# Patient Record
Sex: MALE | Race: WHITE | NOT HISPANIC OR LATINO | Employment: OTHER | URBAN - METROPOLITAN AREA
[De-identification: names, ages, dates, MRNs, and addresses within clinical notes are randomized per-mention and may not be internally consistent; named-entity substitution may affect disease eponyms.]

---

## 2017-03-24 ENCOUNTER — DOCUMENTATION ONLY (OUTPATIENT)
Dept: FAMILY MEDICINE | Facility: CLINIC | Age: 82
End: 2017-03-24

## 2017-03-24 ENCOUNTER — OFFICE VISIT (OUTPATIENT)
Dept: FAMILY MEDICINE | Facility: CLINIC | Age: 82
End: 2017-03-24
Payer: MEDICARE

## 2017-03-24 VITALS
BODY MASS INDEX: 27.54 KG/M2 | WEIGHT: 181.69 LBS | HEIGHT: 68 IN | SYSTOLIC BLOOD PRESSURE: 125 MMHG | TEMPERATURE: 98 F | OXYGEN SATURATION: 97 % | DIASTOLIC BLOOD PRESSURE: 68 MMHG | HEART RATE: 63 BPM | RESPIRATION RATE: 16 BRPM

## 2017-03-24 DIAGNOSIS — H61.21 IMPACTED CERUMEN OF RIGHT EAR: ICD-10-CM

## 2017-03-24 DIAGNOSIS — N40.0 ENLARGED PROSTATE ON RECTAL EXAMINATION: ICD-10-CM

## 2017-03-24 DIAGNOSIS — E03.9 HYPOTHYROIDISM, UNSPECIFIED TYPE: ICD-10-CM

## 2017-03-24 DIAGNOSIS — I10 ESSENTIAL HYPERTENSION: Primary | ICD-10-CM

## 2017-03-24 DIAGNOSIS — E78.5 HYPERLIPIDEMIA, UNSPECIFIED HYPERLIPIDEMIA TYPE: ICD-10-CM

## 2017-03-24 DIAGNOSIS — R09.82 POSTNASAL DRIP: ICD-10-CM

## 2017-03-24 PROCEDURE — 1126F AMNT PAIN NOTED NONE PRSNT: CPT | Mod: S$GLB,,, | Performed by: INTERNAL MEDICINE

## 2017-03-24 PROCEDURE — 3078F DIAST BP <80 MM HG: CPT | Mod: S$GLB,,, | Performed by: INTERNAL MEDICINE

## 2017-03-24 PROCEDURE — 1159F MED LIST DOCD IN RCRD: CPT | Mod: S$GLB,,, | Performed by: INTERNAL MEDICINE

## 2017-03-24 PROCEDURE — 3074F SYST BP LT 130 MM HG: CPT | Mod: S$GLB,,, | Performed by: INTERNAL MEDICINE

## 2017-03-24 PROCEDURE — 1157F ADVNC CARE PLAN IN RCRD: CPT | Mod: S$GLB,,, | Performed by: INTERNAL MEDICINE

## 2017-03-24 PROCEDURE — 1160F RVW MEDS BY RX/DR IN RCRD: CPT | Mod: S$GLB,,, | Performed by: INTERNAL MEDICINE

## 2017-03-24 PROCEDURE — 69210 REMOVE IMPACTED EAR WAX UNI: CPT | Mod: S$GLB,,, | Performed by: INTERNAL MEDICINE

## 2017-03-24 PROCEDURE — 99203 OFFICE O/P NEW LOW 30 MIN: CPT | Mod: 25,S$GLB,, | Performed by: INTERNAL MEDICINE

## 2017-03-24 PROCEDURE — 99499 UNLISTED E&M SERVICE: CPT | Mod: S$GLB,,, | Performed by: INTERNAL MEDICINE

## 2017-03-24 RX ORDER — VITAMIN E 268 MG
400 CAPSULE ORAL DAILY
COMMUNITY
End: 2018-10-01 | Stop reason: ALTCHOICE

## 2017-03-24 RX ORDER — FLUTICASONE PROPIONATE 50 MCG
2 SPRAY, SUSPENSION (ML) NASAL DAILY
Qty: 16 G | Refills: 2 | Status: SHIPPED | OUTPATIENT
Start: 2017-03-24 | End: 2017-08-23 | Stop reason: SDUPTHER

## 2017-03-24 RX ORDER — ATORVASTATIN CALCIUM 10 MG/1
1 TABLET, FILM COATED ORAL NIGHTLY
COMMUNITY
Start: 2017-03-12 | End: 2017-10-04

## 2017-03-24 NOTE — MR AVS SNAPSHOT
[facility]  [address]  [place] [address]  Phone: [phone]  Fax: [phone]                  Adriano Borjas   3/24/2017 1:40 PM   Office Visit    Description:  Male : 1935   Provider:  Bud Kamara MD   Department:  [facility]           Reason for Visit     Establish Care           Diagnoses this Visit        Comments    Essential hypertension    -  Primary     Hyperlipidemia, unspecified hyperlipidemia type         Hypothyroidism, unspecified type         Enlarged prostate on rectal examination         Impacted cerumen of right ear         Postnasal drip                To Do List           Future Appointments        Provider Department Dept Phone    2017 1:40 PM Bud Kamara MD [facility] [phone]      Goals (5 Years of Data)     None      Follow-Up and Disposition     Return in about 3 months (around 2017).    Follow-up and Disposition History       These Medications        Disp Refills Start End    fluticasone (FLONASE) 50 mcg/actuation nasal spray 16 g 2 3/24/2017     2 sprays by Each Nare route once daily. - Each Nare    Pharmacy: [facility] [address] [place] [address] Ph #: [phone]         [facility]     [facility] Care [facility] -  Assistance  Registered nurses in the [facility] On Call Center provide clinical advisement, health education, appointment booking, and other advisory services.  Call for this free service at [phone].             Medications           Message regarding Medications     Verify the changes and/or additions to your medication regime listed below are the same as discussed with your clinician today.  If any of these changes or additions are incorrect, please notify your healthcare provider.        START taking these NEW medications        Refills    fluticasone (FLONASE) 50 mcg/actuation nasal spray 2    Si sprays by Each Nare  "route once daily.    Class: Normal    Route: Each Nare      STOP taking these medications     escitalopram oxalate (LEXAPRO) 20 MG tablet     hydrochlorothiazide (HYDRODIURIL) 12.5 MG Tab     metronidazole 1 % GlwP     pravastatin (PRAVACHOL) 20 MG tablet     PROCTOSOL HC 2.5 % rectal cream     tamsulosin (FLOMAX) 0.4 mg Cp24 Take 1 capsule (0.4 mg total) by mouth once daily.    dronedarone (MULTAQ) 400 mg Tab Take 200 mg by mouth 2 (two) times daily with meals.           Verify that the below list of medications is an accurate representation of the medications you are currently taking.  If none reported, the list may be blank. If incorrect, please contact your healthcare provider. Carry this list with you in case of emergency.           Current Medications     aspirin (ECOTRIN) 81 MG EC tablet Take 1 tablet (81 mg total) by mouth once daily.    atorvastatin (LIPITOR) 10 MG tablet Take 1 tablet by mouth nightly.    CALCIUM CARB/MAGNESIUM OXID/D3 (CALCIUM CARB-MAG OXIDE-VIT D3 ORAL) Take by mouth once daily.    levothyroxine (SYNTHROID) 75 MCG tablet Take 75 mcg by mouth once daily.      losartan (COZAAR) 25 MG tablet     vitamin E 400 UNIT capsule Take 400 Units by mouth once daily.    fluticasone (FLONASE) 50 mcg/actuation nasal spray 2 sprays by Each Nare route once daily.           Clinical Reference Information           Your Vitals Were     BP Pulse Temp Resp Height Weight    125/68 (BP Location: Left arm, Patient Position: Sitting, BP Method: Automatic) 63 98.2 °F (36.8 °C) (Oral) 16 5' 8" (1.727 m) 82.4 kg (181 lb 10.5 oz)    SpO2 BMI             97% 27.62 kg/m2         Blood Pressure          Most Recent Value    BP  125/68      Allergies as of 3/24/2017     Ciprofloxacin    Codeine    Sulfa (Sulfonamide Antibiotics)      Immunizations Administered on Date of Encounter - 3/24/2017     None      Orders Placed During Today's Visit     Future Labs/Procedures Expected by Expires    CBC auto differential  " 3/24/2017 3/25/2018    Comprehensive metabolic panel  3/24/2017 3/25/2018    Lipid panel  3/24/2017 3/25/2018    PSA, Screening  3/24/2017 3/25/2018    TSH  3/24/2017 3/24/2018      Instructions    Do not use a Q-tip.  You need to clear it take a mixture of peroxide, rubbing alcohol and mineral oil and put about 5 drops into your ear and leave it in for 10 minutes similar drain out.    Low-Salt Diet  This diet removes foods that are high in salt. It also limits the amount of salt you use when cooking. It is most often used for people with high blood pressure, edema (fluid retention), and kidney, liver, or heart disease.  Table salt contains the mineral sodium. Your body needs sodium to work normally. But too much sodium can make your health problems worse. Your healthcare provider is recommending a low-salt (also called low-sodium) diet for you. Your total daily allowance of salt is 1,500 to 2,300 milligrams (mg). It is less than 1 teaspoon of table salt. This means you can have only about 500 to 700 mg of sodium at each meal. People with certain health problems should limit salt intake to the lower end of the recommended range.    When you cook, dont add much salt. If you can cook without using salt, even better. Dont add salt to your food at the table.  When shopping, read food labels. Salt is often called sodium on the label. Choose foods that are salt-free, low salt, or very low salt. Note that foods with reduced salt may not lower your salt intake enough.    Beans, potatoes, and pasta  Ok: Dry beans, split peas, lentils, potatoes, rice, macaroni, pasta, spaghetti without added salt  Avoid: Potato chips, tortilla chips, and similar products  Breads and cereals  Ok: Low-sodium breads, rolls, cereals, and cakes; low-salt crackers, matzo crackers  Avoid: Salted crackers, pretzels, popcorn, Armenian toast, pancakes, muffins  Dairy  Ok: Milk, chocolate milk, hot chocolate mix, low-salt cheeses, and yogurt  Avoid:  Processed cheese and cheese spreads; Roquefort, Camembert, and cottage cheese; buttermilk, instant breakfast drink  Desserts  Ok: Ice cream, frozen yogurt, juice bars, gelatin, cookies and pies, sugar, honey, jelly, hard candy  Avoid: Most pies, cakes and cookies prepared or processed with salt; instant pudding  Drinks  Ok: Tea, coffee, fizzy (carbonated) drinks, juices  Avoid: Flavored coffees, electrolyte replacement drinks, sports drinks  Meats  Ok: All fresh meat, fish, poultry, low-salt tuna, eggs, egg substitute  Avoid: Smoked, pickled, brine-cured, or salted meats and fish. This includes valverde, chipped beef, corned beef, hot dogs, deli meats, ham, kosher meats, salt pork, sausage, canned tuna, salted codfish, smoked salmon, herring, sardines, or anchovies.  Seasonings and spices  Ok: Most seasonings are okay. Good substitutes for salt include: fresh herb blends, hot sauce, lemon, garlic, moralez, vinegar, dry mustard, parsley, cilantro, horseradish, tomato paste, regular margarine, mayonnaise, unsalted butter, cream cheese, vegetable oil, cream, low-salt salad dressing and gravy.  Avoid: Regular ketchup, relishes, pickles, soy sauce, teriyaki sauce, Worcestershire sauce, BBQ sauce, tartar sauce, meat tenderizer, chili sauce, regular gravy, regular salad dressing, salted butter  Soups  Ok: Low-salt soups and broths made with allowed foods  Avoid: Bouillon cubes, soups with smoked or salted meats, regular soup and broth  Vegetables  Ok: Most vegetables are okay; also low-salt tomato and vegetable juices  Avoid: Sauerkraut and other brine-soaked vegetables; pickles and other pickled vegetables; tomato juice, olives  © 8662-3073 [employer]. [address] [place] [address], [place] [address]. All rights reserved. This information is not intended as a substitute for professional medical care. Always follow your healthcare professional's instructions.  .         Language Assistance Services     ATTENTION:  Language assistance services are available, free of charge. Please call [phone].      ATENCIÓN: Si habla brenda, tiene a damon disposición servicios gratuitos de asistencia lingüística. Llame al [phone].     CHÚ Ý: N?u b?n nói Ti?ng Vi?t, có các d?ch v? h? tr? ngôn ng? mi?n phí dành cho b?n. G?i s? [phone].         [facility] complies with applicable Federal civil rights laws and does not discriminate on the basis of race, color, national origin, age, disability, or sex.

## 2017-03-24 NOTE — PATIENT INSTRUCTIONS
Do not use a Q-tip.  You need to clear it take a mixture of peroxide, rubbing alcohol and mineral oil and put about 5 drops into your ear and leave it in for 10 minutes similar drain out.    Low-Salt Diet  This diet removes foods that are high in salt. It also limits the amount of salt you use when cooking. It is most often used for people with high blood pressure, edema (fluid retention), and kidney, liver, or heart disease.  Table salt contains the mineral sodium. Your body needs sodium to work normally. But too much sodium can make your health problems worse. Your healthcare provider is recommending a low-salt (also called low-sodium) diet for you. Your total daily allowance of salt is 1,500 to 2,300 milligrams (mg). It is less than 1 teaspoon of table salt. This means you can have only about 500 to 700 mg of sodium at each meal. People with certain health problems should limit salt intake to the lower end of the recommended range.    When you cook, dont add much salt. If you can cook without using salt, even better. Dont add salt to your food at the table.  When shopping, read food labels. Salt is often called sodium on the label. Choose foods that are salt-free, low salt, or very low salt. Note that foods with reduced salt may not lower your salt intake enough.    Beans, potatoes, and pasta  Ok: Dry beans, split peas, lentils, potatoes, rice, macaroni, pasta, spaghetti without added salt  Avoid: Potato chips, tortilla chips, and similar products  Breads and cereals  Ok: Low-sodium breads, rolls, cereals, and cakes; low-salt crackers, matzo crackers  Avoid: Salted crackers, pretzels, popcorn, [place]serratian toast, pancakes, muffins  Dairy  Ok: Milk, chocolate milk, hot chocolate mix, low-salt cheeses, and yogurt  Avoid: Processed cheese and cheese spreads; Roquefort, Camembert, and cottage cheese; buttermilk, instant breakfast drink  Desserts  Ok: Ice cream, frozen yogurt, juice bars, gelatin, cookies and pies, sugar,  honey, jelly, hard candy  Avoid: Most pies, cakes and cookies prepared or processed with salt; instant pudding  Drinks  Ok: Tea, coffee, fizzy (carbonated) drinks, juices  Avoid: Flavored coffees, electrolyte replacement drinks, sports drinks  Meats  Ok: All fresh meat, fish, poultry, low-salt tuna, eggs, egg substitute  Avoid: Smoked, pickled, brine-cured, or salted meats and fish. This includes valverde, chipped beef, corned beef, hot dogs, deli meats, ham, kosher meats, salt pork, sausage, canned tuna, salted codfish, smoked salmon, herring, sardines, or anchovies.  Seasonings and spices  Ok: Most seasonings are okay. Good substitutes for salt include: fresh herb blends, hot sauce, lemon, garlic, moralez, vinegar, dry mustard, parsley, cilantro, horseradish, tomato paste, regular margarine, mayonnaise, unsalted butter, cream cheese, vegetable oil, cream, low-salt salad dressing and gravy.  Avoid: Regular ketchup, relishes, pickles, soy sauce, teriyaki sauce, Worcestershire sauce, BBQ sauce, tartar sauce, meat tenderizer, chili sauce, regular gravy, regular salad dressing, salted butter  Soups  Ok: Low-salt soups and broths made with allowed foods  Avoid: Bouillon cubes, soups with smoked or salted meats, regular soup and broth  Vegetables  Ok: Most vegetables are okay; also low-salt tomato and vegetable juices  Avoid: Sauerkraut and other brine-soaked vegetables; pickles and other pickled vegetables; tomato juice, olives  © 0557-7265 [employer]. [address] [place] [address], [place] [address]. All rights reserved. This information is not intended as a substitute for professional medical care. Always follow your healthcare professional's instructions.  .

## 2017-03-24 NOTE — PROGRESS NOTES
Health Maintenance Due   Topic Date Due    TETANUS VACCINE  05/02/1955    Zoster Vaccine  05/02/1997    Pneumococcal (65+) (1 of 2 - PCV13) 05/02/2002    Lipid Panel  09/02/2015    Influenza Vaccine  08/01/2016

## 2017-03-24 NOTE — PROGRESS NOTES
Subjective:       Patient ID: Adriano Borjas is a 81 y.o. male.    Chief Complaint: Establish Care    HPI   The patient has a history of elevated PSA about 5.  He seen urologist they did not biopsy it.  He's asymptomatic without nocturia or urinary frequency.    The patient is a history of a TIA 2 years ago.  He has no further symptoms.  He at that time had atrial fibrillation but is been taken off medications because it has not recurred.  He is however on aspirin    Patient has decreased hearing from the right ear for a month.  No pain.  He tries to clean his ears out with Q-tips.    Patient reports that he feels like there is something in his throat most the time; he has to clear his throat frequently.      CHIEF COMPLAINT: Hyperlipidemia. cholesterol screening: no.   HPI:     ONSET:    MODIFIERS/TREATMENTS: . Taking medications: yes.  Lipitor . Non-compliance with following diet: no. .     SYMPTOMS/RELATED:Possible medication side effects include:   Myalgia: no.  .     REVIEW OF SYMPTOMS: past weights:   Wt Readings from Last 1 Encounters:   03/24/17 1353 82.4 kg (181 lb 10.5 oz)                                                     Last lipids: total   Lab Results   Component Value Date    CHOL 221 (H) 09/02/2014                                                                     HDL   Lab Results   Component Value Date    HDL 33 (L) 09/02/2014    HDL 38 (L) 11/30/2012    HDL 38 (L) 11/29/2012                                                                     LDL   Lab Results   Component Value Date    LDLCALC 151.2 09/02/2014    LDLCALC 152 11/30/2012    LDLCALC 142 11/29/2012                                                                     TRIG   Lab Results   Component Value Date    TRIG 184 (H) 09/02/2014                            CHIEF COMPLAINT: Hypertension  HPI:     ONSET:      QUALITY/COURSE:   Controlled:  yes     INTENSITY/SEVERITY:  Average blood pressure is 125/75.     MODIFIERS/TREATMENTS:   "Taking medications: yes. .High sodium intake: no. alcohol: no      The following symptoms are positive only if BOLDED, otherwise are negative.      SYMPTOMS/RELATED: Possible medication side effects include:   Depression..  . Cough. . Constipation.    REVIEW OF SYMPTOMS: . Weight_loss . Weight_gain . Leg_cramps .Potency_problems .    TARGET ORGAN DAMAGE:: angina/ prior myocardial infarction, chronic kidney disease, heart failure, left ventricular hypertrophy, peripheral artery disease, prior coronary revascularization, retinopathy, stroke. transient ischemic attack.                                                       Review of Systems   Constitutional: Negative for fatigue, fever and unexpected weight change.   HENT: Negative for dental problem, hearing loss, nosebleeds, rhinorrhea, tinnitus, trouble swallowing and voice change.    Eyes: Negative for itching and visual disturbance.   Respiratory: Negative for cough, shortness of breath and wheezing.    Cardiovascular: Negative for chest pain and palpitations.   Gastrointestinal: Negative for abdominal pain, blood in stool, constipation, diarrhea, nausea and vomiting.   Endocrine: Negative for cold intolerance, heat intolerance, polydipsia and polyphagia.   Genitourinary: Negative for difficulty urinating and dysuria.   Musculoskeletal: Negative for arthralgias.   Allergic/Immunologic: Negative for environmental allergies and immunocompromised state.   Neurological: Negative for dizziness, seizures, weakness, numbness and headaches.   Hematological: Does not bruise/bleed easily.   Psychiatric/Behavioral: Negative for agitation, dysphoric mood, sleep disturbance and suicidal ideas. The patient is not nervous/anxious.        Objective:      Vitals:    03/24/17 1353   BP: 125/68   Pulse: 63   Resp: 16   Temp: 98.2 °F (36.8 °C)   TempSrc: Oral   SpO2: 97%   Weight: 82.4 kg (181 lb 10.5 oz)   Height: 5' 8" (1.727 m)   PainSc: 0-No pain     Physical Exam "   Constitutional: He is oriented to person, place, and time. He appears well-developed and well-nourished.   HENT:   Head: Normocephalic and atraumatic.   Left Ear: External ear normal.   Nose: Nose normal.   Mouth/Throat: Oropharynx is clear and moist.   Right ear wax impaction   Eyes: Conjunctivae and EOM are normal. Pupils are equal, round, and reactive to light. No scleral icterus.   Neck: Normal range of motion. Neck supple. No thyromegaly present.   Cardiovascular: Normal rate, regular rhythm, normal heart sounds and intact distal pulses.  Exam reveals no friction rub.    No murmur heard.  Pulmonary/Chest: Effort normal and breath sounds normal. No respiratory distress. He has no wheezes. He has no rales. He exhibits no tenderness.   Abdominal: Soft. Bowel sounds are normal. He exhibits no distension. There is no tenderness.   Genitourinary: Rectum normal and penis normal.   Genitourinary Comments: Prostate is enlarged but no nodules.   Musculoskeletal: Normal range of motion. He exhibits no edema or deformity.   Lymphadenopathy:     He has no cervical adenopathy.   Neurological: He is oriented to person, place, and time. He has normal reflexes. He displays normal reflexes. No cranial nerve deficit. He exhibits normal muscle tone. Coordination normal.   Skin: Skin is warm and dry. No rash noted.   Psychiatric: He has a normal mood and affect. His behavior is normal. Judgment and thought content normal.   Nursing note and vitals reviewed.      procedure: Verbal consent given.  Wax removed from the right ear with an ear curette.  Tympanic membrane well visualized and was normal.  Hearing equal bilaterally afterwards..  Assessment:       1. Essential hypertension    2. Hyperlipidemia, unspecified hyperlipidemia type    3. Hypothyroidism, unspecified type    4. Enlarged prostate on rectal examination    5. Impacted cerumen of right ear    6. Postnasal drip          Plan:     Essential hypertension  -     CBC auto  differential; Future; Expected date: 3/24/17  -     Comprehensive metabolic panel; Future; Expected date: 3/24/17    Hyperlipidemia, unspecified hyperlipidemia type  -     Lipid panel; Future; Expected date: 3/24/17    Hypothyroidism, unspecified type  -     TSH; Future; Expected date: 3/24/17    Enlarged prostate on rectal examination  -     PSA, Screening; Future; Expected date: 3/24/17    Impacted cerumen of right ear    Postnasal drip  -     fluticasone (FLONASE) 50 mcg/actuation nasal spray; 2 sprays by Each Nare route once daily.  Dispense: 16 g; Refill: 2      No Follow-up on file.

## 2017-04-06 ENCOUNTER — HOSPITAL ENCOUNTER (EMERGENCY)
Facility: HOSPITAL | Age: 82
Discharge: HOME | End: 2017-04-06
Attending: EMERGENCY MEDICINE
Payer: MEDICARE

## 2017-04-06 VITALS
BODY MASS INDEX: 27.13 KG/M2 | WEIGHT: 179 LBS | RESPIRATION RATE: 16 BRPM | TEMPERATURE: 97 F | SYSTOLIC BLOOD PRESSURE: 112 MMHG | OXYGEN SATURATION: 96 % | DIASTOLIC BLOOD PRESSURE: 62 MMHG | HEIGHT: 68 IN | HEART RATE: 52 BPM

## 2017-04-06 DIAGNOSIS — R00.2 PALPITATIONS: Primary | ICD-10-CM

## 2017-04-06 LAB
ALBUMIN SERPL BCP-MCNC: 3.2 G/DL
ALP SERPL-CCNC: 57 U/L
ALT SERPL W/O P-5'-P-CCNC: 25 U/L
ANION GAP SERPL CALC-SCNC: 9 MMOL/L
AST SERPL-CCNC: 30 U/L
BASOPHILS # BLD AUTO: 0.1 K/UL
BASOPHILS NFR BLD: 0.6 %
BILIRUB SERPL-MCNC: 0.5 MG/DL
BUN SERPL-MCNC: 15 MG/DL
CALCIUM SERPL-MCNC: 8.8 MG/DL
CHLORIDE SERPL-SCNC: 104 MMOL/L
CO2 SERPL-SCNC: 27 MMOL/L
CREAT SERPL-MCNC: 1.1 MG/DL
DIFFERENTIAL METHOD: ABNORMAL
EOSINOPHIL # BLD AUTO: 0.3 K/UL
EOSINOPHIL NFR BLD: 3.6 %
ERYTHROCYTE [DISTWIDTH] IN BLOOD BY AUTOMATED COUNT: 13 %
EST. GFR  (AFRICAN AMERICAN): >60 ML/MIN/1.73 M^2
EST. GFR  (NON AFRICAN AMERICAN): >60 ML/MIN/1.73 M^2
GLUCOSE SERPL-MCNC: 101 MG/DL
HCT VFR BLD AUTO: 42 %
HGB BLD-MCNC: 13.5 G/DL
LYMPHOCYTES # BLD AUTO: 2.8 K/UL
LYMPHOCYTES NFR BLD: 34 %
MAGNESIUM SERPL-MCNC: 2.2 MG/DL
MCH RBC QN AUTO: 29.8 PG
MCHC RBC AUTO-ENTMCNC: 32.1 %
MCV RBC AUTO: 93 FL
MONOCYTES # BLD AUTO: 0.9 K/UL
MONOCYTES NFR BLD: 10.6 %
NEUTROPHILS # BLD AUTO: 4.2 K/UL
NEUTROPHILS NFR BLD: 51.2 %
PLATELET # BLD AUTO: 121 K/UL
PMV BLD AUTO: 12.7 FL
POTASSIUM SERPL-SCNC: 3.8 MMOL/L
PROT SERPL-MCNC: 6.5 G/DL
RBC # BLD AUTO: 4.51 M/UL
SODIUM SERPL-SCNC: 140 MMOL/L
WBC # BLD AUTO: 8.3 K/UL

## 2017-04-06 PROCEDURE — 80053 COMPREHEN METABOLIC PANEL: CPT

## 2017-04-06 PROCEDURE — 83735 ASSAY OF MAGNESIUM: CPT

## 2017-04-06 PROCEDURE — 99284 EMERGENCY DEPT VISIT MOD MDM: CPT

## 2017-04-06 PROCEDURE — 85025 COMPLETE CBC W/AUTO DIFF WBC: CPT

## 2017-04-06 PROCEDURE — 36415 COLL VENOUS BLD VENIPUNCTURE: CPT

## 2017-04-06 PROCEDURE — 93005 ELECTROCARDIOGRAM TRACING: CPT

## 2017-04-06 NOTE — ED PROVIDER NOTES
Encounter Date: 4/6/2017       History     Chief Complaint   Patient presents with    Irregular Heart Beat     pt reports feeling like heart is skipping a beat, denies cp, mild sob; stopped taking Multaq approx 1 week ago     Review of patient's allergies indicates:   Allergen Reactions    Ciprofloxacin Other (See Comments)     Shoulder and leg pain/cramping     Codeine Other (See Comments)     Pt cannot remember been a long time ago    Sulfa (sulfonamide antibiotics)      Allergic reaction to bactrim     HPI Comments: 81-year-old male with a past medical history of atrial fibrillation presents with a chief complaint of palpitations.  He reports that it started approximately 2 hours prior to arrival and it felt like his heart was skipping a beat.  He reports that it is associated with slight shortness of breath.  He denies any chest pain, nausea/vomiting, diarrhea, or abdominal pain.  The patient took a Multaq when his symptoms started.  This medication was recently stopped by his cardiologist, Dr. Piper approximately 10 days ago.  He reports his symptoms have now resolved since being in the Emergency Department.    The history is provided by the patient.     Past Medical History:   Diagnosis Date    A-fib     Elevated cholesterol     Essential hypertension     Kidney stone     passed on his own    Osteoarthritis     Thyroid disease      Past Surgical History:   Procedure Laterality Date    CYSTOSCOPY      EYE SURGERY       Family History   Problem Relation Age of Onset    Cancer Mother     Diabetes Mother     Hyperlipidemia Mother     Heart disease Father     Hyperlipidemia Father     Dementia Sister     Dementia Brother     Heart disease Brother     Migraines Daughter     Tremor Daughter     Tremor Son     Urolithiasis Neg Hx     Prostate cancer Neg Hx     Kidney cancer Neg Hx      Social History   Substance Use Topics    Smoking status: Former Smoker     Years: 15.00    Smokeless  tobacco: Never Used    Alcohol use No     Review of Systems   Constitutional: Negative for chills, diaphoresis, fatigue and fever.   HENT: Negative for congestion and rhinorrhea.    Respiratory: Positive for shortness of breath. Negative for cough.    Cardiovascular: Positive for palpitations. Negative for chest pain.   Gastrointestinal: Negative for abdominal pain, diarrhea, nausea and vomiting.   Genitourinary: Negative for dysuria, frequency and testicular pain.   Musculoskeletal: Negative for gait problem.   Skin: Negative for color change.   Neurological: Negative for dizziness and numbness.   Psychiatric/Behavioral: Negative for agitation and confusion.       Physical Exam   Initial Vitals   BP Pulse Resp Temp SpO2   04/06/17 0410 04/06/17 0410 04/06/17 0410 04/06/17 0410 04/06/17 0410   141/90 119 16 96.6 °F (35.9 °C) 99 %     Physical Exam    Nursing note and vitals reviewed.  Constitutional: He appears well-developed and well-nourished.   HENT:   Head: Normocephalic and atraumatic.   Eyes: EOM are normal. Pupils are equal, round, and reactive to light.   Neck: Neck supple.   Cardiovascular: Normal rate and regular rhythm.   Pulmonary/Chest: Breath sounds normal.   Abdominal: Soft. Bowel sounds are normal.   Musculoskeletal: Normal range of motion.   Neurological: He is alert and oriented to person, place, and time.   Skin: Skin is warm and dry.   Psychiatric: He has a normal mood and affect.         ED Course   Procedures  Labs Reviewed   COMPREHENSIVE METABOLIC PANEL   CBC W/ AUTO DIFFERENTIAL   MAGNESIUM             Medical Decision Making:   Initial Assessment:   81-year-old male presented with a chief complaint of palpitations.  Differential Diagnosis:   Initial differential diagnosis included but not limited to atrial fibrillation, atrial flutter, electrolyte abnormality, and SVT.  Clinical Tests:   Lab Tests: Ordered and Reviewed  Medical Tests: Ordered and Reviewed  ED Management:  The patient was  emergently evaluated in the ED, his evaluation was significant for an elderly male who has a normal heart rate at present.  The patient reports his symptoms are resolved at present.  The patient's EKG showed a sinus rhythm per my independent interpretation.  The patient's labs showed no acute processes.  I suspect the patient had an episode of atrial flutter, which resolved with his Multaq, taken prior to arrival.  The patient was observed in the ED without any further recurrence in his arrhythmia.  He is stable for discharge to home.  He has been instructed that he should restart his antiarrhythmic medication and he is to follow-up with his cardiologist for further care.                   ED Course     Clinical Impression:   The encounter diagnosis was Palpitations.          Randall Charles MD  04/06/17 0605

## 2017-04-06 NOTE — ED NOTES
"Patient identifiers for Adriano Borjas checked and correct.  LOC:  Patient is awake, alert, and aware of environment with an appropriate affect. Patient is oriented x 3 and speaking appropriately.  APPEARANCE:  Patient resting comfortably and in no acute distress. Patient is clean and well groomed, patient's clothing is properly fastened.  SKIN:  The skin is warm and dry. Patient has normal skin turgor and moist mucus membranes. Skin is intact; no bruising or breakdown noted.  MUSCULOSKELETAL:  Patient is moving all extremities well, no obvious deformities noted. Pulses intact.   RESPIRATORY:  Airway is open and patent. Respirations are spontaneous and non-labored with normal effort and rate.  CARDIAC:  Patient has a normal rate and rhythm. Pt states his heart "felt like it was skipping a beat". Denies feeling like this now. No peripheral edema noted. Capillary refill < 3 seconds.  ABDOMEN:  No distention noted.  Soft and non-tender upon palpation.  NEUROLOGICAL:  PERRL. Facial expression is symmetrical. Hand grasps are equal bilaterally. Normal sensation in all extremities when touched with finger.  Allergies reported:   Review of patient's allergies indicates:   Allergen Reactions    Ciprofloxacin Other (See Comments)     Shoulder and leg pain/cramping     Codeine Other (See Comments)     Pt cannot remember been a long time ago    Sulfa (sulfonamide antibiotics)      Allergic reaction to bactrim     OTHER NOTES:  Pt c/o heart skipping a beat, some SOB was reported. Pt states he denies the feeling now and is not SOB at the moment. VSS, Will continue to monitor.     "

## 2017-04-06 NOTE — ED AVS SNAPSHOT
[facility] [address]  [place] [address]               Adriano Borjas   2017  4:13 AM   ED    Description:  Male : 1935   Department:  [facility]           Your Care was Coordinated By:     Provider Role From To    Randall Charles MD Attending Provider 17 0415 --      Reason for Visit     Irregular Heart Beat           Diagnoses this Visit        Comments    Palpitations    -  Primary       ED Disposition     None           To Do List           Follow-up Information     Follow up with David Piper MD. Call today.    Specialty:  Cardiology    Contact information:    [address]  [address]  [place] [address]  [phone]        [facility] On [facility]     [facility] Nurse Care Line -  Assistance  Unless otherwise directed by your provider, please contact [facility], our nurse care line that is available for  assistance.     Registered nurses in the [facility] provide: appointment scheduling, clinical advisement, health education, and other advisory services.  Call: [phone] (toll free)               Medications           Message regarding Medications     Verify the changes and/or additions to your medication regime listed below are the same as discussed with your clinician today.  If any of these changes or additions are incorrect, please notify your healthcare provider.             Verify that the below list of medications is an accurate representation of the medications you are currently taking.  If none reported, the list may be blank. If incorrect, please contact your healthcare provider. Carry this list with you in case of emergency.           Current Medications     aspirin (ECOTRIN) 81 MG EC tablet Take 1 tablet (81 mg total) by mouth once daily.    atorvastatin (LIPITOR) 10 MG tablet Take 1 tablet by mouth nightly.    CALCIUM CARB/MAGNESIUM OXID/D3 (CALCIUM CARB-MAG OXIDE-VIT  "D3 ORAL) Take by mouth once daily.    fluticasone (FLONASE) 50 mcg/actuation nasal spray 2 sprays by Each Nare route once daily.    levothyroxine (SYNTHROID) 75 MCG tablet Take 75 mcg by mouth once daily.      losartan (COZAAR) 25 MG tablet     vitamin E 400 UNIT capsule Take 400 Units by mouth once daily.           Clinical Reference Information           Your Vitals Were     BP Pulse Temp Resp Height Weight    141/90 119 96.6 °F (35.9 °C) (Oral) 16 5' 8" (1.727 m) 81.2 kg (179 lb)    SpO2 BMI             99% 27.22 kg/m2         Allergies as of 4/6/2017        Reactions    Ciprofloxacin Other (See Comments)    Shoulder and leg pain/cramping     Codeine Other (See Comments)    Pt cannot remember been a long time ago    Sulfa (Sulfonamide Antibiotics)     Allergic reaction to bactrim      Immunizations Administered on Date of Encounter - 4/6/2017     None      ED Micro, Lab, POCT     Start Ordered       Status Ordering Provider    04/06/17 0435 04/06/17 0434  Comprehensive Metabolic Panel (CMP)  STAT      Final result     04/06/17 0435 04/06/17 0434  Complete Blood Count (CBC)  STAT      Final result     04/06/17 0435 04/06/17 0434  Magnesium  STAT      Final result       ED Imaging Orders     None        Discharge Instructions         Understanding Heart Palpitations    Heart palpitations are a symptom. Its the feeling you have when your heartbeat seems to be racing, pounding, skipping, or fluttering. Heart palpitations are most often felt in the chest. Sometimes, they may also be felt in the neck.  What causes heart palpitations?  In most cases, heart palpitations are caused by:  · Stress or anxiety  · Exercise  · Pregnancy  · Some medicines  · Caffeine  · Nicotine  · Alcohol  · Illegal drugs, such as cocaine  · Health problems, such as anemia or overactive thyroid  In some cases, heart palpitations may be caused by a problem with the heart. Abnormal heart rhythms (arrhythmias) are the main concern. They may need " to be managed by you and your healthcare provider or treated right away.  How are heart palpitations treated?  Treatments for heart palpitations depend on the cause. Options may include:  · Managing the things that trigger your heart palpitations. This could mean:  ¨ Learning ways to reduce stress and anxiety  ¨ Avoiding caffeine, nicotine, alcohol, or illegal drugs  ¨ Stopping the use of certain medicines, under your doctors guidance  · Medicines, procedures, or surgery to treat an arrhythmia or other health problem that is causing your symptoms  What are the complications of heart palpitations?  Complications of heart palpitations are rare unless they are caused by a problem such as an arrhythmia. In such cases, complications can include:  · Fainting  · Heart failure. This problem occurs when the heart is so weak it no longer pumps blood well.  · Blood clots and stroke  · Sudden cardiac arrest. This problem occurs when the heart suddenly stops beating.  When should I call my healthcare provider?  Call your healthcare provider right away if you have any of these:  · Fever of 100.4°F (38°C) or higher, or as directed  · Symptoms that dont get better with treatment, or symptoms that get worse  · New symptoms, such as chest pain, shortness of breath, dizziness, or fainting   Date Last Reviewed: 5/1/2016  © 2404-4432 [employer]. [address] [place] [address], [place] [address]. All rights reserved. This information is not intended as a substitute for professional medical care. Always follow your healthcare professional's instructions.          Your Scheduled Appointments     Jun 23, 2017  1:40 PM CDT   Established Patient Visit with Bud Kamara MD   [facility] ([facility] [place] [facility]) [address] [place] [id number] [phone]              Smoking Cessation     If you would like to quit smoking:   You may be eligible for free services if you are a [place] resident and  started smoking cigarettes before September 1, 1988.  Call the Smoking Cessation Trust (SCT) toll free at (730) 524-9182 or (569) 109-2622.   Call 1-800-QUIT-NOW if you do not meet the above criteria.   Contact us via email: [email or web address]   View our website for more information: [email or web address]         [facility] complies with applicable Federal civil rights laws and does not discriminate on the basis of race, color, national origin, age, disability, or sex.        Language Assistance Services     ATTENTION: Language assistance services are available, free of charge. Please call [phone].      ATENCIÓN: Si habla español, tiene a damon disposición servicios gratuitos de asistencia lingüística. Llame al [phone].     CHÚ Ý: N?u b?n nói Ti?ng Vi?t, có các d?ch v? h? tr? ngôn ng? mi?n phí dành cho b?n. G?i s? [phone].

## 2017-04-06 NOTE — DISCHARGE INSTRUCTIONS
Understanding Heart Palpitations    Heart palpitations are a symptom. Its the feeling you have when your heartbeat seems to be racing, pounding, skipping, or fluttering. Heart palpitations are most often felt in the chest. Sometimes, they may also be felt in the neck.  What causes heart palpitations?  In most cases, heart palpitations are caused by:  · Stress or anxiety  · Exercise  · Pregnancy  · Some medicines  · Caffeine  · Nicotine  · Alcohol  · Illegal drugs, such as cocaine  · Health problems, such as anemia or overactive thyroid  In some cases, heart palpitations may be caused by a problem with the heart. Abnormal heart rhythms (arrhythmias) are the main concern. They may need to be managed by you and your healthcare provider or treated right away.  How are heart palpitations treated?  Treatments for heart palpitations depend on the cause. Options may include:  · Managing the things that trigger your heart palpitations. This could mean:  ¨ Learning ways to reduce stress and anxiety  ¨ Avoiding caffeine, nicotine, alcohol, or illegal drugs  ¨ Stopping the use of certain medicines, under your doctors guidance  · Medicines, procedures, or surgery to treat an arrhythmia or other health problem that is causing your symptoms  What are the complications of heart palpitations?  Complications of heart palpitations are rare unless they are caused by a problem such as an arrhythmia. In such cases, complications can include:  · Fainting  · Heart failure. This problem occurs when the heart is so weak it no longer pumps blood well.  · Blood clots and stroke  · Sudden cardiac arrest. This problem occurs when the heart suddenly stops beating.  When should I call my healthcare provider?  Call your healthcare provider right away if you have any of these:  · Fever of 100.4°F (38°C) or higher, or as directed  · Symptoms that dont get better with treatment, or symptoms that get worse  · New symptoms, such as chest pain,  shortness of breath, dizziness, or fainting   Date Last Reviewed: 5/1/2016  © 5526-5170 [employer]. [address] [place] [address], [place] [address]. All rights reserved. This information is not intended as a substitute for professional medical care. Always follow your healthcare professional's instructions.

## 2017-05-02 ENCOUNTER — OFFICE VISIT (OUTPATIENT)
Dept: FAMILY MEDICINE | Facility: CLINIC | Age: 82
End: 2017-05-02
Payer: MEDICARE

## 2017-05-02 ENCOUNTER — DOCUMENTATION ONLY (OUTPATIENT)
Dept: FAMILY MEDICINE | Facility: CLINIC | Age: 82
End: 2017-05-02

## 2017-05-02 VITALS
RESPIRATION RATE: 16 BRPM | WEIGHT: 184.5 LBS | OXYGEN SATURATION: 97 % | SYSTOLIC BLOOD PRESSURE: 146 MMHG | DIASTOLIC BLOOD PRESSURE: 72 MMHG | BODY MASS INDEX: 27.96 KG/M2 | HEIGHT: 68 IN | HEART RATE: 70 BPM

## 2017-05-02 DIAGNOSIS — R09.89 LABILE HYPERTENSION: ICD-10-CM

## 2017-05-02 DIAGNOSIS — G62.9 POLYNEUROPATHY: Primary | ICD-10-CM

## 2017-05-02 DIAGNOSIS — L57.0 ACTINIC KERATOSES: ICD-10-CM

## 2017-05-02 PROCEDURE — 3078F DIAST BP <80 MM HG: CPT | Mod: S$GLB,,, | Performed by: INTERNAL MEDICINE

## 2017-05-02 PROCEDURE — 1126F AMNT PAIN NOTED NONE PRSNT: CPT | Mod: S$GLB,,, | Performed by: INTERNAL MEDICINE

## 2017-05-02 PROCEDURE — 3077F SYST BP >= 140 MM HG: CPT | Mod: S$GLB,,, | Performed by: INTERNAL MEDICINE

## 2017-05-02 PROCEDURE — 99499 UNLISTED E&M SERVICE: CPT | Mod: S$GLB,,, | Performed by: INTERNAL MEDICINE

## 2017-05-02 PROCEDURE — 1160F RVW MEDS BY RX/DR IN RCRD: CPT | Mod: S$GLB,,, | Performed by: INTERNAL MEDICINE

## 2017-05-02 PROCEDURE — 99214 OFFICE O/P EST MOD 30 MIN: CPT | Mod: S$GLB,,, | Performed by: INTERNAL MEDICINE

## 2017-05-02 PROCEDURE — 1159F MED LIST DOCD IN RCRD: CPT | Mod: S$GLB,,, | Performed by: INTERNAL MEDICINE

## 2017-05-02 RX ORDER — DRONEDARONE 400 MG/1
1 TABLET, FILM COATED ORAL DAILY
COMMUNITY
Start: 2017-04-07 | End: 2017-09-15

## 2017-05-02 NOTE — PROGRESS NOTES
Health Maintenance Due   Topic Date Due    TETANUS VACCINE  05/02/1955    Zoster Vaccine  05/02/1997    Pneumococcal (65+) (1 of 2 - PCV13) 05/02/2002    Lipid Panel  09/02/2015

## 2017-05-02 NOTE — MR AVS SNAPSHOT
[facility]  [address]  [place] [address]  Phone: [phone]  Fax: [phone]                  Adriano Borjas   2017 3:00 PM   Office Visit    Description:  Male : 1935   Provider:  Bud Kamara MD   Department:  [facility]           Reason for Visit     Knee Pain     spot on head     spots on neck           Diagnoses this Visit        Comments    Polyneuropathy    -  Primary     Actinic keratoses         Labile hypertension                To Do List           Future Appointments        Provider Department Dept Phone    2017 9:40 AM LAB, [facility] [phone]    2017 1:40 PM Bud Kamara MD [facility] [phone]    2017 2:15 PM Heavenly Lopez MD [place] [facility] [phone]      Goals (5 Years of Data)     None      Follow-Up and Disposition     Return in about 6 weeks (around 2017).    Follow-up and Disposition History      [facility]     [facility] On Call Nurse Care Line -  Assistance  Unless otherwise directed by your provider, please contact [facility], our nurse care line that is available for  assistance.     Registered nurses in the [facility] provide: appointment scheduling, clinical advisement, health education, and other advisory services.  Call: [phone] (toll free)               Medications           Message regarding Medications     Verify the changes and/or additions to your medication regime listed below are the same as discussed with your clinician today.  If any of these changes or additions are incorrect, please notify your healthcare provider.             Verify that the below list of medications is an accurate representation of the medications you are currently taking.  If none reported, the list may be blank. If incorrect, please contact your healthcare provider. Carry this list with you in case of  "emergency.           Current Medications     aspirin (ECOTRIN) 81 MG EC tablet Take 1 tablet (81 mg total) by mouth once daily.    atorvastatin (LIPITOR) 10 MG tablet Take 1 tablet by mouth nightly.    CALCIUM CARB/MAGNESIUM OXID/D3 (CALCIUM CARB-MAG OXIDE-VIT D3 ORAL) Take by mouth once daily.    fluticasone (FLONASE) 50 mcg/actuation nasal spray 2 sprays by Each Nare route once daily.    levothyroxine (SYNTHROID) 75 MCG tablet Take 75 mcg by mouth once daily.      losartan (COZAAR) 25 MG tablet     MULTAQ 400 mg Tab Take 1 tablet by mouth once daily.    vitamin E 400 UNIT capsule Take 400 Units by mouth once daily.           Clinical Reference Information           Your Vitals Were     BP Pulse Resp Height Weight SpO2    146/72 (BP Location: Right arm, Patient Position: Sitting, BP Method: Manual) 70 16 5' 8" (1.727 m) 83.7 kg (184 lb 8.4 oz) 97%    BMI                28.06 kg/m2          Blood Pressure          Most Recent Value    BP  (!)  146/72      Allergies as of 5/2/2017     Ciprofloxacin    Codeine    Sulfa (Sulfonamide Antibiotics)      Immunizations Administered on Date of Encounter - 5/2/2017     None      Orders Placed During Today's Visit      Normal Orders This Visit    Ambulatory Referral to Dermatology     Ambulatory Referral to Neurology     Future Labs/Procedures Expected by Expires    AMANDA  5/2/2017 7/1/2018    Protein electrophoresis, serum  5/2/2017 7/1/2018    RPR  5/2/2017 5/2/2018    Sedimentation rate, manual  5/2/2017 5/3/2018    TSH  5/2/2017 5/2/2018    Vitamin B12  5/2/2017 5/2/2018      Instructions    Low-Salt Diet  This diet removes foods that are high in salt. It also limits the amount of salt you use when cooking. It is most often used for people with high blood pressure, edema (fluid retention), and kidney, liver, or heart disease.  Table salt contains the mineral sodium. Your body needs sodium to work normally. But too much sodium can make your health problems worse. Your " healthcare provider is recommending a low-salt (also called low-sodium) diet for you. Your total daily allowance of salt is 1,500 to 2,300 milligrams (mg). It is less than 1 teaspoon of table salt. This means you can have only about 500 to 700 mg of sodium at each meal. People with certain health problems should limit salt intake to the lower end of the recommended range.    When you cook, dont add much salt. If you can cook without using salt, even better. Dont add salt to your food at the table.  When shopping, read food labels. Salt is often called sodium on the label. Choose foods that are salt-free, low salt, or very low salt. Note that foods with reduced salt may not lower your salt intake enough.    Beans, potatoes, and pasta  Ok: Dry beans, split peas, lentils, potatoes, rice, macaroni, pasta, spaghetti without added salt  Avoid: Potato chips, tortilla chips, and similar products  Breads and cereals  Ok: Low-sodium breads, rolls, cereals, and cakes; low-salt crackers, matzo crackers  Avoid: Salted crackers, pretzels, popcorn, Kosovan toast, pancakes, muffins  Dairy  Ok: Milk, chocolate milk, hot chocolate mix, low-salt cheeses, and yogurt  Avoid: Processed cheese and cheese spreads; Roquefort, Camembert, and cottage cheese; buttermilk, instant breakfast drink  Desserts  Ok: Ice cream, frozen yogurt, juice bars, gelatin, cookies and pies, sugar, honey, jelly, hard candy  Avoid: Most pies, cakes and cookies prepared or processed with salt; instant pudding  Drinks  Ok: Tea, coffee, fizzy (carbonated) drinks, juices  Avoid: Flavored coffees, electrolyte replacement drinks, sports drinks  Meats  Ok: All fresh meat, fish, poultry, low-salt tuna, eggs, egg substitute  Avoid: Smoked, pickled, brine-cured, or salted meats and fish. This includes valverde, chipped beef, corned beef, hot dogs, deli meats, ham, kosher meats, salt pork, sausage, canned tuna, salted codfish, smoked salmon, herring, sardines, or  anchovies.  Seasonings and spices  Ok: Most seasonings are okay. Good substitutes for salt include: fresh herb blends, hot sauce, lemon, garlic, moralez, vinegar, dry mustard, parsley, cilantro, horseradish, tomato paste, regular margarine, mayonnaise, unsalted butter, cream cheese, vegetable oil, cream, low-salt salad dressing and gravy.  Avoid: Regular ketchup, relishes, pickles, soy sauce, teriyaki sauce, Worcestershire sauce, BBQ sauce, tartar sauce, meat tenderizer, chili sauce, regular gravy, regular salad dressing, salted butter  Soups  Ok: Low-salt soups and broths made with allowed foods  Avoid: Bouillon cubes, soups with smoked or salted meats, regular soup and broth  Vegetables  Ok: Most vegetables are okay; also low-salt tomato and vegetable juices  Avoid: Sauerkraut and other brine-soaked vegetables; pickles and other pickled vegetables; tomato juice, olives  © 8057-1400 [employer]. [address] [place] [address]. All rights reserved. This information is not intended as a substitute for professional medical care. Always follow your healthcare professional's instructions.       Language Assistance Services     ATTENTION: Language assistance services are available, free of charge. Please call [phone].      ATENCIÓN: Si habla español, tiene a damon disposición servicios gratuitos de asistencia lingüística. Llame al [phone].     CHÚ Ý: N?u b?n nói Ti?ng Vi?t, có các d?ch v? h? tr? ngôn ng? mi?n phí dành cho b?n. G?i s? [phone].         [facility] complies with applicable Federal civil rights laws and does not discriminate on the basis of race, color, national origin, age, disability, or sex.

## 2017-05-02 NOTE — PROGRESS NOTES
"Subjective:       Patient ID: Adriano Borjas is a 82 y.o. male.    Chief Complaint: Knee Pain; spot on head (behind right ear); and spots on neck    HPI       Lower_Extremity_Problems(+). Pain: yes. . Injury: no.   HPI: Numbness.. Also has a funny sensation in the left knee which is only there with walking but not running.  It's not a pain.    ONSET/TIMING: . Onset    6 years  ago.     DURATION:   Intermittent         QUALITY/COURSE:    Worsening,. .     LOCATION:  Both feet     . Radiation: no.      INTENSITY/SEVERITY:. Severity is #  4   (10 point scale).        MODIFIERS/TREATMENTS: Current_Limitations_are:  none..   Taking medications: no    Physical_Therapy: no.  Chiropractor: no.     SYMPTOMS/RELATED: . --Possible medication side effects include:.     The following symptoms/statements  are positive if BOLD, negative otherwise.      CONTEXT/WHEN: . Activity . weight bearing. Inactivity.  Sudden  Work related.  Similar_problems_in past.   . Litigation_pending . X-rays. CT . MRI      REVIEW OF SYMPTOMS:   Numbness. Weakness. Muscle_Problems. Fever. Joint_Problems. Swelling. Erythema. Weight_loss. Instability.  . Weakness.     .              Review of Systems    Objective:      Vitals:    05/02/17 1526 05/02/17 1529   BP: (!) 155/80 (!) 146/72   Pulse: 70    Resp: 16    SpO2: 97%    Weight: 83.7 kg (184 lb 8.4 oz)    Height: 5' 8" (1.727 m)    PainSc: 0-No pain      Physical Exam   Constitutional: He appears well-developed and well-nourished.   Cardiovascular: Normal rate, regular rhythm and normal heart sounds.    Pulses:       Dorsalis pedis pulses are 2+ on the right side, and 2+ on the left side.   Pulmonary/Chest: Effort normal and breath sounds normal. No respiratory distress. He has no wheezes.   Abdominal: Soft. Bowel sounds are normal. There is no tenderness.   Musculoskeletal:        Right foot: There is normal range of motion and no deformity.        Left foot: There is normal range of motion and no " deformity.   Feet:   Right Foot:   Protective Sensation: 5 sites tested. 2 sites sensed.   Skin Integrity: Negative for ulcer, blister, skin breakdown, erythema, warmth, callus or dry skin.   Left Foot:   Protective Sensation: 5 sites tested. 3 sites sensed.   Skin Integrity: Negative for ulcer, blister, skin breakdown, erythema, warmth, callus or dry skin.   Skin:   Bruising of right hand at the palmar side of the MP joint.    Actinic keratoses behind the right ear and on the right forearm         Assessment:       1. Polyneuropathy    2. Actinic keratoses          Plan:     Polyneuropathy  -     Sedimentation rate, manual; Future; Expected date: 5/2/17  -     RPR; Future; Expected date: 5/2/17  -     Vitamin B12; Future; Expected date: 5/2/17  -     TSH; Future; Expected date: 5/2/17  -     AMANDA; Future; Expected date: 5/2/17  -     Protein electrophoresis, serum; Future; Expected date: 5/2/17  -     Ambulatory Referral to Neurology    Actinic keratoses  -     Ambulatory Referral to Dermatology    No Follow-up on file.

## 2017-05-02 NOTE — PATIENT INSTRUCTIONS

## 2017-06-02 ENCOUNTER — CLINICAL SUPPORT (OUTPATIENT)
Dept: LAB | Facility: HOSPITAL | Age: 82
End: 2017-06-02
Attending: INTERNAL MEDICINE
Payer: MEDICARE

## 2017-06-02 ENCOUNTER — TELEPHONE (OUTPATIENT)
Dept: FAMILY MEDICINE | Facility: CLINIC | Age: 82
End: 2017-06-02

## 2017-06-02 DIAGNOSIS — G62.9 POLYNEUROPATHY: ICD-10-CM

## 2017-06-02 LAB
ERYTHROCYTE [SEDIMENTATION RATE] IN BLOOD BY WESTERGREN METHOD: 15 MM/HR
TSH SERPL DL<=0.005 MIU/L-ACNC: 3.02 UIU/ML
VIT B12 SERPL-MCNC: 825 PG/ML

## 2017-06-02 PROCEDURE — 86038 ANTINUCLEAR ANTIBODIES: CPT

## 2017-06-02 PROCEDURE — 85651 RBC SED RATE NONAUTOMATED: CPT

## 2017-06-02 PROCEDURE — 84165 PROTEIN E-PHORESIS SERUM: CPT | Mod: 26,,, | Performed by: PATHOLOGY

## 2017-06-02 PROCEDURE — 86592 SYPHILIS TEST NON-TREP QUAL: CPT

## 2017-06-02 PROCEDURE — 82607 VITAMIN B-12: CPT

## 2017-06-02 PROCEDURE — 84165 PROTEIN E-PHORESIS SERUM: CPT

## 2017-06-02 PROCEDURE — 84443 ASSAY THYROID STIM HORMONE: CPT

## 2017-06-02 PROCEDURE — 36415 COLL VENOUS BLD VENIPUNCTURE: CPT

## 2017-06-02 NOTE — TELEPHONE ENCOUNTER
----- Message from Marie Millan sent at 6/2/2017 10:18 AM CDT -----  Contact: self [phone]  Patient is requesting lab orders to be sent to [facility] so he can get his blood drawn.

## 2017-06-05 LAB
ALBUMIN SERPL ELPH-MCNC: 3.57 G/DL
ALPHA1 GLOB SERPL ELPH-MCNC: 0.27 G/DL
ALPHA2 GLOB SERPL ELPH-MCNC: 0.59 G/DL
ANA SER QL IF: NORMAL
B-GLOBULIN SERPL ELPH-MCNC: 0.74 G/DL
GAMMA GLOB SERPL ELPH-MCNC: 1.53 G/DL
PATHOLOGIST INTERPRETATION SPE: NORMAL
PROT SERPL-MCNC: 6.7 G/DL
RPR SER QL: NORMAL

## 2017-06-12 NOTE — PROGRESS NOTES
Subjective:       Patient ID: Adriano Borjas is a 82 y.o. male.    Chief Complaint: No chief complaint on file.  Patient requested lab orders, did not specify where he wanted them drawn. Labs sent to [facility].   HPI  Review of Systems    Objective:      Physical Exam    Assessment:       1. Polyneuropathy        Plan:

## 2017-06-15 ENCOUNTER — DOCUMENTATION ONLY (OUTPATIENT)
Dept: FAMILY MEDICINE | Facility: CLINIC | Age: 82
End: 2017-06-15

## 2017-06-16 ENCOUNTER — OFFICE VISIT (OUTPATIENT)
Dept: FAMILY MEDICINE | Facility: CLINIC | Age: 82
End: 2017-06-16
Payer: MEDICARE

## 2017-06-16 ENCOUNTER — TELEPHONE (OUTPATIENT)
Dept: NEUROLOGY | Facility: CLINIC | Age: 82
End: 2017-06-16

## 2017-06-16 VITALS
WEIGHT: 181.88 LBS | HEART RATE: 70 BPM | SYSTOLIC BLOOD PRESSURE: 130 MMHG | DIASTOLIC BLOOD PRESSURE: 72 MMHG | BODY MASS INDEX: 27.57 KG/M2 | OXYGEN SATURATION: 97 % | TEMPERATURE: 98 F | RESPIRATION RATE: 16 BRPM | HEIGHT: 68 IN

## 2017-06-16 DIAGNOSIS — E78.5 HYPERLIPIDEMIA, UNSPECIFIED HYPERLIPIDEMIA TYPE: ICD-10-CM

## 2017-06-16 DIAGNOSIS — M21.371 RIGHT FOOT DROP: Primary | ICD-10-CM

## 2017-06-16 DIAGNOSIS — I10 ESSENTIAL HYPERTENSION: ICD-10-CM

## 2017-06-16 PROCEDURE — 99214 OFFICE O/P EST MOD 30 MIN: CPT | Mod: S$GLB,,, | Performed by: INTERNAL MEDICINE

## 2017-06-16 PROCEDURE — 99499 UNLISTED E&M SERVICE: CPT | Mod: S$GLB,,, | Performed by: INTERNAL MEDICINE

## 2017-06-16 PROCEDURE — 1126F AMNT PAIN NOTED NONE PRSNT: CPT | Mod: S$GLB,,, | Performed by: INTERNAL MEDICINE

## 2017-06-16 PROCEDURE — 1159F MED LIST DOCD IN RCRD: CPT | Mod: S$GLB,,, | Performed by: INTERNAL MEDICINE

## 2017-06-16 NOTE — TELEPHONE ENCOUNTER
----- Message from Sharri Gardner sent at 6/16/2017  2:39 PM CDT -----  Pt needs appt access.  Pt does not want to go to [facility].  Please call at home #

## 2017-06-16 NOTE — TELEPHONE ENCOUNTER
"Patient's ex-wife said, "she would talk to her kids to see if the patient can get a ride."  She will call back to schedule.  "

## 2017-06-16 NOTE — PROGRESS NOTES
"Subjective:       Patient ID: Adriano Borjas is a 82 y.o. male.    Chief Complaint: Follow-up (labs) and Extremity Weakness (lower body)    HPI       Lower_Extremity_Problems(+). Pain: no. . Injury: no.   HPI: The patient has difficulty getting up when he is on his hands and knees but otherwise is unaffected    ONSET/TIMING: . Onset 6 months    ago.     DURATION:   Intermittent         QUALITY/COURSE:    Slowly worsening,. .     LOCATION:    Both legs    . Radiation: no.      INTENSITY/SEVERITY:. Severity is #   5   (10 point scale).        MODIFIERS/TREATMENTS: Current_Limitations_are:  none..   Taking medications: no    Physical_Therapy: no.  Chiropractor: no.     SYMPTOMS/RELATED: . --Possible medication side effects include:.     The following symptoms/statements  are positive if BOLD, negative otherwise.      CONTEXT/WHEN: . Activity . weight bearing. Inactivity.  Sudden  Work related.  Similar_problems_in past.   . Litigation_pending . X-rays. CT . MRI      REVIEW OF SYMPTOMS:   Numbness. Weakness. Muscle_Problems. Fever. Joint_Problems. Swelling. Erythema. Weight_loss. Instability.      .             Review of Systems    Objective:      Vitals:    06/16/17 1348 06/16/17 1428   BP: (!) 140/78 130/72   Pulse: 70    Resp: 16    Temp: 98.2 °F (36.8 °C)    TempSrc: Oral    SpO2: 97%    Weight: 82.5 kg (181 lb 14.1 oz)    Height: 5' 8" (1.727 m)    PainSc: 0-No pain      Physical Exam   Constitutional: He appears well-developed and well-nourished.   Eyes: Pupils are equal, round, and reactive to light.   Cardiovascular: Normal rate, regular rhythm and normal heart sounds.    Pulmonary/Chest: Effort normal and breath sounds normal.   Abdominal: Soft. There is no tenderness.   Musculoskeletal:   Weakness of dorsiflexion of the right great toe and the right foot.  He can stand on his tiptoes but is difficult form.  No trouble getting out of a chair and the quadricep muscles are strong   Neurological: He is alert. He " displays abnormal reflex (risk reflexes).   Psychiatric: He has a normal mood and affect. His behavior is normal. Thought content normal.   Nursing note and vitals reviewed.        Assessment:       1. Right foot drop    2. Essential hypertension    3. Hyperlipidemia, unspecified hyperlipidemia type          Plan:     Right foot drop  -     Ambulatory Referral to Neurology  -     Ambulatory Referral to Physical/Occupational Therapy  -     GLUCOSE TOLERANCE 2 HOUR; Future; Expected date: 06/16/2017    Essential hypertension  -     Comprehensive metabolic panel; Future; Expected date: 06/16/2017    Hyperlipidemia, unspecified hyperlipidemia type  -     Lipid panel; Future; Expected date: 06/16/2017      Return in about 3 months (around 9/16/2017).

## 2017-06-26 ENCOUNTER — CLINICAL SUPPORT (OUTPATIENT)
Dept: REHABILITATION | Facility: HOSPITAL | Age: 82
End: 2017-06-26
Attending: INTERNAL MEDICINE
Payer: MEDICARE

## 2017-06-26 DIAGNOSIS — R26.9 GAIT ABNORMALITY: ICD-10-CM

## 2017-06-26 DIAGNOSIS — M21.371 FOOT DROP, RIGHT: Primary | ICD-10-CM

## 2017-06-26 PROCEDURE — G8978 MOBILITY CURRENT STATUS: HCPCS | Mod: CJ,PN

## 2017-06-26 PROCEDURE — 97161 PT EVAL LOW COMPLEX 20 MIN: CPT | Mod: PN

## 2017-06-26 PROCEDURE — G8979 MOBILITY GOAL STATUS: HCPCS | Mod: CJ,PN

## 2017-06-26 NOTE — PLAN OF CARE
TIME RECORD    Date: 06/26/2017    Start Time:  1500  Stop Time:  1555    PROCEDURES:    TIMED  Procedure Time Min.    Start:  Stop:     Start:  Stop:     Start:  Stop:     Start:  Stop:          UNTIMED  Procedure Time Min.   CHARLESAL Start:  Stop:     Start:  Stop:      Total Timed Minutes:    Total Timed Units:    Total Untimed Units:  1  Charges Billed/# of units:  1    OUTPATIENT PHYSICAL THERAPY   PATIENT EVALUATION  Onset Date: Insidious.  Primary Diagnosis:   1. Foot drop, right     2. Gait abnormality       Treatment Diagnosis: Gait abnormality.  Past Medical History:   Diagnosis Date    A-fib     Elevated cholesterol     Essential hypertension     Kidney stone     passed on his own    Osteoarthritis     Thyroid disease      Precautions: STD  Prior Therapy: None  Medications: Adriano Borjas has a current medication list which includes the following prescription(s): aspirin, atorvastatin, calcium carb/magnesium oxid/d3, fluticasone, levothyroxine, losartan, multaq, and vitamin e.  Nutrition:  Overweight  History of Present Illness: Pain in rt foot started ~ 6-7 months ago without trauma.  Prior Level of Function: Independent  Social History: In house with wife.  Place of Residence (Steps/Adaptations): No steps.  Functional Deficits Leading to Referral/Nature of Injury: Difficulties with ADLs,functional activities, homemaking.  Patient Therapy Goals: Decrease pain and numbness in both feet.    Subjective     Adriano Borjas states reports numbness in both feet ~ 3 yrs..    Pain:  Location: rt foot.  Description: Aching, Throbbing, Tingling, Deep, Numb and Variable  Activities Which Increase Pain: Standing, Bending, Walking, Extension, Flexing and Lifting  Activities Which Decrease Pain: relaxation, lying down, rest and elevation  Pain Scale: 0/10 at best 0/10 now  6/10 at worst    Objective     Posture: Round shoulders,flexed hips, ER RLE.  Palpation: No tenderness.  Sensation: Intact.  DTRs:  Range of  Motion/Strength: ROM of right foot is WNL/WFL in all planes.  Strength is grossly 3+/5 in all planes.     Flexibility: Decreased in BLE.  Gait: Without AD  Analysis: SBA - guarded, ER RLE.  Bed Mobility:Independent  Transfers: Independent  Special Tests: Anterior drawer test neg,FIG 8 LT;49.8, RT;49.8 cm.  Other: LEFS 55/80 20-40% IMP.  Treatment: EVAL.    Assessment       Initial Assessment (Pertinent finding, problem list and factors affecting outcome): Pt presents gait abnormality, strength deficit, decreased functional status due to  Rehab Potiential: good    Short Term Goals (2 Weeks): 1.Decrease pain x 1 grade. 2.Start HEP.   Long Term Goals (4 Weeks): 1.Pain 0-4/10. 2.Independent HEP. 3. Strength 5/5. 4.Gait WNL. 5.LEFS 49/80. 6.Restore previous LING.    Plan     Certification Period: 6/26/17 to 7/26/17  Recommended Treatment Plan: 2 times per week for 4 weeks: Gait Training, Group Therapy, Manual Therapy, Moist Heat/ Ice, Patient Education, Therapeutic Activites, Therapeutic Exercise and Whirlpool/Fluidotherapy  Other Recommendations: HEP.      Therapist: Rambo Davis, PT    I CERTIFY THE NEED FOR THESE SERVICES FURNISHED UNDER THIS PLAN OF TREATMENT AND WHILE UNDER MY CARE    Physician's comments: ________________________________________________________________________________________________________________________________________________      Physician's Name: ___________________________________

## 2017-06-29 ENCOUNTER — CLINICAL SUPPORT (OUTPATIENT)
Dept: REHABILITATION | Facility: HOSPITAL | Age: 82
End: 2017-06-29
Attending: INTERNAL MEDICINE
Payer: MEDICARE

## 2017-06-29 DIAGNOSIS — M79.671 RIGHT FOOT PAIN: Primary | ICD-10-CM

## 2017-06-29 PROCEDURE — 97022 WHIRLPOOL THERAPY: CPT | Mod: PN

## 2017-06-29 PROCEDURE — 97110 THERAPEUTIC EXERCISES: CPT | Mod: PN

## 2017-06-29 NOTE — PROGRESS NOTES
TIME RECORD    Date:  06/29/2017    Start Time:  1500  Stop Time:  1555    PROCEDURES:    TIMED  Procedure Time Min.   TE Start:1500  Stop:1540 40    Start:  Stop:     Start:  Stop:     Start:  Stop:          UNTIMED  Procedure Time Min.   FLUIDO  Start:1541  Stop:1553 12    Start:  Stop:      Total Timed Minutes:  40  Total Timed Units:  3  Total Untimed Units:  1  Charges Billed/# of units:  4      Progress/Current Status    Subjective:     Patient ID: Adriano Borjas is a 82 y.o. male.  Diagnosis:   1. Right foot pain       Pain: 2 /10      Objective:     TE for ROM,strength, gait f/b fluido x 12'.    Assessment:     Requires constant v.c. To follow ex programme.    Patient Education/Response:     Gait pattern ed.    Plans and Goals:     Cont per POC.

## 2017-06-29 NOTE — PROGRESS NOTES
06/29/17 1600   Ankle Exercises   Double Leg Calf Raises Reps/Sets/Weight 30   Standing Dorsiflexion Stretch(Gastroc) Reps/Sets/Hold Time 3X30   Knee Exercises   Frontal Squats Reps/Sets/Weight 30   Tg/Shuttle/Reformer Squats Reps/Sets/Weight/Level 50# 6'   Additional Exercises   Additional Exercise BIKE 10', NUSTEP 5' L5   Additional Exercise AROM ALL PLANES 5'

## 2017-07-13 ENCOUNTER — TELEPHONE (OUTPATIENT)
Dept: NEUROLOGY | Facility: CLINIC | Age: 82
End: 2017-07-13

## 2017-07-13 NOTE — TELEPHONE ENCOUNTER
Spoke with the pt's wife, informed her we can schedule the pt with Dr. Martin in the [place] location. She plans to speak with the pt and call back to schedule.

## 2017-08-23 DIAGNOSIS — R09.82 POSTNASAL DRIP: ICD-10-CM

## 2017-08-23 RX ORDER — LEVOTHYROXINE SODIUM 75 UG/1
75 TABLET ORAL DAILY
Qty: 90 TABLET | Refills: 1 | Status: SHIPPED | OUTPATIENT
Start: 2017-08-23 | End: 2018-03-07 | Stop reason: SDUPTHER

## 2017-08-23 RX ORDER — FLUTICASONE PROPIONATE 50 MCG
2 SPRAY, SUSPENSION (ML) NASAL DAILY
Qty: 16 G | Refills: 2 | Status: SHIPPED | OUTPATIENT
Start: 2017-08-23 | End: 2017-12-05

## 2017-08-23 NOTE — TELEPHONE ENCOUNTER
----- Message from Dara Carvalho sent at 8/23/2017 10:52 AM CDT -----  Contact: wife  Needs a new prescription for thyroid meds.  Please call back at [phone] (home)     [facility] [address] [place] [address] [facility]  [address] [facility] [address]  Phone: [phone] Fax: [phone]

## 2017-09-08 ENCOUNTER — TELEPHONE (OUTPATIENT)
Dept: FAMILY MEDICINE | Facility: CLINIC | Age: 82
End: 2017-09-08

## 2017-09-08 NOTE — TELEPHONE ENCOUNTER
----- Message from RT Rosalia sent at 9/8/2017  4:03 PM CDT -----  Contact: Enma (wife) [phone]   Enma (wife) [phone], requesting a call back soon concerning the pt glucose tolerance test, thanks.

## 2017-09-11 ENCOUNTER — TELEPHONE (OUTPATIENT)
Dept: FAMILY MEDICINE | Facility: CLINIC | Age: 82
End: 2017-09-11

## 2017-09-11 NOTE — TELEPHONE ENCOUNTER
----- Message from Arminda Woods sent at 9/11/2017  1:45 PM CDT -----  Contact: Chris Monae (Daughter)  Chris Monae (Daughter) calling to speak with the Nurse about the glucose tolerance test. It says it was cancelled. Please advise.  Call back   Thanks!

## 2017-09-11 NOTE — TELEPHONE ENCOUNTER
Spoke with Chris.  Wanted to have done at [facility].   Instructed to call them and see if it can be changed.

## 2017-09-12 ENCOUNTER — TELEPHONE (OUTPATIENT)
Dept: FAMILY MEDICINE | Facility: CLINIC | Age: 82
End: 2017-09-12

## 2017-09-12 DIAGNOSIS — E16.2 HYPOGLYCEMIA: Primary | ICD-10-CM

## 2017-09-13 ENCOUNTER — PATIENT MESSAGE (OUTPATIENT)
Dept: FAMILY MEDICINE | Facility: CLINIC | Age: 82
End: 2017-09-13

## 2017-09-13 ENCOUNTER — LAB VISIT (OUTPATIENT)
Dept: LAB | Facility: HOSPITAL | Age: 82
End: 2017-09-13
Attending: INTERNAL MEDICINE
Payer: MEDICARE

## 2017-09-13 DIAGNOSIS — E78.5 HYPERLIPIDEMIA, UNSPECIFIED HYPERLIPIDEMIA TYPE: ICD-10-CM

## 2017-09-13 DIAGNOSIS — N40.0 ENLARGED PROSTATE ON RECTAL EXAMINATION: ICD-10-CM

## 2017-09-13 DIAGNOSIS — I10 ESSENTIAL HYPERTENSION: ICD-10-CM

## 2017-09-13 DIAGNOSIS — R73.9 ELEVATED BLOOD SUGAR LEVEL: ICD-10-CM

## 2017-09-13 DIAGNOSIS — R73.9 ELEVATED BLOOD SUGAR LEVEL: Primary | ICD-10-CM

## 2017-09-13 DIAGNOSIS — E03.9 HYPOTHYROIDISM, UNSPECIFIED TYPE: ICD-10-CM

## 2017-09-13 LAB
ALBUMIN SERPL BCP-MCNC: 3.5 G/DL
ALP SERPL-CCNC: 71 U/L
ALT SERPL W/O P-5'-P-CCNC: 23 U/L
ANION GAP SERPL CALC-SCNC: 7 MMOL/L
AST SERPL-CCNC: 34 U/L
BASOPHILS # BLD AUTO: 0.1 K/UL
BASOPHILS NFR BLD: 0.7 %
BILIRUB SERPL-MCNC: 1.3 MG/DL
BUN SERPL-MCNC: 14 MG/DL
CALCIUM SERPL-MCNC: 8.8 MG/DL
CHLORIDE SERPL-SCNC: 102 MMOL/L
CHOLEST SERPL-MCNC: 203 MG/DL
CHOLEST/HDLC SERPL: 5.6 {RATIO}
CO2 SERPL-SCNC: 29 MMOL/L
COMPLEXED PSA SERPL-MCNC: 10.8 NG/ML
CREAT SERPL-MCNC: 1.2 MG/DL
DIFFERENTIAL METHOD: ABNORMAL
EOSINOPHIL # BLD AUTO: 0.1 K/UL
EOSINOPHIL NFR BLD: 1.8 %
ERYTHROCYTE [DISTWIDTH] IN BLOOD BY AUTOMATED COUNT: 13.2 %
EST. GFR  (AFRICAN AMERICAN): >60 ML/MIN/1.73 M^2
EST. GFR  (NON AFRICAN AMERICAN): 57 ML/MIN/1.73 M^2
GLUCOSE SERPL-MCNC: 121 MG/DL
GLUCOSE SERPL-MCNC: 195 MG/DL
GLUCOSE SERPL-MCNC: 96 MG/DL
GLUCOSE SERPL-MCNC: 96 MG/DL
HCT VFR BLD AUTO: 42.8 %
HDLC SERPL-MCNC: 36 MG/DL
HDLC SERPL: 17.7 %
HGB BLD-MCNC: 14.4 G/DL
LDLC SERPL CALC-MCNC: 143.8 MG/DL
LYMPHOCYTES # BLD AUTO: 1.8 K/UL
LYMPHOCYTES NFR BLD: 23.4 %
MCH RBC QN AUTO: 31.5 PG
MCHC RBC AUTO-ENTMCNC: 33.7 G/DL
MCV RBC AUTO: 94 FL
MONOCYTES # BLD AUTO: 0.7 K/UL
MONOCYTES NFR BLD: 9 %
NEUTROPHILS # BLD AUTO: 5.1 K/UL
NEUTROPHILS NFR BLD: 65.1 %
NONHDLC SERPL-MCNC: 167 MG/DL
PLATELET # BLD AUTO: 127 K/UL
PMV BLD AUTO: 12 FL
POTASSIUM SERPL-SCNC: 4.2 MMOL/L
PROT SERPL-MCNC: 7.3 G/DL
RBC # BLD AUTO: 4.57 M/UL
SODIUM SERPL-SCNC: 138 MMOL/L
TRIGL SERPL-MCNC: 116 MG/DL
TSH SERPL DL<=0.005 MIU/L-ACNC: 1.9 UIU/ML
WBC # BLD AUTO: 7.8 K/UL

## 2017-09-13 PROCEDURE — 83036 HEMOGLOBIN GLYCOSYLATED A1C: CPT

## 2017-09-13 PROCEDURE — 85025 COMPLETE CBC W/AUTO DIFF WBC: CPT

## 2017-09-13 PROCEDURE — 82951 GLUCOSE TOLERANCE TEST (GTT): CPT

## 2017-09-13 PROCEDURE — 36415 COLL VENOUS BLD VENIPUNCTURE: CPT

## 2017-09-13 PROCEDURE — 84153 ASSAY OF PSA TOTAL: CPT

## 2017-09-13 PROCEDURE — 84443 ASSAY THYROID STIM HORMONE: CPT

## 2017-09-13 PROCEDURE — 80061 LIPID PANEL: CPT

## 2017-09-13 PROCEDURE — 80053 COMPREHEN METABOLIC PANEL: CPT

## 2017-09-15 ENCOUNTER — OFFICE VISIT (OUTPATIENT)
Dept: FAMILY MEDICINE | Facility: CLINIC | Age: 82
End: 2017-09-15
Payer: MEDICARE

## 2017-09-15 ENCOUNTER — DOCUMENTATION ONLY (OUTPATIENT)
Dept: FAMILY MEDICINE | Facility: CLINIC | Age: 82
End: 2017-09-15

## 2017-09-15 VITALS
SYSTOLIC BLOOD PRESSURE: 125 MMHG | TEMPERATURE: 99 F | HEART RATE: 72 BPM | HEIGHT: 68 IN | WEIGHT: 177 LBS | DIASTOLIC BLOOD PRESSURE: 71 MMHG | OXYGEN SATURATION: 97 % | BODY MASS INDEX: 26.83 KG/M2

## 2017-09-15 DIAGNOSIS — Z23 NEED FOR VACCINATION WITH 13-POLYVALENT PNEUMOCOCCAL CONJUGATE VACCINE: ICD-10-CM

## 2017-09-15 DIAGNOSIS — Z23 NEEDS FLU SHOT: ICD-10-CM

## 2017-09-15 DIAGNOSIS — R29.898 WEAKNESS OF BOTH LOWER EXTREMITIES: Primary | ICD-10-CM

## 2017-09-15 DIAGNOSIS — R97.20 ELEVATED PSA: ICD-10-CM

## 2017-09-15 PROCEDURE — G0009 ADMIN PNEUMOCOCCAL VACCINE: HCPCS | Mod: S$GLB,,, | Performed by: INTERNAL MEDICINE

## 2017-09-15 PROCEDURE — 99212 OFFICE O/P EST SF 10 MIN: CPT | Mod: S$GLB,,, | Performed by: INTERNAL MEDICINE

## 2017-09-15 PROCEDURE — G0008 ADMIN INFLUENZA VIRUS VAC: HCPCS | Mod: S$GLB,,, | Performed by: INTERNAL MEDICINE

## 2017-09-15 PROCEDURE — 3008F BODY MASS INDEX DOCD: CPT | Mod: S$GLB,,, | Performed by: INTERNAL MEDICINE

## 2017-09-15 PROCEDURE — 90662 IIV NO PRSV INCREASED AG IM: CPT | Mod: S$GLB,,, | Performed by: INTERNAL MEDICINE

## 2017-09-15 PROCEDURE — 3074F SYST BP LT 130 MM HG: CPT | Mod: S$GLB,,, | Performed by: INTERNAL MEDICINE

## 2017-09-15 PROCEDURE — 90670 PCV13 VACCINE IM: CPT | Mod: S$GLB,,, | Performed by: INTERNAL MEDICINE

## 2017-09-15 PROCEDURE — 1126F AMNT PAIN NOTED NONE PRSNT: CPT | Mod: S$GLB,,, | Performed by: INTERNAL MEDICINE

## 2017-09-15 PROCEDURE — 3078F DIAST BP <80 MM HG: CPT | Mod: S$GLB,,, | Performed by: INTERNAL MEDICINE

## 2017-09-15 PROCEDURE — 1159F MED LIST DOCD IN RCRD: CPT | Mod: S$GLB,,, | Performed by: INTERNAL MEDICINE

## 2017-09-15 NOTE — PROGRESS NOTES
Health Maintenance Due   Topic Date Due    TETANUS VACCINE  05[birth year]02/1953    Zoster Vaccine  05/02/1997    Pneumococcal (65+) (1 of 2 - PCV13) 05/02/2002    Influenza Vaccine  08/01/2017

## 2017-09-15 NOTE — PROGRESS NOTES
"Subjective:       Patient ID: Adriano Borjas is a 82 y.o. male.    Chief Complaint: Follow-up    HPI         Lower_Extremity_Problems(+). Pain: no . . Injury: no.   HPI: The patient says that he started to feel weaker when he developed atrial fibrillation 8 years ago.  6 months ago he was taken off multaq After an ablation cured him of atrial fibrillation.  He feels like he's weak in the lower extremities.  This 82-year-old doesn't great deal of weight lifting and can press on a good day 400 pounds.    ONSET/TIMING: . Onset   8 y ago    ago.     DURATION:   Intermittent         QUALITY/COURSE:    unchanged ,. .     LOCATION:        . Radiation: no.      INTENSITY/SEVERITY:. Severity is #   3  (10 point scale).        MODIFIERS/TREATMENTS: Current_Limitations_are:  none..   Taking medications: no    Physical_Therapy: no.  Chiropractor: no.     SYMPTOMS/RELATED: . --Possible medication side effects include:.     The following symptoms/statements  are positive if BOLD, negative otherwise.      CONTEXT/WHEN: . Activity . weight bearing. Inactivity.  Sudden  Work related.  Similar_problems_in past.   . Litigation_pending . X-rays. CT . MRI      REVIEW OF SYMPTOMS:   Numbness. Weakness. Muscle_Problems. Fever. Joint_Problems. Swelling. Erythema. Weight_loss. Instability.      .             Review of Systems    Objective:      Vitals:    09/15/17 1309   BP: 125/71   Pulse: 72   Temp: 98.8 °F (37.1 °C)   TempSrc: Oral   SpO2: 97%   Weight: 80.3 kg (177 lb 0.5 oz)   Height: 5' 8" (1.727 m)   PainSc: 0-No pain     Physical Exam   Constitutional: He appears well-developed and well-nourished.   Eyes: Pupils are equal, round, and reactive to light.   Cardiovascular: Normal rate, regular rhythm and normal heart sounds.    Pulmonary/Chest: Effort normal and breath sounds normal.   Abdominal: Soft. There is no tenderness.   Musculoskeletal:   He can get up from a chair without using his arms.  He can walk on his tiptoes easily. "   Neurological: He is alert.   Can walk a straight line easily.   Psychiatric: He has a normal mood and affect. His behavior is normal. Thought content normal.   Nursing note and vitals reviewed.   PSA 10.8.  Other labs normal including TSH CMP and CBC      Assessment:       1. Weakness of both lower extremities    2. Elevated PSA    3. Needs flu shot    4. Need for vaccination with 13-polyvalent pneumococcal conjugate vaccine          Plan:     Weakness of both lower extremities    Elevated PSA  -     Ambulatory Referral to Urology    Needs flu shot  -     Influenza - High Dose (65+) (PF) (IM)    Need for vaccination with 13-polyvalent pneumococcal conjugate vaccine  -     (In Office Administered) Pneumococcal Conjugate Vaccine (13 Valent) (IM)      Return in about 3 months (around 12/15/2017).

## 2017-10-04 ENCOUNTER — OFFICE VISIT (OUTPATIENT)
Dept: UROLOGY | Facility: CLINIC | Age: 82
End: 2017-10-04
Payer: MEDICARE

## 2017-10-04 VITALS
WEIGHT: 175.69 LBS | DIASTOLIC BLOOD PRESSURE: 85 MMHG | HEIGHT: 68 IN | SYSTOLIC BLOOD PRESSURE: 160 MMHG | BODY MASS INDEX: 26.63 KG/M2 | HEART RATE: 58 BPM

## 2017-10-04 DIAGNOSIS — R97.20 ELEVATED PSA: ICD-10-CM

## 2017-10-04 DIAGNOSIS — R35.1 NOCTURIA: ICD-10-CM

## 2017-10-04 DIAGNOSIS — R35.0 FREQUENCY OF MICTURITION: Primary | ICD-10-CM

## 2017-10-04 DIAGNOSIS — N40.1 BENIGN PROSTATIC HYPERPLASIA WITH WEAK URINARY STREAM: ICD-10-CM

## 2017-10-04 DIAGNOSIS — R39.12 BENIGN PROSTATIC HYPERPLASIA WITH WEAK URINARY STREAM: ICD-10-CM

## 2017-10-04 LAB
BILIRUB SERPL-MCNC: NORMAL MG/DL
BLOOD URINE, POC: NORMAL
COLOR, POC UA: YELLOW
GLUCOSE UR QL STRIP: NORMAL
KETONES UR QL STRIP: NORMAL
LEUKOCYTE ESTERASE URINE, POC: NORMAL
NITRITE, POC UA: NORMAL
PH, POC UA: 6
PROTEIN, POC: NORMAL
SPECIFIC GRAVITY, POC UA: 1.01
UROBILINOGEN, POC UA: NORMAL

## 2017-10-04 PROCEDURE — 99214 OFFICE O/P EST MOD 30 MIN: CPT | Mod: 25,S$GLB,, | Performed by: UROLOGY

## 2017-10-04 PROCEDURE — 99999 PR PBB SHADOW E&M-EST. PATIENT-LVL III: CPT | Mod: PBBFAC,,, | Performed by: UROLOGY

## 2017-10-04 PROCEDURE — 81002 URINALYSIS NONAUTO W/O SCOPE: CPT | Mod: S$GLB,,, | Performed by: UROLOGY

## 2017-10-04 NOTE — PROGRESS NOTES
[facility] [place]  10 4 17    Urinalysis: col yellow, sg 10, pH 6, leuco trace, nitrites -, prot -, glucose -, bili -, blood -    c-c elevated psa    Age 82, accompanied by son. We are following this pt because of slightly elevated psa. The psa was 5.6 in 2014, 6.5 in 2015, 5.5 last year and 10.8 at the current time.     He is known to have bph, for which he is taking no medication. His stream is slightly diminished, with occasional intermittency, but pt denies needing to strain to void. He says he does have some urgency with rare urinary incontinence if he 'gets distracted doing something and ignores his bladder for a while'. This happens sometimes when he plays chess, he says.     No pains or burning when voiding. Nocturia x 2          PMH     Surgical: Eye surgery.     Medical:  has a past medical history of Thyroid disease; Kidney stone; A-fib; Essential hypertension; Elevated cholesterol; and Osteoarthritis.     Familial: no fh prostate ca. Mother had diabetes     Social: pt , lives in [facility]. Eats well, goes to the gym and lifts weights. He is very proud of the progress he is making with his gymnasium routine.        ROS:  Denies malaise, headaches, eye symptoms, difficulty swallowing or breathing problems.   No chest pains or palpitations.   No change in bowel habits, no tarry stools or hematochezia. No acid reflux.  No genital lesions.  No bleeding disorders, no seizures.  Psych: normal mentation, normal affect     Current Outpatient Prescriptions on File Prior to Visit   Medication Sig Dispense Refill    aspirin (ECOTRIN) 81 MG EC tablet Take 1 tablet (81 mg total) by mouth once daily. 81 tablet mg    CALCIUM CARB/MAGNESIUM OXID/D3 (CALCIUM CARB-MAG OX Take by mouth once daily.      fluticasone (FLONASE) 50 mcg/actuation nasal spray 2 sprays by Each Nare route once daily. (Patient t 16 g 2    levothyroxine (SYNTHROID) 75 MCG tablet Take 1 tablet (75 mcg total) by mouth once  daily. 90 tablet 1    vitamin E 400 UNIT capsule Take 400 Units by mouth once daily.       PE:    Pt alert, oriented, cooperative, no distress  HEENT: normocephalic, pupils round, equal, reactive to light and acommodation, extrinsic ocular movements full, conjunctiva pink. Oral and nasal cavities wnl.  Neck: supple, no JVD, no lymphadenopathy  Chest: CV NSR  Lungs: normal auscultation  Abdomen athletic, flat, nontender, no organomegaly, no masses.  No hernias  Penis noncircumcised, meatus nl  Testes nl, epi nl, scrotum nl  GREG: anus nl, sphincter nl tone, mucosa without lesions, prostate 30 gm, symmetric, no nodules or indurations  Extremities: no edema, peripheral pulses nl  Neuro: preserved       IMP    Elevated psa, has not had a prostate biopsy  bph on observation  We discussed the psa situation with pt and son. We talked about the pros and cons of a possible prostate biopsy. We decided to have him come back in six months and then we can repeat the GREG and PSA

## 2017-10-04 NOTE — LETTER
October 4, 2017      Bud Kamara MD  [address] [facility] [address]           [facility]  [address] [facility] [address] [place] [address]  Phone: [phone]          Patient: Adriano Borjas   MR Number: [id number]   YOB: 1935   Date of Visit: 10/4/2017       Dear Dr. Bud Kamara:    Thank you for referring Adriano Borjas to me for evaluation. Attached you will find relevant portions of my assessment and plan of care.    If you have questions, please do not hesitate to call me. I look forward to following Adriano Borjas along with you.    Sincerely,    Abran Tapia MD    Enclosure  CC:  No Recipients    If you would like to receive this communication electronically, please contact [email or web address] or [phone] to request more information on [facility] access.    For providers and/or their staff who would like to refer a patient to [facility], please contact us through our one-stop-shop provider referral line, [facility], at [phone].    If you feel you have received this communication in error or would no longer like to receive these types of communications, please e-mail [email or web address]

## 2017-12-05 ENCOUNTER — OFFICE VISIT (OUTPATIENT)
Dept: ORTHOPEDICS | Facility: CLINIC | Age: 82
End: 2017-12-05
Payer: MEDICARE

## 2017-12-05 VITALS
HEIGHT: 68 IN | SYSTOLIC BLOOD PRESSURE: 140 MMHG | BODY MASS INDEX: 25.46 KG/M2 | HEART RATE: 66 BPM | WEIGHT: 168 LBS | DIASTOLIC BLOOD PRESSURE: 76 MMHG

## 2017-12-05 DIAGNOSIS — M75.51 BURSITIS OF RIGHT SHOULDER: Primary | ICD-10-CM

## 2017-12-05 PROCEDURE — 99204 OFFICE O/P NEW MOD 45 MIN: CPT | Mod: 25,,, | Performed by: ORTHOPAEDIC SURGERY

## 2017-12-05 PROCEDURE — 20610 DRAIN/INJ JOINT/BURSA W/O US: CPT | Mod: RT,,, | Performed by: ORTHOPAEDIC SURGERY

## 2017-12-05 PROCEDURE — 73030 X-RAY EXAM OF SHOULDER: CPT | Mod: RT,,, | Performed by: ORTHOPAEDIC SURGERY

## 2017-12-05 RX ORDER — TRIAMCINOLONE ACETONIDE 40 MG/ML
80 INJECTION, SUSPENSION INTRA-ARTICULAR; INTRAMUSCULAR
Status: DISCONTINUED | OUTPATIENT
Start: 2017-12-05 | End: 2017-12-05 | Stop reason: HOSPADM

## 2017-12-05 RX ADMIN — TRIAMCINOLONE ACETONIDE 80 MG: 40 INJECTION, SUSPENSION INTRA-ARTICULAR; INTRAMUSCULAR at 04:12

## 2017-12-05 NOTE — PROCEDURES
Large Joint Aspiration/Injection  Date/Time: 12/5/2017 4:07 PM  Performed by: SIMRAN ROBERTS JR  Authorized by: SIMRAN ROBERTS JR     Consent Done?:  Yes (Verbal)  Indications:  Pain  Procedure site marked: Yes    Timeout: Prior to procedure the correct patient, procedure, and site was verified      Location:  Shoulder  Site:  R subacromial bursa  Prep: Patient was prepped and draped in usual sterile fashion    Ultrasonic Guidance for needle placement: No  Needle size:  25 G  Approach:  Lateral  Medications:  80 mg triamcinolone acetonide 40 mg/mL  Patient tolerance:  Patient tolerated the procedure well with no immediate complications

## 2017-12-05 NOTE — PROGRESS NOTES
Subjective:       Chief Complaint    Chief Complaint   Patient presents with    Right Shoulder - Pain     Patient has had right shoulder pain for many years. He states he does some weight lifting and it is starting to bother him more frequently now. He is also having troble with his shoulder while trying to sleep as well.sergio Borjas is a 82 y.o.  male who presents With right shoulder discomfort about 3 years. Interferes with his sleep at times. Exercises at least 3 times a week.      Past History  Past Medical History:   Diagnosis Date    A-fib     Elevated cholesterol     Essential hypertension     Kidney stone     passed on his own    Osteoarthritis     Thyroid disease      Past Surgical History:   Procedure Laterality Date    CYSTOSCOPY      EYE SURGERY       Social History     Social History    Marital status:      Spouse name: N/A    Number of children: N/A    Years of education: N/A     Occupational History    Not on file.     Social History Main Topics    Smoking status: Former Smoker     Years: 15.00    Smokeless tobacco: Never Used    Alcohol use No    Drug use: No    Sexual activity: Not on file     Other Topics Concern    Not on file     Social History Narrative    No narrative on file         Medications  Current Outpatient Prescriptions   Medication Sig    aspirin (ECOTRIN) 81 MG EC tablet Take 1 tablet (81 mg total) by mouth once daily.    CALCIUM CARB/MAGNESIUM OXID/D3 (CALCIUM CARB-MAG OXIDE-VIT D3 ORAL) Take by mouth once daily.    levothyroxine (SYNTHROID) 75 MCG tablet Take 1 tablet (75 mcg total) by mouth once daily.    vitamin E 400 UNIT capsule Take 400 Units by mouth once daily.     No current facility-administered medications for this visit.        Allergies  Review of patient's allergies indicates:   Allergen Reactions    Ciprofloxacin Other (See Comments)     Shoulder and leg pain/cramping     Codeine Other (See Comments)     Pt cannot  remember been a long time ago    Sulfa (sulfonamide antibiotics)      Allergic reaction to bactrim         Review of Systems     Constitutional: Negative    HENT: Negative  Eyes: Negative  Respiratory: Negative  Cardiovascular: Negative  Musculoskeletal: HPI  Skin: Negative  Neurological: Negative  Hematological: Negative  Endocrine: Negative      Physical Exam    Vitals:    12/05/17 1443   BP: (!) 140/76   Pulse: 66     Physical Examination:Nontender. Acromioclavicular joint. Elevation, abduction 180°, external rotation 45°, internal rotation T10. Scaption 90°. Stable shoulder. Negative impingement signs.     Skin-clear  General appearance -  well appearing, and in no distress  Mental status - awake  Neck - supple  Chest -  symmetric air entry  Heart - normal rate   Abdomen - soft      Assessment/Plan   Bursitis of right shoulder  -     X-ray Shoulder 2 or More Views Right  -     Large Joint Aspiration/Injection          Advised to continue working out with weights. Stop bench pressing with 380 pounds.  This note was dictated using voice recognition software and may contain grammatical errors.

## 2017-12-15 ENCOUNTER — TELEPHONE (OUTPATIENT)
Dept: FAMILY MEDICINE | Facility: CLINIC | Age: 82
End: 2017-12-15

## 2017-12-15 DIAGNOSIS — R97.20 ELEVATED PSA: Primary | ICD-10-CM

## 2017-12-15 NOTE — TELEPHONE ENCOUNTER
----- Message from Yandy Mcpherson sent at 12/15/2017  8:08 AM CST -----  Contact: Enma Borjas  Wife called regarding scheduling lab orders before appt on 1/8/18, no orders. Please contact [phone]

## 2017-12-21 ENCOUNTER — TELEPHONE (OUTPATIENT)
Dept: FAMILY MEDICINE | Facility: CLINIC | Age: 82
End: 2017-12-21

## 2018-01-04 ENCOUNTER — LAB VISIT (OUTPATIENT)
Dept: LAB | Facility: HOSPITAL | Age: 83
End: 2018-01-04
Attending: INTERNAL MEDICINE
Payer: MEDICARE

## 2018-01-04 DIAGNOSIS — R97.20 ELEVATED PSA: ICD-10-CM

## 2018-01-04 LAB
ALBUMIN SERPL BCP-MCNC: 3.3 G/DL
ALP SERPL-CCNC: 66 U/L
ALT SERPL W/O P-5'-P-CCNC: 24 U/L
ANION GAP SERPL CALC-SCNC: 11 MMOL/L
AST SERPL-CCNC: 32 U/L
BILIRUB SERPL-MCNC: 0.8 MG/DL
BUN SERPL-MCNC: 19 MG/DL
CALCIUM SERPL-MCNC: 8.8 MG/DL
CHLORIDE SERPL-SCNC: 103 MMOL/L
CO2 SERPL-SCNC: 27 MMOL/L
COMPLEXED PSA SERPL-MCNC: 8.6 NG/ML
CREAT SERPL-MCNC: 1.1 MG/DL
EST. GFR  (AFRICAN AMERICAN): >60 ML/MIN/1.73 M^2
EST. GFR  (NON AFRICAN AMERICAN): >60 ML/MIN/1.73 M^2
GLUCOSE SERPL-MCNC: 101 MG/DL
POTASSIUM SERPL-SCNC: 4.3 MMOL/L
PROT SERPL-MCNC: 7 G/DL
SODIUM SERPL-SCNC: 141 MMOL/L

## 2018-01-04 PROCEDURE — 36415 COLL VENOUS BLD VENIPUNCTURE: CPT | Mod: PO

## 2018-01-04 PROCEDURE — 84153 ASSAY OF PSA TOTAL: CPT

## 2018-01-04 PROCEDURE — 80053 COMPREHEN METABOLIC PANEL: CPT

## 2018-01-08 ENCOUNTER — DOCUMENTATION ONLY (OUTPATIENT)
Dept: FAMILY MEDICINE | Facility: CLINIC | Age: 83
End: 2018-01-08

## 2018-01-08 ENCOUNTER — OFFICE VISIT (OUTPATIENT)
Dept: FAMILY MEDICINE | Facility: CLINIC | Age: 83
End: 2018-01-08
Payer: MEDICARE

## 2018-01-08 VITALS
BODY MASS INDEX: 27.36 KG/M2 | HEIGHT: 68 IN | RESPIRATION RATE: 16 BRPM | HEART RATE: 79 BPM | OXYGEN SATURATION: 96 % | DIASTOLIC BLOOD PRESSURE: 90 MMHG | SYSTOLIC BLOOD PRESSURE: 114 MMHG | TEMPERATURE: 98 F | WEIGHT: 180.56 LBS

## 2018-01-08 DIAGNOSIS — R41.3 MEMORY LOSS: ICD-10-CM

## 2018-01-08 DIAGNOSIS — R25.2 LEG CRAMPS: Primary | ICD-10-CM

## 2018-01-08 LAB
MAGNESIUM SERPL-MCNC: 2.2 MG/DL
TSH SERPL DL<=0.005 MIU/L-ACNC: 1.52 UIU/ML

## 2018-01-08 PROCEDURE — 84443 ASSAY THYROID STIM HORMONE: CPT

## 2018-01-08 PROCEDURE — 83735 ASSAY OF MAGNESIUM: CPT

## 2018-01-08 PROCEDURE — 99214 OFFICE O/P EST MOD 30 MIN: CPT | Mod: S$GLB,,, | Performed by: INTERNAL MEDICINE

## 2018-01-08 RX ORDER — ASCORBIC ACID 500 MG
500 TABLET ORAL DAILY
COMMUNITY

## 2018-01-08 RX ORDER — DONEPEZIL HYDROCHLORIDE 5 MG/1
5 TABLET, FILM COATED ORAL NIGHTLY
Qty: 30 TABLET | Refills: 11 | Status: SHIPPED | OUTPATIENT
Start: 2018-01-08 | End: 2018-03-17

## 2018-01-08 NOTE — PROGRESS NOTES
Health Maintenance Due   Topic Date Due    Zoster Vaccine  05/02/1997    TETANUS VACCINE  10/05/2015

## 2018-01-08 NOTE — PROGRESS NOTES
"Subjective:       Patient ID: Adriano Borjas is a 82 y.o. male.    Chief Complaint: muscle cramps and Memory Loss (discuss meds)    HPI     CHIEF COMPLAINT: leg cramps.  HPI:     ONSET/TIMING: Onset   1 month  ago.     DURATION:  5 minutes.  Some days it doesn't happen at all and some days up to 6 times    QUALITY/COURSE: unchanged .     LOCATION: . calves          Radiation: none    INTENSITY/SEVERITY:  severity   5  (on a 1-10 scale).    MODIFIERS/TREATMENTS:  Taking_medications:.     SYMPTOMS/RELATED: Possible medication side effects include:     The following symptoms/statements are positive if BOLD, negative otherwise.        CONTEXT/WHEN: Similar_problems.  Day or night. .   Activity.  Sudden.. Trauma .           Medication_not_effective_now.  Prior_ultrasound.. . Prior _abs.  .  Heat_exposure.       REVIEW OF SYSTEMS : Sharp_pain. Dull_pain. Burning_pain.       On_diuretics . Muscle_strain . Increase_in_caffeine_or_energy_drinks.  vomiting . spider bite . Pregnancy .      \  Review of Systems    Objective:      Vitals:    01/08/18 1514   BP: (!) 114/90   Pulse: 79   Resp: 16   Temp: 97.9 °F (36.6 °C)   TempSrc: Oral   SpO2: 96%   Weight: 81.9 kg (180 lb 8.9 oz)   Height: 5' 8" (1.727 m)   PainSc: 0-No pain     Physical Exam   Constitutional: He appears well-developed and well-nourished.   Eyes: Pupils are equal, round, and reactive to light.   Cardiovascular: Normal rate, regular rhythm and normal heart sounds.    Pulmonary/Chest: Effort normal and breath sounds normal.   Abdominal: Soft. There is no tenderness.   Neurological: He is alert.   Psychiatric: He has a normal mood and affect. His behavior is normal. Thought content normal.   Draws a.  LOC face correctly but make several mistakes but then recognizes him later.  Very slow to remember the date but finally did get it right.  The patient remembered 0 of 3 objects at 5 minutes   Nursing note and vitals reviewed.        Assessment:       1. Leg cramps  "   2. Memory loss          Plan:     Leg cramps  -     TSH; Future; Expected date: 01/08/2018  -     Magnesium; Future; Expected date: 01/08/2018    Memory loss  -     RPR; Future; Expected date: 01/08/2018  -     Vitamin B12; Future; Expected date: 01/08/2018  -     CT Head Without Contrast; Future; Expected date: 01/08/2018  -     donepezil (ARICEPT) 5 MG tablet; Take 1 tablet (5 mg total) by mouth every evening.  Dispense: 30 tablet; Refill: 11      Return in about 6 weeks (around 2/19/2018).

## 2018-02-19 ENCOUNTER — DOCUMENTATION ONLY (OUTPATIENT)
Dept: FAMILY MEDICINE | Facility: CLINIC | Age: 83
End: 2018-02-19

## 2018-03-07 RX ORDER — LEVOTHYROXINE SODIUM 75 UG/1
TABLET ORAL
Qty: 90 TABLET | Refills: 1 | Status: SHIPPED | OUTPATIENT
Start: 2018-03-07 | End: 2018-10-04 | Stop reason: SDUPTHER

## 2018-03-17 ENCOUNTER — HOSPITAL ENCOUNTER (EMERGENCY)
Facility: HOSPITAL | Age: 83
Discharge: HOME | End: 2018-03-17
Attending: EMERGENCY MEDICINE
Payer: MEDICARE

## 2018-03-17 VITALS
RESPIRATION RATE: 16 BRPM | DIASTOLIC BLOOD PRESSURE: 74 MMHG | SYSTOLIC BLOOD PRESSURE: 148 MMHG | BODY MASS INDEX: 26.22 KG/M2 | HEIGHT: 68 IN | WEIGHT: 173 LBS | HEART RATE: 61 BPM | TEMPERATURE: 98 F | OXYGEN SATURATION: 97 %

## 2018-03-17 DIAGNOSIS — R07.0 THROAT PAIN: ICD-10-CM

## 2018-03-17 DIAGNOSIS — R05.9 COUGH: Primary | ICD-10-CM

## 2018-03-17 DIAGNOSIS — J02.9 PHARYNGITIS, UNSPECIFIED ETIOLOGY: ICD-10-CM

## 2018-03-17 PROCEDURE — 99283 EMERGENCY DEPT VISIT LOW MDM: CPT

## 2018-03-17 RX ORDER — FLUTICASONE PROPIONATE 50 MCG
1 SPRAY, SUSPENSION (ML) NASAL 2 TIMES DAILY PRN
Qty: 15 G | Refills: 0 | Status: SHIPPED | OUTPATIENT
Start: 2018-03-17 | End: 2018-07-17

## 2018-03-17 NOTE — ED NOTES
Pt presents with c/o difficulty clearing throat. Throat is sore and voice is becoming hoarse. He is also concerned about abrasion on the front of his right shin-hit on frame of a chair 2 weeks ago at dental office. Abrasion is scabbed over now but tender around site.

## 2018-03-17 NOTE — ED PROVIDER NOTES
Encounter Date: 3/17/2018       History     Chief Complaint   Patient presents with    Sore Throat     Pt feels like something is caught in his throat.  Has been trying for several hours to clear it but in now makes his throat hurt worse      HPI   Patient is a very pleasant 82-year-old man who presents emergency department for evaluation of foreign body sensation in his upper throat with coughing tonight.  He endorses moderate pain in his throat and foreign body sensation.  Denies fever.  Cough is dry and nonproductive.  Symptoms are worsened by coughing.  He has an associated hoarse voice.  Review of patient's allergies indicates:   Allergen Reactions    Ciprofloxacin Other (See Comments)     Shoulder and leg pain/cramping     Codeine Other (See Comments)     Pt cannot remember been a long time ago    Sulfa (sulfonamide antibiotics)      Allergic reaction to bactrim     Past Medical History:   Diagnosis Date    A-fib     Elevated cholesterol     Essential hypertension     Kidney stone     passed on his own    Osteoarthritis     Thyroid disease      Past Surgical History:   Procedure Laterality Date    CYSTOSCOPY      EYE SURGERY       Family History   Problem Relation Age of Onset    Cancer Mother     Diabetes Mother     Hyperlipidemia Mother     Heart disease Father     Hyperlipidemia Father     Dementia Sister     Dementia Brother     Heart disease Brother     Migraines Daughter     Tremor Daughter     Tremor Son     Urolithiasis Neg Hx     Prostate cancer Neg Hx     Kidney cancer Neg Hx      Social History   Substance Use Topics    Smoking status: Former Smoker     Years: 15.00    Smokeless tobacco: Never Used    Alcohol use No     Review of Systems  REVIEW OF SYSTEMS  CONSTITUTIONAL: Negative for fever.  HEENT: Positive for sore throat  HEART:   Negative for chest pain..  LUNG:  Negative for shortness of breath.  Positive for cough  ABDOMEN:  Negative for nausea.   :  No  discharge, dysuria  EXTREMITIES:  No swelling  NEURO:  Negative for weakness.   SKIN:  Negative for rash.  Psych: No depression  HEME: Does not bruise/bleed easily.           Physical Exam     Initial Vitals [03/17/18 0317]   BP Pulse Resp Temp SpO2   (!) 172/92 62 16 97.8 °F (36.6 °C) 97 %      MAP       118.67         Physical Exam    Physical Exam   Nursing note and vitals reviewed.   Constitutional: Oriented to person, place, and time. Appears well-developed and well-nourished.   HENT:   Head: Normocephalic and atraumatic.   Eyes: EOM are normal.   Mouth: Mildly erythematous pharynx.  No tonsillar hypertrophy or exudate.  Uvula midline.  No oropharyngeal swelling or edema.  No foreign body appreciated.  Airway intact.  Voice is slightly hoarse.  Neck: Normal range of motion. Neck supple.   Cardiovascular: Normal rate.   Pulmonary/Chest: Effort normal. Clear BS b/l   Abdominal: Soft, Non tender, no distension.   Musculoskeletal: Normal range of motion.   Neurological:Alert and oriented to person, place, and time.   Psychiatric: Normal mood and affect. Behavior is normal.         ED Course   Procedures  Labs Reviewed - No data to display          Medical Decision Making:   History:   Old Medical Records: I decided to obtain old medical records.  Initial Assessment:   82-year-old man presents for evaluation of foreign body sensation in his throat that occurred after forceful coughing.  No fever.  Well-appearing and in no respiratory distress.  No stridor.  No airway occlusion, pharyngeal edema or foreign body appreciated.  Low suspicion for angioedema, epiglottitis, peritonsillar abscess, streptococcal pharyngitis.  He has no hemoptysis or hematemesis.  He is well-appearing and in no acute distress.  X-ray soft tissue neck obtained and is unremarkable.  I believe he is appropriate for outpatient follow-up by his primary care physician.  I'll prescribe him Flonase since he has been having rhinorrhea and likely  postnasal drip leading to his cough.  His discharge improved in no acute distress.                      Clinical Impression:   The primary encounter diagnosis was Cough. Diagnoses of Throat pain and Pharyngitis, unspecified etiology were also pertinent to this visit.                           Jason Forde MD  03/17/18 0544

## 2018-03-19 ENCOUNTER — HOSPITAL ENCOUNTER (OUTPATIENT)
Dept: RADIOLOGY | Facility: HOSPITAL | Age: 83
Discharge: HOME | End: 2018-03-19
Attending: NURSE PRACTITIONER
Payer: MEDICARE

## 2018-03-19 ENCOUNTER — OFFICE VISIT (OUTPATIENT)
Dept: FAMILY MEDICINE | Facility: CLINIC | Age: 83
End: 2018-03-19
Payer: MEDICARE

## 2018-03-19 ENCOUNTER — DOCUMENTATION ONLY (OUTPATIENT)
Dept: FAMILY MEDICINE | Facility: CLINIC | Age: 83
End: 2018-03-19

## 2018-03-19 VITALS
TEMPERATURE: 99 F | HEART RATE: 81 BPM | OXYGEN SATURATION: 96 % | DIASTOLIC BLOOD PRESSURE: 72 MMHG | BODY MASS INDEX: 27.26 KG/M2 | HEIGHT: 68 IN | WEIGHT: 179.88 LBS | SYSTOLIC BLOOD PRESSURE: 138 MMHG

## 2018-03-19 DIAGNOSIS — M79.10 MYALGIA: ICD-10-CM

## 2018-03-19 DIAGNOSIS — R94.31 ABNORMAL EKG: ICD-10-CM

## 2018-03-19 DIAGNOSIS — R05.9 COUGH: ICD-10-CM

## 2018-03-19 DIAGNOSIS — R07.89 CHEST PRESSURE: Primary | ICD-10-CM

## 2018-03-19 PROCEDURE — 93010 ELECTROCARDIOGRAM REPORT: CPT | Mod: ,,, | Performed by: INTERNAL MEDICINE

## 2018-03-19 PROCEDURE — 99213 OFFICE O/P EST LOW 20 MIN: CPT | Mod: 25,S$GLB,, | Performed by: NURSE PRACTITIONER

## 2018-03-19 PROCEDURE — 71046 X-RAY EXAM CHEST 2 VIEWS: CPT | Mod: TC,FY

## 2018-03-19 PROCEDURE — 96372 THER/PROPH/DIAG INJ SC/IM: CPT | Mod: S$GLB,,, | Performed by: INTERNAL MEDICINE

## 2018-03-19 PROCEDURE — 3078F DIAST BP <80 MM HG: CPT | Mod: CPTII,S$GLB,, | Performed by: NURSE PRACTITIONER

## 2018-03-19 PROCEDURE — 93005 ELECTROCARDIOGRAM TRACING: CPT | Mod: S$GLB,,, | Performed by: NURSE PRACTITIONER

## 2018-03-19 PROCEDURE — 3075F SYST BP GE 130 - 139MM HG: CPT | Mod: CPTII,S$GLB,, | Performed by: NURSE PRACTITIONER

## 2018-03-19 PROCEDURE — 71046 X-RAY EXAM CHEST 2 VIEWS: CPT | Mod: 26,,, | Performed by: RADIOLOGY

## 2018-03-19 RX ORDER — PREDNISONE 5 MG/1
TABLET ORAL
Qty: 21 TABLET | Refills: 0 | Status: SHIPPED | OUTPATIENT
Start: 2018-03-19 | End: 2018-07-17

## 2018-03-19 RX ORDER — DEXAMETHASONE SODIUM PHOSPHATE 4 MG/ML
4 INJECTION, SOLUTION INTRA-ARTICULAR; INTRALESIONAL; INTRAMUSCULAR; INTRAVENOUS; SOFT TISSUE
Status: COMPLETED | OUTPATIENT
Start: 2018-03-19 | End: 2018-03-19

## 2018-03-19 RX ORDER — BENZONATATE 200 MG/1
200 CAPSULE ORAL 3 TIMES DAILY PRN
Qty: 30 CAPSULE | Refills: 0 | Status: SHIPPED | OUTPATIENT
Start: 2018-03-19 | End: 2018-07-17

## 2018-03-19 RX ORDER — PROMETHAZINE HYDROCHLORIDE AND DEXTROMETHORPHAN HYDROBROMIDE 6.25; 15 MG/5ML; MG/5ML
5 SYRUP ORAL NIGHTLY
Qty: 120 ML | Refills: 0 | Status: SHIPPED | OUTPATIENT
Start: 2018-03-19 | End: 2018-03-29

## 2018-03-19 RX ADMIN — DEXAMETHASONE SODIUM PHOSPHATE 4 MG: 4 INJECTION, SOLUTION INTRA-ARTICULAR; INTRALESIONAL; INTRAMUSCULAR; INTRAVENOUS; SOFT TISSUE at 04:03

## 2018-03-19 NOTE — PROGRESS NOTES
"Subjective:       Patient ID: Adriano Borjas is a 82 y.o. male.    Chief Complaint: Cough and Chest Pain    Cough   This is a new (Went to the ER on Saturday night, he had a sore throat and felt like the throat was closing on him, they gave him flonase and sent him home. ) problem. The current episode started in the past 7 days (Has difficulty giving detailed history). The problem has been rapidly worsening (used flonase as ordered today and began coughing violently. ). The cough is productive of sputum. Associated symptoms include chest pain. Pertinent negatives include no fever, myalgias or shortness of breath. Treatments tried: flonase. The treatment provided no relief. There is no history of COPD or pneumonia.   Chest Pain    This is a new problem. The current episode started in the past 7 days. Progression since onset: resolved. The pain is severe. The quality of the pain is described as pressure ("felt like my ribs were cracking"). The pain does not radiate. Associated symptoms include a cough. Pertinent negatives include no fever or shortness of breath.     Review of Systems   Constitutional: Negative for fever.   Respiratory: Positive for cough. Negative for shortness of breath.    Cardiovascular: Positive for chest pain.   Musculoskeletal: Negative for myalgias.       Objective:     X-Ray Neck Soft Tissue     Narrative     EXAMINATION:  XR NECK SOFT TISSUE    CLINICAL HISTORY:  Pain in throat    TECHNIQUE:  AP and lateral soft tissue views the neck were performed.    COMPARISON:  None.    FINDINGS:  There is not abnormal prevertebral soft tissue swelling or abnormal prevertebral gas collection.  Epiglottis is normal in appearance.  Degenerative changes, disc space narrowing, reversal of the usual cervical lordosis noted in the cervical spine.   Impression       Unremarkable appearance of the soft tissues of the neck.      Electronically signed by: Mayra Steel MD  Date: 03/17/2018  Time: 08:33    " Encounter     View Encounter          Signed by     Signed Credentials Date/Time  Phone Pager   STACY DAVILA MD 3/17/2018 08:33 [phone]      EKG NSR with pacs and left anterior fascicular block  Physical Exam   Constitutional: He is oriented to person, place, and time. He appears well-developed and well-nourished. No distress.   HENT:   Head: Normocephalic and atraumatic.   Right Ear: External ear normal.   Left Ear: External ear normal.   Nose: Nose normal.   Mouth/Throat: Oropharynx is clear and moist. No oropharyngeal exudate.   Eyes: Conjunctivae are normal. Right eye exhibits no discharge. Left eye exhibits no discharge. No scleral icterus.   Neck: Normal range of motion. Neck supple.   Cardiovascular: Normal rate, regular rhythm and normal heart sounds.  Exam reveals no gallop and no friction rub.    No murmur heard.  Pulmonary/Chest: Effort normal. No respiratory distress. He has no wheezes. He has rales.   Right lower lobe rales noted.    Musculoskeletal: He exhibits no edema.   Lymphadenopathy:     He has no cervical adenopathy.   Neurological: He is alert and oriented to person, place, and time.   Skin: Skin is warm and dry. He is not diaphoretic.   Psychiatric: He has a normal mood and affect. His behavior is normal.   Nursing note and vitals reviewed.      Assessment:       1. Chest pressure    2. Cough    3. Myalgia    4. Abnormal EKG        Plan:       Chest pressure  -     IN OFFICE EKG 12-LEAD (to [facility])    Cough  -     X-Ray Chest PA And Lateral; Future; Expected date: 03/19/2018  -     benzonatate (TESSALON) 200 MG capsule; Take 1 capsule (200 mg total) by mouth 3 (three) times daily as needed for Cough.  Dispense: 30 capsule; Refill: 0  -     predniSONE (DELTASONE) 5 MG tablet; 6 tabs the first day, then 5 the next, then 4, 3, 2, 1  Dispense: 21 tablet; Refill: 0  -     promethazine-dextromethorphan (PROMETHAZINE-DM) 6.25-15 mg/5 mL Syrp; Take 5 mLs by mouth every evening.  Dispense: 120  mL; Refill: 0    Myalgia  -     dexamethasone injection 4 mg; Inject 1 mL (4 mg total) into the muscle one time.    Abnormal EKG  -     Ambulatory Referral to Cardiology      1 week if not better

## 2018-04-18 ENCOUNTER — DOCUMENTATION ONLY (OUTPATIENT)
Dept: FAMILY MEDICINE | Facility: CLINIC | Age: 83
End: 2018-04-18

## 2018-07-17 ENCOUNTER — HOSPITAL ENCOUNTER (EMERGENCY)
Facility: HOSPITAL | Age: 83
Discharge: LEFT BEFORE DISCHARGE | End: 2018-07-17
Attending: EMERGENCY MEDICINE | Admitting: EMERGENCY MEDICINE
Payer: MEDICARE

## 2018-07-17 VITALS
OXYGEN SATURATION: 99 % | SYSTOLIC BLOOD PRESSURE: 116 MMHG | RESPIRATION RATE: 14 BRPM | TEMPERATURE: 97 F | HEART RATE: 66 BPM | HEIGHT: 68 IN | DIASTOLIC BLOOD PRESSURE: 56 MMHG | BODY MASS INDEX: 27.26 KG/M2 | WEIGHT: 179.88 LBS

## 2018-07-17 DIAGNOSIS — R00.2 PALPITATIONS: ICD-10-CM

## 2018-07-17 DIAGNOSIS — I48.91 ATRIAL FIBRILLATION WITH RVR: Primary | ICD-10-CM

## 2018-07-17 LAB
ALBUMIN SERPL BCP-MCNC: 3.3 G/DL
ALP SERPL-CCNC: 53 U/L
ALT SERPL W/O P-5'-P-CCNC: 22 U/L
ANION GAP SERPL CALC-SCNC: 10 MMOL/L
AST SERPL-CCNC: 35 U/L
BASOPHILS # BLD AUTO: 0 K/UL
BASOPHILS NFR BLD: 0.2 %
BILIRUB SERPL-MCNC: 1.1 MG/DL
BUN SERPL-MCNC: 15 MG/DL
CALCIUM SERPL-MCNC: 8.6 MG/DL
CHLORIDE SERPL-SCNC: 105 MMOL/L
CO2 SERPL-SCNC: 24 MMOL/L
CREAT SERPL-MCNC: 1.5 MG/DL
DIFFERENTIAL METHOD: ABNORMAL
EOSINOPHIL # BLD AUTO: 0 K/UL
EOSINOPHIL NFR BLD: 0.3 %
ERYTHROCYTE [DISTWIDTH] IN BLOOD BY AUTOMATED COUNT: 13 %
EST. GFR  (AFRICAN AMERICAN): 50 ML/MIN/1.73 M^2
EST. GFR  (NON AFRICAN AMERICAN): 43 ML/MIN/1.73 M^2
GLUCOSE SERPL-MCNC: 99 MG/DL
HCT VFR BLD AUTO: 40.2 %
HGB BLD-MCNC: 13.6 G/DL
LYMPHOCYTES # BLD AUTO: 1.1 K/UL
LYMPHOCYTES NFR BLD: 15.5 %
MAGNESIUM SERPL-MCNC: 2.2 MG/DL
MCH RBC QN AUTO: 31.1 PG
MCHC RBC AUTO-ENTMCNC: 33.7 G/DL
MCV RBC AUTO: 92 FL
MONOCYTES # BLD AUTO: 0.6 K/UL
MONOCYTES NFR BLD: 8.3 %
NEUTROPHILS # BLD AUTO: 5.4 K/UL
NEUTROPHILS NFR BLD: 75.7 %
PLATELET # BLD AUTO: 113 K/UL
PMV BLD AUTO: 13.5 FL
POTASSIUM SERPL-SCNC: 4.3 MMOL/L
PROT SERPL-MCNC: 6.1 G/DL
RBC # BLD AUTO: 4.36 M/UL
SODIUM SERPL-SCNC: 139 MMOL/L
TROPONIN I SERPL DL<=0.01 NG/ML-MCNC: 0.04 NG/ML
TSH SERPL DL<=0.005 MIU/L-ACNC: 2.55 UIU/ML
WBC # BLD AUTO: 7.2 K/UL

## 2018-07-17 PROCEDURE — 84484 ASSAY OF TROPONIN QUANT: CPT

## 2018-07-17 PROCEDURE — 36415 COLL VENOUS BLD VENIPUNCTURE: CPT

## 2018-07-17 PROCEDURE — 12000002 HC ACUTE/MED SURGE SEMI-PRIVATE ROOM

## 2018-07-17 PROCEDURE — 83735 ASSAY OF MAGNESIUM: CPT

## 2018-07-17 PROCEDURE — 99285 EMERGENCY DEPT VISIT HI MDM: CPT | Mod: 25 | Performed by: EMERGENCY MEDICINE

## 2018-07-17 PROCEDURE — 80053 COMPREHEN METABOLIC PANEL: CPT

## 2018-07-17 PROCEDURE — 93005 ELECTROCARDIOGRAM TRACING: CPT

## 2018-07-17 PROCEDURE — 85025 COMPLETE CBC W/AUTO DIFF WBC: CPT

## 2018-07-17 PROCEDURE — 25000003 PHARM REV CODE 250: Performed by: EMERGENCY MEDICINE

## 2018-07-17 PROCEDURE — 96361 HYDRATE IV INFUSION ADD-ON: CPT | Performed by: EMERGENCY MEDICINE

## 2018-07-17 PROCEDURE — 96374 THER/PROPH/DIAG INJ IV PUSH: CPT | Performed by: EMERGENCY MEDICINE

## 2018-07-17 PROCEDURE — 84443 ASSAY THYROID STIM HORMONE: CPT

## 2018-07-17 RX ORDER — AMOXICILLIN 500 MG/1
500 CAPSULE ORAL EVERY 8 HOURS
COMMUNITY
Start: 2018-07-12 | End: 2018-07-19

## 2018-07-17 RX ORDER — DILTIAZEM HCL 1 MG/ML
5 INJECTION, SOLUTION INTRAVENOUS
Status: DISCONTINUED | OUTPATIENT
Start: 2018-07-17 | End: 2018-07-17 | Stop reason: HOSPADM

## 2018-07-17 RX ORDER — DILTIAZEM HYDROCHLORIDE 5 MG/ML
10 INJECTION INTRAVENOUS
Status: COMPLETED | OUTPATIENT
Start: 2018-07-17 | End: 2018-07-17

## 2018-07-17 RX ORDER — SODIUM CHLORIDE 0.9 % (FLUSH) 0.9 %
3 SYRINGE (ML) INJECTION EVERY 8 HOURS
Status: CANCELLED | OUTPATIENT
Start: 2018-07-17

## 2018-07-17 RX ADMIN — DILTIAZEM HYDROCHLORIDE 10 MG: 5 INJECTION INTRAVENOUS at 04:07

## 2018-07-17 RX ADMIN — SODIUM CHLORIDE 1000 ML: 0.9 INJECTION, SOLUTION INTRAVENOUS at 05:07

## 2018-07-17 RX ADMIN — DRONEDARONE 400 MG: 400 TABLET, FILM COATED ORAL at 04:07

## 2018-07-17 NOTE — ED PROVIDER NOTES
"Encounter Date: 7/17/2018    SCRIBE #1 NOTE: I, Oksana Page, am scribing for, and in the presence of, Dr. Charles .       History     Chief Complaint   Patient presents with    Neck Pain    Depression     Denies SI, HI       Time seen by provider: 4:00 PM on 07/17/2018    Adriano Borjas is a 83 y.o. male with a hx of atrial fibrillation and atrial flutter who presents to the ED with an onset of weakness and vision change. He states he was looking at something and "it started looking grey" followed by generalized weakness. He states he feels palpitations with previous episodes of atrial fibrillation but currently feels "perfectly fine." Pt was on Multaq for A-fib but was taken of 6-12 months ago. Allergens include Ciprofloxacin, Codeine, and Sulfa. Pt denies trouble breathing and vomiting. Per EMS, pt reported depression but pt states he is "happy."       The history is provided by the patient.     Review of patient's allergies indicates:   Allergen Reactions    Ciprofloxacin Other (See Comments)     Shoulder and leg pain/cramping     Codeine Other (See Comments)     Pt cannot remember been a long time ago    Sulfa (sulfonamide antibiotics)      Allergic reaction to bactrim     Past Medical History:   Diagnosis Date    A-fib     Elevated cholesterol     Essential hypertension     Kidney stone     passed on his own    Osteoarthritis     Thyroid disease      Past Surgical History:   Procedure Laterality Date    CYSTOSCOPY      EYE SURGERY       Family History   Problem Relation Age of Onset    Cancer Mother     Diabetes Mother     Hyperlipidemia Mother     Heart disease Father     Hyperlipidemia Father     Dementia Sister     Dementia Brother     Heart disease Brother     Migraines Daughter     Tremor Daughter     Tremor Son     Urolithiasis Neg Hx     Prostate cancer Neg Hx     Kidney cancer Neg Hx      Social History   Substance Use Topics    Smoking status: Former Smoker     Years: " 15.00    Smokeless tobacco: Never Used    Alcohol use No     Review of Systems   Constitutional: Negative for fever.   HENT: Negative for sore throat.    Eyes: Positive for visual disturbance. Negative for redness.   Respiratory: Negative for shortness of breath.    Cardiovascular: Negative for chest pain.   Gastrointestinal: Negative for nausea and vomiting.   Genitourinary: Negative for dysuria.   Musculoskeletal: Negative for back pain.   Skin: Negative for rash.   Neurological: Positive for weakness.   Hematological: Does not bruise/bleed easily.   Psychiatric/Behavioral: Positive for dysphoric mood (Depression (resolved) ).       Physical Exam     Initial Vitals [07/17/18 1538]   BP Pulse Resp Temp SpO2   104/63 (!) 155 14 96.9 °F (36.1 °C) 95 %      MAP       --         Physical Exam    Nursing note and vitals reviewed.  Constitutional: He appears well-developed and well-nourished. He is not diaphoretic.  Non-toxic appearance. He does not have a sickly appearance. He does not appear ill. No distress.   HENT:   Head: Normocephalic and atraumatic.   Eyes: EOM are normal.   Neck: Normal range of motion. Neck supple. Normal range of motion present. No neck rigidity.   Cardiovascular: Normal heart sounds. An irregular rhythm present. Tachycardia present.  Exam reveals no gallop and no friction rub.    No murmur heard.  Pulmonary/Chest: Breath sounds normal. No respiratory distress. He has no wheezes. He has no rhonchi. He has no rales.   Abdominal: Soft. He exhibits no distension.   Musculoskeletal: Normal range of motion.   Neurological: He is alert and oriented to person, place, and time.   Skin: Skin is warm and dry. No rash noted.   Psychiatric: He has a normal mood and affect. His behavior is normal. Judgment and thought content normal.         ED Course   Critical Care  Performed by: ESTHER LU  Authorized by: ESTHER LU   Direct patient critical care time: 14 minutes  Additional history  critical care time: 11 minutes  Ordering / reviewing critical care time: 12 minutes  Documentation critical care time: 10 minutes  Consulting other physicians critical care time: 6 minutes  Total critical care time (exclusive of procedural time) : 53 minutes  Critical care was time spent personally by me on the following activities: evaluation of patient's response to treatment, obtaining history from patient or surrogate, ordering and review of laboratory studies, ordering and performing treatments and interventions and development of treatment plan with patient or surrogate.        Labs Reviewed   COMPREHENSIVE METABOLIC PANEL - Abnormal; Notable for the following:        Result Value    Creatinine 1.5 (*)     Calcium 8.6 (*)     Albumin 3.3 (*)     Total Bilirubin 1.1 (*)     Alkaline Phosphatase 53 (*)     eGFR if  50 (*)     eGFR if non  43 (*)     All other components within normal limits   CBC W/ AUTO DIFFERENTIAL - Abnormal; Notable for the following:     RBC 4.36 (*)     Hemoglobin 13.6 (*)     MCH 31.1 (*)     Platelets 113 (*)     MPV 13.5 (*)     Gran% 75.7 (*)     Lymph% 15.5 (*)     All other components within normal limits   TROPONIN I - Abnormal; Notable for the following:     Troponin I 0.035 (*)     All other components within normal limits   MAGNESIUM   TSH     EKG Readings: (Independently Interpreted)   A-fib with RVR at a rate of 156. Normal axis. Normal axis. No acute ST segment changes.        Imaging Results    None          Medical Decision Making:   History:   Old Medical Records: I decided to obtain old medical records.  Initial Assessment:   83-year-old male presented with a chief complaint of weakness.  Differential Diagnosis:   Initial differential diagnosis included but not limited to cardiac arrhythmia, dehydration, electrolyte abnormality, and acute renal failure.    Clinical Tests:   Lab Tests: Ordered and Reviewed  Medical Tests: Ordered and  Reviewed  ED Management:  The patient was emergently evaluated in the emergency department, his evaluation was significant for an elderly male with an irregular tachycardic rhythm noted. The patient's EKG was significant for atrial fibrillation with RVR per my independent interpretation.  The patient's labs were significant for a mildly elevated troponin.  The patient's atrial fibrillation was aggressively treated with IV Cardizem, with some improvement in it.  I will admit the patient to the hospitalist service for further care and treatment.  I have discussed the case with the hospitalist on call, Dr. Dalton.  He has accepted the patient for admission.            Scribe Attestation:   Scribe #1: I performed the above scribed service and the documentation accurately describes the services I performed. I attest to the accuracy of the note.    Attending Attestation:             Attending ED Notes:   After admission, the patient's atrial fibrillation spontaneously converted to a normal sinus rhythm. A repeat EKG shows him to be in normal sinus rhythm per my independent interpretation.  The patient also decided to leave AMA after this.  The patient was awake, alert, and oriented x4.  Patient also had capacity make debilitated make his own decisions and showed no signs of clinical intoxication.  The patient signed out AMA and was encouraged to return at any time to seek further care.  He will otherwise follow up with Cardiology.             Clinical Impression:     1. Atrial fibrillation with RVR    2. Palpitations                                 Randall Charles MD  07/17/18 2043

## 2018-07-17 NOTE — ED NOTES
Provider notified of BP and instructed to hang 500 ml of NS. Pt asymptomatic of hypotension. Denies any CP

## 2018-07-17 NOTE — ED NOTES
Pt's HR remaining in 70's provider aware and diltiazem drip held. Pt states he doesn't want to stay the night because he can't sleep in hospitals. Informed pt of reasoning for staying and pt verbalized understanding but states he needs sleep. Pt requesting to speak to provider. Will notify provider.

## 2018-07-18 NOTE — ED NOTES
Patient leaving AMA.  Forms signed and patient educated on complications of leaving AMA.  Patient verbalized understanding.

## 2018-07-18 NOTE — ED NOTES
Assumed care from Dorita:  Patient awake, alert and oriented x 3, skin warm and dry, in NAD with family at bedside.

## 2018-07-19 ENCOUNTER — TELEPHONE (OUTPATIENT)
Dept: CARDIOLOGY | Facility: CLINIC | Age: 83
End: 2018-07-19

## 2018-07-19 NOTE — TELEPHONE ENCOUNTER
----- Message from Jillian Gomez sent at 7/19/2018  9:50 AM CDT -----  Type:   Appointment Request       Name of Caller:  Daughter Chris Monae   When is the first available appointment?  08/20/2018  Symptoms:  Follow up [facility] / 07/18  Best Call Back Number:  [phone]  Additional Information:

## 2018-07-19 NOTE — TELEPHONE ENCOUNTER
Called and spoke with Ms. Perez, I offered her an appt on 7/26/18 for a hosp f/u with Dr. Vaughn. She accepted. No further issues noted.

## 2018-07-26 ENCOUNTER — OFFICE VISIT (OUTPATIENT)
Dept: CARDIOLOGY | Facility: CLINIC | Age: 83
End: 2018-07-26
Payer: MEDICARE

## 2018-07-26 ENCOUNTER — HOSPITAL ENCOUNTER (OUTPATIENT)
Dept: CARDIOLOGY | Facility: HOSPITAL | Age: 83
Discharge: HOME | End: 2018-07-26
Attending: INTERNAL MEDICINE
Payer: MEDICARE

## 2018-07-26 VITALS
WEIGHT: 174.63 LBS | SYSTOLIC BLOOD PRESSURE: 159 MMHG | HEART RATE: 61 BPM | DIASTOLIC BLOOD PRESSURE: 79 MMHG | BODY MASS INDEX: 26.47 KG/M2 | HEIGHT: 68 IN | OXYGEN SATURATION: 95 %

## 2018-07-26 DIAGNOSIS — I48.91 ATRIAL FIBRILLATION, UNSPECIFIED TYPE: ICD-10-CM

## 2018-07-26 DIAGNOSIS — G45.8 OTHER SPECIFIED TRANSIENT CEREBRAL ISCHEMIAS: Primary | ICD-10-CM

## 2018-07-26 DIAGNOSIS — I48.91 ATRIAL FIBRILLATION, UNSPECIFIED TYPE: Primary | ICD-10-CM

## 2018-07-26 DIAGNOSIS — I10 ESSENTIAL HYPERTENSION: ICD-10-CM

## 2018-07-26 PROCEDURE — 99999 PR PBB SHADOW E&M-EST. PATIENT-LVL III: CPT | Mod: PBBFAC,,, | Performed by: INTERNAL MEDICINE

## 2018-07-26 PROCEDURE — 93272 ECG/REVIEW INTERPRET ONLY: CPT | Mod: ,,, | Performed by: INTERNAL MEDICINE

## 2018-07-26 PROCEDURE — 99204 OFFICE O/P NEW MOD 45 MIN: CPT | Mod: S$GLB,,, | Performed by: INTERNAL MEDICINE

## 2018-07-26 PROCEDURE — 99499 UNLISTED E&M SERVICE: CPT | Mod: S$GLB,,, | Performed by: INTERNAL MEDICINE

## 2018-07-26 PROCEDURE — 93271 ECG/MONITORING AND ANALYSIS: CPT

## 2018-07-26 PROCEDURE — 93000 ELECTROCARDIOGRAM COMPLETE: CPT | Mod: 59,S$GLB,, | Performed by: INTERNAL MEDICINE

## 2018-07-26 RX ORDER — AMOXICILLIN AND CLAVULANATE POTASSIUM 500; 125 MG/1; MG/1
TABLET, FILM COATED ORAL
COMMUNITY
Start: 2018-07-24 | End: 2018-10-01

## 2018-07-26 RX ORDER — ASPIRIN 81 MG/1
81 TABLET ORAL DAILY
Refills: 0 | COMMUNITY
Start: 2018-07-26 | End: 2018-10-01 | Stop reason: ALTCHOICE

## 2018-07-27 PROBLEM — I10 ESSENTIAL HYPERTENSION: Status: ACTIVE | Noted: 2018-07-27

## 2018-07-27 NOTE — PROGRESS NOTES
[facility]    CC:  Atrial fibrillation.  Chief Complaint   Patient presents with    Hospital Follow Up       Patient ID: Adriano Borjas is a 83 y.o. male with a past medical history of paroxysmal atrial fibrillation, essential hypertension, hypercholesterolemia, carotid artery disease, prior TIAs, thrombocytopenia    HPI  Patient was recently admitted to the hospital with AFib with RVR.  He was medically managed.  He spontaneously cardioverted into sinus rhythm.  This gentleman has a history of paroxysmal atrial fibrillation with previous treatment with Multaq.  He has been off this medication for a while.  When he had atrial fibrillation on this occasion he did not have palpitation but felt like he was dizzy.  He has had transient ischemic episodes a few years back.  Never was diagnosed with a stroke.  He has had dizzy spells since then however not certain whether these were atrial fibrillation episodes.    Past Medical History:   Diagnosis Date    A-fib     Elevated cholesterol     Essential hypertension     Kidney stone     passed on his own    Osteoarthritis     Thyroid disease      Past Surgical History:   Procedure Laterality Date    CYSTOSCOPY      EYE SURGERY       Social History     Social History    Marital status:      Spouse name: N/A    Number of children: N/A    Years of education: N/A     Occupational History    Not on file.     Social History Main Topics    Smoking status: Former Smoker     Years: 15.00    Smokeless tobacco: Never Used    Alcohol use No    Drug use: No    Sexual activity: Not on file     Other Topics Concern    Not on file     Social History Narrative    No narrative on file     Family History   Problem Relation Age of Onset    Cancer Mother     Diabetes Mother     Hyperlipidemia Mother     Heart disease Father     Hyperlipidemia Father     Dementia Sister     Dementia Brother     Heart disease Brother     Migraines Daughter      "Tremor Daughter     Tremor Son     Urolithiasis Neg Hx     Prostate cancer Neg Hx     Kidney cancer Neg Hx        Review of patient's allergies indicates:   Allergen Reactions    Ciprofloxacin Other (See Comments)     Shoulder and leg pain/cramping     Codeine Other (See Comments)     Pt cannot remember been a long time ago    Sulfa (sulfonamide antibiotics)      Allergic reaction to bactrim       Medication List with Changes/Refills   New Medications    ASPIRIN (ECOTRIN) 81 MG EC TABLET    Take 1 tablet (81 mg total) by mouth once daily.   Current Medications    AMOXICILLIN-CLAVULANATE 500-125MG (AUGMENTIN) 500-125 MG TAB        ASCORBIC ACID, VITAMIN C, (VITAMIN C) 500 MG TABLET    Take 500 mg by mouth once daily.    CALCIUM CARB/MAGNESIUM OXID/D3 (CALCIUM CARB-MAG OXIDE-VIT D3 ORAL)    Take by mouth once daily.    LEVOTHYROXINE (SYNTHROID) 75 MCG TABLET    TAKE ONE TABLET BY MOUTH ONCE DAILY    MULTIVITAMIN (THERAGRAN) TABLET    Take 1 tablet by mouth once daily.    VITAMIN E 400 UNIT CAPSULE    Take 400 Units by mouth once daily.       Review of Systems  Constitution: Denies chills, fever, and sweats.  HENT: Denies headaches or blurry vision.  Cardiovascular: Denies chest pain or irregular heart beat.  Respiratory: Denies cough or shortness of breath.  Gastrointestinal: Denies abdominal pain, nausea, or vomiting.  Musculoskeletal: Denies muscle cramps.  Neurological:  dizziness .  Psychiatric/Behavioral: Normal mental status.  Hematologic/Lymphatic: Denies bleeding problem or easy bruising/bleeding.  Skin: Denies rash or suspicious lesions    Physical Examination  BP (!) 159/79 (BP Location: Right arm, Patient Position: Sitting)   Pulse 61   Ht 5' 8" (1.727 m)   Wt 79.2 kg (174 lb 9.7 oz)   SpO2 95%   BMI 26.55 kg/m²     Constitutional: No acute distress, conversant  HEENT: Sclera anicteric, Pupils equal, round and reactive to light, extraocular motions intact, Oropharynx clear  Neck: No JVD, no " carotid bruits  Cardiovascular: regular rate and rhythm, no murmur, rubs or gallops, normal S1/S2  Pulmonary: Clear to auscultation bilaterally  Abdominal: Abdomen soft, nontender, nondistended, positive bowel sounds  Extremities: No lower extremity edema,   Pulses:  Carotid pulses are 2+ on the right side, and 2+ on the left side.  Radial pulses are 2+ on the right side, and 2+ on the left side.      Skin: No ecchymosis, erythema, or ulcers  Psych: Alert and oriented x 3, appropriate affect  Neuro: CNII-XII intact, no focal deficits    Labs:  Most Recent Data  CBC:   Lab Results   Component Value Date    WBC 7.20 2018    HGB 13.6 (L) 2018    HCT 40.2 2018     (L) 2018    MCV 92 2018    RDW 13.0 2018     BMP:   Lab Results   Component Value Date     2018    K 4.3 2018     2018    CO2 24 2018    BUN 15 2018    CREATININE 1.5 (H) 2018    GLU 99 2018    CALCIUM 8.6 (L) 2018    MG 2.2 2018    PHOS 3.9 2012     LFTS;   Lab Results   Component Value Date    PROT 6.1 2018    ALBUMIN 3.3 (L) 2018    BILITOT 1.1 (H) 2018    AST 35 2018    ALKPHOS 53 (L) 2018    ALT 22 2018     COAGS:   Lab Results   Component Value Date    INR 1.0 2014     FLP:   Lab Results   Component Value Date    CHOL 203 (H) 2017    HDL 36 (L) 2017    LDLCALC 143.8 2017    TRIG 116 2017    CHOLHDL 17.7 (L) 2017     CARDIAC:   Lab Results   Component Value Date    TROPONINI 0.035 (H) 2018    BNP 89 2014       Imaging:    EKG:  Normal sinus rhythm.  Single PAC.        Stress Testin.  Scintigraphically negative for ischemia and infarct.  2.  The global left ventricular systolic function is normal with an LV ejection fraction of 63 % and no evidence of LV dilatation. Wall motion is normal.  I have personally reviewed these images and echo  data    Assessment/Plan:  Adriano was seen today for hospital follow up.    Diagnoses and all orders for this visit:    Other specified transient cerebral ischemias    Atrial fibrillation, unspecified type  -     EKG 12-lead  -     Transthoracic echo (TTE) complete; Future  -     Cancel: Cardiac event monitor (30 day); Future  -     Cardiac event monitor; Future    Essential hypertension    Other orders  -     aspirin (ECOTRIN) 81 MG EC tablet; Take 1 tablet (81 mg total) by mouth once daily.      CHADSVAS- 5. HASBLED-2    He has thrombocytopenia.  Given his age he is also at high risk for bleeding.  I will put 30 day event monitor.  Based on the atrial fibrillation burden I would decide whether to use full dose Eliquis versus half dose Eliquis.  He was on blood pressure medication but currently he is on none.  His blood pressure that he measures it at home has been within normal range as he states.  His systolics run around 130s.  Once he has the 30 day event monitor I would put him on small dose of beta-blocker.    Follow-up in about 1 month (around 8/26/2018).          Total duration of face to face visit time 45 minutes.  Total time spent counseling greater than fifty percent of total visit time.  Counseling included discussion regarding imaging findings, diagnosis, possibilities, treatment options, risks and benefits.  The patient had many questions regarding the options and long-term effect which were all answered to my best ability.      Chele Vaughn MD,MRCP,RPVI,FACC,FSCAI.  Interventional Cardiology   Phone [phone]

## 2018-08-07 ENCOUNTER — HOSPITAL ENCOUNTER (OUTPATIENT)
Dept: CARDIOLOGY | Facility: HOSPITAL | Age: 83
Discharge: HOME | End: 2018-08-07
Attending: INTERNAL MEDICINE
Payer: MEDICARE

## 2018-08-07 VITALS
HEIGHT: 68 IN | WEIGHT: 174 LBS | HEART RATE: 65 BPM | DIASTOLIC BLOOD PRESSURE: 84 MMHG | SYSTOLIC BLOOD PRESSURE: 139 MMHG | BODY MASS INDEX: 26.37 KG/M2

## 2018-08-07 DIAGNOSIS — I48.91 ATRIAL FIBRILLATION, UNSPECIFIED TYPE: ICD-10-CM

## 2018-08-07 PROCEDURE — 93306 TTE W/DOPPLER COMPLETE: CPT | Mod: 26,,, | Performed by: INTERNAL MEDICINE

## 2018-08-07 PROCEDURE — 93306 TTE W/DOPPLER COMPLETE: CPT

## 2018-08-08 ENCOUNTER — PATIENT MESSAGE (OUTPATIENT)
Dept: CARDIOLOGY | Facility: CLINIC | Age: 83
End: 2018-08-08

## 2018-08-08 LAB
AORTIC ROOT ANNULUS: 2.73 CM
AORTIC VALVE CUSP SEPERATION: 1.7 CM
AV MEAN GRADIENT: 2.16 MMHG
AV PEAK GRADIENT: 3.7 MMHG
AV VALVE AREA: 2.38 CM2
BSA FOR ECHO PROCEDURE: 1.95 M2
CV ECHO LV RWT: 0.56 CM
DOP CALC AO PEAK VEL: 0.96 M/S
DOP CALC AO VTI: 22.12 CM
DOP CALC LVOT AREA: 2.77 CM2
DOP CALC LVOT DIAMETER: 1.88 CM
DOP CALC LVOT STROKE VOLUME: 52.72 CM3
DOP CALCLVOT PEAK VEL VTI: 19 CM
E WAVE DECELERATION TIME: 191.2 MSEC
E/A RATIO: 0.8
E/E' RATIO: 6.67
ECHO LV POSTERIOR WALL: 1.18 CM (ref 0.6–1.1)
FRACTIONAL SHORTENING: 38 % (ref 28–44)
INTERVENTRICULAR SEPTUM: 1.22 CM (ref 0.6–1.1)
IVRT: 0.08 MSEC
LEFT ATRIUM SIZE: 3.28 CM
LEFT INTERNAL DIMENSION IN SYSTOLE: 2.66 CM (ref 2.1–4)
LEFT VENTRICLE MASS INDEX: 95 G/M2
LEFT VENTRICULAR INTERNAL DIMENSION IN DIASTOLE: 4.31 CM (ref 3.5–6)
LEFT VENTRICULAR MASS: 185.34 G
LV LATERAL E/E' RATIO: 6
LV SEPTAL E/E' RATIO: 7.5
MV PEAK A VEL: 0.75 M/S
MV PEAK E VEL: 0.6 M/S
MV STENOSIS PRESSURE HALF TIME: 55.45 MS
MV VALVE AREA P 1/2 METHOD: 3.97 CM2
PISA TR MAX VEL: 1.76 M/S
PV PEAK GRADIENT: 4.13 MMHG
PV PEAK VELOCITY: 1.02 CM/S
RA PRESSURE: 3 MMHG
RIGHT VENTRICULAR END-DIASTOLIC DIMENSION: 3.13 CM
TDI LATERAL: 0.1
TDI SEPTAL: 0.08
TDI: 0.09
TR MAX PG: 12.39 MMHG
TRICUSPID ANNULAR PLANE SYSTOLIC EXCURSION: 0.02 CM
TV REST PULMONARY ARTERY PRESSURE: 15.39 MMHG

## 2018-08-27 ENCOUNTER — TELEPHONE (OUTPATIENT)
Dept: CARDIOLOGY | Facility: CLINIC | Age: 83
End: 2018-08-27

## 2018-08-27 NOTE — TELEPHONE ENCOUNTER
Called and spoke with Shawna, pts daughter, she stated that her dad is still wearing his heart monitor and she was wondering if they disconnect it to come to the appt. I advised her I will reschedule his appt because we like to have all the test results prior to the pt coming to see the doctor. She verbalized understanding. I rescheudle appt for 9/27/18 @ 2:45p. She accpeted. I asked her to call me when they mail it back, and I may be able to move the appt up. She verbalized understanding. No further issues noted.

## 2018-08-27 NOTE — TELEPHONE ENCOUNTER
----- Message from Gurwinder Reid sent at 8/27/2018  3:57 PM CDT -----  Contact: chris  Type: Needs Medical Advice    Who Called:  Patient  Symptoms (please be specific):    How long has patient had these symptoms:    Pharmacy name and phone #:    Best Call Back Number: [phone]  Additional Information: called to verify if patient needs to disconnect the monitor to come for visit on tomorrow.call back to advise

## 2018-09-10 ENCOUNTER — TELEPHONE (OUTPATIENT)
Dept: CARDIOLOGY | Facility: CLINIC | Age: 83
End: 2018-09-10

## 2018-09-10 NOTE — TELEPHONE ENCOUNTER
----- Message from Teresa Burnett sent at 9/10/2018  1:05 PM CDT -----  Contact: Chris Letha (Daughter)  Patient's daughter states they mailed the heart monitored on Thursday 08/06/18. Daughter states if Brittaney makes the appointment she is not available to bring patient on Tuesdays or Wednesday. Please call daughter at [phone]. Thanks!

## 2018-09-25 ENCOUNTER — TELEPHONE (OUTPATIENT)
Dept: CARDIOLOGY | Facility: CLINIC | Age: 83
End: 2018-09-25

## 2018-09-25 NOTE — TELEPHONE ENCOUNTER
Called pt to reschedule his appointment with Dr. Vaughn on 9/27 as  wont be in clinic after 2: 30 for a meeting. Spoke to pt's wife and scheduled pt's appointment for 10/1 at 3:45 pm. Pt's wife was asked that he comes 15 minutes early and to bring a current medication list. No further issues noted.

## 2018-10-01 ENCOUNTER — OFFICE VISIT (OUTPATIENT)
Dept: CARDIOLOGY | Facility: CLINIC | Age: 83
End: 2018-10-01
Payer: MEDICARE

## 2018-10-01 VITALS
SYSTOLIC BLOOD PRESSURE: 116 MMHG | BODY MASS INDEX: 26.27 KG/M2 | HEART RATE: 66 BPM | OXYGEN SATURATION: 95 % | HEIGHT: 68 IN | WEIGHT: 173.31 LBS | DIASTOLIC BLOOD PRESSURE: 68 MMHG

## 2018-10-01 DIAGNOSIS — I73.9 CLAUDICATION: Primary | ICD-10-CM

## 2018-10-01 DIAGNOSIS — I48.91 ATRIAL FIBRILLATION, UNSPECIFIED TYPE: ICD-10-CM

## 2018-10-01 PROCEDURE — 99999 PR PBB SHADOW E&M-EST. PATIENT-LVL III: CPT | Mod: PBBFAC,,, | Performed by: INTERNAL MEDICINE

## 2018-10-01 PROCEDURE — 99213 OFFICE O/P EST LOW 20 MIN: CPT | Mod: PBBFAC,PO | Performed by: INTERNAL MEDICINE

## 2018-10-01 PROCEDURE — 99499 UNLISTED E&M SERVICE: CPT | Mod: S$GLB,,, | Performed by: INTERNAL MEDICINE

## 2018-10-01 PROCEDURE — 1101F PT FALLS ASSESS-DOCD LE1/YR: CPT | Mod: CPTII,,, | Performed by: INTERNAL MEDICINE

## 2018-10-01 PROCEDURE — 99214 OFFICE O/P EST MOD 30 MIN: CPT | Mod: S$PBB,,, | Performed by: INTERNAL MEDICINE

## 2018-10-03 ENCOUNTER — LAB VISIT (OUTPATIENT)
Dept: LAB | Facility: HOSPITAL | Age: 83
End: 2018-10-03
Attending: INTERNAL MEDICINE
Payer: MEDICARE

## 2018-10-03 DIAGNOSIS — R41.3 MEMORY LOSS: ICD-10-CM

## 2018-10-03 LAB — VIT B12 SERPL-MCNC: 844 PG/ML

## 2018-10-03 PROCEDURE — 36415 COLL VENOUS BLD VENIPUNCTURE: CPT | Mod: PO

## 2018-10-03 PROCEDURE — 82607 VITAMIN B-12: CPT

## 2018-10-03 PROCEDURE — 86592 SYPHILIS TEST NON-TREP QUAL: CPT

## 2018-10-04 LAB — RPR SER QL: NORMAL

## 2018-10-04 RX ORDER — LEVOTHYROXINE SODIUM 75 UG/1
75 TABLET ORAL DAILY
Qty: 90 TABLET | Refills: 1 | Status: SHIPPED | OUTPATIENT
Start: 2018-10-04 | End: 2019-04-04 | Stop reason: SDUPTHER

## 2018-10-04 NOTE — TELEPHONE ENCOUNTER
----- Message from Pedro Perales sent at 10/4/2018  9:49 AM CDT -----  Contact: Daughter, Shawna  Patient need a refill on levothyroxine please send to [facility], any questions please call back at [phone] (home)     [facility] [id number] - [address]  [place] [id number]  Phone: [phone] Fax: [phone]

## 2018-10-09 LAB
OHS CV HOLTER LENGTH DECIMAL HOURS: 693.27
OHS CV HOLTER LENGTH HOURS: 693
OHS CV HOLTER LENGTH MINUTES: 16

## 2018-10-09 NOTE — PROGRESS NOTES
[facility]    CC:  Follow-up appointment.  Chief Complaint   Patient presents with    Follow-up     4 week       Patient ID: Adriano Borjas is a 83 y.o. male with a past medical history of atrial fibrillation, essential hypertension  HPI  This gentleman underwent extended event monitor.  The reports are dictated below.  He is feeling well.  Denies any history of chest pain, shortness of breath, orthopnea, PND, palpitations, syncope or presyncope.  He is still exercising and does really heavy lifting.  Occasionally he complains of lower extremity pain.    Past Medical History:   Diagnosis Date    A-fib     Elevated cholesterol     Essential hypertension     Kidney stone     passed on his own    Osteoarthritis     Thyroid disease      Past Surgical History:   Procedure Laterality Date    CYSTOSCOPY      EYE SURGERY       Social History     Socioeconomic History    Marital status:      Spouse name: Not on file    Number of children: Not on file    Years of education: Not on file    Highest education level: Not on file   Social Needs    Financial resource strain: Not on file    Food insecurity - worry: Not on file    Food insecurity - inability: Not on file    Transportation needs - medical: Not on file    Transportation needs - non-medical: Not on file   Occupational History    Not on file   Tobacco Use    Smoking status: Former Smoker     Years: 15.00    Smokeless tobacco: Never Used   Substance and Sexual Activity    Alcohol use: No    Drug use: No    Sexual activity: Not on file   Other Topics Concern    Not on file   Social History Narrative    Not on file     Family History   Problem Relation Age of Onset    Cancer Mother     Diabetes Mother     Hyperlipidemia Mother     Heart disease Father     Hyperlipidemia Father     Dementia Sister     Dementia Brother     Heart disease Brother     Migraines Daughter     Tremor Daughter     Tremor Son      Urolithiasis Neg Hx     Prostate cancer Neg Hx     Kidney cancer Neg Hx        Review of patient's allergies indicates:   Allergen Reactions    Ciprofloxacin Other (See Comments)     Shoulder and leg pain/cramping     Codeine Other (See Comments)     Pt cannot remember been a long time ago    Sulfa (sulfonamide antibiotics)      Allergic reaction to bactrim          Medication List           Accurate as of 10/1/18 11:59 PM. If you have any questions, ask your nurse or doctor.               START taking these medications    apixaban 5 mg Tab  Commonly known as:  ELIQUIS  Take 1 tablet (5 mg total) by mouth 2 (two) times daily.  Started by:  Chele Vaughn MD        CONTINUE taking these medications    CALCIUM CARB-MAG OXIDE-VIT D3 ORAL     levothyroxine 75 MCG tablet  Commonly known as:  SYNTHROID  TAKE ONE TABLET BY MOUTH ONCE DAILY     multivitamin tablet  Commonly known as:  THERAGRAN     VITAMIN C 500 MG tablet  Generic drug:  ascorbic acid (vitamin C)        STOP taking these medications    amoxicillin-clavulanate 500-125mg 500-125 mg Tab  Commonly known as:  AUGMENTIN  Stopped by:  Chele Vaughn MD     aspirin 81 MG EC tablet  Commonly known as:  ECOTRIN  Stopped by:  Chele Vaughn MD     vitamin E 400 UNIT capsule  Stopped by:  Chele Vaughn MD           Where to Get Your Medications      These medications were sent to [facility] [address] [place] [id number]    Phone:  [phone]   · apixaban 5 mg Tab         Review of Systems  Constitution: Denies chills, fever, and sweats.  HENT: Denies headaches or blurry vision.  Cardiovascular: Denies chest pain or irregular heart beat.  Respiratory: Denies cough or shortness of breath.  Gastrointestinal: Denies abdominal pain, nausea, or vomiting.  Musculoskeletal:  Leg pain.  Neurological: Denies dizziness or focal weakness.  Psychiatric/Behavioral: Normal mental status.  Hematologic/Lymphatic:  "Denies bleeding problem or easy bruising/bleeding.  Skin: Denies rash or suspicious lesions    Physical Examination  /68 (BP Location: Right arm, Patient Position: Sitting)   Pulse 66   Ht 5' 8" (1.727 m)   Wt 78.6 kg (173 lb 4.5 oz)   SpO2 95%   BMI 26.35 kg/m²     Constitutional: No acute distress, conversant  HEENT: Sclera anicteric, Pupils equal, round and reactive to light, extraocular motions intact, Oropharynx clear  Neck: No JVD, no carotid bruits  Cardiovascular: regular rate and rhythm, no murmur, rubs or gallops, normal S1/S2  Pulmonary: Clear to auscultation bilaterally  Abdominal: Abdomen soft, nontender, nondistended, positive bowel sounds  Extremities: No lower extremity edema,   Pulses:  Carotid pulses are 2+ on the right side, and 2+ on the left side.  Radial pulses are 2+ on the right side, and 2+ on the left side.      Skin: No ecchymosis, erythema, or ulcers  Psych: Alert and oriented x 3, appropriate affect  Neuro: CNII-XII intact, no focal deficits    Labs:  Most Recent Data  CBC:   Lab Results   Component Value Date    WBC 7.20 07/17/2018    HGB 13.6 (L) 07/17/2018    HCT 40.2 07/17/2018     (L) 07/17/2018    MCV 92 07/17/2018    RDW 13.0 07/17/2018     BMP:   Lab Results   Component Value Date     07/17/2018    K 4.3 07/17/2018     07/17/2018    CO2 24 07/17/2018    BUN 15 07/17/2018    CREATININE 1.5 (H) 07/17/2018    GLU 99 07/17/2018    CALCIUM 8.6 (L) 07/17/2018    MG 2.2 07/17/2018    PHOS 3.9 11/30/2012     LFTS;   Lab Results   Component Value Date    PROT 6.1 07/17/2018    ALBUMIN 3.3 (L) 07/17/2018    BILITOT 1.1 (H) 07/17/2018    AST 35 07/17/2018    ALKPHOS 53 (L) 07/17/2018    ALT 22 07/17/2018     COAGS:   Lab Results   Component Value Date    INR 1.0 09/01/2014     FLP:   Lab Results   Component Value Date    CHOL 203 (H) 09/13/2017    HDL 36 (L) 09/13/2017    LDLCALC 143.8 09/13/2017    TRIG 116 09/13/2017    CHOLHDL 17.7 (L) 09/13/2017 "     CARDIAC:   Lab Results   Component Value Date    TROPONINI 0.035 (H) 07/17/2018    BNP 89 09/01/2014       Imaging:    EKG:  Normal sinus rhythm.  PACs.    Echo:Conclusion     · Left ventricle ejection fraction is normal at 55%  · Grade I (mild) left ventricular diastolic dysfunction consistent with impaired relaxation.  · RV systolic function is normal.  · Left atrium is mildly dilated.  · Right atrium is mildly dilated.  · Normal central venous pressure (3 mm Hg).  · Mitral valve shows mild regurgitation.  · Tricuspid valve shows mild regurgitation.  · Mild regurgitation is present in the aortic valve..  · LA pressure is normal.  · Left ventricle shows mild concentric hypertrophy.       Holter monitor  Multiple episodes of atrial fibrillation.  Up to 10% burden.  Few conversion pauses.  Longest 2.2 sec.    I have personally reviewed these images and echo data    Assessment/Plan:  Adriano was seen today for follow-up.    Diagnoses and all orders for this visit:    Claudication  -     Cardiology Lab CONSTANCE Resting, Lower Extremities; Future    Atrial fibrillation, unspecified type  -     EKG 12-lead  -     CBC auto differential; Future    Other orders  -     apixaban (ELIQUIS) 5 mg Tab; Take 1 tablet (5 mg total) by mouth 2 (two) times daily.     Based on his atrial fibrillation burden I have increased his Eliquis due full dose for stroke prophylaxis    CHADSVAS- 5, HAS BLED-2      Follow-up in about 2 months (around 12/1/2018).        Chele Vaughn MD,MRCP,RPVI,FACC,FSCAI.  Interventional Cardiology   Phone [phone]

## 2018-12-12 ENCOUNTER — OFFICE VISIT (OUTPATIENT)
Dept: CARDIOLOGY | Facility: CLINIC | Age: 83
End: 2018-12-12
Payer: MEDICARE

## 2018-12-12 VITALS
OXYGEN SATURATION: 92 % | SYSTOLIC BLOOD PRESSURE: 144 MMHG | DIASTOLIC BLOOD PRESSURE: 70 MMHG | BODY MASS INDEX: 26.43 KG/M2 | WEIGHT: 174.38 LBS | HEART RATE: 75 BPM | HEIGHT: 68 IN

## 2018-12-12 DIAGNOSIS — I10 ESSENTIAL HYPERTENSION: ICD-10-CM

## 2018-12-12 DIAGNOSIS — G45.9 TIA (TRANSIENT ISCHEMIC ATTACK): Primary | ICD-10-CM

## 2018-12-12 DIAGNOSIS — I48.91 ATRIAL FIBRILLATION, UNSPECIFIED TYPE: ICD-10-CM

## 2018-12-12 DIAGNOSIS — I48.0 PAROXYSMAL ATRIAL FIBRILLATION: ICD-10-CM

## 2018-12-12 PROCEDURE — 99213 OFFICE O/P EST LOW 20 MIN: CPT | Mod: S$GLB,,, | Performed by: INTERNAL MEDICINE

## 2018-12-12 PROCEDURE — 99999 PR PBB SHADOW E&M-EST. PATIENT-LVL III: CPT | Mod: PBBFAC,,, | Performed by: INTERNAL MEDICINE

## 2018-12-12 PROCEDURE — 1101F PT FALLS ASSESS-DOCD LE1/YR: CPT | Mod: CPTII,S$GLB,, | Performed by: INTERNAL MEDICINE

## 2018-12-12 PROCEDURE — 93000 ELECTROCARDIOGRAM COMPLETE: CPT | Mod: S$GLB,,, | Performed by: INTERNAL MEDICINE

## 2018-12-12 NOTE — PROGRESS NOTES
[facility]    CC:  Follow-up appointment  Chief Complaint   Patient presents with    2 month follow-up       Patient ID: Adriano Borjas is a 83 y.o. male with a past medical history of paroxysmal atrial fibrillation, essential hypertension  HPI  Patient is doing well.  He continues to exercise regularly.  He is lifting about 350 lb of weight in the gym.  He denies any chest pain, palpitations, syncope or presyncope.    Past Medical History:   Diagnosis Date    A-fib     Elevated cholesterol     Essential hypertension     Kidney stone     passed on his own    Osteoarthritis     Thyroid disease      Past Surgical History:   Procedure Laterality Date    CYSTOSCOPY      EYE SURGERY       Social History     Socioeconomic History    Marital status:      Spouse name: Not on file    Number of children: Not on file    Years of education: Not on file    Highest education level: Not on file   Social Needs    Financial resource strain: Not on file    Food insecurity - worry: Not on file    Food insecurity - inability: Not on file    Transportation needs - medical: Not on file    Transportation needs - non-medical: Not on file   Occupational History    Not on file   Tobacco Use    Smoking status: Former Smoker     Years: 15.00    Smokeless tobacco: Never Used   Substance and Sexual Activity    Alcohol use: No    Drug use: No    Sexual activity: Not on file   Other Topics Concern    Not on file   Social History Narrative    Not on file     Family History   Problem Relation Age of Onset    Cancer Mother     Diabetes Mother     Hyperlipidemia Mother     Heart disease Father     Hyperlipidemia Father     Dementia Sister     Dementia Brother     Heart disease Brother     Migraines Daughter     Tremor Daughter     Tremor Son     Urolithiasis Neg Hx     Prostate cancer Neg Hx     Kidney cancer Neg Hx        Review of patient's allergies indicates:   Allergen Reactions     "Ciprofloxacin Other (See Comments)     Shoulder and leg pain/cramping     Codeine Other (See Comments)     Pt cannot remember been a long time ago    Sulfa (sulfonamide antibiotics)      Allergic reaction to bactrim          Medication List           Accurate as of 12/12/18  3:11 PM. If you have any questions, ask your nurse or doctor.               CONTINUE taking these medications    apixaban 5 mg Tab  Commonly known as:  ELIQUIS  Take 1 tablet (5 mg total) by mouth 2 (two) times daily.     levothyroxine 75 MCG tablet  Commonly known as:  SYNTHROID  Take 1 tablet (75 mcg total) by mouth once daily.     multivitamin tablet  Commonly known as:  THERAGRAN     VITAMIN C 500 MG tablet  Generic drug:  ascorbic acid (vitamin C)        STOP taking these medications    CALCIUM CARB-MAG OXIDE-VIT D3 ORAL  Stopped by:  Chele Vaughn MD            Review of Systems  Constitution: Denies chills, fever, and sweats.  HENT: Denies headaches or blurry vision.  Cardiovascular: Denies chest pain or irregular heart beat.  Respiratory: Denies cough or shortness of breath.  Gastrointestinal: Denies abdominal pain, nausea, or vomiting.  Musculoskeletal: Denies muscle cramps.  Neurological: Denies dizziness or focal weakness.  Psychiatric/Behavioral: Normal mental status.  Hematologic/Lymphatic: Denies bleeding problem or easy bruising/bleeding.  Skin: Denies rash or suspicious lesions    Physical Examination  BP (!) 144/70 (BP Location: Right arm, Patient Position: Sitting, BP Method: Medium (Automatic))   Pulse 75   Ht 5' 8" (1.727 m)   Wt 79.1 kg (174 lb 6.1 oz)   SpO2 (!) 92%   BMI 26.51 kg/m²     Constitutional: No acute distress, conversant  HEENT: Sclera anicteric, Pupils equal, round and reactive to light, extraocular motions intact, Oropharynx clear  Neck: No JVD, no carotid bruits  Cardiovascular: regular rate and rhythm, no murmur, rubs or gallops, normal S1/S2  Pulmonary: Clear to auscultation bilaterally  Abdominal: " Abdomen soft, nontender, nondistended, positive bowel sounds  Extremities: No lower extremity edema,   Pulses:  Carotid pulses are 2+ on the right side, and 2+ on the left side.  Radial pulses are 2+ on the right side, and 2+ on the left side.     Skin: No ecchymosis, erythema, or ulcers  Psych: Alert and oriented x 3, appropriate affect  Neuro: CNII-XII intact, no focal deficits    Labs:  Most Recent Data  CBC:   Lab Results   Component Value Date    WBC 7.20 07/17/2018    HGB 13.6 (L) 07/17/2018    HCT 40.2 07/17/2018     (L) 07/17/2018    MCV 92 07/17/2018    RDW 13.0 07/17/2018     BMP:   Lab Results   Component Value Date     07/17/2018    K 4.3 07/17/2018     07/17/2018    CO2 24 07/17/2018    BUN 15 07/17/2018    CREATININE 1.5 (H) 07/17/2018    GLU 99 07/17/2018    CALCIUM 8.6 (L) 07/17/2018    MG 2.2 07/17/2018    PHOS 3.9 11/30/2012     LFTS;   Lab Results   Component Value Date    PROT 6.1 07/17/2018    ALBUMIN 3.3 (L) 07/17/2018    BILITOT 1.1 (H) 07/17/2018    AST 35 07/17/2018    ALKPHOS 53 (L) 07/17/2018    ALT 22 07/17/2018     COAGS:   Lab Results   Component Value Date    INR 1.0 09/01/2014     FLP:   Lab Results   Component Value Date    CHOL 203 (H) 09/13/2017    HDL 36 (L) 09/13/2017    LDLCALC 143.8 09/13/2017    TRIG 116 09/13/2017    CHOLHDL 17.7 (L) 09/13/2017     CARDIAC:   Lab Results   Component Value Date    TROPONINI 0.035 (H) 07/17/2018    BNP 89 09/01/2014       Imaging:    EKG:  Normal sinus rhythm.    Echo:Conclusion     · Left ventricle ejection fraction is normal at 55%  · Grade I (mild) left ventricular diastolic dysfunction consistent with impaired relaxation.  · RV systolic function is normal.  · Left atrium is mildly dilated.  · Right atrium is mildly dilated.  · Normal central venous pressure (3 mm Hg).  · Mitral valve shows mild regurgitation.  · Tricuspid valve shows mild regurgitation.  · Mild regurgitation is present in the aortic valve..  · LA pressure  is normal.  · Left ventricle shows mild concentric hypertrophy.         I have personally reviewed these images and echo data    Assessment/Plan:  Adriano was seen today for 2 month follow-up.    Diagnoses and all orders for this visit:    TIA (transient ischemic attack)    Atrial fibrillation, unspecified type  -     EKG 12-lead  -     CBC Without Differential; Future    Essential hypertension    Paroxysmal atrial fibrillation       continue anticoagulation for the time being.   he would need blood work done every 6 months .  Recommendations regarding healthy lifestyle, diet and exercise provided to the patient.    Patient needs an extraction of his tooth.  It is okay to hold anticoagulation for about 3 days.  He should resume anticoagulation back after the extraction.        Follow-up in about 1 year (around 12/12/2019).        Total duration of face to face visit time 15 minutes.  Total time spent counseling greater than fifty percent of total visit time.  Counseling included discussion regarding imaging findings, diagnosis, possibilities, treatment options, risks and benefits.  The patient had many questions regarding the options and long-term effect which were all answered to my best ability.      Chele Vaughn MD,MRCP,RPVI,FACC,FSCAI.  Interventional Cardiology   Phone [phone]

## 2018-12-20 ENCOUNTER — TELEPHONE (OUTPATIENT)
Dept: CARDIOLOGY | Facility: CLINIC | Age: 83
End: 2018-12-20

## 2018-12-20 NOTE — TELEPHONE ENCOUNTER
----- Message from Yandy Mcpherson sent at 12/20/2018  3:52 PM CST -----  Type: Needs Medical Advice    Who Called: DaughterCira Marquez Call Back Number: [phone]  Additional Information: Need medical clearance for dental, stating the patient is on blood thinners. Mentioned it last appt and advised would have it taken care of. Not received a call nor dental office

## 2018-12-27 ENCOUNTER — TELEPHONE (OUTPATIENT)
Dept: CARDIOLOGY | Facility: CLINIC | Age: 83
End: 2018-12-27

## 2018-12-27 NOTE — TELEPHONE ENCOUNTER
Called and spoke with Ms. Chris, I advised her that Ms. Oh's clearance form was faxed over to the dentist office on 12/21/18, and they he needs to be off of his eliquid 3-4 days prior to procedure per Dr. Vaughn. She verbalized understanding. No further issues noted.

## 2018-12-27 NOTE — TELEPHONE ENCOUNTER
----- Message from Tomas Bermeo sent at 12/27/2018 10:02 AM CST -----  Contact: Chris Monae (Daughter)  Type: Needs Medical Advice    Who Called:  Chris Monae (Daughter)  Best Call Back Number: [phone]  Additional Information: Needs medical clearance to have tooth pulled while on blood thinners. Tooth is abscessed  and patient is in pain. Please call daughter back.

## 2019-02-06 ENCOUNTER — DOCUMENTATION ONLY (OUTPATIENT)
Dept: FAMILY MEDICINE | Facility: CLINIC | Age: 84
End: 2019-02-06

## 2019-02-06 NOTE — PROGRESS NOTES
Health Maintenance Due   Topic Date Due    Zoster Vaccine  05/02/1997    Influenza Vaccine  08/01/2018    Pneumococcal Vaccine (65+ Low/Medium Risk) (2 of 2 - PPSV23) 09/15/2018

## 2019-04-01 ENCOUNTER — TELEPHONE (OUTPATIENT)
Dept: FAMILY MEDICINE | Facility: CLINIC | Age: 84
End: 2019-04-01

## 2019-04-01 DIAGNOSIS — D64.9 ANEMIA, UNSPECIFIED TYPE: ICD-10-CM

## 2019-04-01 DIAGNOSIS — F03.90 DEMENTIA WITHOUT BEHAVIORAL DISTURBANCE, UNSPECIFIED DEMENTIA TYPE: Primary | ICD-10-CM

## 2019-04-01 NOTE — TELEPHONE ENCOUNTER
----- Message from Charles Faria sent at 4/1/2019 11:48 AM CDT -----  Contact: pt's wife Enma  Type: Needs Medical Advice    Who Called:  Pt's wife        Best Call Back Number: [phone]- daughter Shawna  Additional Information: pt's wife is curious if pt needs lab work done at his next appointment 4/20/19.

## 2019-04-04 RX ORDER — LEVOTHYROXINE SODIUM 75 UG/1
TABLET ORAL
Qty: 90 TABLET | Refills: 1 | Status: SHIPPED | OUTPATIENT
Start: 2019-04-04 | End: 2019-08-02 | Stop reason: SDUPTHER

## 2019-04-05 ENCOUNTER — OFFICE VISIT (OUTPATIENT)
Dept: FAMILY MEDICINE | Facility: CLINIC | Age: 84
End: 2019-04-05
Payer: MEDICARE

## 2019-04-05 VITALS
OXYGEN SATURATION: 97 % | BODY MASS INDEX: 26.83 KG/M2 | DIASTOLIC BLOOD PRESSURE: 84 MMHG | SYSTOLIC BLOOD PRESSURE: 124 MMHG | HEART RATE: 65 BPM | HEIGHT: 68 IN | RESPIRATION RATE: 16 BRPM | WEIGHT: 177 LBS

## 2019-04-05 DIAGNOSIS — I48.0 PAROXYSMAL ATRIAL FIBRILLATION: ICD-10-CM

## 2019-04-05 DIAGNOSIS — Z23 NEED FOR 23-POLYVALENT PNEUMOCOCCAL POLYSACCHARIDE VACCINE: Primary | ICD-10-CM

## 2019-04-05 DIAGNOSIS — E03.9 HYPOTHYROIDISM, UNSPECIFIED TYPE: ICD-10-CM

## 2019-04-05 PROCEDURE — 90732 PPSV23 VACC 2 YRS+ SUBQ/IM: CPT | Mod: S$GLB,,, | Performed by: INTERNAL MEDICINE

## 2019-04-05 PROCEDURE — 99499 UNLISTED E&M SERVICE: CPT | Mod: S$GLB,,, | Performed by: INTERNAL MEDICINE

## 2019-04-05 PROCEDURE — 99499 RISK ADDL DX/OHS AUDIT: ICD-10-PCS | Mod: S$GLB,,, | Performed by: INTERNAL MEDICINE

## 2019-04-05 PROCEDURE — 99213 PR OFFICE/OUTPT VISIT, EST, LEVL III, 20-29 MIN: ICD-10-PCS | Mod: 25,S$GLB,, | Performed by: INTERNAL MEDICINE

## 2019-04-05 PROCEDURE — G0009 ADMIN PNEUMOCOCCAL VACCINE: HCPCS | Mod: S$GLB,,, | Performed by: INTERNAL MEDICINE

## 2019-04-05 PROCEDURE — 90732 PNEUMOCOCCAL POLYSACCHARIDE VACCINE 23-VALENT =>2YO SQ IM: ICD-10-PCS | Mod: S$GLB,,, | Performed by: INTERNAL MEDICINE

## 2019-04-05 PROCEDURE — G0009 PNEUMOCOCCAL POLYSACCHARIDE VACCINE 23-VALENT =>2YO SQ IM: ICD-10-PCS | Mod: S$GLB,,, | Performed by: INTERNAL MEDICINE

## 2019-04-05 PROCEDURE — 99213 OFFICE O/P EST LOW 20 MIN: CPT | Mod: 25,S$GLB,, | Performed by: INTERNAL MEDICINE

## 2019-04-05 NOTE — PROGRESS NOTES
"Subjective:       Patient ID: Adriano Borjas is a 83 y.o. male.    Chief Complaint: Annual Exam (needs labs ordered)    HPI   Biggest medical problem according to the patient: a-fib. No bleeding or weakness or numbness.      does not get erections but thinks he might have peyronie's disease. Had bend to left when he had a partial erection.       Smoking: long ago  Alcohol: no   Street drugs:no    Diet issues:no    Exercise: yes, qod.     Mood:  Good    Sadness: no    Anhedonia: no    Sleep: poor  Snoring: no    Brush teeth:  yes  Floss teeth: yes    Wears seatbelts: yes    Review of Systems      Objective:      Vitals:    04/05/19 1607   BP: 124/84   Pulse: 65   Resp: 16   SpO2: 97%   Weight: 80.3 kg (177 lb 0.5 oz)   Height: 5' 8" (1.727 m)   PainSc: 0-No pain     Physical Exam   Constitutional: He appears well-developed and well-nourished.   Cardiovascular: Normal rate, regular rhythm and normal heart sounds.   Pulmonary/Chest: Effort normal and breath sounds normal.   Abdominal: Soft. There is no tenderness.   Neurological: He is alert.   Dry skin   Psychiatric: He has a normal mood and affect. His behavior is normal. Thought content normal.   Nursing note and vitals reviewed.        Assessment:       1. Need for 23-polyvalent pneumococcal polysaccharide vaccine    2. Hypothyroidism, unspecified type    3. Paroxysmal atrial fibrillation          Plan:       Need for 23-polyvalent pneumococcal polysaccharide vaccine  -     Pneumococcal Polysaccharide Vaccine (23 Valent) (SQ/IM)    Hypothyroidism, unspecified type  -     TSH; Future; Expected date: 04/05/2019    Paroxysmal atrial fibrillation  -     CBC auto differential; Future; Expected date: 04/05/2019  -     Comprehensive metabolic panel; Future; Expected date: 04/05/2019      Follow up in about 3 months (around 7/5/2019).      "

## 2019-04-30 ENCOUNTER — TELEPHONE (OUTPATIENT)
Dept: FAMILY MEDICINE | Facility: CLINIC | Age: 84
End: 2019-04-30

## 2019-04-30 DIAGNOSIS — F03.90 DEMENTIA WITHOUT BEHAVIORAL DISTURBANCE, UNSPECIFIED DEMENTIA TYPE: Primary | ICD-10-CM

## 2019-04-30 NOTE — TELEPHONE ENCOUNTER
----- Message from Joseph Donis sent at 4/30/2019  2:53 PM CDT -----  Type:  Patient Requesting Referral    Who Called: Chris Monae (Daughter)   Does the patient already have the specialty appointment scheduled?:    If yes, what is the date of that appointment?:  Referral to What Specialty:  Neurology  Reason for Referral:  TIA-Dementia  Does the patient want the referral with a specific physician?:  Dr. Destinee Gomez  [facility] the [facility]  [facility]  Patient Requesting a Call Back?:  Yes  Best Call Back Number:  [phone]  Additional Information:

## 2019-05-01 ENCOUNTER — TELEPHONE (OUTPATIENT)
Dept: NEUROLOGY | Facility: CLINIC | Age: 84
End: 2019-05-01

## 2019-05-01 NOTE — TELEPHONE ENCOUNTER
Spoke with daughter.  Referral has been made.  I was unable to assist with scheduling.  Staff message was sent to see if Dr Gomez addresses his issues.  If not a new referral would need to be made.

## 2019-05-01 NOTE — TELEPHONE ENCOUNTER
----- Message from Jaz Barrera LPN sent at 5/1/2019 11:05 AM CDT -----  Regarding: referral  Dr Kamara made a referral to Dr Gomez at the request of his daughter.  I was unable to schedule the patient.  Can you look at referral and let me know if this is something she addresses and schedule patient if appropriate.  If not let me know and I'll have Dr Kamara change referral.  Thanks for your help.

## 2019-05-07 ENCOUNTER — OFFICE VISIT (OUTPATIENT)
Dept: FAMILY MEDICINE | Facility: CLINIC | Age: 84
End: 2019-05-07
Payer: MEDICARE

## 2019-05-07 VITALS
BODY MASS INDEX: 26.33 KG/M2 | OXYGEN SATURATION: 97 % | HEART RATE: 74 BPM | DIASTOLIC BLOOD PRESSURE: 78 MMHG | WEIGHT: 173.75 LBS | HEIGHT: 68 IN | RESPIRATION RATE: 16 BRPM | SYSTOLIC BLOOD PRESSURE: 142 MMHG | TEMPERATURE: 98 F

## 2019-05-07 DIAGNOSIS — R10.13 EPIGASTRIC PAIN: Primary | ICD-10-CM

## 2019-05-07 PROCEDURE — 93010 EKG 12-LEAD: ICD-10-PCS | Mod: S$GLB,,, | Performed by: INTERNAL MEDICINE

## 2019-05-07 PROCEDURE — 93005 EKG 12-LEAD: ICD-10-PCS | Mod: S$GLB,,, | Performed by: INTERNAL MEDICINE

## 2019-05-07 PROCEDURE — 99499 UNLISTED E&M SERVICE: CPT | Mod: S$GLB,,, | Performed by: INTERNAL MEDICINE

## 2019-05-07 PROCEDURE — 1101F PT FALLS ASSESS-DOCD LE1/YR: CPT | Mod: CPTII,S$GLB,, | Performed by: INTERNAL MEDICINE

## 2019-05-07 PROCEDURE — 93010 ELECTROCARDIOGRAM REPORT: CPT | Mod: S$GLB,,, | Performed by: INTERNAL MEDICINE

## 2019-05-07 PROCEDURE — 99214 OFFICE O/P EST MOD 30 MIN: CPT | Mod: S$GLB,,, | Performed by: INTERNAL MEDICINE

## 2019-05-07 PROCEDURE — 1101F PR PT FALLS ASSESS DOC 0-1 FALLS W/OUT INJ PAST YR: ICD-10-PCS | Mod: CPTII,S$GLB,, | Performed by: INTERNAL MEDICINE

## 2019-05-07 PROCEDURE — 99214 PR OFFICE/OUTPT VISIT, EST, LEVL IV, 30-39 MIN: ICD-10-PCS | Mod: S$GLB,,, | Performed by: INTERNAL MEDICINE

## 2019-05-07 PROCEDURE — 99499 RISK ADDL DX/OHS AUDIT: ICD-10-PCS | Mod: S$GLB,,, | Performed by: INTERNAL MEDICINE

## 2019-05-07 PROCEDURE — 93005 ELECTROCARDIOGRAM TRACING: CPT | Mod: S$GLB,,, | Performed by: INTERNAL MEDICINE

## 2019-05-07 RX ORDER — PANTOPRAZOLE SODIUM 40 MG/1
40 TABLET, DELAYED RELEASE ORAL DAILY
Qty: 30 TABLET | Refills: 11 | Status: SHIPPED | OUTPATIENT
Start: 2019-05-07 | End: 2019-08-21 | Stop reason: CLARIF

## 2019-05-07 NOTE — PROGRESS NOTES
"Subjective:       Patient ID: Adriano Borjas is a 84 y.o. male.    Chief Complaint: Stomach Pain (No refills needed. )    HPI       Abdominal Pain.    ONSET:     2 wks ago.    DURATION:      QUALITY/COURSE: . unchanged    LOCATION:   epig  .--Radiation:      INTENSITY/SEVERITY: .Severity is #   5   (10 point scale).  Character:  burning  CONTEXT/WHEN: .--Similar problems: no. Trauma: no.    The following symptoms/statements  are positive if BOLD, negative otherwise.    AGGRAVATING FACTORS: food . medications. alcohol. movement. position . bowel movements . emotional stress .  RELIEF WITH: food or milk, antacids . medications .  position . bowel movements . Eructation.  passing gas .  PAST TREATMENT OR EVALUATION: barium+_enema . Upper_gastrointestinal_series . CT_scans . sonograms . Endoscopic_procedures .  ASSOCIATED SYMPTOMS:      Weight_loss dieting . jaundice .     HISTORY OF: Diabetes. CAD. prior abdomina surgery. Kidney_stones.  Gallbladder_disease. Hiatal_hernia. Peptic_ulcer. colitis. Liver_disease.  FH  Colitis . . enteritis .     Last labs:  Lab Results   Component Value Date    WBC 7.20 07/17/2018    HGB 13.6 (L) 07/17/2018    HCT 40.2 07/17/2018     (L) 07/17/2018    CHOL 203 (H) 09/13/2017    TRIG 116 09/13/2017    HDL 36 (L) 09/13/2017    ALT 22 07/17/2018    AST 35 07/17/2018     07/17/2018    K 4.3 07/17/2018     07/17/2018    CREATININE 1.5 (H) 07/17/2018    BUN 15 07/17/2018    CO2 24 07/17/2018    TSH 2.554 07/17/2018    PSA 8.6 (H) 01/04/2018    INR 1.0 09/01/2014    HGBA1C 5.2 09/13/2017                     Review of Systems      Objective:      Vitals:    05/07/19 1539   BP: (!) 142/78   Pulse: 74   Resp: 16   Temp: 97.7 °F (36.5 °C)   TempSrc: Oral   SpO2: 97%   Weight: 78.8 kg (173 lb 11.6 oz)   Height: 5' 8" (1.727 m)   PainSc: 0-No pain    Down 5 lb  Physical Exam   Constitutional: He appears well-developed and well-nourished.   Cardiovascular: Normal rate, regular " rhythm and normal heart sounds.   Pulmonary/Chest: Effort normal and breath sounds normal.   Abdominal: Soft. There is no tenderness.   Neurological: He is alert.   Psychiatric: He has a normal mood and affect. His behavior is normal. Thought content normal.   Nursing note and vitals reviewed.      EKG normal      Assessment:       1. Epigastric pain          Plan:       Epigastric pain  -     pantoprazole (PROTONIX) 40 MG tablet; Take 1 tablet (40 mg total) by mouth once daily.  Dispense: 30 tablet; Refill: 11  -     H.Pylori Antibody IgG; Future; Expected date: 05/07/2019  -     CBC auto differential; Future; Expected date: 05/07/2019      Follow up in about 3 months (around 8/7/2019) for if you are not better return in one week.

## 2019-05-08 ENCOUNTER — LAB VISIT (OUTPATIENT)
Dept: LAB | Facility: HOSPITAL | Age: 84
End: 2019-05-08
Attending: INTERNAL MEDICINE
Payer: MEDICARE

## 2019-05-08 DIAGNOSIS — R10.13 EPIGASTRIC PAIN: ICD-10-CM

## 2019-05-08 LAB
BASOPHILS # BLD AUTO: 0.07 K/UL (ref 0–0.2)
BASOPHILS NFR BLD: 1.1 % (ref 0–1.9)
DIFFERENTIAL METHOD: ABNORMAL
EOSINOPHIL # BLD AUTO: 0.1 K/UL (ref 0–0.5)
EOSINOPHIL NFR BLD: 1.3 % (ref 0–8)
ERYTHROCYTE [DISTWIDTH] IN BLOOD BY AUTOMATED COUNT: 13.1 % (ref 11.5–14.5)
HCT VFR BLD AUTO: 38.8 % (ref 40–54)
HGB BLD-MCNC: 13.1 G/DL (ref 14–18)
IMM GRANULOCYTES # BLD AUTO: 0.01 K/UL (ref 0–0.04)
IMM GRANULOCYTES NFR BLD AUTO: 0.2 % (ref 0–0.5)
LYMPHOCYTES # BLD AUTO: 2 K/UL (ref 1–4.8)
LYMPHOCYTES NFR BLD: 32.8 % (ref 18–48)
MCH RBC QN AUTO: 31.6 PG (ref 27–31)
MCHC RBC AUTO-ENTMCNC: 33.8 G/DL (ref 32–36)
MCV RBC AUTO: 94 FL (ref 82–98)
MONOCYTES # BLD AUTO: 0.7 K/UL (ref 0.3–1)
MONOCYTES NFR BLD: 10.9 % (ref 4–15)
NEUTROPHILS # BLD AUTO: 3.3 K/UL (ref 1.8–7.7)
NEUTROPHILS NFR BLD: 53.7 % (ref 38–73)
NRBC BLD-RTO: 0 /100 WBC
PLATELET # BLD AUTO: 111 K/UL (ref 150–350)
PMV BLD AUTO: 14.3 FL (ref 9.2–12.9)
RBC # BLD AUTO: 4.15 M/UL (ref 4.6–6.2)
WBC # BLD AUTO: 6.15 K/UL (ref 3.9–12.7)

## 2019-05-08 PROCEDURE — 36415 COLL VENOUS BLD VENIPUNCTURE: CPT | Mod: PO

## 2019-05-08 PROCEDURE — 85025 COMPLETE CBC W/AUTO DIFF WBC: CPT

## 2019-05-08 PROCEDURE — 86677 HELICOBACTER PYLORI ANTIBODY: CPT

## 2019-05-08 NOTE — PROGRESS NOTES
Patient, Adriano Borjas (MRN #[id number]), presented with a recent Estimated Glumerular Filtration Rate (EGFR) between 30 and 45 consistent with the definition of chronic kidney disease stage 3 - moderate (ICD10 - N18.3).    eGFR if non    Date Value Ref Range Status   07/17/2018 43 (A) >60 mL/min/1.73 m^2 Final     Comment:     Calculation used to obtain the estimated glomerular filtration  rate (eGFR) is the CKD-EPI equation.          The patient's chronic kidney disease stage 3 was monitored, evaluated, addressed and/or treated. This addendum to the medical record is made on 05/08/2019.

## 2019-05-09 DIAGNOSIS — D64.9 ANEMIA, UNSPECIFIED TYPE: Primary | ICD-10-CM

## 2019-05-09 LAB — H PYLORI IGG SERPL QL IA: POSITIVE

## 2019-05-10 DIAGNOSIS — A04.8 H. PYLORI INFECTION: Primary | ICD-10-CM

## 2019-05-10 RX ORDER — CLARITHROMYCIN 500 MG/1
500 TABLET, FILM COATED ORAL 2 TIMES DAILY
Qty: 28 TABLET | Refills: 0 | Status: SHIPPED | OUTPATIENT
Start: 2019-05-10 | End: 2019-05-24

## 2019-05-10 RX ORDER — AMOXICILLIN 500 MG/1
1000 CAPSULE ORAL EVERY 12 HOURS
Qty: 56 CAPSULE | Refills: 0 | Status: SHIPPED | OUTPATIENT
Start: 2019-05-10 | End: 2019-05-24

## 2019-05-10 RX ORDER — PANTOPRAZOLE SODIUM 40 MG/1
40 TABLET, DELAYED RELEASE ORAL 2 TIMES DAILY
Qty: 24 TABLET | Refills: 0 | Status: SHIPPED | OUTPATIENT
Start: 2019-05-10 | End: 2019-07-03 | Stop reason: SDUPTHER

## 2019-05-13 ENCOUNTER — PATIENT MESSAGE (OUTPATIENT)
Dept: FAMILY MEDICINE | Facility: CLINIC | Age: 84
End: 2019-05-13

## 2019-07-03 ENCOUNTER — TELEPHONE (OUTPATIENT)
Dept: NEUROLOGY | Facility: CLINIC | Age: 84
End: 2019-07-03

## 2019-07-03 ENCOUNTER — OFFICE VISIT (OUTPATIENT)
Dept: NEUROLOGY | Facility: CLINIC | Age: 84
End: 2019-07-03
Payer: MEDICARE

## 2019-07-03 VITALS
DIASTOLIC BLOOD PRESSURE: 70 MMHG | HEART RATE: 67 BPM | HEIGHT: 68 IN | BODY MASS INDEX: 25.83 KG/M2 | RESPIRATION RATE: 20 BRPM | WEIGHT: 170.44 LBS | SYSTOLIC BLOOD PRESSURE: 127 MMHG

## 2019-07-03 DIAGNOSIS — G45.9 TIA (TRANSIENT ISCHEMIC ATTACK): ICD-10-CM

## 2019-07-03 DIAGNOSIS — M21.371 FOOT DROP, RIGHT: ICD-10-CM

## 2019-07-03 DIAGNOSIS — F02.80 FRONTOTEMPORAL DEMENTIA: Primary | ICD-10-CM

## 2019-07-03 DIAGNOSIS — G31.09 FRONTOTEMPORAL DEMENTIA: Primary | ICD-10-CM

## 2019-07-03 DIAGNOSIS — I48.0 PAROXYSMAL ATRIAL FIBRILLATION: ICD-10-CM

## 2019-07-03 PROCEDURE — 99499 RISK ADDL DX/OHS AUDIT: ICD-10-PCS | Mod: S$GLB,,, | Performed by: PSYCHIATRY & NEUROLOGY

## 2019-07-03 PROCEDURE — 99999 PR PBB SHADOW E&M-EST. PATIENT-LVL IV: CPT | Mod: PBBFAC,,, | Performed by: PSYCHIATRY & NEUROLOGY

## 2019-07-03 PROCEDURE — 1101F PT FALLS ASSESS-DOCD LE1/YR: CPT | Mod: CPTII,S$GLB,, | Performed by: PSYCHIATRY & NEUROLOGY

## 2019-07-03 PROCEDURE — 99499 UNLISTED E&M SERVICE: CPT | Mod: S$GLB,,, | Performed by: PSYCHIATRY & NEUROLOGY

## 2019-07-03 PROCEDURE — 99999 PR PBB SHADOW E&M-EST. PATIENT-LVL IV: ICD-10-PCS | Mod: PBBFAC,,, | Performed by: PSYCHIATRY & NEUROLOGY

## 2019-07-03 PROCEDURE — 1101F PR PT FALLS ASSESS DOC 0-1 FALLS W/OUT INJ PAST YR: ICD-10-PCS | Mod: CPTII,S$GLB,, | Performed by: PSYCHIATRY & NEUROLOGY

## 2019-07-03 PROCEDURE — 99205 PR OFFICE/OUTPT VISIT, NEW, LEVL V, 60-74 MIN: ICD-10-PCS | Mod: S$GLB,,, | Performed by: PSYCHIATRY & NEUROLOGY

## 2019-07-03 PROCEDURE — 99205 OFFICE O/P NEW HI 60 MIN: CPT | Mod: S$GLB,,, | Performed by: PSYCHIATRY & NEUROLOGY

## 2019-07-03 RX ORDER — DIPHENHYDRAMINE HCL 25 MG
25 CAPSULE ORAL NIGHTLY PRN
COMMUNITY
End: 2020-09-25

## 2019-07-03 RX ORDER — DONEPEZIL HYDROCHLORIDE 10 MG/1
10 TABLET, FILM COATED ORAL NIGHTLY
Qty: 30 TABLET | Refills: 11 | Status: SHIPPED | OUTPATIENT
Start: 2019-07-03 | End: 2019-07-09
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
## 2019-07-03 NOTE — LETTER
July 3, 2019      Bud Kamara MD  [address] [facility] [address]           [facility]  [address] [facility] [address] [place] [address]  Phone: [phone]  Fax: [phone]          Patient: Adriano Borjas   MR Number: [id number]   YOB: 1935   Date of Visit: 7/3/2019       Dear Dr. Bud Kamara:    Thank you for referring Adriano Borjas to me for evaluation. Attached you will find relevant portions of my assessment and plan of care.    If you have questions, please do not hesitate to call me. I look forward to following Adriano Borjas along with you.    Sincerely,    Destinee Gomez MD    Enclosure  CC:  No Recipients    If you would like to receive this communication electronically, please contact [email or web address] or [phone] to request more information on [employer] Link access.    For providers and/or their staff who would like to refer a patient to [facility], please contact us through our one-stop-shop provider referral line, [facility], at [phone].    If you feel you have received this communication in error or would no longer like to receive these types of communications, please e-mail [email or web address]

## 2019-07-03 NOTE — PATIENT INSTRUCTIONS
Start aricept 5 mg nightly for 1 week. If no side effects, we can increase to 10 mg nightly. If you have GI side effects or nightmares, let me know.        To prevent further memory loss, some of the best preventative measures are following a healthy diet, getting regular exercise, and ensuring good sleep habits.  Approximately 30 to 45 minutes of brisk physical activity (brisk walking, swimming, stationary bicycle, etc.) 5 days a week has been shown to improve function in vascular dementias, and can lower the risk of stroke and slow progression of memory loss.     A Mediterranean style diet, or the DASH diet with lots of fresh fruits and vegetables, whole grains, more fish and chicken, less red meat, less dairy, less processed foods is also beneficial. For more information see:    https://www.rush.[facility]/news/diet-may-help-prevent-alzheimers      Minimize or eliminate the use of alcohol, and discuss any prescription medications you might be taking with your doctor to avoid medications which can cause sedation or worsen cognitive function.     Establish a regular, consistent sleep pattern and practice good sleep hygiene.  Avoid screen time (computer, TV, smartphones or tablets) or heavy meals for at least an hour before bedtime, and avoid caffeine or stimulants after 2 PM. Exercise earlier in the day or mornings and keep your sleeping environment comfortable.      Socializing with friends and family and staying both mentally and physically active is also very important. Continue with hobbies or activities that are engaging and practice activities that are mentally stimulating (word puzzles, Sudoku, etc) and stay active in the community.    Please look into the following websites, to help you find resources including day programs, caregivers and caregiver support, legal questions, support groups, etc.    [facility] ([facility])  [facility] -  [address] [facility]    Group meetings facilitated by Chucky Walker MA, KRISTEN [phone]    ADDRESS  Mailing Address:  [address]  [place] [address] Street Address:  [address] [place] [address]   Web Address:  [email or web address]       Services offered at this location:  Chore, Congregate Meals, Home Delivered Meals, Homemaker, Information and Assistance, Legal, Material Aid, Medical Alert, Medication Management, NFCSP Information and Assistance, NFCSP In-Home Respite, NFCSP Material Aid, NGCSP Personal Care , NFCSP Public Education, NFCSP Sitter Service, NFCSP Support Groups, Nutrition Counseling, Nutrition Education, Outreach, Recreation, Transportation, Utility Assistance, Wellness, Area Agency on Aging, [place] on Aging        Website for the Alzheimer's Association:  http://www.alz.org/    Excellent resources for finding community resources   Action plan navigator and online tools   Call [phone] for 24/7 helpline    http://www.communityresourcefinder.org    [facility] Office of Aging and Adult Services:  Http://www.ldh.[place].[facility]/index.cfm/subhome/12/n/7    [facility] With Dementia: http://carePayTouch.YOHO/    Website for [facility] Agency on Ageing: http://goea.[place].[facility]/index.cfm?md=ehsan&tmp=category&catID=38&nid=24&ssid=0&startIndex=1      PATIENT/FAMILY RESOURCES:  1. Alzheimer's Association                                                                 http://www.alz.org  2. Alzheimer's Foundation of [place]                                               http://www.alzfdn.org  3. The Alzheimer's Disease Education and Referral Center             https://www.lion.nih.gov/alzheimers  4. Lewy Body Dementia Association                                                  http://www.lbda.org  5. National [place] of Mental Health                                                 http://www.nimh.nih.gov  6. National [place] on Mental Illness                                                 http://www.puja.org  7. Mental Health [place]                                                                   http://www.mentalhealthamerica.net  8. Mental Health.gov                                                                           http://www.mentalhealth.gov  9. National [place] for Behavioral Health                                          http://www.thenationalcouncil.org  10. Substance Abuse and Mental Health Services Administration   http://www.samhsa.gov  11. Licensed local counselors, social workers, psychiatrists, psychologists - one starting point is the Psychology Today website, therapist finder

## 2019-07-03 NOTE — TELEPHONE ENCOUNTER
----- Message from Bonita Duarte sent at 7/3/2019  3:45 PM CDT -----  Contact: Chris Monae (Daughter)  Chris Monae (Daughter)calling would like to change where to send prescription from [facility] [place] for Aricept,please...[phone]      .

## 2019-07-09 RX ORDER — GALANTAMINE 4 MG/1
4 TABLET, FILM COATED ORAL 2 TIMES DAILY WITH MEALS
Qty: 60 TABLET | Refills: 11 | Status: SHIPPED | OUTPATIENT
Start: 2019-07-09 | End: 2019-07-09 | Stop reason: SDUPTHER

## 2019-07-09 RX ORDER — GALANTAMINE 4 MG/1
4 TABLET, FILM COATED ORAL 2 TIMES DAILY WITH MEALS
Qty: 60 TABLET | Refills: 11 | Status: SHIPPED | OUTPATIENT
Start: 2019-07-09 | End: 2019-08-21 | Stop reason: CLARIF

## 2019-07-09 NOTE — TELEPHONE ENCOUNTER
----- Message from Zofia Shea sent at 7/9/2019 12:43 PM CDT -----  Contact: patient daughter chris harp [phone]  patient daughter chris harp [phone]  Requesting a call from the nurse stated patient had a reaction to Aricept and was taking to ER at [facility].  Please call

## 2019-07-09 NOTE — TELEPHONE ENCOUNTER
Patient with adverse reaction to aricept. Will try galantamine low dose. They can start with one 4 mg tablet nightly for 1 week. If tolerating, can increase to 4 mg twice daily. If he has side effects again, they should call us and stop the medication.

## 2019-07-09 NOTE — TELEPHONE ENCOUNTER
Patient's daughter informed of medication change. She needs refill sent to [facility] because it's closer for her to drive. She stated that they were given an Rx for Restoril in the ER but never filled it; wanted to know if they should wait for this new medication before trying that prescription or not.

## 2019-07-09 NOTE — TELEPHONE ENCOUNTER
Spoke with patient's daughter. First night he took Aricept, he began to 'panic'. The second night, he took it at 11pm, called her very depressed and agitated and began to vomit; brought to the ER at [facility]; left yesterday morning. Stated he was shaking and hyperventilating on the way which hadn't happened before. They were told to stop the Aricept and to call to get a different prescription from Dr. Gomez. Please advise.

## 2019-07-10 ENCOUNTER — TELEPHONE (OUTPATIENT)
Dept: NEUROLOGY | Facility: CLINIC | Age: 84
End: 2019-07-10

## 2019-07-10 NOTE — TELEPHONE ENCOUNTER
Spoke with patient's son. He stated that the patient refused to get the MRI completed that Dr. Gomez ordered. He stated that the patient has been aggressive; yelling and throwing things, refusing to do anything he doesn't want to do. They are at a loss and are looking for some kind of help; they weren't sure what to do. Please advise.

## 2019-07-10 NOTE — TELEPHONE ENCOUNTER
----- Message from Dominique Eric sent at 7/10/2019  4:01 PM CDT -----     Pt    Daughter bailey  Is calling to  Get a sooner essence  For   The pt // please call [phone]

## 2019-07-10 NOTE — TELEPHONE ENCOUNTER
Has he started the galantamine? If he has, then let's stop that to see if it is a paradoxical reaction. If that doesn't work, let us know. If he hasn't yet started that, then let me know that too.     Also tell the family:    The alzheimer's association has a number that is good in emergencies: 1-671.977.8155    Advise the patient's family of tips for caregivers:     - Dont Ask, Do You Remember? Of course they cant remember. If they could remember, they wouldnt be diagnosed with dementia. Asking if they remember some person or event could make them frustrated.  -  Do Interact With the Person at His or Her Level: You may want to interact with the person the way you always have, but that isnt going to be possible. Instead, figure out at what age they appear to be behaving, then connect with them at that level.  -  Dont Argue, Correct or Disagree: You cant win an argument with a person who has dementia, so dont even try. Neither should you contradict them. It will make them dig in their heels even more strongly.  - Dont Bring up Topics That May Upset the Person: If you know your loved one will get upset if you talk about politics, for example, dont start the conversation in the first place. It will probably lead to a quinn you dont want to have.  -  Do Quickly Change the Subject If the Person Does Get Upset: If the person does get upset one of the best things you can do is redirect their attention to something else, preferable something pleasant.

## 2019-07-10 NOTE — TELEPHONE ENCOUNTER
----- Message from Prasanna Glaser sent at 7/10/2019 12:21 PM CDT -----  Contact: Son/Prieto Moreau called in and stated patient was seen on 7/3/19 by Dr. Gomez.  Prieto stated patient has dementia and is refusing going to have his test done today & is behaving badly.      Prieto' call back number is [phone] cell

## 2019-07-12 ENCOUNTER — PATIENT MESSAGE (OUTPATIENT)
Dept: NEUROLOGY | Facility: CLINIC | Age: 84
End: 2019-07-12

## 2019-07-15 ENCOUNTER — PATIENT MESSAGE (OUTPATIENT)
Dept: NEUROLOGY | Facility: CLINIC | Age: 84
End: 2019-07-15

## 2019-07-24 ENCOUNTER — PATIENT OUTREACH (OUTPATIENT)
Dept: ADMINISTRATIVE | Facility: HOSPITAL | Age: 84
End: 2019-07-24

## 2019-08-02 RX ORDER — LEVOTHYROXINE SODIUM 75 UG/1
TABLET ORAL
Qty: 90 TABLET | Refills: 1 | Status: SHIPPED | OUTPATIENT
Start: 2019-08-02 | End: 2020-04-23

## 2019-08-05 ENCOUNTER — PATIENT MESSAGE (OUTPATIENT)
Dept: NEUROLOGY | Facility: CLINIC | Age: 84
End: 2019-08-05

## 2019-08-05 DIAGNOSIS — F02.80 FRONTOTEMPORAL DEMENTIA: Primary | ICD-10-CM

## 2019-08-05 DIAGNOSIS — G31.09 FRONTOTEMPORAL DEMENTIA: Primary | ICD-10-CM

## 2019-08-20 ENCOUNTER — PATIENT OUTREACH (OUTPATIENT)
Dept: ADMINISTRATIVE | Facility: HOSPITAL | Age: 84
End: 2019-08-20

## 2019-08-23 ENCOUNTER — TELEPHONE (OUTPATIENT)
Dept: NEUROLOGY | Facility: CLINIC | Age: 84
End: 2019-08-23

## 2019-08-23 DIAGNOSIS — Z86.79 HISTORY OF INTRACRANIAL HEMORRHAGE: Primary | ICD-10-CM

## 2019-08-23 PROBLEM — F02.80 FRONTOTEMPORAL DEMENTIA: Status: ACTIVE | Noted: 2019-08-23

## 2019-08-23 PROBLEM — G31.09 FRONTOTEMPORAL DEMENTIA: Status: ACTIVE | Noted: 2019-08-23

## 2019-08-30 ENCOUNTER — OFFICE VISIT (OUTPATIENT)
Dept: FAMILY MEDICINE | Facility: CLINIC | Age: 84
End: 2019-08-30
Payer: MEDICARE

## 2019-08-30 ENCOUNTER — DOCUMENTATION ONLY (OUTPATIENT)
Dept: FAMILY MEDICINE | Facility: CLINIC | Age: 84
End: 2019-08-30

## 2019-08-30 VITALS
HEART RATE: 74 BPM | HEIGHT: 68 IN | WEIGHT: 168.88 LBS | SYSTOLIC BLOOD PRESSURE: 120 MMHG | RESPIRATION RATE: 16 BRPM | BODY MASS INDEX: 25.59 KG/M2 | OXYGEN SATURATION: 98 % | DIASTOLIC BLOOD PRESSURE: 80 MMHG | TEMPERATURE: 98 F

## 2019-08-30 DIAGNOSIS — R49.0 HOARSENESS OF VOICE: ICD-10-CM

## 2019-08-30 DIAGNOSIS — J34.9 SINUS DISORDER: ICD-10-CM

## 2019-08-30 DIAGNOSIS — E85.4 CEREBRAL AMYLOID ANGIOPATHY: Primary | ICD-10-CM

## 2019-08-30 DIAGNOSIS — I68.0 CEREBRAL AMYLOID ANGIOPATHY: Primary | ICD-10-CM

## 2019-08-30 DIAGNOSIS — I48.0 PAROXYSMAL ATRIAL FIBRILLATION: ICD-10-CM

## 2019-08-30 PROCEDURE — 99214 PR OFFICE/OUTPT VISIT, EST, LEVL IV, 30-39 MIN: ICD-10-PCS | Mod: S$GLB,,, | Performed by: INTERNAL MEDICINE

## 2019-08-30 PROCEDURE — 1101F PR PT FALLS ASSESS DOC 0-1 FALLS W/OUT INJ PAST YR: ICD-10-PCS | Mod: CPTII,S$GLB,, | Performed by: INTERNAL MEDICINE

## 2019-08-30 PROCEDURE — 99214 OFFICE O/P EST MOD 30 MIN: CPT | Mod: S$GLB,,, | Performed by: INTERNAL MEDICINE

## 2019-08-30 PROCEDURE — 99499 UNLISTED E&M SERVICE: CPT | Mod: S$GLB,,, | Performed by: INTERNAL MEDICINE

## 2019-08-30 PROCEDURE — 99499 RISK ADDL DX/OHS AUDIT: ICD-10-PCS | Mod: S$GLB,,, | Performed by: INTERNAL MEDICINE

## 2019-08-30 PROCEDURE — 1101F PT FALLS ASSESS-DOCD LE1/YR: CPT | Mod: CPTII,S$GLB,, | Performed by: INTERNAL MEDICINE

## 2019-08-30 NOTE — PATIENT INSTRUCTIONS
Stop Eliquis until further instructions from Dr. Gomez.    [employer]ins[employer] has a website that has a course that teaches you how to sleep.  I highly recommend it.     Try getting him a recliner     Wash the nose out with sterile saline daily.        Please fill out the patient experience survey.

## 2019-08-30 NOTE — PROGRESS NOTES
"Subjective:      4:11 PM     Patient ID: Adriano Borjas is a 84 y.o. male.    Chief Complaint: No chief complaint on file.    HPI   Patient was being evaluated for frontal temporal dementia with an MRI.  It showed punctate bleeding consistent with cerebral amyloid angiopathy.  Unfortunately he is on Eliquis for atrial fibrillation.  No stroke-like symptoms including weakness non redness or difficulty speaking.    The patient is tender over the left maxillary sinus for a month but is not having pain there.  He thinks the MRI of his brain showed a sinus infection but that is not present on it.  He thinks it started after he had dental surgery but that was on the right.    The patient has insomnia.  He can sleep in a chair but he can sleep in the bed.  He says his back hurts when he does that.    Review of Systems      Objective:      Vitals:    08/30/19 1547   BP: 120/80   Pulse: 74   Resp: 16   Temp: 97.8 °F (36.6 °C)   TempSrc: Oral   SpO2: 98%   Weight: 76.6 kg (168 lb 14 oz)   Height: 5' 8" (1.727 m)   PainSc:   3   PainLoc: Face     Physical Exam   Constitutional: He appears well-developed and well-nourished.   Cardiovascular: Normal rate, regular rhythm and normal heart sounds.   Pulmonary/Chest: Effort normal and breath sounds normal.   Abdominal: Soft. There is no tenderness.   Neurological: He is alert.   Psychiatric: He has a normal mood and affect. His behavior is normal. Thought content normal.   Nursing note and vitals reviewed.        Assessment:       1. Cerebral amyloid angiopathy    2. Paroxysmal atrial fibrillation    3. Sinus disorder    4. Hoarseness of voice          Plan:       Cerebral amyloid angiopathy  -     Ambulatory Referral to Neurology    Paroxysmal atrial fibrillation    Sinus disorder    Hoarseness of voice  -     Ambulatory referral to ENT      Follow up in about 3 months (around 11/30/2019).      "

## 2019-09-03 ENCOUNTER — TELEPHONE (OUTPATIENT)
Dept: FAMILY MEDICINE | Facility: CLINIC | Age: 84
End: 2019-09-03

## 2019-09-03 DIAGNOSIS — E03.9 HYPOTHYROIDISM, UNSPECIFIED TYPE: ICD-10-CM

## 2019-09-03 DIAGNOSIS — I10 ESSENTIAL HYPERTENSION: ICD-10-CM

## 2019-09-03 DIAGNOSIS — I48.91 ATRIAL FIBRILLATION WITH RVR: Primary | ICD-10-CM

## 2019-09-03 DIAGNOSIS — R97.20 ELEVATED PSA: ICD-10-CM

## 2019-09-03 DIAGNOSIS — Z12.5 ENCOUNTER FOR SCREENING FOR MALIGNANT NEOPLASM OF PROSTATE: ICD-10-CM

## 2019-09-03 NOTE — TELEPHONE ENCOUNTER
----- Message from Nicole Bermeo sent at 9/3/2019 11:27 AM CDT -----  Type: Needs Medical Advice    Who Called:  Daughter/Shawna Marquez Call Back Number: [phone], after 12:45pm call [phone]  Additional Information: Needs to talk to the doctor a bout patient's medications. He has been taking all his medications because daughter gives them to him. Brother, who brought patient to appointment did not know. It was concerning an ulcer. Please call for details.

## 2019-09-03 NOTE — TELEPHONE ENCOUNTER
I am very unclear how much pain he is having.  I recommend that he tries Zantac 1st.  I am ordering labs for him.

## 2019-09-03 NOTE — TELEPHONE ENCOUNTER
Spoke with daughter and she advised that there was some confusion with the patient taking his medications for the ulcer. She stated that her brother brought him to the appt and wasn't sure what medications he was taking. She advised that he was taking the protonix and completed all of the antibiotics. She also states that he would probably benefit from taking the protonix everyday. His stomach pains had gotten better but the pain is on and off throughout the month. If you agree can you send a new rx to his pharmacy. Also she would like him to have a PSA and TSH done and any other labs he should have done. Please advise.

## 2019-09-04 NOTE — TELEPHONE ENCOUNTER
Notified Shawna of instructions.They will try Zantac.Patient just complains every now and then.They will take him to get labs drawn.

## 2019-09-05 ENCOUNTER — OFFICE VISIT (OUTPATIENT)
Dept: NEUROLOGY | Facility: CLINIC | Age: 84
End: 2019-09-05
Payer: MEDICARE

## 2019-09-05 VITALS
RESPIRATION RATE: 20 BRPM | SYSTOLIC BLOOD PRESSURE: 163 MMHG | DIASTOLIC BLOOD PRESSURE: 78 MMHG | HEART RATE: 63 BPM | BODY MASS INDEX: 25.75 KG/M2 | WEIGHT: 169.88 LBS | HEIGHT: 68 IN

## 2019-09-05 DIAGNOSIS — G31.09 FRONTOTEMPORAL DEMENTIA: Primary | ICD-10-CM

## 2019-09-05 DIAGNOSIS — H53.9 VISUAL CHANGES: ICD-10-CM

## 2019-09-05 DIAGNOSIS — I48.91 ATRIAL FIBRILLATION WITH RVR: ICD-10-CM

## 2019-09-05 DIAGNOSIS — F02.80 FRONTOTEMPORAL DEMENTIA: Primary | ICD-10-CM

## 2019-09-05 DIAGNOSIS — Z86.79 HISTORY OF CEREBRAL HEMORRHAGE: ICD-10-CM

## 2019-09-05 PROCEDURE — 99215 PR OFFICE/OUTPT VISIT, EST, LEVL V, 40-54 MIN: ICD-10-PCS | Mod: S$GLB,,, | Performed by: PSYCHIATRY & NEUROLOGY

## 2019-09-05 PROCEDURE — 99499 UNLISTED E&M SERVICE: CPT | Mod: S$GLB,,, | Performed by: PSYCHIATRY & NEUROLOGY

## 2019-09-05 PROCEDURE — 99499 RISK ADDL DX/OHS AUDIT: ICD-10-PCS | Mod: S$GLB,,, | Performed by: PSYCHIATRY & NEUROLOGY

## 2019-09-05 PROCEDURE — 1101F PT FALLS ASSESS-DOCD LE1/YR: CPT | Mod: CPTII,S$GLB,, | Performed by: PSYCHIATRY & NEUROLOGY

## 2019-09-05 PROCEDURE — 99999 PR PBB SHADOW E&M-EST. PATIENT-LVL III: CPT | Mod: PBBFAC,,, | Performed by: PSYCHIATRY & NEUROLOGY

## 2019-09-05 PROCEDURE — 1101F PR PT FALLS ASSESS DOC 0-1 FALLS W/OUT INJ PAST YR: ICD-10-PCS | Mod: CPTII,S$GLB,, | Performed by: PSYCHIATRY & NEUROLOGY

## 2019-09-05 PROCEDURE — 99215 OFFICE O/P EST HI 40 MIN: CPT | Mod: S$GLB,,, | Performed by: PSYCHIATRY & NEUROLOGY

## 2019-09-05 PROCEDURE — 99999 PR PBB SHADOW E&M-EST. PATIENT-LVL III: ICD-10-PCS | Mod: PBBFAC,,, | Performed by: PSYCHIATRY & NEUROLOGY

## 2019-09-05 RX ORDER — TRAZODONE HYDROCHLORIDE 50 MG/1
50 TABLET ORAL NIGHTLY
Qty: 30 TABLET | Refills: 11 | Status: SHIPPED | OUTPATIENT
Start: 2019-09-05 | End: 2019-12-26 | Stop reason: SDUPTHER
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
## 2019-09-05 NOTE — PATIENT INSTRUCTIONS
For sleep:  - Try trazodone 50 mg nightly. We can increase the dose if this doesn't help, give me a call and we can increase the dose.

## 2019-10-28 ENCOUNTER — LAB VISIT (OUTPATIENT)
Dept: LAB | Facility: HOSPITAL | Age: 84
End: 2019-10-28
Attending: INTERNAL MEDICINE
Payer: MEDICARE

## 2019-10-28 DIAGNOSIS — I48.91 ATRIAL FIBRILLATION WITH RVR: ICD-10-CM

## 2019-10-28 DIAGNOSIS — E03.9 HYPOTHYROIDISM, UNSPECIFIED TYPE: ICD-10-CM

## 2019-10-28 DIAGNOSIS — Z12.5 ENCOUNTER FOR SCREENING FOR MALIGNANT NEOPLASM OF PROSTATE: ICD-10-CM

## 2019-10-28 DIAGNOSIS — R97.20 ELEVATED PSA: ICD-10-CM

## 2019-10-28 DIAGNOSIS — I10 ESSENTIAL HYPERTENSION: ICD-10-CM

## 2019-10-28 LAB
BASOPHILS # BLD AUTO: 0.06 K/UL (ref 0–0.2)
BASOPHILS NFR BLD: 0.9 % (ref 0–1.9)
DIFFERENTIAL METHOD: ABNORMAL
EOSINOPHIL # BLD AUTO: 0.1 K/UL (ref 0–0.5)
EOSINOPHIL NFR BLD: 1.4 % (ref 0–8)
ERYTHROCYTE [DISTWIDTH] IN BLOOD BY AUTOMATED COUNT: 13.1 % (ref 11.5–14.5)
HCT VFR BLD AUTO: 35.2 % (ref 40–54)
HGB BLD-MCNC: 12.1 G/DL (ref 14–18)
IMM GRANULOCYTES # BLD AUTO: 0.01 K/UL (ref 0–0.04)
IMM GRANULOCYTES NFR BLD AUTO: 0.2 % (ref 0–0.5)
LYMPHOCYTES # BLD AUTO: 1.3 K/UL (ref 1–4.8)
LYMPHOCYTES NFR BLD: 21.2 % (ref 18–48)
MCH RBC QN AUTO: 32.4 PG (ref 27–31)
MCHC RBC AUTO-ENTMCNC: 34.4 G/DL (ref 32–36)
MCV RBC AUTO: 94 FL (ref 82–98)
MONOCYTES # BLD AUTO: 0.7 K/UL (ref 0.3–1)
MONOCYTES NFR BLD: 10.9 % (ref 4–15)
NEUTROPHILS # BLD AUTO: 4.1 K/UL (ref 1.8–7.7)
NEUTROPHILS NFR BLD: 65.4 % (ref 38–73)
NRBC BLD-RTO: 0 /100 WBC
PLATELET # BLD AUTO: 110 K/UL (ref 150–350)
PMV BLD AUTO: 13.1 FL (ref 9.2–12.9)
RBC # BLD AUTO: 3.73 M/UL (ref 4.6–6.2)
WBC # BLD AUTO: 6.32 K/UL (ref 3.9–12.7)

## 2019-10-28 PROCEDURE — 85025 COMPLETE CBC W/AUTO DIFF WBC: CPT

## 2019-10-28 PROCEDURE — 36415 COLL VENOUS BLD VENIPUNCTURE: CPT | Mod: PO

## 2019-10-28 PROCEDURE — 80053 COMPREHEN METABOLIC PANEL: CPT

## 2019-10-28 PROCEDURE — 84443 ASSAY THYROID STIM HORMONE: CPT

## 2019-10-28 PROCEDURE — 84153 ASSAY OF PSA TOTAL: CPT

## 2019-10-29 DIAGNOSIS — D64.9 ANEMIA, UNSPECIFIED TYPE: Primary | ICD-10-CM

## 2019-10-29 LAB
ALBUMIN SERPL BCP-MCNC: 3.4 G/DL (ref 3.5–5.2)
ALP SERPL-CCNC: 61 U/L (ref 55–135)
ALT SERPL W/O P-5'-P-CCNC: 14 U/L (ref 10–44)
ANION GAP SERPL CALC-SCNC: 3 MMOL/L (ref 8–16)
AST SERPL-CCNC: 26 U/L (ref 10–40)
BILIRUB SERPL-MCNC: 1 MG/DL (ref 0.1–1)
BUN SERPL-MCNC: 20 MG/DL (ref 8–23)
CALCIUM SERPL-MCNC: 8.6 MG/DL (ref 8.7–10.5)
CHLORIDE SERPL-SCNC: 106 MMOL/L (ref 95–110)
CO2 SERPL-SCNC: 31 MMOL/L (ref 23–29)
COMPLEXED PSA SERPL-MCNC: 13.3 NG/ML (ref 0–4)
CREAT SERPL-MCNC: 1.2 MG/DL (ref 0.5–1.4)
EST. GFR  (AFRICAN AMERICAN): >60 ML/MIN/1.73 M^2
EST. GFR  (NON AFRICAN AMERICAN): 56 ML/MIN/1.73 M^2
GLUCOSE SERPL-MCNC: 104 MG/DL (ref 70–110)
POTASSIUM SERPL-SCNC: 4.3 MMOL/L (ref 3.5–5.1)
PROT SERPL-MCNC: 6.6 G/DL (ref 6–8.4)
SODIUM SERPL-SCNC: 140 MMOL/L (ref 136–145)
TSH SERPL DL<=0.005 MIU/L-ACNC: 0.63 UIU/ML (ref 0.4–4)

## 2019-11-12 DIAGNOSIS — J32.4 CHRONIC PANSINUSITIS: Primary | ICD-10-CM

## 2019-11-26 ENCOUNTER — HOSPITAL ENCOUNTER (OUTPATIENT)
Dept: RADIOLOGY | Facility: HOSPITAL | Age: 84
Discharge: HOME | End: 2019-11-26
Attending: OTOLARYNGOLOGY
Payer: MEDICARE

## 2019-11-26 DIAGNOSIS — J32.4 CHRONIC PANSINUSITIS: ICD-10-CM

## 2019-11-26 PROCEDURE — 70486 CT MAXILLOFACIAL W/O DYE: CPT | Mod: TC,PO

## 2019-12-26 ENCOUNTER — OFFICE VISIT (OUTPATIENT)
Dept: NEUROLOGY | Facility: CLINIC | Age: 84
End: 2019-12-26
Payer: MEDICARE

## 2019-12-26 VITALS
BODY MASS INDEX: 25.66 KG/M2 | DIASTOLIC BLOOD PRESSURE: 73 MMHG | SYSTOLIC BLOOD PRESSURE: 142 MMHG | WEIGHT: 169.31 LBS | RESPIRATION RATE: 14 BRPM | HEART RATE: 66 BPM | HEIGHT: 68 IN

## 2019-12-26 DIAGNOSIS — Z86.79 HISTORY OF CEREBRAL HEMORRHAGE: ICD-10-CM

## 2019-12-26 DIAGNOSIS — F02.80 FRONTOTEMPORAL DEMENTIA: Primary | ICD-10-CM

## 2019-12-26 DIAGNOSIS — G47.00 INSOMNIA, UNSPECIFIED TYPE: ICD-10-CM

## 2019-12-26 DIAGNOSIS — F41.9 ANXIETY: ICD-10-CM

## 2019-12-26 DIAGNOSIS — G31.09 FRONTOTEMPORAL DEMENTIA: Primary | ICD-10-CM

## 2019-12-26 PROCEDURE — 99499 UNLISTED E&M SERVICE: CPT | Mod: S$GLB,,, | Performed by: PSYCHIATRY & NEUROLOGY

## 2019-12-26 PROCEDURE — 99999 PR PBB SHADOW E&M-EST. PATIENT-LVL IV: ICD-10-PCS | Mod: PBBFAC,,, | Performed by: PSYCHIATRY & NEUROLOGY

## 2019-12-26 PROCEDURE — 99999 PR PBB SHADOW E&M-EST. PATIENT-LVL IV: CPT | Mod: PBBFAC,,, | Performed by: PSYCHIATRY & NEUROLOGY

## 2019-12-26 PROCEDURE — 99499 NO LOS: ICD-10-PCS | Mod: S$GLB,,, | Performed by: PSYCHIATRY & NEUROLOGY

## 2019-12-26 PROCEDURE — 99483 ASSMT & CARE PLN PT COG IMP: CPT | Mod: S$GLB,,, | Performed by: PSYCHIATRY & NEUROLOGY

## 2019-12-26 PROCEDURE — 99483 PR ASSMT/CARE PLANNING, PT W/COGN IMPAIRMENT: ICD-10-PCS | Mod: S$GLB,,, | Performed by: PSYCHIATRY & NEUROLOGY

## 2019-12-26 RX ORDER — TRAZODONE HYDROCHLORIDE 50 MG/1
100 TABLET ORAL NIGHTLY
Qty: 60 TABLET | Refills: 11 | Status: SHIPPED | OUTPATIENT
Start: 2019-12-26 | End: 2019-12-26 | Stop reason: SDUPTHER

## 2019-12-26 RX ORDER — TRAZODONE HYDROCHLORIDE 100 MG/1
100 TABLET ORAL NIGHTLY
Qty: 30 TABLET | Refills: 11 | Status: SHIPPED | OUTPATIENT
Start: 2019-12-26 | End: 2020-09-25
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
## 2019-12-26 NOTE — LETTER
December 26, 2019      Bud Kamara MD  [address] [facility] [place] [address]           [facility]  [address] [place] [address]  Phone: [phone]  Fax: [phone]          Patient: Adriano Borjas   MR Number: [id number]   YOB: 1935   Date of Visit: 12/26/2019       Dear Dr. Bud Kamara:    Thank you for referring Adriano Borjas to me for evaluation. Attached you will find relevant portions of my assessment and plan of care.    If you have questions, please do not hesitate to call me. I look forward to following Adriano Borjas along with you.    Sincerely,    Destinee Gomez MD    Enclosure  CC:  No Recipients    If you would like to receive this communication electronically, please contact [email or web address] or [phone] to request more information on [employer] Link access.    For providers and/or their staff who would like to refer a patient to [facility], please contact us through our one-stop-shop provider referral line, [facility], at [phone].    If you feel you have received this communication in error or would no longer like to receive these types of communications, please e-mail [email or web address]

## 2020-01-14 ENCOUNTER — SSC ENCOUNTER (OUTPATIENT)
Dept: ADMINISTRATIVE | Facility: OTHER | Age: 85
End: 2020-01-14

## 2020-01-14 NOTE — PROGRESS NOTES
Please note the following patient's information was forwarded to [facility] ([facility]) for case management and/or  on 01/14/2020.    Please contact Ext. [phone] with any questions.    Thank you,    Kalyn Doran, [facility]  Outpatient Case Mgmnt  [phone]

## 2020-03-31 ENCOUNTER — TELEPHONE (OUTPATIENT)
Dept: NEUROLOGY | Facility: CLINIC | Age: 85
End: 2020-03-31

## 2020-03-31 NOTE — TELEPHONE ENCOUNTER
----- Message from Yolanda Wilson sent at 3/31/2020  1:15 PM CDT -----  Contact: Javier Matos  Type: Needs Medical Advice  Who Called:  Shawna Marquez Call Back Number: [phone] (home) or [phone] (cell)  Additional Information: hSawna wants to know if HH can assist patient with bathing due to patient being on Lock down at assisting living facility. Requesting a call back.

## 2020-03-31 NOTE — TELEPHONE ENCOUNTER
Returned call to pt daughter; instructed pt's daughter to call PCP to discuss home health issue. Verbalized understanding.

## 2020-04-23 RX ORDER — LEVOTHYROXINE SODIUM 75 UG/1
TABLET ORAL
Qty: 90 TABLET | Refills: 0 | Status: SHIPPED | OUTPATIENT
Start: 2020-04-23 | End: 2020-07-17

## 2020-04-23 RX ORDER — APIXABAN 5 MG/1
TABLET, FILM COATED ORAL
Qty: 180 TABLET | Refills: 0 | OUTPATIENT
Start: 2020-04-23

## 2020-05-05 ENCOUNTER — PATIENT MESSAGE (OUTPATIENT)
Dept: ADMINISTRATIVE | Facility: HOSPITAL | Age: 85
End: 2020-05-05

## 2020-05-29 ENCOUNTER — HOSPITAL ENCOUNTER (EMERGENCY)
Facility: HOSPITAL | Age: 85
Discharge: HOME | End: 2020-05-29
Attending: EMERGENCY MEDICINE
Payer: MEDICARE

## 2020-05-29 VITALS
OXYGEN SATURATION: 96 % | WEIGHT: 160 LBS | BODY MASS INDEX: 24.33 KG/M2 | TEMPERATURE: 98 F | HEART RATE: 75 BPM | RESPIRATION RATE: 18 BRPM | DIASTOLIC BLOOD PRESSURE: 76 MMHG | SYSTOLIC BLOOD PRESSURE: 151 MMHG

## 2020-05-29 DIAGNOSIS — M54.2 NECK PAIN: Primary | ICD-10-CM

## 2020-05-29 PROCEDURE — 99284 EMERGENCY DEPT VISIT MOD MDM: CPT | Mod: 25

## 2020-05-29 NOTE — ED NOTES
Patient brought to the ED by son, states the patient had fallen at home ([place]ICARDO), and had hit the left side of his head. No lump or bruising noted. Is c/o of neck pain. Patient placed on cardiac monitor. Heart rate over 130. Son states his heart rate is usually in the 60's.

## 2020-05-29 NOTE — ED PROVIDER NOTES
"Encounter Date: 5/29/2020    SCRIBE #1 NOTE: I, Christofer Chavez, am scribing for, and in the presence of, Dr. Burrell.       History     Chief Complaint   Patient presents with    Fall     c/o neck pain, slip and fall in shower.     Time seen by provider: 10:25 AM on 05/29/2020      Adriano Borjas is a 85 y.o. male with a PMHx of A-Fib, hx of TIA, kidney stones, hx of dementia (per son),and essential HTN who presents to the ED with son for neck pain that started this am.  Patient reports his neck pain is greatly improved since earlier.  Per patient, he had a slip and fall this am, hitting the left side of his head. He denies LOC or headache, but is on Eliquis. Son states that the patient lives alone in assistant living currently. Per son, the patient was complaining of left shoulder pain and neck pain after the fall this am. According to the son, the patient was "Staggering and dizzy when I first got there, but now he doesn't seem like he is." The son states that he has not taken his Eliquis this morning. Patient offers no other complaints.The patient has a PSHx of cystoscopy, eye surgery, and MRI. The patient is a former smoker.      The history is provided by the patient.     Review of patient's allergies indicates:   Allergen Reactions    Ciprofloxacin Other (See Comments)     Shoulder and leg pain/cramping     Codeine Other (See Comments)     Pt cannot remember been a long time ago    Sulfa (sulfonamide antibiotics)      Allergic reaction to bactrim     Past Medical History:   Diagnosis Date    A-fib     Elevated cholesterol     Essential hypertension     Kidney stone     passed on his own    Memory loss     dememtia per family    Osteoarthritis     Stroke     2012 TIA     Thyroid disease      Past Surgical History:   Procedure Laterality Date    CYSTOSCOPY      EYE SURGERY      MAGNETIC RESONANCE IMAGING N/A 8/23/2019    Procedure: MRI (Magnetic Resonance Imagine) needs anesthesia;  Surgeon: " Panchito Montoya MD;  Location: [facility];  Service: Anesthesiology;  Laterality: N/A;     Family History   Problem Relation Age of Onset    Cancer Mother     Diabetes Mother     Hyperlipidemia Mother     Heart disease Father     Hyperlipidemia Father     Dementia Sister     Dementia Brother     Heart disease Brother     Migraines Daughter     Tremor Daughter     Tremor Son     Urolithiasis Neg Hx     Prostate cancer Neg Hx     Kidney cancer Neg Hx      Social History     Tobacco Use    Smoking status: Former Smoker     Years: 15.00    Smokeless tobacco: Never Used    Tobacco comment: quit 50 years ago   Substance Use Topics    Alcohol use: No    Drug use: No     Review of Systems   Respiratory: Negative for shortness of breath.    Cardiovascular: Negative for chest pain.   Gastrointestinal: Negative for nausea.   Musculoskeletal: Positive for arthralgias and neck pain.   Skin: Negative for wound.   Neurological: Positive for dizziness. Negative for syncope and headaches.       Physical Exam     Initial Vitals [05/29/20 1013]   BP Pulse Resp Temp SpO2   -- 103 18 97.9 °F (36.6 °C) 98 %      MAP       --         Physical Exam    Nursing note and vitals reviewed.  Constitutional: He appears well-developed and well-nourished. He is not diaphoretic.  Non-toxic appearance. He does not have a sickly appearance. He does not appear ill. No distress.   HENT:   Head: Normocephalic and atraumatic.   Eyes: EOM are normal.   Neck: Normal range of motion. Neck supple. Normal range of motion present. No neck rigidity.   Cardiovascular: A regularly irregular rhythm present.  Tachycardia present.    Tachycardia   Pulmonary/Chest: Breath sounds normal. No respiratory distress.   Clear bilaterally   Musculoskeletal: Normal range of motion. He exhibits tenderness.   No midline spinous process tenderness.Left trapezius tenderness   Neurological: He is alert and oriented to person, place, and time. GCS score is 15.  GCS eye subscore is 4. GCS verbal subscore is 5. GCS motor subscore is 6.   Skin: Skin is warm and dry. No rash noted.   Psychiatric: He has a normal mood and affect. His behavior is normal. Judgment and thought content normal.         ED Course   Procedures  Labs Reviewed - No data to display       Imaging Results    None          Medical Decision Making:   History:   Old Medical Records: I decided to obtain old medical records.  Clinical Tests:   Radiological Study: Ordered and Reviewed            Scribe Attestation:   Scribe #1: I performed the above scribed service and the documentation accurately describes the services I performed. I attest to the accuracy of the note.    I, Dr. Burrell, personally performed the services described in this documentation. All medical record entries made by the scribe were at my direction and in my presence.  I have reviewed the chart and agree that the record reflects my personal performance and is accurate and complete.1:22 PM 05/29/2020            ED Course as of May 29 1321   Fri May 29, 2020   1114 BP(!): 135/51 [EF]   1114 Temp: 97.9 °F (36.6 °C) [EF]   1114 Temp src: Oral [EF]   1114 Pulse: 103 [EF]   1114 Pulse(!): 139 [EF]   1114 Resp: 18 [EF]   1114 SpO2: 98 % [EF]   1114 SpO2: 97 % [EF]   1139 CT Head Without Contrast [EF]   1139 Heart rate 79    [EF]   1154 CT Cervical Spine Without Contrast [EF]   1159 Patient presents to the emergency room with neck pain after a fall.  Initially in atrial fibrillation with rapid response but this resolved without any intervention.  Son states that he has been tried on rate control medications but it makes him bradycardic.   Son also states that typically his heart rate goes up when he is nervous or anxious.  Patient complaining of neck pain earlier prior to arrival which has greatly improved according to him.  CT of the head and neck negative for any acute findings.  Tylenol p.r.n. pain.  Follow-up primary care if neck continues to  cause discomfort.  No other complaints at this time.  Patient has no extremity complaints.  No joint pain or long bone pain numbness or weakness of the extremities.   [EF]      ED Course User Index  [EF] Merritt Burrell MD                Clinical Impression:   No diagnosis found.                             Merritt Burrell MD  05/29/20 4992

## 2020-07-07 ENCOUNTER — LAB VISIT (OUTPATIENT)
Dept: LAB | Facility: HOSPITAL | Age: 85
End: 2020-07-07
Attending: NURSE PRACTITIONER
Payer: MEDICARE

## 2020-07-07 ENCOUNTER — OFFICE VISIT (OUTPATIENT)
Dept: FAMILY MEDICINE | Facility: CLINIC | Age: 85
End: 2020-07-07
Payer: MEDICARE

## 2020-07-07 VITALS
HEIGHT: 68 IN | HEART RATE: 65 BPM | DIASTOLIC BLOOD PRESSURE: 88 MMHG | RESPIRATION RATE: 17 BRPM | OXYGEN SATURATION: 96 % | BODY MASS INDEX: 25.53 KG/M2 | TEMPERATURE: 98 F | WEIGHT: 168.44 LBS | SYSTOLIC BLOOD PRESSURE: 136 MMHG

## 2020-07-07 DIAGNOSIS — E03.9 HYPOTHYROIDISM, UNSPECIFIED TYPE: ICD-10-CM

## 2020-07-07 DIAGNOSIS — D64.9 ANEMIA, UNSPECIFIED TYPE: ICD-10-CM

## 2020-07-07 DIAGNOSIS — K14.0 GLOSSITIS: ICD-10-CM

## 2020-07-07 DIAGNOSIS — I10 ESSENTIAL HYPERTENSION: ICD-10-CM

## 2020-07-07 DIAGNOSIS — K14.0 GLOSSITIS: Primary | ICD-10-CM

## 2020-07-07 LAB
BASOPHILS # BLD AUTO: 0.06 K/UL (ref 0–0.2)
BASOPHILS NFR BLD: 1 % (ref 0–1.9)
DIFFERENTIAL METHOD: ABNORMAL
EOSINOPHIL # BLD AUTO: 0.2 K/UL (ref 0–0.5)
EOSINOPHIL NFR BLD: 2.6 % (ref 0–8)
ERYTHROCYTE [DISTWIDTH] IN BLOOD BY AUTOMATED COUNT: 13.5 % (ref 11.5–14.5)
HCT VFR BLD AUTO: 38.2 % (ref 40–54)
HGB BLD-MCNC: 12.3 G/DL (ref 14–18)
IMM GRANULOCYTES # BLD AUTO: 0.01 K/UL (ref 0–0.04)
IMM GRANULOCYTES NFR BLD AUTO: 0.2 % (ref 0–0.5)
LYMPHOCYTES # BLD AUTO: 1.4 K/UL (ref 1–4.8)
LYMPHOCYTES NFR BLD: 22.7 % (ref 18–48)
MCH RBC QN AUTO: 31.9 PG (ref 27–31)
MCHC RBC AUTO-ENTMCNC: 32.2 G/DL (ref 32–36)
MCV RBC AUTO: 99 FL (ref 82–98)
MONOCYTES # BLD AUTO: 0.7 K/UL (ref 0.3–1)
MONOCYTES NFR BLD: 11.1 % (ref 4–15)
NEUTROPHILS # BLD AUTO: 3.9 K/UL (ref 1.8–7.7)
NEUTROPHILS NFR BLD: 62.4 % (ref 38–73)
NRBC BLD-RTO: 0 /100 WBC
PLATELET # BLD AUTO: 101 K/UL (ref 150–350)
PMV BLD AUTO: ABNORMAL FL (ref 9.2–12.9)
RBC # BLD AUTO: 3.86 M/UL (ref 4.6–6.2)
WBC # BLD AUTO: 6.22 K/UL (ref 3.9–12.7)

## 2020-07-07 PROCEDURE — 82607 VITAMIN B-12: CPT

## 2020-07-07 PROCEDURE — 1101F PR PT FALLS ASSESS DOC 0-1 FALLS W/OUT INJ PAST YR: ICD-10-PCS | Mod: CPTII,S$GLB,, | Performed by: NURSE PRACTITIONER

## 2020-07-07 PROCEDURE — 99214 OFFICE O/P EST MOD 30 MIN: CPT | Mod: S$GLB,,, | Performed by: NURSE PRACTITIONER

## 2020-07-07 PROCEDURE — 1101F PT FALLS ASSESS-DOCD LE1/YR: CPT | Mod: CPTII,S$GLB,, | Performed by: NURSE PRACTITIONER

## 2020-07-07 PROCEDURE — 36415 COLL VENOUS BLD VENIPUNCTURE: CPT | Mod: PO

## 2020-07-07 PROCEDURE — 99999 PR PBB SHADOW E&M-EST. PATIENT-LVL IV: CPT | Mod: PBBFAC,,, | Performed by: NURSE PRACTITIONER

## 2020-07-07 PROCEDURE — 99999 PR PBB SHADOW E&M-EST. PATIENT-LVL IV: ICD-10-PCS | Mod: PBBFAC,,, | Performed by: NURSE PRACTITIONER

## 2020-07-07 PROCEDURE — 99214 PR OFFICE/OUTPT VISIT, EST, LEVL IV, 30-39 MIN: ICD-10-PCS | Mod: S$GLB,,, | Performed by: NURSE PRACTITIONER

## 2020-07-07 PROCEDURE — 1126F PR PAIN SEVERITY QUANTIFIED, NO PAIN PRESENT: ICD-10-PCS | Mod: S$GLB,,, | Performed by: NURSE PRACTITIONER

## 2020-07-07 PROCEDURE — 1126F AMNT PAIN NOTED NONE PRSNT: CPT | Mod: S$GLB,,, | Performed by: NURSE PRACTITIONER

## 2020-07-07 PROCEDURE — 82746 ASSAY OF FOLIC ACID SERUM: CPT

## 2020-07-07 PROCEDURE — 80053 COMPREHEN METABOLIC PANEL: CPT

## 2020-07-07 PROCEDURE — 1159F PR MEDICATION LIST DOCUMENTED IN MEDICAL RECORD: ICD-10-PCS | Mod: S$GLB,,, | Performed by: NURSE PRACTITIONER

## 2020-07-07 PROCEDURE — 84443 ASSAY THYROID STIM HORMONE: CPT

## 2020-07-07 PROCEDURE — 85025 COMPLETE CBC W/AUTO DIFF WBC: CPT

## 2020-07-07 PROCEDURE — 1159F MED LIST DOCD IN RCRD: CPT | Mod: S$GLB,,, | Performed by: NURSE PRACTITIONER

## 2020-07-07 RX ORDER — CHLORHEXIDINE GLUCONATE ORAL RINSE 1.2 MG/ML
15 SOLUTION DENTAL 2 TIMES DAILY
Qty: 473 ML | Refills: 0 | Status: SHIPPED | OUTPATIENT
Start: 2020-07-07 | End: 2020-07-14

## 2020-07-08 LAB
ALBUMIN SERPL BCP-MCNC: 3.7 G/DL (ref 3.5–5.2)
ALP SERPL-CCNC: 69 U/L (ref 55–135)
ALT SERPL W/O P-5'-P-CCNC: 17 U/L (ref 10–44)
ANION GAP SERPL CALC-SCNC: 7 MMOL/L (ref 8–16)
AST SERPL-CCNC: 32 U/L (ref 10–40)
BILIRUB SERPL-MCNC: 0.7 MG/DL (ref 0.1–1)
BUN SERPL-MCNC: 21 MG/DL (ref 8–23)
CALCIUM SERPL-MCNC: 9.1 MG/DL (ref 8.7–10.5)
CHLORIDE SERPL-SCNC: 103 MMOL/L (ref 95–110)
CO2 SERPL-SCNC: 30 MMOL/L (ref 23–29)
CREAT SERPL-MCNC: 1.2 MG/DL (ref 0.5–1.4)
EST. GFR  (AFRICAN AMERICAN): >60 ML/MIN/1.73 M^2
EST. GFR  (NON AFRICAN AMERICAN): 55.6 ML/MIN/1.73 M^2
FOLATE SERPL-MCNC: 14.1 NG/ML (ref 4–24)
GLUCOSE SERPL-MCNC: 89 MG/DL (ref 70–110)
POTASSIUM SERPL-SCNC: 4.5 MMOL/L (ref 3.5–5.1)
PROT SERPL-MCNC: 7.1 G/DL (ref 6–8.4)
SODIUM SERPL-SCNC: 140 MMOL/L (ref 136–145)
TSH SERPL DL<=0.005 MIU/L-ACNC: 1.59 UIU/ML (ref 0.4–4)
VIT B12 SERPL-MCNC: 431 PG/ML (ref 210–950)

## 2020-07-14 ENCOUNTER — LAB VISIT (OUTPATIENT)
Dept: PRIMARY CARE CLINIC | Facility: OTHER | Age: 85
End: 2020-07-14
Attending: INTERNAL MEDICINE
Payer: MEDICARE

## 2020-07-14 DIAGNOSIS — Z11.59 SPECIAL SCREENING EXAMINATION FOR UNSPECIFIED VIRAL DISEASE: ICD-10-CM

## 2020-07-14 PROCEDURE — U0003 INFECTIOUS AGENT DETECTION BY NUCLEIC ACID (DNA OR RNA); SEVERE ACUTE RESPIRATORY SYNDROME CORONAVIRUS 2 (SARS-COV-2) (CORONAVIRUS DISEASE [COVID-19]), AMPLIFIED PROBE TECHNIQUE, MAKING USE OF HIGH THROUGHPUT TECHNOLOGIES AS DESCRIBED BY CMS-2020-01-R: HCPCS

## 2020-07-17 LAB — SARS-COV-2 RNA RESP QL NAA+PROBE: NOT DETECTED

## 2020-07-24 ENCOUNTER — PATIENT MESSAGE (OUTPATIENT)
Dept: NEUROLOGY | Facility: CLINIC | Age: 85
End: 2020-07-24

## 2020-07-27 ENCOUNTER — PATIENT MESSAGE (OUTPATIENT)
Dept: NEUROLOGY | Facility: CLINIC | Age: 85
End: 2020-07-27

## 2020-07-27 NOTE — TELEPHONE ENCOUNTER
Labs ordered   Reviewed paperwork which mother left for LSW as determined; it appears an informal decision was made that he was ineligible due to \" you told us you do not want to file a claim for SSI\" (addressed to Makenna Sharma at same address. Explained that a contact to [phone] will need to happen to re apply and get further direction and sent picture of letter as they will need to reference. Valeria Reuben reports he has wound care appt at [facility] on 7-29-20; encouraged to ask for home care wound care so approopriate dressings can be ordered; advised if issue of [facility] ordering, to be sure to obtain written order for wound care and supplies needed and to contact LSW  Mother explains need for pt to have income and waiver services as she will soon be preparing to return to work.   Impressed importance  of keeping up with paperwork and to call LSW if has questions

## 2020-07-30 ENCOUNTER — PATIENT MESSAGE (OUTPATIENT)
Dept: NEUROLOGY | Facility: CLINIC | Age: 85
End: 2020-07-30

## 2020-07-30 ENCOUNTER — TELEPHONE (OUTPATIENT)
Dept: FAMILY MEDICINE | Facility: CLINIC | Age: 85
End: 2020-07-30

## 2020-07-30 DIAGNOSIS — Z11.1 SCREENING-PULMONARY TB: Primary | ICD-10-CM

## 2020-07-30 DIAGNOSIS — U07.1 COVID-19: ICD-10-CM

## 2020-07-30 NOTE — TELEPHONE ENCOUNTER
----- Message from Maia Acosta sent at 7/30/2020  3:29 PM CDT -----  Regarding: Orders  Contact: Daughter  Type: Needs Medical Advice    Who Called:Shawna Davis Call Back Number: [phone]    Additional Information:   Daughter calling in regards to patient moving in to home on 8/11. Pt is est care with Dr. Joyce 8/24 & insurance said he cannot do this any earlier due to time of pcp change on card?    Patient needs TB test ordered and also needs COVID screening within 7 days of moving in- wondering if these can be ordered by DRU Steward since he saw her last? Or what would they need to do to get these since he is in between PCP's right now?  Please call to advise, thank you!

## 2020-07-31 ENCOUNTER — TELEPHONE (OUTPATIENT)
Dept: FAMILY MEDICINE | Facility: CLINIC | Age: 85
End: 2020-07-31

## 2020-07-31 ENCOUNTER — PATIENT MESSAGE (OUTPATIENT)
Dept: NEUROLOGY | Facility: CLINIC | Age: 85
End: 2020-07-31

## 2020-07-31 DIAGNOSIS — Z03.818 ENCOUNTER FOR OBSERVATION FOR SUSPECTED EXPOSURE TO OTHER BIOLOGICAL AGENTS RULED OUT: ICD-10-CM

## 2020-07-31 NOTE — TELEPHONE ENCOUNTER
Please schedule patient for nurse visit for TB test and COVID clinic drive thru swab 8/6/20 or later

## 2020-07-31 NOTE — TELEPHONE ENCOUNTER
Pt scheduled Wednesday, 08/05/2020 at 1500 for COVID test and 1400 for Physical and TB with MAGDIEL Alejandro NP. Pt's daughter verbalized understanding.

## 2020-08-03 ENCOUNTER — PATIENT MESSAGE (OUTPATIENT)
Dept: NEUROLOGY | Facility: CLINIC | Age: 85
End: 2020-08-03

## 2020-08-05 ENCOUNTER — CLINICAL SUPPORT (OUTPATIENT)
Dept: INTERNAL MEDICINE | Facility: CLINIC | Age: 85
End: 2020-08-05
Payer: MEDICARE

## 2020-08-05 ENCOUNTER — OFFICE VISIT (OUTPATIENT)
Dept: FAMILY MEDICINE | Facility: CLINIC | Age: 85
End: 2020-08-05
Payer: MEDICARE

## 2020-08-05 VITALS
WEIGHT: 168.44 LBS | HEIGHT: 68 IN | HEART RATE: 66 BPM | SYSTOLIC BLOOD PRESSURE: 130 MMHG | DIASTOLIC BLOOD PRESSURE: 68 MMHG | BODY MASS INDEX: 25.53 KG/M2 | TEMPERATURE: 100 F | TEMPERATURE: 100 F

## 2020-08-05 DIAGNOSIS — I48.0 PAROXYSMAL ATRIAL FIBRILLATION: ICD-10-CM

## 2020-08-05 DIAGNOSIS — R97.20 ELEVATED PSA: ICD-10-CM

## 2020-08-05 DIAGNOSIS — I10 ESSENTIAL HYPERTENSION: Primary | ICD-10-CM

## 2020-08-05 DIAGNOSIS — F03.90 DEMENTIA WITHOUT BEHAVIORAL DISTURBANCE, UNSPECIFIED DEMENTIA TYPE: ICD-10-CM

## 2020-08-05 DIAGNOSIS — Z11.1 ENCOUNTER FOR SCREENING FOR RESPIRATORY TUBERCULOSIS: ICD-10-CM

## 2020-08-05 PROCEDURE — 86580 TB INTRADERMAL TEST: CPT | Mod: S$GLB,,, | Performed by: NURSE PRACTITIONER

## 2020-08-05 PROCEDURE — 99499 RISK ADDL DX/OHS AUDIT: ICD-10-PCS | Mod: S$GLB,,, | Performed by: NURSE PRACTITIONER

## 2020-08-05 PROCEDURE — 1159F PR MEDICATION LIST DOCUMENTED IN MEDICAL RECORD: ICD-10-PCS | Mod: S$GLB,,, | Performed by: NURSE PRACTITIONER

## 2020-08-05 PROCEDURE — 99499 UNLISTED E&M SERVICE: CPT | Mod: S$GLB,,, | Performed by: NURSE PRACTITIONER

## 2020-08-05 PROCEDURE — 1101F PT FALLS ASSESS-DOCD LE1/YR: CPT | Mod: CPTII,S$GLB,, | Performed by: NURSE PRACTITIONER

## 2020-08-05 PROCEDURE — 99999 PR PBB SHADOW E&M-EST. PATIENT-LVL I: CPT | Mod: PBBFAC,,,

## 2020-08-05 PROCEDURE — 99213 PR OFFICE/OUTPT VISIT, EST, LEVL III, 20-29 MIN: ICD-10-PCS | Mod: S$GLB,,, | Performed by: NURSE PRACTITIONER

## 2020-08-05 PROCEDURE — 1126F AMNT PAIN NOTED NONE PRSNT: CPT | Mod: S$GLB,,, | Performed by: NURSE PRACTITIONER

## 2020-08-05 PROCEDURE — 99999 PR PBB SHADOW E&M-EST. PATIENT-LVL III: CPT | Mod: PBBFAC,,, | Performed by: NURSE PRACTITIONER

## 2020-08-05 PROCEDURE — 1101F PR PT FALLS ASSESS DOC 0-1 FALLS W/OUT INJ PAST YR: ICD-10-PCS | Mod: CPTII,S$GLB,, | Performed by: NURSE PRACTITIONER

## 2020-08-05 PROCEDURE — 99999 PR PBB SHADOW E&M-EST. PATIENT-LVL III: ICD-10-PCS | Mod: PBBFAC,,, | Performed by: NURSE PRACTITIONER

## 2020-08-05 PROCEDURE — 99213 OFFICE O/P EST LOW 20 MIN: CPT | Mod: S$GLB,,, | Performed by: NURSE PRACTITIONER

## 2020-08-05 PROCEDURE — 1126F PR PAIN SEVERITY QUANTIFIED, NO PAIN PRESENT: ICD-10-PCS | Mod: S$GLB,,, | Performed by: NURSE PRACTITIONER

## 2020-08-05 PROCEDURE — 1159F MED LIST DOCD IN RCRD: CPT | Mod: S$GLB,,, | Performed by: NURSE PRACTITIONER

## 2020-08-05 PROCEDURE — 99999 PR PBB SHADOW E&M-EST. PATIENT-LVL I: ICD-10-PCS | Mod: PBBFAC,,,

## 2020-08-05 PROCEDURE — 86580 POCT TB SKIN TEST: ICD-10-PCS | Mod: S$GLB,,, | Performed by: NURSE PRACTITIONER

## 2020-08-05 NOTE — PROGRESS NOTES
This dictation has been generated using Modal Fluency Dictation some phonetic errors may occur. Please contact author for clarification if needed.     Problem List Items Addressed This Visit     Elevated PSA    Essential hypertension - Primary    Paroxysmal atrial fibrillation      Other Visit Diagnoses     Dementia without behavioral disturbance, unspecified dementia type                   HYPERTENSION, PROXIMAL ATRIAL FIBRILLATION, ELEVATED PSA, DEMENTIA okay for assisted living.    No follow-ups on file.    ________________________________________________________________  ________________________________________________________________      Chief Complaint   Patient presents with    Annual Exam     History of present illness  This 85 y.o. presents today for complaint of physical for assisted living.  No complaints.  Limited review of systems negative  TB today    Past Medical History:   Diagnosis Date    A-fib     Elevated cholesterol     Essential hypertension     Kidney stone     passed on his own    Memory loss     dememtia per family    Osteoarthritis     Stroke     2012 TIA     Thyroid disease        Past Surgical History:   Procedure Laterality Date    CYSTOSCOPY      EYE SURGERY      MAGNETIC RESONANCE IMAGING N/A 8/23/2019    Procedure: MRI (Magnetic Resonance Imagine) needs anesthesia;  Surgeon: Panchito Montoya MD;  Location: [facility];  Service: Anesthesiology;  Laterality: N/A;       Family History   Problem Relation Age of Onset    Cancer Mother     Diabetes Mother     Hyperlipidemia Mother     Heart disease Father     Hyperlipidemia Father     Dementia Sister     Dementia Brother     Heart disease Brother     Migraines Daughter     Tremor Daughter     Tremor Son     Urolithiasis Neg Hx     Prostate cancer Neg Hx     Kidney cancer Neg Hx        Social History     Socioeconomic History    Marital status:      Spouse name: Not on file    Number of children: Not  on file    Years of education: Not on file    Highest education level: Not on file   Occupational History    Not on file   Social Needs    Financial resource strain: Not on file    Food insecurity     Worry: Not on file     Inability: Not on file    Transportation needs     Medical: Not on file     Non-medical: Not on file   Tobacco Use    Smoking status: Former Smoker     Years: 15.00    Smokeless tobacco: Never Used    Tobacco comment: quit 50 years ago   Substance and Sexual Activity    Alcohol use: No    Drug use: No    Sexual activity: Not on file   Lifestyle    Physical activity     Days per week: Not on file     Minutes per session: Not on file    Stress: Not on file   Relationships    Social connections     Talks on phone: Not on file     Gets together: Not on file     Attends Sikh service: Not on file     Active member of club or organization: Not on file     Attends meetings of clubs or organizations: Not on file     Relationship status: Not on file   Other Topics Concern    Not on file   Social History Narrative    Not on file       Current Outpatient Medications   Medication Sig Dispense Refill    apixaban (ELIQUIS) 5 mg Tab Take 1 tablet (5 mg total) by mouth 2 (two) times daily. Need office visit before next refill, last seen 12/2018. This will be the LAST Rx if visit is not made. 180 tablet 0    levothyroxine (SYNTHROID) 75 MCG tablet Take 1 tablet by mouth once daily 90 tablet 0    traZODone (DESYREL) 100 MG tablet Take 1 tablet (100 mg total) by mouth every evening. 30 tablet 11    acetaminophen (TYLENOL) 500 MG tablet Take 500 mg by mouth every evening.      ascorbic acid, vitamin C, (VITAMIN C) 500 MG tablet Take 500 mg by mouth once daily.      diphenhydrAMINE (BENADRYL) 25 mg capsule Take 25 mg by mouth nightly as needed for Itching.      multivitamin (THERAGRAN) tablet Take 1 tablet by mouth once daily.       Current Facility-Administered Medications   Medication  Dose Route Frequency Provider Last Rate Last Dose    tuberculin injection 5 Units  5 Units Intradermal 1 time in Clinic/HOD Panfilo Steward DNP         Facility-Administered Medications Ordered in Other Visits   Medication Dose Route Frequency Provider Last Rate Last Dose    lactated ringers infusion   Intravenous Continuous Panchito Montoya MD        lidocaine (PF) 10 mg/ml (1%) injection 10 mg  1 mL Intradermal Once Panchito Montoya MD           Review of patient's allergies indicates:   Allergen Reactions    Ciprofloxacin Other (See Comments)     Shoulder and leg pain/cramping     Codeine Other (See Comments)     Pt cannot remember been a long time ago    Sulfa (sulfonamide antibiotics)      Allergic reaction to bactrim       Physical examination  Vitals Reviewed  Gen. Well-dressed well-nourished   Skin warm dry and intact.  No rashes noted.  Chest.  Respirations are even unlabored.  Lungs are clear to auscultation.  Cardiac regular rate and rhythm.  No chest wall adenopathy noted.  Neuro. Awake alert oriented x4.  Normal judgment and cognition noted.  Extremities no clubbing cyanosis or edema noted.     Call or return to clinic prn if these symptoms worsen or fail to improve as anticipated.

## 2020-08-05 NOTE — PROGRESS NOTES
Two person Identification with verbal feedback.  Orders per MAR Tuberculin skin test applied to R ventral forearm. Explained how to read the test, measuring induration not just erythema;  Instructed to come into office in 48-72 hours  to  Read results. /mp

## 2020-08-07 ENCOUNTER — LAB VISIT (OUTPATIENT)
Dept: PRIMARY CARE CLINIC | Facility: CLINIC | Age: 85
End: 2020-08-07
Payer: MEDICARE

## 2020-08-07 ENCOUNTER — CLINICAL SUPPORT (OUTPATIENT)
Dept: INTERNAL MEDICINE | Facility: CLINIC | Age: 85
End: 2020-08-07
Payer: MEDICARE

## 2020-08-07 DIAGNOSIS — U07.1 COVID-19: ICD-10-CM

## 2020-08-07 LAB
TB INDURATION - 48 HR READ: 0 MM
TB INDURATION - 72 HR READ: 0 MM
TB SKIN TEST - 48 HR READ: NEGATIVE
TB SKIN TEST - 72 HR READ: NEGATIVE

## 2020-08-07 PROCEDURE — U0003 INFECTIOUS AGENT DETECTION BY NUCLEIC ACID (DNA OR RNA); SEVERE ACUTE RESPIRATORY SYNDROME CORONAVIRUS 2 (SARS-COV-2) (CORONAVIRUS DISEASE [COVID-19]), AMPLIFIED PROBE TECHNIQUE, MAKING USE OF HIGH THROUGHPUT TECHNOLOGIES AS DESCRIBED BY CMS-2020-01-R: HCPCS

## 2020-08-07 NOTE — TELEPHONE ENCOUNTER
Normal study    This was reviewed by Dr. Kamara.  If you have any questions needs or problems let us know

## 2020-08-07 NOTE — PROGRESS NOTES
PPD Reading Note  PPD read and results entered in [facility].  Result: 0 mm induration.  Interpretation: Negative  t read within 48-72 hours of initial placement./mp

## 2020-08-08 LAB — SARS-COV-2 RNA RESP QL NAA+PROBE: NOT DETECTED

## 2020-08-29 ENCOUNTER — PATIENT MESSAGE (OUTPATIENT)
Dept: FAMILY MEDICINE | Facility: CLINIC | Age: 85
End: 2020-08-29

## 2020-08-31 ENCOUNTER — PATIENT MESSAGE (OUTPATIENT)
Dept: FAMILY MEDICINE | Facility: CLINIC | Age: 85
End: 2020-08-31

## 2020-08-31 DIAGNOSIS — Z12.5 ENCOUNTER FOR SCREENING FOR MALIGNANT NEOPLASM OF PROSTATE: ICD-10-CM

## 2020-08-31 DIAGNOSIS — R97.20 ELEVATED PSA: Primary | ICD-10-CM

## 2020-09-07 ENCOUNTER — PATIENT MESSAGE (OUTPATIENT)
Dept: FAMILY MEDICINE | Facility: CLINIC | Age: 85
End: 2020-09-07

## 2020-09-10 ENCOUNTER — LAB VISIT (OUTPATIENT)
Dept: LAB | Facility: OTHER | Age: 85
End: 2020-09-10
Attending: INTERNAL MEDICINE
Payer: MEDICARE

## 2020-09-10 ENCOUNTER — PATIENT MESSAGE (OUTPATIENT)
Dept: FAMILY MEDICINE | Facility: CLINIC | Age: 85
End: 2020-09-10

## 2020-09-10 DIAGNOSIS — Z03.818 ENCOUNTER FOR OBSERVATION FOR SUSPECTED EXPOSURE TO OTHER BIOLOGICAL AGENTS RULED OUT: ICD-10-CM

## 2020-09-10 PROCEDURE — U0003 INFECTIOUS AGENT DETECTION BY NUCLEIC ACID (DNA OR RNA); SEVERE ACUTE RESPIRATORY SYNDROME CORONAVIRUS 2 (SARS-COV-2) (CORONAVIRUS DISEASE [COVID-19]), AMPLIFIED PROBE TECHNIQUE, MAKING USE OF HIGH THROUGHPUT TECHNOLOGIES AS DESCRIBED BY CMS-2020-01-R: HCPCS

## 2020-09-11 LAB — SARS-COV-2 RNA RESP QL NAA+PROBE: NOT DETECTED

## 2020-09-15 ENCOUNTER — PATIENT MESSAGE (OUTPATIENT)
Dept: FAMILY MEDICINE | Facility: CLINIC | Age: 85
End: 2020-09-15

## 2020-09-18 ENCOUNTER — LAB VISIT (OUTPATIENT)
Dept: LAB | Facility: HOSPITAL | Age: 85
End: 2020-09-18
Attending: NURSE PRACTITIONER
Payer: MEDICARE

## 2020-09-18 DIAGNOSIS — R97.20 ELEVATED PSA: ICD-10-CM

## 2020-09-18 PROCEDURE — 84153 ASSAY OF PSA TOTAL: CPT

## 2020-09-18 PROCEDURE — 36415 COLL VENOUS BLD VENIPUNCTURE: CPT | Mod: PO

## 2020-09-19 LAB — COMPLEXED PSA SERPL-MCNC: 13.1 NG/ML (ref 0–4)

## 2020-09-24 ENCOUNTER — PATIENT OUTREACH (OUTPATIENT)
Dept: ADMINISTRATIVE | Facility: OTHER | Age: 85
End: 2020-09-24

## 2020-09-25 ENCOUNTER — OFFICE VISIT (OUTPATIENT)
Dept: NEUROLOGY | Facility: CLINIC | Age: 85
End: 2020-09-25
Payer: MEDICARE

## 2020-09-25 VITALS
RESPIRATION RATE: 16 BRPM | DIASTOLIC BLOOD PRESSURE: 80 MMHG | HEART RATE: 69 BPM | HEIGHT: 68 IN | TEMPERATURE: 98 F | WEIGHT: 159.63 LBS | BODY MASS INDEX: 24.19 KG/M2 | SYSTOLIC BLOOD PRESSURE: 148 MMHG

## 2020-09-25 DIAGNOSIS — G47.00 INSOMNIA, UNSPECIFIED TYPE: ICD-10-CM

## 2020-09-25 DIAGNOSIS — G31.01 DEMENTIA DUE TO PICK'S DISEASE WITHOUT BEHAVIORAL DISTURBANCE: Primary | ICD-10-CM

## 2020-09-25 DIAGNOSIS — Z86.73 HISTORY OF TIA (TRANSIENT ISCHEMIC ATTACK): ICD-10-CM

## 2020-09-25 DIAGNOSIS — R79.9 ABNORMAL FINDING OF BLOOD CHEMISTRY, UNSPECIFIED: ICD-10-CM

## 2020-09-25 DIAGNOSIS — F02.80 DEMENTIA DUE TO PICK'S DISEASE WITHOUT BEHAVIORAL DISTURBANCE: Primary | ICD-10-CM

## 2020-09-25 PROCEDURE — 99999 PR PBB SHADOW E&M-EST. PATIENT-LVL IV: ICD-10-PCS | Mod: PBBFAC,,, | Performed by: PSYCHIATRY & NEUROLOGY

## 2020-09-25 PROCEDURE — 99999 PR PBB SHADOW E&M-EST. PATIENT-LVL IV: CPT | Mod: PBBFAC,,, | Performed by: PSYCHIATRY & NEUROLOGY

## 2020-09-25 PROCEDURE — 1101F PR PT FALLS ASSESS DOC 0-1 FALLS W/OUT INJ PAST YR: ICD-10-PCS | Mod: CPTII,S$GLB,, | Performed by: PSYCHIATRY & NEUROLOGY

## 2020-09-25 PROCEDURE — 1126F PR PAIN SEVERITY QUANTIFIED, NO PAIN PRESENT: ICD-10-PCS | Mod: S$GLB,,, | Performed by: PSYCHIATRY & NEUROLOGY

## 2020-09-25 PROCEDURE — 1159F PR MEDICATION LIST DOCUMENTED IN MEDICAL RECORD: ICD-10-PCS | Mod: S$GLB,,, | Performed by: PSYCHIATRY & NEUROLOGY

## 2020-09-25 PROCEDURE — 99215 PR OFFICE/OUTPT VISIT, EST, LEVL V, 40-54 MIN: ICD-10-PCS | Mod: S$GLB,,, | Performed by: PSYCHIATRY & NEUROLOGY

## 2020-09-25 PROCEDURE — 1101F PT FALLS ASSESS-DOCD LE1/YR: CPT | Mod: CPTII,S$GLB,, | Performed by: PSYCHIATRY & NEUROLOGY

## 2020-09-25 PROCEDURE — 1159F MED LIST DOCD IN RCRD: CPT | Mod: S$GLB,,, | Performed by: PSYCHIATRY & NEUROLOGY

## 2020-09-25 PROCEDURE — 99215 OFFICE O/P EST HI 40 MIN: CPT | Mod: S$GLB,,, | Performed by: PSYCHIATRY & NEUROLOGY

## 2020-09-25 PROCEDURE — 1126F AMNT PAIN NOTED NONE PRSNT: CPT | Mod: S$GLB,,, | Performed by: PSYCHIATRY & NEUROLOGY

## 2020-09-25 RX ORDER — TRAZODONE HYDROCHLORIDE 100 MG/1
200 TABLET ORAL NIGHTLY
Qty: 60 TABLET | Refills: 11 | Status: SHIPPED | OUTPATIENT
Start: 2020-09-25 | End: 2020-10-02

## 2020-09-25 NOTE — PROGRESS NOTES
Health Maintenance Due   Topic Date Due    Shingles Vaccine (1 of 2) 05/02/1987    Influenza Vaccine (1) 08/01/2020     Updates were requested from care everywhere.  Chart was reviewed for overdue Proactive [facility] Encounters (ADALBERTO) topics (CRS, Breast Cancer Screening, Eye exam)  Health Maintenance has been updated.  LINKS immunization registry triggered.  Immunizations were reconciled.
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
## 2020-09-29 ENCOUNTER — LAB VISIT (OUTPATIENT)
Dept: LAB | Facility: OTHER | Age: 85
End: 2020-09-29
Payer: MEDICARE

## 2020-09-29 DIAGNOSIS — Z03.818 ENCOUNTER FOR OBSERVATION FOR SUSPECTED EXPOSURE TO OTHER BIOLOGICAL AGENTS RULED OUT: ICD-10-CM

## 2020-09-29 PROCEDURE — U0003 INFECTIOUS AGENT DETECTION BY NUCLEIC ACID (DNA OR RNA); SEVERE ACUTE RESPIRATORY SYNDROME CORONAVIRUS 2 (SARS-COV-2) (CORONAVIRUS DISEASE [COVID-19]), AMPLIFIED PROBE TECHNIQUE, MAKING USE OF HIGH THROUGHPUT TECHNOLOGIES AS DESCRIBED BY CMS-2020-01-R: HCPCS

## 2020-09-30 LAB — SARS-COV-2 RNA RESP QL NAA+PROBE: NOT DETECTED

## 2020-10-02 ENCOUNTER — PATIENT MESSAGE (OUTPATIENT)
Dept: NEUROLOGY | Facility: CLINIC | Age: 85
End: 2020-10-02

## 2020-10-02 RX ORDER — TRAZODONE HYDROCHLORIDE 100 MG/1
100 TABLET ORAL NIGHTLY
Qty: 30 TABLET | Refills: 11 | Status: ON HOLD | OUTPATIENT
Start: 2020-10-02 | End: 2020-10-07 | Stop reason: HOSPADM

## 2020-10-02 RX ORDER — MIRTAZAPINE 30 MG/1
30 TABLET, FILM COATED ORAL NIGHTLY
Qty: 30 TABLET | Refills: 11 | Status: ON HOLD | OUTPATIENT
Start: 2020-10-02 | End: 2020-10-07 | Stop reason: SDUPTHER

## 2020-10-02 NOTE — TELEPHONE ENCOUNTER
Increasing trazodone has led to worsening anxiety at night. I would recommend that we lower the trazodone back down to 100 mg nightly. We will try to add remeron 30 mg nightly (it can help with mood and help people sleep). Depending on how he does, we may wean the trazodone down further or increase the remeron. If behavioral difficulties persist, we can try something stronger like seroquel.

## 2020-10-03 ENCOUNTER — HOSPITAL ENCOUNTER (INPATIENT)
Facility: HOSPITAL | Age: 85
LOS: 4 days | Discharge: HOME HEALTH | DRG: 065 | End: 2020-10-07
Attending: EMERGENCY MEDICINE | Admitting: INTERNAL MEDICINE
Payer: MEDICARE

## 2020-10-03 DIAGNOSIS — I61.9: ICD-10-CM

## 2020-10-03 DIAGNOSIS — I62.9 INTRACRANIAL HEMORRHAGE: Primary | ICD-10-CM

## 2020-10-03 DIAGNOSIS — R51.9 HEADACHE: ICD-10-CM

## 2020-10-03 DIAGNOSIS — I63.89 OTHER CEREBRAL INFARCTION: ICD-10-CM

## 2020-10-03 DIAGNOSIS — I63.9 CVA (CEREBRAL VASCULAR ACCIDENT): ICD-10-CM

## 2020-10-03 DIAGNOSIS — Z03.89 RULED OUT FOR MYOCARDIAL INFARCTION: ICD-10-CM

## 2020-10-03 DIAGNOSIS — R41.0 CONFUSION: ICD-10-CM

## 2020-10-03 PROBLEM — Z71.89 GOALS OF CARE, COUNSELING/DISCUSSION: Status: ACTIVE | Noted: 2020-10-03

## 2020-10-03 LAB
ALBUMIN SERPL BCP-MCNC: 3.6 G/DL (ref 3.5–5.2)
ALP SERPL-CCNC: 58 U/L (ref 55–135)
ALT SERPL W/O P-5'-P-CCNC: 14 U/L (ref 10–44)
ANION GAP SERPL CALC-SCNC: 11 MMOL/L (ref 8–16)
AST SERPL-CCNC: 27 U/L (ref 10–40)
BASOPHILS # BLD AUTO: 0.06 K/UL (ref 0–0.2)
BASOPHILS NFR BLD: 0.7 % (ref 0–1.9)
BILIRUB SERPL-MCNC: 1 MG/DL (ref 0.1–1)
BILIRUB UR QL STRIP: NEGATIVE
BUN SERPL-MCNC: 18 MG/DL (ref 8–23)
CALCIUM SERPL-MCNC: 9.3 MG/DL (ref 8.7–10.5)
CHLORIDE SERPL-SCNC: 104 MMOL/L (ref 95–110)
CLARITY UR: CLEAR
CO2 SERPL-SCNC: 26 MMOL/L (ref 23–29)
COLOR UR: YELLOW
CREAT SERPL-MCNC: 1.2 MG/DL (ref 0.5–1.4)
DIFFERENTIAL METHOD: ABNORMAL
EOSINOPHIL # BLD AUTO: 0.2 K/UL (ref 0–0.5)
EOSINOPHIL NFR BLD: 2.4 % (ref 0–8)
ERYTHROCYTE [DISTWIDTH] IN BLOOD BY AUTOMATED COUNT: 12.2 % (ref 11.5–14.5)
EST. GFR  (AFRICAN AMERICAN): >60 ML/MIN/1.73 M^2
EST. GFR  (NON AFRICAN AMERICAN): 56 ML/MIN/1.73 M^2
GLUCOSE SERPL-MCNC: 97 MG/DL (ref 70–110)
GLUCOSE UR QL STRIP: NEGATIVE
HCT VFR BLD AUTO: 36.6 % (ref 40–54)
HGB BLD-MCNC: 12.8 G/DL (ref 14–18)
HGB UR QL STRIP: NEGATIVE
IMM GRANULOCYTES # BLD AUTO: 0.03 K/UL (ref 0–0.04)
IMM GRANULOCYTES NFR BLD AUTO: 0.3 % (ref 0–0.5)
KETONES UR QL STRIP: ABNORMAL
LEUKOCYTE ESTERASE UR QL STRIP: NEGATIVE
LYMPHOCYTES # BLD AUTO: 1.4 K/UL (ref 1–4.8)
LYMPHOCYTES NFR BLD: 15.1 % (ref 18–48)
MCH RBC QN AUTO: 32.7 PG (ref 27–31)
MCHC RBC AUTO-ENTMCNC: 35 G/DL (ref 32–36)
MCV RBC AUTO: 94 FL (ref 82–98)
MONOCYTES # BLD AUTO: 1 K/UL (ref 0.3–1)
MONOCYTES NFR BLD: 10.8 % (ref 4–15)
NEUTROPHILS # BLD AUTO: 6.5 K/UL (ref 1.8–7.7)
NEUTROPHILS NFR BLD: 70.7 % (ref 38–73)
NITRITE UR QL STRIP: NEGATIVE
NRBC BLD-RTO: 0 /100 WBC
PH UR STRIP: 6 [PH] (ref 5–8)
PLATELET # BLD AUTO: 94 K/UL (ref 150–350)
PMV BLD AUTO: 14 FL (ref 9.2–12.9)
POTASSIUM SERPL-SCNC: 3.8 MMOL/L (ref 3.5–5.1)
PROT SERPL-MCNC: 6.6 G/DL (ref 6–8.4)
PROT UR QL STRIP: NEGATIVE
RBC # BLD AUTO: 3.91 M/UL (ref 4.6–6.2)
SARS-COV-2 RDRP RESP QL NAA+PROBE: NEGATIVE
SODIUM SERPL-SCNC: 141 MMOL/L (ref 136–145)
SP GR UR STRIP: >=1.03 (ref 1–1.03)
URN SPEC COLLECT METH UR: ABNORMAL
UROBILINOGEN UR STRIP-ACNC: ABNORMAL EU/DL
WBC # BLD AUTO: 9.22 K/UL (ref 3.9–12.7)

## 2020-10-03 PROCEDURE — 25000003 PHARM REV CODE 250: Performed by: NURSE PRACTITIONER

## 2020-10-03 PROCEDURE — 81003 URINALYSIS AUTO W/O SCOPE: CPT

## 2020-10-03 PROCEDURE — 20000000 HC ICU ROOM

## 2020-10-03 PROCEDURE — 80053 COMPREHEN METABOLIC PANEL: CPT

## 2020-10-03 PROCEDURE — 85025 COMPLETE CBC W/AUTO DIFF WBC: CPT

## 2020-10-03 PROCEDURE — 96375 TX/PRO/DX INJ NEW DRUG ADDON: CPT

## 2020-10-03 PROCEDURE — 96376 TX/PRO/DX INJ SAME DRUG ADON: CPT

## 2020-10-03 PROCEDURE — 25000003 PHARM REV CODE 250: Performed by: EMERGENCY MEDICINE

## 2020-10-03 PROCEDURE — 99292 CRITICAL CARE ADDL 30 MIN: CPT

## 2020-10-03 PROCEDURE — 99291 CRITICAL CARE FIRST HOUR: CPT | Mod: 25

## 2020-10-03 PROCEDURE — 63600175 PHARM REV CODE 636 W HCPCS: Performed by: EMERGENCY MEDICINE

## 2020-10-03 PROCEDURE — 96365 THER/PROPH/DIAG IV INF INIT: CPT

## 2020-10-03 PROCEDURE — 36415 COLL VENOUS BLD VENIPUNCTURE: CPT

## 2020-10-03 PROCEDURE — U0002 COVID-19 LAB TEST NON-CDC: HCPCS

## 2020-10-03 RX ORDER — ONDANSETRON 2 MG/ML
4 INJECTION INTRAMUSCULAR; INTRAVENOUS EVERY 12 HOURS PRN
Status: DISCONTINUED | OUTPATIENT
Start: 2020-10-03 | End: 2020-10-07 | Stop reason: HOSPADM

## 2020-10-03 RX ORDER — TRAZODONE HYDROCHLORIDE 50 MG/1
100 TABLET ORAL NIGHTLY
Status: DISCONTINUED | OUTPATIENT
Start: 2020-10-03 | End: 2020-10-05

## 2020-10-03 RX ORDER — LORAZEPAM 2 MG/ML
2 INJECTION INTRAMUSCULAR
Status: COMPLETED | OUTPATIENT
Start: 2020-10-03 | End: 2020-10-03

## 2020-10-03 RX ORDER — SODIUM CHLORIDE 0.9 % (FLUSH) 0.9 %
10 SYRINGE (ML) INJECTION
Status: DISCONTINUED | OUTPATIENT
Start: 2020-10-03 | End: 2020-10-07 | Stop reason: HOSPADM

## 2020-10-03 RX ORDER — NICARDIPINE HYDROCHLORIDE 0.2 MG/ML
5 INJECTION INTRAVENOUS CONTINUOUS
Status: DISCONTINUED | OUTPATIENT
Start: 2020-10-03 | End: 2020-10-06

## 2020-10-03 RX ORDER — ASCORBIC ACID 500 MG
500 TABLET ORAL DAILY
Status: DISCONTINUED | OUTPATIENT
Start: 2020-10-04 | End: 2020-10-07 | Stop reason: HOSPADM

## 2020-10-03 RX ORDER — HALOPERIDOL 5 MG/ML
5 INJECTION INTRAMUSCULAR
Status: DISPENSED | OUTPATIENT
Start: 2020-10-03 | End: 2020-10-04

## 2020-10-03 RX ORDER — CLONAZEPAM 0.5 MG/1
0.5 TABLET ORAL
Status: COMPLETED | OUTPATIENT
Start: 2020-10-03 | End: 2020-10-03

## 2020-10-03 RX ORDER — LABETALOL HYDROCHLORIDE 5 MG/ML
10 INJECTION, SOLUTION INTRAVENOUS
Status: DISCONTINUED | OUTPATIENT
Start: 2020-10-03 | End: 2020-10-05

## 2020-10-03 RX ORDER — ATORVASTATIN CALCIUM 40 MG/1
40 TABLET, FILM COATED ORAL DAILY
Status: DISCONTINUED | OUTPATIENT
Start: 2020-10-04 | End: 2020-10-07 | Stop reason: HOSPADM

## 2020-10-03 RX ADMIN — CLONAZEPAM 0.5 MG: 0.5 TABLET ORAL at 09:10

## 2020-10-03 RX ADMIN — NICARDIPINE HYDROCHLORIDE 5 MG/HR: 0.2 INJECTION, SOLUTION INTRAVENOUS at 11:10

## 2020-10-03 RX ADMIN — TRAZODONE HYDROCHLORIDE 100 MG: 50 TABLET ORAL at 09:10

## 2020-10-03 RX ADMIN — LORAZEPAM 2 MG: 2 INJECTION, SOLUTION INTRAMUSCULAR; INTRAVENOUS at 11:10

## 2020-10-03 NOTE — ASSESSMENT & PLAN NOTE
Chronic problem. Will continue chronic medications and monitor for any changes, adjusting as needed.  Avoid elevated systolic blood pressure.

## 2020-10-03 NOTE — NURSING
"Pt arrived from ED, pleasant, oriented to place, initially pt said "I don't even know who I am," but was then able to tell me his name and date of birth, disoriented to time, situation. No physical deficits visualized, NIH score - 1, pt AFIB on tele, Cardene on hold pt SBP < 150, IGOR's/ SCD's applied- eliquis on hold.  "

## 2020-10-03 NOTE — ASSESSMENT & PLAN NOTE
Advance Care Planning     Living Will  During this visit, I engaged with patient's son -healthcare power of    in the advance care planning process.  The patient and I reviewed the role for advance directives and their purpose in directing future healthcare if the patient's unable to speak for him/herself.  At this point in time, the patient does have full decision-making capacity.  We discussed different extreme health states that he could experience, and reviewed what kind of medical care he would want in those situations.  The healthcare power of   communicated that if he were comatose and had little chance of a meaningful recovery, he would want machines/life-sustaining treatments used.  He will be discussing with family and leaning towards do not resuscitate order, he will let us know after talking to the family members.  I spent a total of 20 minutes engaging the patient in this advance care planning discussion.

## 2020-10-03 NOTE — ED NOTES
Son (#2) appears @ bedside. Pt currently engaging in short conversations with same. NAD @ present.    Never

## 2020-10-03 NOTE — SUBJECTIVE & OBJECTIVE
Past Medical History:   Diagnosis Date    A-fib     Elevated cholesterol     Essential hypertension     Kidney stone     passed on his own    Memory loss     dememtia per family    Osteoarthritis     Stroke     2012 TIA     Thyroid disease        Past Surgical History:   Procedure Laterality Date    CYSTOSCOPY      EYE SURGERY      MAGNETIC RESONANCE IMAGING N/A 8/23/2019    Procedure: MRI (Magnetic Resonance Imagine) needs anesthesia;  Surgeon: Panchito Montoya MD;  Location: [facility];  Service: Anesthesiology;  Laterality: N/A;       Review of patient's allergies indicates:   Allergen Reactions    Ciprofloxacin Other (See Comments)     Shoulder and leg pain/cramping     Codeine Other (See Comments)     Pt cannot remember been a long time ago    Sulfa (sulfonamide antibiotics)      Allergic reaction to bactrim       Current Facility-Administered Medications on File Prior to Encounter   Medication    lactated ringers infusion    lidocaine (PF) 10 mg/ml (1%) injection 10 mg    tuberculin injection 5 Units     Current Outpatient Medications on File Prior to Encounter   Medication Sig    acetaminophen (TYLENOL) 500 MG tablet Take 500 mg by mouth every evening.    apixaban (ELIQUIS) 5 mg Tab Take 1 tablet (5 mg total) by mouth 2 (two) times daily. Need office visit before next refill, last seen 12/2018. This will be the LAST Rx if visit is not made.    ascorbic acid, vitamin C, (VITAMIN C) 500 MG tablet Take 500 mg by mouth once daily.    levothyroxine (SYNTHROID) 75 MCG tablet Take 1 tablet by mouth once daily    mirtazapine (REMERON) 30 MG tablet Take 1 tablet (30 mg total) by mouth every evening.    multivitamin (THERAGRAN) tablet Take 1 tablet by mouth once daily.    traZODone (DESYREL) 100 MG tablet Take 1 tablet (100 mg total) by mouth every evening.     Family History     Problem Relation (Age of Onset)    Cancer Mother    Dementia Sister, Brother    Diabetes Mother    Heart disease  Father, Brother    Hyperlipidemia Mother, Father    Migraines Daughter    Tremor Daughter, Son        Tobacco Use    Smoking status: Former Smoker     Years: 15.00    Smokeless tobacco: Never Used    Tobacco comment: quit 50 years ago   Substance and Sexual Activity    Alcohol use: No    Drug use: No    Sexual activity: Not on file     Review of Systems   Constitutional: Positive for appetite change and fatigue.   Neurological: Positive for weakness and headaches.   Psychiatric/Behavioral: Positive for agitation and confusion.   All other systems reviewed and are negative.    Objective:     Vital Signs (Most Recent):  Temp: 97.9 °F (36.6 °C) (10/03/20 0912)  Pulse: 72 (10/03/20 1412)  Resp: 18 (10/03/20 0912)  BP: (!) 122/58 (10/03/20 1412)  SpO2: (!) 94 % (10/03/20 1412) Vital Signs (24h Range):  Temp:  [97.9 °F (36.6 °C)] 97.9 °F (36.6 °C)  Pulse:  [] 72  Resp:  [18] 18  SpO2:  [92 %-98 %] 94 %  BP: (113-180)/(56-95) 122/58        There is no height or weight on file to calculate BMI.    Physical Exam  Vitals signs and nursing note reviewed.   Constitutional:       Appearance: Normal appearance. He is well-developed.   HENT:      Head: Normocephalic and atraumatic.      Nose: Nose normal. No septal deviation.   Eyes:      Conjunctiva/sclera: Conjunctivae normal.      Pupils: Pupils are equal, round, and reactive to light.   Neck:      Thyroid: No thyroid mass.      Vascular: No JVD.      Trachea: No tracheal tenderness or tracheal deviation.   Cardiovascular:      Rate and Rhythm: Normal rate and regular rhythm.      Heart sounds: S1 normal and S2 normal. No murmur. No friction rub. No gallop.    Pulmonary:      Effort: Pulmonary effort is normal.      Breath sounds: Normal breath sounds. No decreased breath sounds, wheezing, rhonchi or rales.   Abdominal:      General: Bowel sounds are normal. There is no distension.      Palpations: Abdomen is soft. There is no hepatomegaly, splenomegaly or mass.      " Tenderness: There is no abdominal tenderness.   Skin:     Findings: No rash.   Neurological:      Mental Status: He is alert.      Cranial Nerves: No cranial nerve deficit.      Sensory: No sensory deficit.           CRANIAL NERVES     CN III, IV, VI   Pupils are equal, round, and reactive to light.       Significant Labs:   CBC:   Recent Labs   Lab 10/03/20  0940   WBC 9.22   HGB 12.8*   HCT 36.6*   PLT 94*     CMP:   Recent Labs   Lab 10/03/20  0940      K 3.8      CO2 26   GLU 97   BUN 18   CREATININE 1.2   CALCIUM 9.3   PROT 6.6   ALBUMIN 3.6   BILITOT 1.0   ALKPHOS 58   AST 27   ALT 14   ANIONGAP 11   EGFRNONAA 56*     Urine Studies:   Recent Labs   Lab 10/03/20  1041   COLORU Yellow   APPEARANCEUA Clear   PHUR 6.0   SPECGRAV >=1.030*   PROTEINUA Negative   GLUCUA Negative   KETONESU Trace*   BILIRUBINUA Negative   OCCULTUA Negative   NITRITE Negative   UROBILINOGEN 2.0-3.0*   LEUKOCYTESUR Negative       Significant Imaging:   CXR: Adriano Borjas is a 85 y.o. male with a PMHx of dementia, TIA, A-Fib on Eliquis, and kidney stone who presents to the ED for AMS that started 3 hours PTA. The son states that the patient has a history of dementia and states that today "I found him after we had breakfast standing at the table and he didn't know where he was." The son states that they have noticed a progressive decline in the patient's mental status, but states that "Today it was a lot more rapid than before. I just want to make sure he isn't having a stroke." Patient reports that currently he feels back to his baseline. The patient is unsure if he had a headache or not earlier in the morning. Son states that the patient has had some recent medication changes, reporting an increase in trazodone, but son returned to original dose secondary to increased agitation. The son states that the patients agitation has not been as intense as it was in the previous week.The patient denies chest pain, SOB, fever, " cough, pain with urination, or any other complaint at this time. Son denies the patient having speech difficulty, facial asymmetry, weakness, numbness, or any other complaint at this time. The patient has a PSHx of cystoscopy, eye surgery, and MRI.     CT head without contrast:  1. 5 mm focus of acute petechial hemorrhage within the left occipital lobe.  2. Small area of subacute versus remote lacunar infarction within the left aspect of the nya.  It is unclear whether this represents artifact or a true finding.  Additional evaluation could be achieved with MRI as deemed clinically appropriate.  3. Age-appropriate cerebral volume loss and chronic microvascular ischemic changes within the periventricular white matter of the supratentorial brain.    MRI brain:  1. Limited examination revealing a small 6 mm focus of acute petechial hemorrhage versus hemorrhagic infarction within the subcortical white matter of the medial left occipital lobe (as demonstrated by recent MRI).  2. Recently noted hypodensity within the left paramidline anterior nya is representative of artifact.  No evidence of acute or remote ischemia/infarction within the nya.  3. Age-appropriate cerebral volume loss and chronic microvascular ischemic changes within the periventricular white matter of the supratentorial brain.  4. Mild ethmoid and frontal sinus disease.

## 2020-10-03 NOTE — HPI
Patient is and 85-year-old male with past medical history significant for hypertension, history of paroxysmal atrial fibrillation (on Eliquis), history of TIA, dementia, nephrolithiasis, hypothyroidism, prior history of cerebral hemorrhage and gait instability is being admitted to Hospital Medicine under inpatient status from [facility] Emergency Room with complaint of increasing confusion and altered mental status for the past week.  Patient is a resident of [facility].  Part of the history obtained from patient's son.  According to the son, patient is having progressive decline in mental status lately and this morning patient was not aware very was at the time of breakfast.  It appeared to be more than usual confusion in family is concerned about possibility of new cerebrovascular accident. Patient reports that currently he feels back to his baseline. The patient is unsure if he had a headache or not earlier in the morning. Son states that the patient has had some recent medication changes, reporting an increase in trazodone, but son returned to original dose secondary to increased agitation. The son states that the patients agitation has not been as intense as it was in the previous week.The patient denies chest pain, SOB, fever, cough, pain with urination, or any other complaint at this time. Son denies the patient having speech difficulty, facial asymmetry, weakness, numbness, or any other complaint at this time.  No recent fall, head injury, loss of consciousness or seizure activity reported.

## 2020-10-03 NOTE — H&P
[facility] Medicine  History & Physical    Patient Name: Adriano Borjas  MRN: [id number]  Admission Date: 10/3/2020  Attending Physician: Tamela Pearl MD   Primary Care Provider: Bud Kamara MD         Patient information was obtained from patient, son and ER records.     Subjective:     Principal Problem:Cerebral hemorrhage, acute    Chief Complaint:   Chief Complaint   Patient presents with    Altered Mental Status     hx dementia, sudden worsening beginning this AM; usually lives at [place], trazodone dose doubled last week, has had increased agitation since then, son reduced trazodone to original dose        HPI: Patient is and 85-year-old male with past medical history significant for hypertension, history of paroxysmal atrial fibrillation (on Eliquis), history of TIA, dementia, nephrolithiasis, hypothyroidism, prior history of cerebral hemorrhage and gait instability is being admitted to Hospital Medicine under inpatient status from [facility] Emergency Room with complaint of increasing confusion and altered mental status for the past week.  Patient is a resident of [facility].  Part of the history obtained from patient's son.  According to the son, patient is having progressive decline in mental status lately and this morning patient was not aware very was at the time of breakfast.  It appeared to be more than usual confusion in family is concerned about possibility of new cerebrovascular accident. Patient reports that currently he feels back to his baseline. The patient is unsure if he had a headache or not earlier in the morning. Son states that the patient has had some recent medication changes, reporting an increase in trazodone, but son returned to original dose secondary to increased agitation. The son states that the patients agitation has not been as intense as it was in the previous week.The patient denies chest  pain, SOB, fever, cough, pain with urination, or any other complaint at this time. Son denies the patient having speech difficulty, facial asymmetry, weakness, numbness, or any other complaint at this time.  No recent fall, head injury, loss of consciousness or seizure activity reported.      Past Medical History:   Diagnosis Date    A-fib     Elevated cholesterol     Essential hypertension     Kidney stone     passed on his own    Memory loss     dememtia per family    Osteoarthritis     Stroke     2012 TIA     Thyroid disease        Past Surgical History:   Procedure Laterality Date    CYSTOSCOPY      EYE SURGERY      MAGNETIC RESONANCE IMAGING N/A 8/23/2019    Procedure: MRI (Magnetic Resonance Imagine) needs anesthesia;  Surgeon: Panchito Montoya MD;  Location: [facility];  Service: Anesthesiology;  Laterality: N/A;       Review of patient's allergies indicates:   Allergen Reactions    Ciprofloxacin Other (See Comments)     Shoulder and leg pain/cramping     Codeine Other (See Comments)     Pt cannot remember been a long time ago    Sulfa (sulfonamide antibiotics)      Allergic reaction to bactrim       Current Facility-Administered Medications on File Prior to Encounter   Medication    lactated ringers infusion    lidocaine (PF) 10 mg/ml (1%) injection 10 mg    tuberculin injection 5 Units     Current Outpatient Medications on File Prior to Encounter   Medication Sig    acetaminophen (TYLENOL) 500 MG tablet Take 500 mg by mouth every evening.    apixaban (ELIQUIS) 5 mg Tab Take 1 tablet (5 mg total) by mouth 2 (two) times daily. Need office visit before next refill, last seen 12/2018. This will be the LAST Rx if visit is not made.    ascorbic acid, vitamin C, (VITAMIN C) 500 MG tablet Take 500 mg by mouth once daily.    levothyroxine (SYNTHROID) 75 MCG tablet Take 1 tablet by mouth once daily    mirtazapine (REMERON) 30 MG tablet Take 1 tablet (30 mg total) by mouth every evening.     multivitamin (THERAGRAN) tablet Take 1 tablet by mouth once daily.    traZODone (DESYREL) 100 MG tablet Take 1 tablet (100 mg total) by mouth every evening.     Family History     Problem Relation (Age of Onset)    Cancer Mother    Dementia Sister, Brother    Diabetes Mother    Heart disease Father, Brother    Hyperlipidemia Mother, Father    Migraines Daughter    Tremor Daughter, Son        Tobacco Use    Smoking status: Former Smoker     Years: 15.00    Smokeless tobacco: Never Used    Tobacco comment: quit 50 years ago   Substance and Sexual Activity    Alcohol use: No    Drug use: No    Sexual activity: Not on file     Review of Systems   Constitutional: Positive for appetite change and fatigue.   Neurological: Positive for weakness and headaches.   Psychiatric/Behavioral: Positive for agitation and confusion.   All other systems reviewed and are negative.    Objective:     Vital Signs (Most Recent):  Temp: 97.9 °F (36.6 °C) (10/03/20 0912)  Pulse: 72 (10/03/20 1412)  Resp: 18 (10/03/20 0912)  BP: (!) 122/58 (10/03/20 1412)  SpO2: (!) 94 % (10/03/20 1412) Vital Signs (24h Range):  Temp:  [97.9 °F (36.6 °C)] 97.9 °F (36.6 °C)  Pulse:  [] 72  Resp:  [18] 18  SpO2:  [92 %-98 %] 94 %  BP: (113-180)/(56-95) 122/58        There is no height or weight on file to calculate BMI.    Physical Exam  Vitals signs and nursing note reviewed.   Constitutional:       Appearance: Normal appearance. He is well-developed.   HENT:      Head: Normocephalic and atraumatic.      Nose: Nose normal. No septal deviation.   Eyes:      Conjunctiva/sclera: Conjunctivae normal.      Pupils: Pupils are equal, round, and reactive to light.   Neck:      Thyroid: No thyroid mass.      Vascular: No JVD.      Trachea: No tracheal tenderness or tracheal deviation.   Cardiovascular:      Rate and Rhythm: Normal rate and regular rhythm.      Heart sounds: S1 normal and S2 normal. No murmur. No friction rub. No gallop.    Pulmonary:       "Effort: Pulmonary effort is normal.      Breath sounds: Normal breath sounds. No decreased breath sounds, wheezing, rhonchi or rales.   Abdominal:      General: Bowel sounds are normal. There is no distension.      Palpations: Abdomen is soft. There is no hepatomegaly, splenomegaly or mass.      Tenderness: There is no abdominal tenderness.   Skin:     Findings: No rash.   Neurological:      Mental Status: He is alert.      Cranial Nerves: No cranial nerve deficit.      Sensory: No sensory deficit.           CRANIAL NERVES     CN III, IV, VI   Pupils are equal, round, and reactive to light.       Significant Labs:   CBC:   Recent Labs   Lab 10/03/20  0940   WBC 9.22   HGB 12.8*   HCT 36.6*   PLT 94*     CMP:   Recent Labs   Lab 10/03/20  0940      K 3.8      CO2 26   GLU 97   BUN 18   CREATININE 1.2   CALCIUM 9.3   PROT 6.6   ALBUMIN 3.6   BILITOT 1.0   ALKPHOS 58   AST 27   ALT 14   ANIONGAP 11   EGFRNONAA 56*     Urine Studies:   Recent Labs   Lab 10/03/20  1041   COLORU Yellow   APPEARANCEUA Clear   PHUR 6.0   SPECGRAV >=1.030*   PROTEINUA Negative   GLUCUA Negative   KETONESU Trace*   BILIRUBINUA Negative   OCCULTUA Negative   NITRITE Negative   UROBILINOGEN 2.0-3.0*   LEUKOCYTESUR Negative       Significant Imaging:   CXR: Adriano Borjas is a 85 y.o. male with a PMHx of dementia, TIA, A-Fib on Eliquis, and kidney stone who presents to the ED for AMS that started 3 hours PTA. The son states that the patient has a history of dementia and states that today "I found him after we had breakfast standing at the table and he didn't know where he was." The son states that they have noticed a progressive decline in the patient's mental status, but states that "Today it was a lot more rapid than before. I just want to make sure he isn't having a stroke." Patient reports that currently he feels back to his baseline. The patient is unsure if he had a headache or not earlier in the morning. Son states that the " patient has had some recent medication changes, reporting an increase in trazodone, but son returned to original dose secondary to increased agitation. The son states that the patients agitation has not been as intense as it was in the previous week.The patient denies chest pain, SOB, fever, cough, pain with urination, or any other complaint at this time. Son denies the patient having speech difficulty, facial asymmetry, weakness, numbness, or any other complaint at this time. The patient has a PSHx of cystoscopy, eye surgery, and MRI.     CT head without contrast:  1. 5 mm focus of acute petechial hemorrhage within the left occipital lobe.  2. Small area of subacute versus remote lacunar infarction within the left aspect of the nya.  It is unclear whether this represents artifact or a true finding.  Additional evaluation could be achieved with MRI as deemed clinically appropriate.  3. Age-appropriate cerebral volume loss and chronic microvascular ischemic changes within the periventricular white matter of the supratentorial brain.    MRI brain:  1. Limited examination revealing a small 6 mm focus of acute petechial hemorrhage versus hemorrhagic infarction within the subcortical white matter of the medial left occipital lobe (as demonstrated by recent MRI).  2. Recently noted hypodensity within the left paramidline anterior nya is representative of artifact.  No evidence of acute or remote ischemia/infarction within the nya.  3. Age-appropriate cerebral volume loss and chronic microvascular ischemic changes within the periventricular white matter of the supratentorial brain.  4. Mild ethmoid and frontal sinus disease.      Assessment/Plan:     * Cerebral hemorrhage, acute  Dr. Fang from neurosurgery was consulted by Dr. Anderson from ER who suspects, occipital hemorrhage could be artifact.  He will follow the patient.  Will continue to hold Eliquis use for now.  Admit to telemetry floor.  Neurochecks q 4 hrs x 24  hrs.  Fall and seizure precautions.  NPO until evaluated by speech therapist for swallowing assessment.  Neurology consult.  MRI and CT head results reviewed.  2 D ECHO for evaluation of intra-cardiac thrombus or valvular heart disease.  Check Lipid Profile.  Consult Physical Therapy and Occupational therapy for evaluation and treatment.          History of cerebral hemorrhage  Noted.      Goals of care, counseling/discussion  Advance Care Planning     Living Will  During this visit, I engaged with patient's son -healthcare power of    in the advance care planning process.  The patient and I reviewed the role for advance directives and their purpose in directing future healthcare if the patient's unable to speak for him/herself.  At this point in time, the patient does have full decision-making capacity.  We discussed different extreme health states that he could experience, and reviewed what kind of medical care he would want in those situations.  The healthcare power of   communicated that if he were comatose and had little chance of a meaningful recovery, he would want machines/life-sustaining treatments used.  He will be discussing with family and leaning towards do not resuscitate order, he will let us know after talking to the family members.  I spent a total of 20 minutes engaging the patient in this advance care planning discussion.  Repeat CT head without contrast in a.m..          Paroxysmal atrial fibrillation  Hold Eliquis for now.  Tele-monitoring.      Essential hypertension  Chronic problem. Will continue chronic medications and monitor for any changes, adjusting as needed.  Avoid elevated systolic blood pressure.        Gait abnormality  PT and OT consultation with fall precautions.        DVT prophylaxis: Use SCD and IGOR.  Avoiding anticoagulation due to intracerebral hemorrhage.    Tamela Pearl MD  Department of Hospital Medicine   [facility]

## 2020-10-03 NOTE — ED PROVIDER NOTES
"Encounter Date: 10/3/2020    SCRIBE #1 NOTE: I, Christofer Chavez, am scribing for, and in the presence of, Dr. Alcantara.       History     Chief Complaint   Patient presents with    Altered Mental Status     hx dementia, sudden worsening beginning this AM; usually lives at [place], trazodone dose doubled last week, has had increased agitation since then, son reduced trazodone to original dose     Time seen by provider: 9:27 AM on 10/03/2020      Adriano Borjas is a 85 y.o. male with a PMHx of dementia, TIA, A-Fib on Eliquis, and kidney stone who presents to the ED for AMS that started 3 hours PTA. The son states that the patient has a history of dementia and states that today "I found him after we had breakfast standing at the table and he didn't know where he was." The son states that they have noticed a progressive decline in the patient's mental status, but states that "Today it was a lot more rapid than before. I just want to make sure he isn't having a stroke." Patient reports that currently he feels back to his baseline. The patient is unsure if he had a headache or not earlier in the morning. Son states that the patient has had some recent medication changes, reporting an increase in trazodone, but son returned to original dose secondary to increased agitation. The son states that the patients agitation has not been as intense as it was in the previous week.The patient denies chest pain, SOB, fever, cough, pain with urination, or any other complaint at this time. Son denies the patient having speech difficulty, facial asymmetry, weakness, numbness, or any other complaint at this time. The patient has a PSHx of cystoscopy, eye surgery, and MRI.         The history is provided by a relative and the patient.     Review of patient's allergies indicates:   Allergen Reactions    Ciprofloxacin Other (See Comments)     Shoulder and leg pain/cramping     Codeine Other (See Comments)     Pt cannot remember been a " long time ago    Sulfa (sulfonamide antibiotics)      Allergic reaction to bactrim     Past Medical History:   Diagnosis Date    A-fib     Elevated cholesterol     Essential hypertension     Kidney stone     passed on his own    Memory loss     dememtia per family    Osteoarthritis     Stroke     2012 TIA     Thyroid disease      Past Surgical History:   Procedure Laterality Date    CYSTOSCOPY      EYE SURGERY      MAGNETIC RESONANCE IMAGING N/A 8/23/2019    Procedure: MRI (Magnetic Resonance Imagine) needs anesthesia;  Surgeon: Panchito Montoya MD;  Location: [facility];  Service: Anesthesiology;  Laterality: N/A;     Family History   Problem Relation Age of Onset    Cancer Mother     Diabetes Mother     Hyperlipidemia Mother     Heart disease Father     Hyperlipidemia Father     Dementia Sister     Dementia Brother     Heart disease Brother     Migraines Daughter     Tremor Daughter     Tremor Son     Urolithiasis Neg Hx     Prostate cancer Neg Hx     Kidney cancer Neg Hx      Social History     Tobacco Use    Smoking status: Former Smoker     Years: 15.00    Smokeless tobacco: Never Used    Tobacco comment: quit 50 years ago   Substance Use Topics    Alcohol use: No    Drug use: No     Review of Systems   Constitutional: Negative for activity change, appetite change, chills, fatigue and fever.   Eyes: Negative for visual disturbance.   Respiratory: Negative for apnea and shortness of breath.    Cardiovascular: Negative for chest pain and palpitations.   Gastrointestinal: Negative for abdominal distention and abdominal pain.   Genitourinary: Negative for difficulty urinating.   Musculoskeletal: Negative for neck pain.   Skin: Negative for pallor and rash.   Neurological: Negative for facial asymmetry, speech difficulty, weakness, numbness and headaches.   Hematological: Bruises/bleeds easily.   Psychiatric/Behavioral: Positive for agitation and confusion.       Physical Exam      Initial Vitals [10/03/20 0912]   BP Pulse Resp Temp SpO2   (!) 172/81 (!) 57 18 97.9 °F (36.6 °C) 98 %      MAP       --         Physical Exam    Nursing note and vitals reviewed.  Constitutional: He appears well-developed and well-nourished.   HENT:   Head: Normocephalic and atraumatic.   Eyes: Conjunctivae are normal. Pupils are equal, round, and reactive to light.   Neck: Normal range of motion. Neck supple.   Cardiovascular: Normal rate, regular rhythm and normal heart sounds. Exam reveals no gallop and no friction rub.    No murmur heard.  Pulmonary/Chest: Breath sounds normal. No respiratory distress. He has no wheezes. He has no rhonchi. He has no rales.   Abdominal: Soft. He exhibits no distension. There is no abdominal tenderness.   Musculoskeletal: Normal range of motion.   Neurological: He is alert and oriented to person, place, and time. He has normal strength. No cranial nerve deficit or sensory deficit.   Skin: Skin is warm and dry.   Psychiatric: He has a normal mood and affect.         ED Course   Critical Care    Date/Time: 10/3/2020 3:39 PM  Performed by: Rolan Alcantara III, MD  Authorized by: Tamela Pearl MD   Direct patient critical care time: 120 minutes  Total critical care time (exclusive of procedural time) : 120 minutes  Critical care was necessary to treat or prevent imminent or life-threatening deterioration of the following conditions: CNS failure or compromise.  Critical care was time spent personally by me on the following activities: review of old charts, re-evaluation of patient's condition, pulse oximetry, ordering and review of radiographic studies, ordering and review of laboratory studies, ordering and performing treatments and interventions, examination of patient and discussions with consultants.  Subsequent provider of critical care: I assumed direction of critical care for this patient from another provider of my specialty.        Labs Reviewed   CBC W/ AUTO DIFFERENTIAL  - Abnormal; Notable for the following components:       Result Value    RBC 3.91 (*)     Hemoglobin 12.8 (*)     Hematocrit 36.6 (*)     Mean Corpuscular Hemoglobin 32.7 (*)     Platelets 94 (*)     MPV 14.0 (*)     Lymph% 15.1 (*)     All other components within normal limits   COMPREHENSIVE METABOLIC PANEL - Abnormal; Notable for the following components:    eGFR if non  56 (*)     All other components within normal limits   URINALYSIS, REFLEX TO URINE CULTURE - Abnormal; Notable for the following components:    Specific Gravity, UA >=1.030 (*)     Ketones, UA Trace (*)     Urobilinogen, UA 2.0-3.0 (*)     All other components within normal limits    Narrative:     Specimen Source->Urine   SARS-COV-2 RNA AMPLIFICATION, QUAL          Imaging Results           MRI Brain Without Contrast (Final result)  Result time 10/03/20 13:33:18    Final result by Seymour Alberto MD (10/03/20 13:33:18)                 Impression:      1. Limited examination revealing a small 6 mm focus of acute petechial hemorrhage versus hemorrhagic infarction within the subcortical white matter of the medial left occipital lobe (as demonstrated by recent MRI).  2. Recently noted hypodensity within the left paramidline anterior nya is representative of artifact.  No evidence of acute or remote ischemia/infarction within the nya.  3. Age-appropriate cerebral volume loss and chronic microvascular ischemic changes within the periventricular white matter of the supratentorial brain.  4. Mild ethmoid and frontal sinus disease.  This report was flagged in Epic as abnormal.      Electronically signed by: Brock Alberto MD  Date:    10/03/2020  Time:    13:33             Narrative:    EXAMINATION:  MRI BRAIN WITHOUT CONTRAST    CLINICAL HISTORY:  Altered mental status;    TECHNIQUE:  Routine multiplanar MR imaging of the brain was performed without intravenous contrast administration.  The patient was combative and removed the head  coil 3 times, eventually refusing to proceed with the examination (at which time the examination was terminated).  No gradient images were acquired due to premature stoppage of the exam.    COMPARISON:  CT of the head without contrast-10/03/2020    FINDINGS:  This is a technically limited examination due to patient motion on all imaging series.  There is a 6 mm focus of T2/FLAIR signal hyperintensity present within the subcortical white matter of the left occipital lobe (series 5, image 19 and series 6, image 19).  There is associated focal diffusion restriction noted on series 3, image 23 and 24.  Differential considerations include small focus of acute hemorrhagic infarction and bland petechial hemorrhage.  The vague hypodensity within the anterior left paramidline nya noted at the time of the recent CT shows no corresponding abnormality on MRI compatible with artifact.    There is age-appropriate cerebral volume loss with associated compensatory enlargement of the ventricular system and widening of the CSF spaces over both cerebral convexities.  There are punctate and confluent areas of T2/FLAIR signal hyperintensity within the periventricular white matter of the corona radiata and centrum semiovale compatible with age-appropriate chronic microvascular ischemic change.  No intracranial mass.    No hydrocephalus.  No effacement of the skull base cisterns.  No Chiari malformation.  There are prominent CSF spaces noted over the cerebellar hemispheres.  There is a dominant left vertebral artery present as an anatomic variant.    The orbits show no significant abnormalities.  There is mucoperiosteal thickening observed within the anterior ethmoid air cells and frontal sinuses bilaterally.  The mastoid air cells are unremarkable.                                CT Head Without Contrast (Final result)  Result time 10/03/20 10:57:17    Final result by Seymour Alberto MD (10/03/20 10:57:17)                  Impression:      1. 5 mm focus of acute petechial hemorrhage within the left occipital lobe.  2. Small area of subacute versus remote lacunar infarction within the left aspect of the nya.  It is unclear whether this represents artifact or a true finding.  Additional evaluation could be achieved with MRI as deemed clinically appropriate.  3. Age-appropriate cerebral volume loss and chronic microvascular ischemic changes within the periventricular white matter of the supratentorial brain.  This report was flagged in Epic as abnormal.      Electronically signed by: Brock Alberto MD  Date:    10/03/2020  Time:    10:57             Narrative:    EXAMINATION:  CT HEAD WITHOUT CONTRAST    CLINICAL HISTORY:  Headache, acute, normal neuro exam;    TECHNIQUE:  5 mm noncontrast axial images were acquired through the head.    COMPARISON:  CT of the head without contrast-05/29/2020    FINDINGS:  The brain is normally formed with preserved gray-white matter junction differentiation.  The most significant abnormality relates to the presence of a 5 mm focus of acute petechial hemorrhage within the subcortical white matter of the left occipital lobe, best appreciated on series 2, image 12, series 400, image 62, and series 401, image 112.  No evidence of acute/recent major vascular territory cerebral infarction or intra-axial mass.  There is age-appropriate cerebral volume loss with associated compensatory enlargement of the ventricular system and widening of the CSF spaces over both cerebral convexities.  There are confluent areas of periventricular white matter hypoattenuation compatible with age-appropriate chronic microvascular ischemic changes.  There is a possible focus of subacute versus remote lacunar infarction present within the left aspect of the nya (series 2, image 8, series 401, image 72, and series 400, image 59).    No hydrocephalus.  No effacement of the skull-base cisterns.  No extra-axial fluid collections or  blood products.  There is an unchanged 5 x 8 x 5 mm extra-axial calcified structure along the right aspect of the tentorium cerebella lie, unchanged over the course of prior examinations and most likely reflective of a small meningioma, of doubtful clinical relevance.  No adjacent cerebral edema.  No interval change when compared with the previous examination of 05/29/2020.    The paranasal sinuses and mastoid air cells are clear.  The visualized orbits are unremarkable.  The bony calvarium and visualized facial bones show no acute abnormality.                               X-Ray Chest AP Portable (Final result)  Result time 10/03/20 09:54:14    Final result by Joey Haney MD (10/03/20 09:54:14)                 Impression:      No definite acute cardiopulmonary disease      Electronically signed by: Joey Haney MD  Date:    10/03/2020  Time:    09:54             Narrative:    EXAMINATION:  XR CHEST AP PORTABLE    CLINICAL HISTORY:  Disorientation, unspecified    TECHNIQUE:  Single frontal view of the chest was performed.    COMPARISON:  July 8, 2019    FINDINGS:  Degenerative changes are seen in the spine.  The aorta is tortuous.  The heart is not enlarged.  No confluent infiltrates are identified.                                 Medical Decision Making:   History:   Old Medical Records: I decided to obtain old medical records.  Clinical Tests:   Lab Tests: Ordered and Reviewed  Radiological Study: Ordered and Reviewed  ED Management:  85-year-old male with chronic dementia presents with acute worsening.  CT reveals a 5 mm occipital bleed.  I discussed the case with Dr. Fang who recommends MRI which also confirms our suspicion.  He will be admitted for serial neurologic exams and observation.            Scribe Attestation:   Scribe #1: I performed the above scribed service and the documentation accurately describes the services I performed. I attest to the accuracy of the note.                      Clinical  Impression:     ICD-10-CM ICD-9-CM   1. Intracranial hemorrhage  I62.9 432.9   2. Headache  R51.9 784.0   3. Confusion  R41.0 298.9                          ED Disposition Condition    Admit                             Rolan Alcantara III, MD  10/03/20 1533

## 2020-10-03 NOTE — ASSESSMENT & PLAN NOTE
Dr. Fang from neurosurgery was consulted by Dr. Anderson from ER who suspects, occipital hemorrhage could be artifact.  He will follow the patient.  Will continue to hold Eliquis use for now.  Admit to telemetry floor.  Neurochecks q 4 hrs x 24 hrs.  Fall and seizure precautions.  NPO until evaluated by speech therapist for swallowing assessment.  Neurology consult.  MRI and CT head results reviewed.  2 D ECHO for evaluation of intra-cardiac thrombus or valvular heart disease.  Check Lipid Profile.  Consult Physical Therapy and Occupational therapy for evaluation and treatment.

## 2020-10-03 NOTE — CONSULTS
Full consult to follow but briefly this 84 yo w baseline h/o dementia and on Eliquis for A-fib presents w progression of mental status decline.  CT shows ?tiny (2mm) left occipital ICH.  MRI was limited so I can't tell for sure if this blood or not.Out of caution, we should admit for overnight observation and repeat CT in AM. Hold Eliquis for now. BP needs to be better control.    Call w aliyah Fang  [phone]

## 2020-10-04 LAB
ALBUMIN SERPL BCP-MCNC: 3.5 G/DL (ref 3.5–5.2)
ALP SERPL-CCNC: 60 U/L (ref 55–135)
ALT SERPL W/O P-5'-P-CCNC: 16 U/L (ref 10–44)
ANION GAP SERPL CALC-SCNC: 11 MMOL/L (ref 8–16)
APTT BLDCRRT: 36.7 SEC (ref 21–32)
AST SERPL-CCNC: 34 U/L (ref 10–40)
BASOPHILS # BLD AUTO: 0.05 K/UL (ref 0–0.2)
BASOPHILS NFR BLD: 0.7 % (ref 0–1.9)
BILIRUB SERPL-MCNC: 1.6 MG/DL (ref 0.1–1)
BUN SERPL-MCNC: 12 MG/DL (ref 8–23)
CALCIUM SERPL-MCNC: 8.4 MG/DL (ref 8.7–10.5)
CHLORIDE SERPL-SCNC: 102 MMOL/L (ref 95–110)
CHOLEST SERPL-MCNC: 166 MG/DL (ref 120–199)
CHOLEST/HDLC SERPL: 5.2 {RATIO} (ref 2–5)
CK MB SERPL-MCNC: 7.7 NG/ML (ref 0.1–6.5)
CK MB SERPL-RTO: 1.3 % (ref 0–5)
CK SERPL-CCNC: 574 U/L (ref 20–200)
CO2 SERPL-SCNC: 27 MMOL/L (ref 23–29)
CREAT SERPL-MCNC: 1.1 MG/DL (ref 0.5–1.4)
DIFFERENTIAL METHOD: ABNORMAL
EOSINOPHIL # BLD AUTO: 0.2 K/UL (ref 0–0.5)
EOSINOPHIL NFR BLD: 2.3 % (ref 0–8)
ERYTHROCYTE [DISTWIDTH] IN BLOOD BY AUTOMATED COUNT: 11.9 % (ref 11.5–14.5)
EST. GFR  (AFRICAN AMERICAN): >60 ML/MIN/1.73 M^2
EST. GFR  (NON AFRICAN AMERICAN): >60 ML/MIN/1.73 M^2
ESTIMATED AVG GLUCOSE: 103 MG/DL (ref 68–131)
GLUCOSE SERPL-MCNC: 94 MG/DL (ref 70–110)
HBA1C MFR BLD HPLC: 5.2 % (ref 4–5.6)
HCT VFR BLD AUTO: 39 % (ref 40–54)
HDLC SERPL-MCNC: 32 MG/DL (ref 40–75)
HDLC SERPL: 19.3 % (ref 20–50)
HGB BLD-MCNC: 13.5 G/DL (ref 14–18)
IMM GRANULOCYTES # BLD AUTO: 0.02 K/UL (ref 0–0.04)
IMM GRANULOCYTES NFR BLD AUTO: 0.3 % (ref 0–0.5)
INR PPP: 1.1 (ref 0.8–1.2)
LDLC SERPL CALC-MCNC: 114.4 MG/DL (ref 63–159)
LYMPHOCYTES # BLD AUTO: 1.2 K/UL (ref 1–4.8)
LYMPHOCYTES NFR BLD: 16.3 % (ref 18–48)
MAGNESIUM SERPL-MCNC: 2 MG/DL (ref 1.6–2.6)
MCH RBC QN AUTO: 31.8 PG (ref 27–31)
MCHC RBC AUTO-ENTMCNC: 34.6 G/DL (ref 32–36)
MCV RBC AUTO: 92 FL (ref 82–98)
MONOCYTES # BLD AUTO: 1 K/UL (ref 0.3–1)
MONOCYTES NFR BLD: 13.1 % (ref 4–15)
NEUTROPHILS # BLD AUTO: 5.1 K/UL (ref 1.8–7.7)
NEUTROPHILS NFR BLD: 67.3 % (ref 38–73)
NONHDLC SERPL-MCNC: 134 MG/DL
NRBC BLD-RTO: 0 /100 WBC
PHOSPHATE SERPL-MCNC: 3.2 MG/DL (ref 2.7–4.5)
PLATELET # BLD AUTO: 100 K/UL (ref 150–350)
PMV BLD AUTO: 13.9 FL (ref 9.2–12.9)
POTASSIUM SERPL-SCNC: 3.4 MMOL/L (ref 3.5–5.1)
PROT SERPL-MCNC: 6.6 G/DL (ref 6–8.4)
PROTHROMBIN TIME: 11.7 SEC (ref 9–12.5)
RBC # BLD AUTO: 4.25 M/UL (ref 4.6–6.2)
SODIUM SERPL-SCNC: 140 MMOL/L (ref 136–145)
TRIGL SERPL-MCNC: 98 MG/DL (ref 30–150)
TROPONIN I SERPL DL<=0.01 NG/ML-MCNC: 0.03 NG/ML (ref 0–0.03)
TSH SERPL DL<=0.005 MIU/L-ACNC: 0.66 UIU/ML (ref 0.4–4)
WBC # BLD AUTO: 7.54 K/UL (ref 3.9–12.7)

## 2020-10-04 PROCEDURE — 20000000 HC ICU ROOM

## 2020-10-04 PROCEDURE — 85730 THROMBOPLASTIN TIME PARTIAL: CPT

## 2020-10-04 PROCEDURE — 85610 PROTHROMBIN TIME: CPT

## 2020-10-04 PROCEDURE — 93010 ELECTROCARDIOGRAM REPORT: CPT | Mod: ,,, | Performed by: INTERNAL MEDICINE

## 2020-10-04 PROCEDURE — 95819 EEG AWAKE AND ASLEEP: CPT

## 2020-10-04 PROCEDURE — 84100 ASSAY OF PHOSPHORUS: CPT

## 2020-10-04 PROCEDURE — 83735 ASSAY OF MAGNESIUM: CPT

## 2020-10-04 PROCEDURE — 85025 COMPLETE CBC W/AUTO DIFF WBC: CPT

## 2020-10-04 PROCEDURE — 80053 COMPREHEN METABOLIC PANEL: CPT

## 2020-10-04 PROCEDURE — 82553 CREATINE MB FRACTION: CPT

## 2020-10-04 PROCEDURE — 95816 PR EEG,W/AWAKE & DROWSY RECORD: ICD-10-PCS | Mod: 26,,, | Performed by: PSYCHIATRY & NEUROLOGY

## 2020-10-04 PROCEDURE — 83036 HEMOGLOBIN GLYCOSYLATED A1C: CPT

## 2020-10-04 PROCEDURE — 99233 PR SUBSEQUENT HOSPITAL CARE,LEVL III: ICD-10-PCS | Mod: ,,, | Performed by: NEUROLOGICAL SURGERY

## 2020-10-04 PROCEDURE — 99233 SBSQ HOSP IP/OBS HIGH 50: CPT | Mod: ,,, | Performed by: NEUROLOGICAL SURGERY

## 2020-10-04 PROCEDURE — 93010 EKG 12-LEAD: ICD-10-PCS | Mod: ,,, | Performed by: INTERNAL MEDICINE

## 2020-10-04 PROCEDURE — 84443 ASSAY THYROID STIM HORMONE: CPT

## 2020-10-04 PROCEDURE — 95816 EEG AWAKE AND DROWSY: CPT | Mod: 26,,, | Performed by: PSYCHIATRY & NEUROLOGY

## 2020-10-04 PROCEDURE — 25000003 PHARM REV CODE 250: Performed by: INTERNAL MEDICINE

## 2020-10-04 PROCEDURE — 93005 ELECTROCARDIOGRAM TRACING: CPT

## 2020-10-04 PROCEDURE — 36415 COLL VENOUS BLD VENIPUNCTURE: CPT

## 2020-10-04 PROCEDURE — 80061 LIPID PANEL: CPT

## 2020-10-04 PROCEDURE — 25000003 PHARM REV CODE 250: Performed by: NURSE PRACTITIONER

## 2020-10-04 PROCEDURE — 82550 ASSAY OF CK (CPK): CPT

## 2020-10-04 PROCEDURE — 63600175 PHARM REV CODE 636 W HCPCS: Performed by: NURSE PRACTITIONER

## 2020-10-04 PROCEDURE — 84484 ASSAY OF TROPONIN QUANT: CPT

## 2020-10-04 PROCEDURE — 94761 N-INVAS EAR/PLS OXIMETRY MLT: CPT

## 2020-10-04 RX ORDER — HALOPERIDOL 5 MG/ML
2 INJECTION INTRAMUSCULAR EVERY 6 HOURS PRN
Status: DISCONTINUED | OUTPATIENT
Start: 2020-10-04 | End: 2020-10-05

## 2020-10-04 RX ORDER — POTASSIUM CHLORIDE 1.5 G/1.58G
40 POWDER, FOR SOLUTION ORAL ONCE
Status: COMPLETED | OUTPATIENT
Start: 2020-10-04 | End: 2020-10-04

## 2020-10-04 RX ADMIN — HALOPERIDOL LACTATE 2 MG: 5 INJECTION, SOLUTION INTRAMUSCULAR at 12:10

## 2020-10-04 RX ADMIN — THERA TABS 1 TABLET: TAB at 08:10

## 2020-10-04 RX ADMIN — LEVOTHYROXINE SODIUM 75 MCG: 0.03 TABLET ORAL at 08:10

## 2020-10-04 RX ADMIN — HALOPERIDOL LACTATE 2 MG: 5 INJECTION, SOLUTION INTRAMUSCULAR at 08:10

## 2020-10-04 RX ADMIN — ATORVASTATIN CALCIUM 40 MG: 40 TABLET, FILM COATED ORAL at 08:10

## 2020-10-04 RX ADMIN — TRAZODONE HYDROCHLORIDE 100 MG: 50 TABLET ORAL at 10:10

## 2020-10-04 RX ADMIN — POTASSIUM CHLORIDE 40 MEQ: 1.5 POWDER, FOR SOLUTION ORAL at 08:10

## 2020-10-04 RX ADMIN — OXYCODONE HYDROCHLORIDE AND ACETAMINOPHEN 500 MG: 500 TABLET ORAL at 09:10

## 2020-10-04 NOTE — ASSESSMENT & PLAN NOTE
Follow General surgery recommendations by Dr. Fang.  Will continue to hold Eliquis use for now.  Continue tele monitoring  Neurochecks q 4 hrs x 24 hrs.  Fall and seizure precautions.  NPO until evaluated by speech therapist for swallowing assessment.  Repeat CT head results reviewed.  Follow Neurology and Neurosurgery recommendations.  Follow 2D echocardiogram results.  Consult Physical Therapy and Occupational therapy for evaluation and treatment.

## 2020-10-04 NOTE — PROGRESS NOTES
[facility] Hospital Medicine  Progress Note    Patient Name: Adriano Borjas  MRN: [id number]  Patient Class: IP- Inpatient   Admission Date: 10/3/2020  Length of Stay: 1 days  Attending Physician: Tamela Pearl MD  Primary Care Provider: Bud Kamara MD        Subjective:     Principal Problem:Cerebral hemorrhage, acute        HPI:  Patient is and 85-year-old male with past medical history significant for hypertension, history of paroxysmal atrial fibrillation (on Eliquis), history of TIA, dementia, nephrolithiasis, hypothyroidism, prior history of cerebral hemorrhage and gait instability is being admitted to Hospital Medicine under inpatient status from [facility] Emergency Room with complaint of increasing confusion and altered mental status for the past week.  Patient is a resident of [facility].  Part of the history obtained from patient's son.  According to the son, patient is having progressive decline in mental status lately and this morning patient was not aware very was at the time of breakfast.  It appeared to be more than usual confusion in family is concerned about possibility of new cerebrovascular accident. Patient reports that currently he feels back to his baseline. The patient is unsure if he had a headache or not earlier in the morning. Son states that the patient has had some recent medication changes, reporting an increase in trazodone, but son returned to original dose secondary to increased agitation. The son states that the patients agitation has not been as intense as it was in the previous week.The patient denies chest pain, SOB, fever, cough, pain with urination, or any other complaint at this time. Son denies the patient having speech difficulty, facial asymmetry, weakness, numbness, or any other complaint at this time.  No recent fall, head injury, loss of consciousness or seizure activity reported.       Overview/Hospital Course:  No notes on file    Interval History:  Patient is in intensive care unit secondary to left occipital hemorrhage.  Overnight patient was increasingly agitated.  Presently sleeping after receiving intravenous haloperidol.    Review of Systems   Constitutional: Positive for appetite change and fatigue.   Neurological: Positive for weakness and headaches.   Psychiatric/Behavioral: Positive for agitation and confusion.   All other systems reviewed and are negative.    Objective:     Vital Signs (Most Recent):  Temp: 98.8 °F (37.1 °C) (10/04/20 0330)  Pulse: 71 (10/04/20 0700)  Resp: (!) 28 (10/04/20 0700)  BP: 114/83 (10/04/20 0700)  SpO2: 100 % (10/04/20 0700) Vital Signs (24h Range):  Temp:  [97 °F (36.1 °C)-98.8 °F (37.1 °C)] 98.8 °F (37.1 °C)  Pulse:  [] 71  Resp:  [7-58] 28  SpO2:  [92 %-100 %] 100 %  BP: ()/(46-95) 114/83     Weight: 68.8 kg (151 lb 10.8 oz)  Body mass index is 22.73 kg/m².    Intake/Output Summary (Last 24 hours) at 10/4/2020 1016  Last data filed at 10/4/2020 0300  Gross per 24 hour   Intake 131.25 ml   Output 1750 ml   Net -1618.75 ml      Physical Exam  Vitals signs and nursing note reviewed.   Constitutional:       Appearance: Normal appearance. He is well-developed.   HENT:      Head: Normocephalic and atraumatic.      Nose: Nose normal. No septal deviation.   Eyes:      Conjunctiva/sclera: Conjunctivae normal.      Pupils: Pupils are equal, round, and reactive to light.   Neck:      Thyroid: No thyroid mass.      Vascular: No JVD.      Trachea: No tracheal tenderness or tracheal deviation.   Cardiovascular:      Rate and Rhythm: Normal rate and regular rhythm.      Heart sounds: S1 normal and S2 normal. No murmur. No friction rub. No gallop.    Pulmonary:      Effort: Pulmonary effort is normal.      Breath sounds: Normal breath sounds. No decreased breath sounds, wheezing, rhonchi or rales.   Abdominal:      General: Bowel sounds are normal. There  is no distension.      Palpations: Abdomen is soft. There is no hepatomegaly, splenomegaly or mass.      Tenderness: There is no abdominal tenderness.   Skin:     Findings: No rash.   Neurological:      Mental Status: He is alert.      Cranial Nerves: No cranial nerve deficit.      Sensory: No sensory deficit.         Significant Labs:   CBC:   Recent Labs   Lab 10/03/20  0940 10/04/20  0510   WBC 9.22 7.54   HGB 12.8* 13.5*   HCT 36.6* 39.0*   PLT 94* 100*     CMP:   Recent Labs   Lab 10/03/20  0940 10/04/20  0511    140   K 3.8 3.4*    102   CO2 26 27   GLU 97 94   BUN 18 12   CREATININE 1.2 1.1   CALCIUM 9.3 8.4*   PROT 6.6 6.6   ALBUMIN 3.6 3.5   BILITOT 1.0 1.6*   ALKPHOS 58 60   AST 27 34   ALT 14 16   ANIONGAP 11 11   EGFRNONAA 56* >60     Coagulation:   Recent Labs   Lab 10/04/20  0510   INR 1.1   APTT 36.7*       Significant Imaging:   CXR: No definite acute cardiopulmonary disease     CT head without contrast:  1. 5 mm focus of acute petechial hemorrhage within the left occipital lobe.  2. Small area of subacute versus remote lacunar infarction within the left aspect of the nya.  It is unclear whether this represents artifact or a true finding.  Additional evaluation could be achieved with MRI as deemed clinically appropriate.  3. Age-appropriate cerebral volume loss and chronic microvascular ischemic changes within the periventricular white matter of the supratentorial brain.     MRI brain:  1. Limited examination revealing a small 6 mm focus of acute petechial hemorrhage versus hemorrhagic infarction within the subcortical white matter of the medial left occipital lobe (as demonstrated by recent MRI).  2. Recently noted hypodensity within the left paramidline anterior nya is representative of artifact.  No evidence of acute or remote ischemia/infarction within the nya.  3. Age-appropriate cerebral volume loss and chronic microvascular ischemic changes within the periventricular white  matter of the supratentorial brain.  4. Mild ethmoid and frontal sinus disease.    Repeat CT head without contrast:  Stable appearance to the brain and ventricles relative the previous day's studies with a possible acute to early subacute punctate hemorrhage in the left occipital lobe cortex or adjacent gyrus.  There is no regional mass effect.:        Assessment/Plan:      * Cerebral hemorrhage, acute  Follow General surgery recommendations by Dr. Fang.  Will continue to hold Eliquis use for now.  Continue tele monitoring  Neurochecks q 4 hrs x 24 hrs.  Fall and seizure precautions.  NPO until evaluated by speech therapist for swallowing assessment.  Repeat CT head results reviewed.  Follow Neurology and Neurosurgery recommendations.  Follow 2D echocardiogram results.  Consult Physical Therapy and Occupational therapy for evaluation and treatment.          History of cerebral hemorrhage  Noted.      Goals of care, counseling/discussion  Advance Care Planning     Living Will  During this visit, I engaged with patient's son -healthcare power of    in the advance care planning process.  The patient and I reviewed the role for advance directives and their purpose in directing future healthcare if the patient's unable to speak for him/herself.  At this point in time, the patient does have full decision-making capacity.  We discussed different extreme health states that he could experience, and reviewed what kind of medical care he would want in those situations.  The healthcare power of   communicated that if he were comatose and had little chance of a meaningful recovery, he would want machines/life-sustaining treatments used.  He will be discussing with family and leaning towards do not resuscitate order, he will let us know after talking to the family members.  I spent a total of 20 minutes engaging the patient in this advance care planning discussion.            Paroxysmal atrial fibrillation  Hold  Eliquis for now.  Tele-monitoring.      Essential hypertension  Chronic problem. Will continue chronic medications and monitor for any changes, adjusting as needed.  Avoid elevated systolic blood pressure.        Gait abnormality  PT and OT consultation with fall precautions.        VTE Risk Mitigation (From admission, onward)         Ordered     IP VTE HIGH RISK PATIENT  Once      10/03/20 1829     Place sequential compression device  Until discontinued      10/03/20 1829     Reason for No Pharmacological VTE Prophylaxis  Once     Question:  Reasons:  Answer:  Active Bleeding    10/03/20 1829                Discharge Planning   KAY:      Code Status: Full Code   Is the patient medically ready for discharge?:     Reason for patient still in hospital (select all that apply): Patient trending condition               Critical care time spent on the evaluation and treatment of severe organ dysfunction, review of pertinent labs and imaging studies, discussions with consulting providers and discussions with patient/family: 33 minutes.      Tamela Pearl MD  Department of Hospital Medicine   [facility]

## 2020-10-04 NOTE — PROGRESS NOTES
Notified Alisa Cordero, DRU of patients increasing agitation. Attempting to get oob, pulled out piv, attempting to remove ekg leads. Attempting redirection, but unsuccessful. Order placed for mittens, once attempting to place the same, pt became extremely agitated. Sat with the patient until he became calm and order given for 2 mg iv haldol, the same was given, with good results. Patient did not require placement of restraints. Notified NP when she completed walking rounds of not being placed, order still existing, not currently on the patient. Charge Nurse Jazmyn nunes.

## 2020-10-04 NOTE — PROGRESS NOTES
[facility]  Neurosurgery  Progress Note    Subjective:     Interval History: Pt agitated overnight,attempting oob and pulling off leads/IV - requiring haldol but ultimately not needed restraints. No new focal deficit or evidence of increased ICP.     History of Present Illness: patient admitted for ? Small ICH.       Post-Op Info:  * No surgery found *          Medications:  Continuous Infusions:   niCARdipine Stopped (10/03/20 2206)    sodium chloride 0.9%       Scheduled Meds:   ascorbic acid (vitamin C)  500 mg Oral Daily    atorvastatin  40 mg Oral Daily    levothyroxine  75 mcg Oral Daily    multivitamin  1 tablet Oral Daily    potassium chloride  40 mEq Oral Once    traZODone  100 mg Oral QHS     PRN Meds:haloperidol lactate, labetaloL, ondansetron, sodium chloride 0.9%, sodium chloride 0.9%     Review of Systems  Objective:     Weight: 68.8 kg (151 lb 10.8 oz)  Body mass index is 22.73 kg/m².  Vital Signs (Most Recent):  Temp: 98.8 °F (37.1 °C) (10/04/20 0330)  Pulse: 71 (10/04/20 0700)  Resp: (!) 28 (10/04/20 0700)  BP: 114/83 (10/04/20 0700)  SpO2: 100 % (10/04/20 0700) Vital Signs (24h Range):  Temp:  [97 °F (36.1 °C)-98.8 °F (37.1 °C)] 98.8 °F (37.1 °C)  Pulse:  [] 71  Resp:  [7-58] 28  SpO2:  [92 %-100 %] 100 %  BP: ()/(46-95) 114/83                          Neurosurgery Physical Exam    Significant Labs:  Recent Labs   Lab 10/03/20  0940 10/04/20  0511   GLU 97 94    140   K 3.8 3.4*    102   CO2 26 27   BUN 18 12   CREATININE 1.2 1.1   CALCIUM 9.3 8.4*   MG  --  2.0     Recent Labs   Lab 10/03/20  0940 10/04/20  0510   WBC 9.22 7.54   HGB 12.8* 13.5*   HCT 36.6* 39.0*   PLT 94* 100*     Recent Labs   Lab 10/04/20  0510   INR 1.1   APTT 36.7*     Microbiology Results (last 7 days)     ** No results found for the last 168 hours. **        All pertinent labs from the last 24 hours have been reviewed.  Significant Diagnostics:  I have reviewed all  pertinent imaging results/findings within the past 24 hours.    Assessment/Plan:     Active Diagnoses:    Diagnosis Date Noted POA    PRINCIPAL PROBLEM:  Cerebral hemorrhage, acute [I61.9] 10/03/2020 Yes    Intracranial hemorrhage [I62.9] 10/03/2020 Yes    Goals of care, counseling/discussion [Z71.89] 10/03/2020 Not Applicable    History of cerebral hemorrhage [Z86.79] 09/05/2019 Not Applicable    Paroxysmal atrial fibrillation [I48.0] 12/12/2018 Yes    Essential hypertension [I10] 07/27/2018 Yes    Gait abnormality [R26.9] 06/26/2017 Yes      Problems Resolved During this Admission:     I don't think the small ICP is resulting in increased agitation.  Awaiting repeat CT today.  If CT stable then can d/c from my view unless primary team thinks patient needs placement.  If the need for Eliquis due to A-fib risk then I would be ok resuming if ICH stable or smaller.  No nsurg follow-up but would need a repeat CT in 4-6 weeks.    Please call with questions [phone]      Nikolay Fang MD  Neurosurgery  [facility]

## 2020-10-04 NOTE — PT/OT/SLP PROGRESS
Physical Therapy      Patient Name:  Adriano Borjas   MRN:  [id number]    Patient not seen today secondary to agitative state. Will follow-up on 10/05/2020.    Nick Madsen, PT

## 2020-10-04 NOTE — PLAN OF CARE
POC to provide meals as tolerated. Reviewed with pt and son on ordered CT scan of head as follow up evaluation. EEG performed today. Pt able to void to urinal clear red with assist. Pt pulling at monitor lines and IV sites this morning required Haldol to redirect and calm pt. Pt cooperative with PRN Haldol given. Son confirms tremors and poor memory were present PTA including itching of dry flaky skin. All safety maintained

## 2020-10-04 NOTE — PLAN OF CARE
Ensured patient safety with frequent checks, assessing pain and neurological checks. PERRLA wnl, reyna's, no n/t noted. Smile eual, no droop noted, clear speech. Patient oriented to name consistently and sometimes forgetful of time or place, but easily reoriented. Patient remains intermittently agitated, prn haldol admin, order given for mittens, not placed yet, attempting redirection. Bed alarm intact, disla removed earlier in the shift, pt remains in a brief and assisting patient with urinal. Remains on room air. Will continue to monitor closely.

## 2020-10-04 NOTE — SUBJECTIVE & OBJECTIVE
Interval History:  Patient is in intensive care unit secondary to left occipital hemorrhage.  Overnight patient was increasingly agitated.  Presently sleeping after receiving intravenous haloperidol.    Review of Systems   Constitutional: Positive for appetite change and fatigue.   Neurological: Positive for weakness and headaches.   Psychiatric/Behavioral: Positive for agitation and confusion.   All other systems reviewed and are negative.    Objective:     Vital Signs (Most Recent):  Temp: 98.8 °F (37.1 °C) (10/04/20 0330)  Pulse: 71 (10/04/20 0700)  Resp: (!) 28 (10/04/20 0700)  BP: 114/83 (10/04/20 0700)  SpO2: 100 % (10/04/20 0700) Vital Signs (24h Range):  Temp:  [97 °F (36.1 °C)-98.8 °F (37.1 °C)] 98.8 °F (37.1 °C)  Pulse:  [] 71  Resp:  [7-58] 28  SpO2:  [92 %-100 %] 100 %  BP: ()/(46-95) 114/83     Weight: 68.8 kg (151 lb 10.8 oz)  Body mass index is 22.73 kg/m².    Intake/Output Summary (Last 24 hours) at 10/4/2020 1016  Last data filed at 10/4/2020 0300  Gross per 24 hour   Intake 131.25 ml   Output 1750 ml   Net -1618.75 ml      Physical Exam  Vitals signs and nursing note reviewed.   Constitutional:       Appearance: Normal appearance. He is well-developed.   HENT:      Head: Normocephalic and atraumatic.      Nose: Nose normal. No septal deviation.   Eyes:      Conjunctiva/sclera: Conjunctivae normal.      Pupils: Pupils are equal, round, and reactive to light.   Neck:      Thyroid: No thyroid mass.      Vascular: No JVD.      Trachea: No tracheal tenderness or tracheal deviation.   Cardiovascular:      Rate and Rhythm: Normal rate and regular rhythm.      Heart sounds: S1 normal and S2 normal. No murmur. No friction rub. No gallop.    Pulmonary:      Effort: Pulmonary effort is normal.      Breath sounds: Normal breath sounds. No decreased breath sounds, wheezing, rhonchi or rales.   Abdominal:      General: Bowel sounds are normal. There is no distension.      Palpations: Abdomen is soft.  There is no hepatomegaly, splenomegaly or mass.      Tenderness: There is no abdominal tenderness.   Skin:     Findings: No rash.   Neurological:      Mental Status: He is alert.      Cranial Nerves: No cranial nerve deficit.      Sensory: No sensory deficit.         Significant Labs:   CBC:   Recent Labs   Lab 10/03/20  0940 10/04/20  0510   WBC 9.22 7.54   HGB 12.8* 13.5*   HCT 36.6* 39.0*   PLT 94* 100*     CMP:   Recent Labs   Lab 10/03/20  0940 10/04/20  0511    140   K 3.8 3.4*    102   CO2 26 27   GLU 97 94   BUN 18 12   CREATININE 1.2 1.1   CALCIUM 9.3 8.4*   PROT 6.6 6.6   ALBUMIN 3.6 3.5   BILITOT 1.0 1.6*   ALKPHOS 58 60   AST 27 34   ALT 14 16   ANIONGAP 11 11   EGFRNONAA 56* >60     Coagulation:   Recent Labs   Lab 10/04/20  0510   INR 1.1   APTT 36.7*       Significant Imaging:   CXR: No definite acute cardiopulmonary disease     CT head without contrast:  1. 5 mm focus of acute petechial hemorrhage within the left occipital lobe.  2. Small area of subacute versus remote lacunar infarction within the left aspect of the nya.  It is unclear whether this represents artifact or a true finding.  Additional evaluation could be achieved with MRI as deemed clinically appropriate.  3. Age-appropriate cerebral volume loss and chronic microvascular ischemic changes within the periventricular white matter of the supratentorial brain.     MRI brain:  1. Limited examination revealing a small 6 mm focus of acute petechial hemorrhage versus hemorrhagic infarction within the subcortical white matter of the medial left occipital lobe (as demonstrated by recent MRI).  2. Recently noted hypodensity within the left paramidline anterior nya is representative of artifact.  No evidence of acute or remote ischemia/infarction within the nya.  3. Age-appropriate cerebral volume loss and chronic microvascular ischemic changes within the periventricular white matter of the supratentorial brain.  4. Mild ethmoid and  frontal sinus disease.    Repeat CT head without contrast:  Stable appearance to the brain and ventricles relative the previous day's studies with a possible acute to early subacute punctate hemorrhage in the left occipital lobe cortex or adjacent gyrus.  There is no regional mass effect.:

## 2020-10-04 NOTE — PROCEDURES
EEG REPORT      Adriano Borjas  [id number]  1935    DATE OF SERVICE: 10/4/2020    ON     METHODOLOGY      Extended electroencephalographic recording is made while the patient is ambulatory and continuing normal daily activities.  Electrodes are placed according to the International 10-20 placement system and included T1 and T2 electrode placement.  Twenty four (24) channels of digital signal (sampling rate of 512/sec) was simultaneously recorded from the scalp including EKG and eye monitors.  Recording band pass was 0.1 to 100 hz and all data was stored digitally on the recorder.  The patient is instructed to press an event button when clinical symptoms occur and write the symptoms into a diary. Activation procedures which include photic stimulation, hyperventilation and instructing patients to perform simple task are done in selected patients.        The EEG is displayed on a monitor screen and can be reformatted into different montages for evaluation.  The entire recoding is submitted for computer assisted analysis to detect spike and electrographic seizure activity.  The entire recording is visually reviewed and the times identified by computer analysis as being spikes or seizures are reviewed again.  Compresses spectral analysis (CSA) is also performed on the activity recorded from each individual channel.  This is displayed as a power display of frequencies from 0 to 30 Hz over time.   The CSA analysis is done and displayed continuously.  This is reviewed for asymmetries in power between homologous areas of the scalp and for presence of changes in power which canbe seen when seizures occur.  Sections of suspected abnormalities on the CSA is then compared with the original EEG recording.  .     [employer] software was also utilized in the review of this study.  This software suite analyzes the EEG recording in multiple domains.  Coherence and rhythmicity is computed to identify EEG sections which may  contain organized seizures.  Each channel undergoes analysis to detect presence of spike and sharp waves which have special and morphological characteristic of epileptic activity.  The routine EEG recording is converted from spacial into frequency domain.  This is then displayed comparing homologous areas to identify areas of significant asymmetry.  Algorithm to identify non-cortically generated artifact is used to separate eye movement, EMG and other artifact from the EEG     Recording Times    A total of 00:30:23 hours of EEG was recorded.      EEG FINDINGS:  Background activity:   The background rhythm was characterized by alpha and theta activity with a diffuse excess of beta and occasional fragments of a 7-8 Hz posterior dominant alpha rhythm at 30-70 microvolts.   Symmetry and continuity: the background was continuous and symmetric     Sleep:   Normal sleep transients including sleep spindles, K complexes were seen.    Activation procedures:   Photic stimulation was performed with no abnormalities seen    Abnormal activity:   No epileptiform discharges, periodic discharges, lateralized rhythmic delta activity or electrographic seizures were seen.    IMPRESSION:   Abnormal EEG due to the finding of a mild, generalized, non-specific cerebral dysfunction.  No electrographic seizures or indications of seizure tendency.      Fabián Hammer MD  Neurology-Epilepsy.  [facility].

## 2020-10-04 NOTE — PROGRESS NOTES
Patient was pulling at catheter and stating how much pain he was in, wanted it out. Notified Alisa Cordero NP, the same was removed. Patient urinated in urinal successfully without issues approximately 1.5 hours later. Urine noted with sediment and clear yellow.

## 2020-10-04 NOTE — CONSULTS
[facility]  Neurology  Consult Note    Patient Name: Adriano Borjas  MRN: [id number]  Admission Date: 10/3/2020  Hospital Length of Stay: 1 days  Code Status: Full Code   Attending Provider: Tamela Pearl MD   Consulting Provider: Dr. Pasha Duque MD   Consulting NP: Donya Ott NP  Primary Care Physician: Bdu Kamara MD  Principal Problem:Cerebral hemorrhage, acute    Inpatient consult to Neurology  Consult performed by: Donya Ott NP  Consult ordered by: Tamela Pearl MD        Subjective:     Chief Complaint:    Chief Complaint   Patient presents with    Altered Mental Status     hx dementia, sudden worsening beginning this AM; usually lives at [place], trazodone dose doubled last week, has had increased agitation since then, son reduced trazodone to original dose     Principal Problem:Cerebral hemorrhage, acute     HPI: Patient is and 85-year-old male with past medical history significant for hypertension, history of paroxysmal atrial fibrillation (on Eliquis), history of TIA, dementia, nephrolithiasis, hypothyroidism, prior history of cerebral hemorrhage and gait instability is being admitted to Hospital Medicine under inpatient status from [facility] Emergency Room with complaint of increasing confusion and altered mental status for the past week.  Patient is a resident of [facility].  Part of the history obtained from patient's son.  According to the son, patient is having progressive decline in mental status lately and this morning patient was not aware very was at the time of breakfast.  It appeared to be more than usual confusion in family is concerned about possibility of new cerebrovascular accident. Patient reports that currently he feels back to his baseline. The patient is unsure if he had a headache or not earlier in the morning. Son states that the patient has had some recent medication changes, reporting an increase in  trazodone, but son returned to original dose secondary to increased agitation. The son states that the patients agitation has not been as intense as it was in the previous week.The patient denies chest pain, SOB, fever, cough, pain with urination, or any other complaint at this time. Son denies the patient having speech difficulty, facial asymmetry, weakness, numbness, or any other complaint at this time.  No recent fall, head injury, loss of consciousness or seizure activity reported.        Neurology Consult Note: Patient seen and examine with Dr. Pasha Duque. Discussed plan of care. Patient is a 85 year old White male with a PMHX: paroxysmal atrial fibrillation (on Eliquis), history of TIA, dementia, nephrolithiasis, hypothyroidism, prior history of cerebral hemorrhage and gait instability. Patient was brought into the hospital with increased confusion and altered mental status. Patient sone reported the patient has had a progressive decline in mental status and yesterday morning the patient had a decrease of awareness at breakfast. The family brought him in because they were concerned he may have had a stroke. Patient is a poor historian no memory of events that led to his admission. Patient alert to person only. Patient has a baseline of dementia. Patient able to follow directions. No dysarthria, no weakness, no pronator drift noted, Patient recognizes familiar objects. Patient has an essential tremor noted right sided. The patient denies recent falls , denies head injury, or loss of consciousness or seizure activity. Patient CT of head showed a 5 mm focus of acute petechial hemorrhage with in the left occipital lobe. Small area of sub acute vs remote lacunar infarction within the left nya. Age related appropriate cerebral volume loss. Chronic microvascular ischemic changes within the periventricular white matter of the supratentorial brain. MRI of brain showed limited examination revealing small 6 mm focus  acute petechial hemorraghic vs hemorraghic infarction within the subcortical white mater of the medical left occipital lobe. CUS impression: No sonographically evident hemodynamically significant > 50% luminal stenosis observed within the left or right internal carotid artery.and repeat Ct of head w/o contrast impression: Stable appearance to the brain and ventricles relative the previous day's studies with a possible acute to early subacute punctate hemorrhage in the left occipital lobe cortex or adjacent gyrus.  There is no regional mass effect.No new findings.     Recommends holding blood thinners,  Seizure precautions, Neurosurgery following, neuro checks q 1 hour, Hold all blood thinners. Recommend blood pressure systolic pressure management less than 150 goal. Will order EEG 30 mins.      Past Medical History:   Diagnosis Date    A-fib     Elevated cholesterol     Essential hypertension     Kidney stone     passed on his own    Memory loss     dememtia per family    Osteoarthritis     Stroke     2012 TIA     Thyroid disease        Past Surgical History:   Procedure Laterality Date    CYSTOSCOPY      EYE SURGERY      MAGNETIC RESONANCE IMAGING N/A 8/23/2019    Procedure: MRI (Magnetic Resonance Imagine) needs anesthesia;  Surgeon: Panchito Montoya MD;  Location: [facility];  Service: Anesthesiology;  Laterality: N/A;       Review of patient's allergies indicates:   Allergen Reactions    Ciprofloxacin Other (See Comments)     Shoulder and leg pain/cramping     Codeine Other (See Comments)     Pt cannot remember been a long time ago    Sulfa (sulfonamide antibiotics)      Allergic reaction to bactrim       Current Neurological Medications:    Current Facility-Administered Medications on File Prior to Encounter   Medication    lactated ringers infusion    lidocaine (PF) 10 mg/ml (1%) injection 10 mg     Current Outpatient Medications on File Prior to Encounter   Medication Sig    apixaban  (ELIQUIS) 5 mg Tab Take 1 tablet (5 mg total) by mouth 2 (two) times daily. Need office visit before next refill, last seen 12/2018. This will be the LAST Rx if visit is not made.    levothyroxine (SYNTHROID) 75 MCG tablet Take 1 tablet by mouth once daily    traZODone (DESYREL) 100 MG tablet Take 1 tablet (100 mg total) by mouth every evening.    acetaminophen (TYLENOL) 500 MG tablet Take 500 mg by mouth every evening.    ascorbic acid, vitamin C, (VITAMIN C) 500 MG tablet Take 500 mg by mouth once daily.    mirtazapine (REMERON) 30 MG tablet Take 1 tablet (30 mg total) by mouth every evening.    multivitamin (THERAGRAN) tablet Take 1 tablet by mouth once daily.      Family History     Problem Relation (Age of Onset)    Cancer Mother    Dementia Sister, Brother    Diabetes Mother    Heart disease Father, Brother    Hyperlipidemia Mother, Father    Migraines Daughter    Tremor Daughter, Son        Tobacco Use    Smoking status: Former Smoker     Years: 15.00    Smokeless tobacco: Never Used    Tobacco comment: quit 50 years ago   Substance and Sexual Activity    Alcohol use: No    Drug use: No    Sexual activity: Not on file     Review of Systems   Unable to perform ROS: Dementia     Objective:     Vital Signs (Most Recent):  Temp: 98.8 °F (37.1 °C) (10/04/20 0330)  Pulse: 71 (10/04/20 0700)  Resp: (!) 28 (10/04/20 0700)  BP: 114/83 (10/04/20 0700)  SpO2: 100 % (10/04/20 0700) Vital Signs (24h Range):  Temp:  [97 °F (36.1 °C)-98.8 °F (37.1 °C)] 98.8 °F (37.1 °C)  Pulse:  [] 71  Resp:  [7-58] 28  SpO2:  [92 %-100 %] 100 %  BP: ()/(46-95) 114/83     Weight: 68.8 kg (151 lb 10.8 oz)  Body mass index is 22.73 kg/m².    Physical Exam  Vitals signs and nursing note reviewed.   Constitutional:       General: He is awake.      Appearance: Normal appearance.      Comments: elderly    HENT:      Head: Normocephalic and atraumatic.      Nose: Nose normal.      Mouth/Throat:      Mouth: Mucous membranes  are moist.      Pharynx: Oropharynx is clear.   Eyes:      General: Lids are normal.      Extraocular Movements: Extraocular movements intact.      Right eye: Normal extraocular motion.      Left eye: Normal extraocular motion.      Conjunctiva/sclera: Conjunctivae normal.      Pupils: Pupils are equal, round, and reactive to light.   Neck:      Musculoskeletal: Full passive range of motion without pain and neck supple.   Cardiovascular:      Rate and Rhythm: Normal rate. Rhythm irregular.      Pulses: Normal pulses.      Heart sounds: Normal heart sounds.   Pulmonary:      Effort: Pulmonary effort is normal.      Breath sounds: Normal breath sounds.   Abdominal:      General: Abdomen is flat. Bowel sounds are normal.      Palpations: Abdomen is soft.   Musculoskeletal: Normal range of motion.   Skin:     General: Skin is warm and dry.      Capillary Refill: Capillary refill takes less than 2 seconds.   Neurological:      General: No focal deficit present.      Mental Status: He is alert. Mental status is at baseline. He is disoriented.      GCS: GCS eye subscore is 4. GCS verbal subscore is 4. GCS motor subscore is 5.      Cranial Nerves: Cranial nerves are intact.      Sensory: Sensation is intact.      Motor: Weakness and tremor present.      Coordination: Finger-Nose-Finger Test abnormal.      Deep Tendon Reflexes:      Reflex Scores:       Brachioradialis reflexes are 2+ on the right side and 2+ on the left side.       Patellar reflexes are 2+ on the right side and 2+ on the left side.     Comments: Alert to person only, Gait not observed    Psychiatric:         Attention and Perception: Attention normal. He is attentive. He does not perceive visual hallucinations.         Mood and Affect: Mood normal.         Speech: Speech normal.         Behavior: Behavior normal. Behavior is cooperative.         Thought Content: Thought content normal.         Cognition and Memory: Cognition is impaired. Memory is impaired.          Judgment: Judgment normal.      Comments: Very cooperative          NEUROLOGICAL EXAMINATION:     MENTAL STATUS   Oriented to person.   Disoriented to place. Disoriented to country, city and area.   Disoriented to time.   Registration: recalls 3 of 3 objects. Follows 3 step commands.   Attention: normal. Concentration: normal.   Speech: speech is normal   Level of consciousness: alert  Knowledge: poor and inconsistent with education.   Able to name object.        Baseline dementia      CRANIAL NERVES     CN II   Visual fields full to confrontation.   Visual acuity: normal    CN III, IV, VI   Pupils are equal, round, and reactive to light.  Right pupil: Size: 3 mm. Shape: regular. Reactivity: brisk.   Left pupil: Size: 3 mm. Shape: regular. Reactivity: brisk.     CN V   Facial sensation intact.     CN VII   Facial expression full, symmetric.     CN VIII   Hearing: impaired    MOTOR EXAM     Strength   Right neck flexion: 4/5  Left neck flexion: 4/5  Right neck extension: 4/5  Left neck extension: 4/5  Right deltoid: 4/5  Left deltoid: 4/5  Right biceps: 4/5  Left biceps: 4/5  Right triceps: 4/5  Left triceps: 4/5  Right wrist flexion: 4/5  Left wrist flexion: 4/5  Right wrist extension: 4/5  Left wrist extension: 4/5  Right interossei: 4/5  Left interossei: 4/5  Right abdominals: 4/5  Left abdominals: 4/5  Right iliopsoas: 4/5  Left iliopsoas: 4/5  Right quadriceps: 4/5  Left quadriceps: 4/5  Right hamstrin/5  Left hamstrin/5  Right glutei: 4/5  Left glutei: 4/5  Right anterior tibial: 4/5  Left anterior tibial: 4/5  Right posterior tibial: 4/5  Left posterior tibial: 4/5  Right peroneal: 4/5  Left peroneal: 4/5  Right gastroc: 4/5  Left gastroc: 4/5    REFLEXES     Reflexes   Right brachioradialis: 2+  Left brachioradialis: 2+  Right patellar: 2+  Left patellar: 2+    SENSORY EXAM   Light touch normal.   Right arm light touch: normal  Left arm light touch: normal  Right leg vibration: decreased from  toes  Left leg vibration: decreased from toes    GAIT AND COORDINATION      Coordination   Finger to nose coordination: abnormal    Tremor   Resting tremor: present       Gait not observed        Significant Labs:  BMP  Lab Results   Component Value Date     10/04/2020    K 3.4 (L) 10/04/2020     10/04/2020    CO2 27 10/04/2020    BUN 12 10/04/2020    CREATININE 1.1 10/04/2020    CALCIUM 8.4 (L) 10/04/2020    ANIONGAP 11 10/04/2020    ESTGFRAFRICA >60 10/04/2020    EGFRNONAA >60 10/04/2020     CMP  Sodium   Date Value Ref Range Status   10/04/2020 140 136 - 145 mmol/L Final     Potassium   Date Value Ref Range Status   10/04/2020 3.4 (L) 3.5 - 5.1 mmol/L Final     Chloride   Date Value Ref Range Status   10/04/2020 102 95 - 110 mmol/L Final     CO2   Date Value Ref Range Status   10/04/2020 27 23 - 29 mmol/L Final     Glucose   Date Value Ref Range Status   10/04/2020 94 70 - 110 mg/dL Final     BUN, Bld   Date Value Ref Range Status   10/04/2020 12 8 - 23 mg/dL Final     Creatinine   Date Value Ref Range Status   10/04/2020 1.1 0.5 - 1.4 mg/dL Final   11/30/2012 1.1 0.5 - 1.4 mg/dL Final     Calcium   Date Value Ref Range Status   10/04/2020 8.4 (L) 8.7 - 10.5 mg/dL Final   11/30/2012 9.1 8.7 - 10.5 mg/dL Final     Total Protein   Date Value Ref Range Status   10/04/2020 6.6 6.0 - 8.4 g/dL Final     Albumin   Date Value Ref Range Status   10/04/2020 3.5 3.5 - 5.2 g/dL Final     Total Bilirubin   Date Value Ref Range Status   10/04/2020 1.6 (H) 0.1 - 1.0 mg/dL Final     Comment:     For infants and newborns, interpretation of results should be based  on gestational age, weight and in agreement with clinical  observations.  Premature Infant recommended reference ranges:  Up to 24 hours.............<8.0 mg/dL  Up to 48 hours............<12.0 mg/dL  3-5 days..................<15.0 mg/dL  6-29 days.................<15.0 mg/dL       Alkaline Phosphatase   Date Value Ref Range Status   10/04/2020 60 62 - 338  "U/L Final   11/30/2012 67 55 - 135 U/L Final     AST   Date Value Ref Range Status   10/04/2020 34 10 - 40 U/L Final   11/30/2012 29 10 - 40 U/L Final     ALT   Date Value Ref Range Status   10/04/2020 16 10 - 44 U/L Final     Anion Gap   Date Value Ref Range Status   10/04/2020 11 8 - 16 mmol/L Final   11/30/2012 11 5 - 15 meq/L Final     eGFR if    Date Value Ref Range Status   10/04/2020 >60 >60 mL/min/1.73 m^2 Final     eGFR if non    Date Value Ref Range Status   10/04/2020 >60 >60 mL/min/1.73 m^2 Final     Comment:     Calculation used to obtain the estimated glomerular filtration  rate (eGFR) is the CKD-EPI equation.        Lab Results   Component Value Date    TSH 0.656 10/04/2020     Lab Results   Component Value Date    LDLCALC 114.4 10/04/2020     Lab Results   Component Value Date    CHOL 166 10/04/2020    CHOL 203 (H) 09/13/2017    CHOL 221 (H) 09/02/2014     Lab Results   Component Value Date    HDL 32 (L) 10/04/2020    HDL 36 (L) 09/13/2017    HDL 33 (L) 09/02/2014     Lab Results   Component Value Date    LDLCALC 114.4 10/04/2020    LDLCALC 143.8 09/13/2017    LDLCALC 151.2 09/02/2014     Lab Results   Component Value Date    TRIG 98 10/04/2020    TRIG 116 09/13/2017    TRIG 184 (H) 09/02/2014     Lab Results   Component Value Date    CHOLHDL 19.3 (L) 10/04/2020    CHOLHDL 17.7 (L) 09/13/2017    CHOLHDL 14.9 (L) 09/02/2014     Lab Results   Component Value Date    WBC 7.54 10/04/2020    HGB 13.5 (L) 10/04/2020    HCT 39.0 (L) 10/04/2020    MCV 92 10/04/2020     (L) 10/04/2020       Lab Results   Component Value Date    HGBA1C 5.2 10/04/2020         Significant Imaging:   CXR: Adriano Borjas is a 85 y.o. male with a PMHx of dementia, TIA, A-Fib on Eliquis, and kidney stone who presents to the ED for AMS that started 3 hours PTA. The son states that the patient has a history of dementia and states that today "I found him after we had breakfast standing at the " "table and he didn't know where he was." The son states that they have noticed a progressive decline in the patient's mental status, but states that "Today it was a lot more rapid than before. I just want to make sure he isn't having a stroke." Patient reports that currently he feels back to his baseline. The patient is unsure if he had a headache or not earlier in the morning. Son states that the patient has had some recent medication changes, reporting an increase in trazodone, but son returned to original dose secondary to increased agitation. The son states that the patients agitation has not been as intense as it was in the previous week.The patient denies chest pain, SOB, fever, cough, pain with urination, or any other complaint at this time. Son denies the patient having speech difficulty, facial asymmetry, weakness, numbness, or any other complaint at this time. The patient has a PSHx of cystoscopy, eye surgery, and MRI.      CT head without contrast 10/3:  1. 5 mm focus of acute petechial hemorrhage within the left occipital lobe.  2. Small area of subacute versus remote lacunar infarction within the left aspect of the nya.  It is unclear whether this represents artifact or a true finding.  Additional evaluation could be achieved with MRI as deemed clinically appropriate.  3. Age-appropriate cerebral volume loss and chronic microvascular ischemic changes within the periventricular white matter of the supratentorial brain.       Repeat CT of head w/o contrast 10/4  CT HEAD WITHOUT CONTRAST     CLINICAL HISTORY:  Left occipital hemorrhage follwo up;     TECHNIQUE:  Low dose axial images were obtained through the head.  Coronal and sagittal reformations were also performed. Contrast was not administered.     COMPARISON:  MRI of the brain performed October 3, 2020 and prior CT scan dated May 29, 2020 and October 3, 2020     FINDINGS:  There is minimal calcification along the right tentorium.  This is stable.  " As was previously described there is a punctate focus of increased density along the cortex and sulci of the left inferior occipital lobe.  A punctate hemorrhage is suggested.  This may be cortical or subarachnoid.  This is not significantly changed relative to the previous day's CT scan and there is no evidence of regional mass effect nor evidence of midline shift.  The remainder the brain and ventricles are otherwise unremarkable.     Impression:     Stable appearance to the brain and ventricles relative the previous day's studies with a possible acute to early subacute punctate hemorrhage in the left occipital lobe cortex or adjacent gyrus.  There is no regional mass effect.     No new findings  MRI brain:  1. Limited examination revealing a small 6 mm focus of acute petechial hemorrhage versus hemorrhagic infarction within the subcortical white matter of the medial left occipital lobe (as demonstrated by recent MRI).  2. Recently noted hypodensity within the left paramidline anterior nya is representative of artifact.  No evidence of acute or remote ischemia/infarction within the nya.  3. Age-appropriate cerebral volume loss and chronic microvascular ischemic changes within the periventricular white matter of the supratentorial brain.  4. Mild ethmoid and frontal sinus disease.     US Carotid Bilateral   EXAMINATION:  US CAROTID BILATERAL     CLINICAL HISTORY:  Velocities evaluation;     TECHNIQUE:  Grayscale and color Doppler ultrasound examination of the carotid and vertebral artery systems bilaterally.  Stenosis estimates are per the NASCET measurement criteria.  The examination is limited by limited patient mobility and difficulty breathing.     COMPARISON:  Carotid ultrasound-09/29/2015     FINDINGS:  Right:     Internal Carotid Artery (ICA) peak systolic velocity 67 cm/sec     ICA/CCA peak systolic velocity ratio: 0.9     Plaque formation: There is partially calcified atherosclerotic calcification  observed within the right internal carotid artery.     Vertebral artery: Antegrade flow and normal waveform.     Left:     Internal Carotid Artery (ICA)  peak systolic velocity 69 cm/sec     ICA/CCA peak systolic velocity ratio: 1.0     Plaque formation: There is homogeneous, partially calcified atherosclerotic plaque deposition noted within the left internal carotid artery.     Vertebral artery: Antegrade flow and normal waveform.     Impression:     No sonographically evident hemodynamically significant > 50% luminal stenosis observed within the left or right internal carotid artery.  Assessment and Plan:    1. Cerebral hemorrhage, acute vs  Hemorrhagic infarction within the subcortical white matter of the medial left occipital lobe /Acute Encephalopathy /Confusion  -Dr. Fang from neurosurgery was consulted by Dr. Anderson from ER who suspects, occipital hemorrhage could be artifact.  Will continue to hold Eliquis use for now.-  -Neurochecks q 4 hrs x 24 hrs.  - Fall and seizure precautions.  -MRI brain w/o contrast noted  Limited examination revealing a small 6 mm focus of acute petechial hemorrhage versus hemorrhagic infarction within the subcortical white matter of the medial left occipital lobe (as demonstrated by recent MRI).  2. Recently noted hypodensity within the left paramidline anterior nya is representative of artifact.  No evidence of acute or remote ischemia/infarction within the nya.  3. Age-appropriate cerebral volume loss and chronic microvascular ischemic changes within the periventricular white matter of the supratentorial brain.  4. Mild ethmoid and frontal sinus disease  - CT head results noted above  small 6 mm focus of acute petechial hemorrhage versus hemorrhagic infarction within the subcortical white matter of the medial left occipital lobe (Recently noted hypodensity within the left paramidline anterior nya is representative of artifact.  No evidence of acute or remote  ischemia/infarction within the nya.  3. Age-appropriate cerebral volume loss and chronic microvascular ischemic changes within the periventricular white matter of the supratentorial brain.  4. Mild ethmoid and frontal sinus disease.-  -.4   -Seizure precautions    -Consulted Neurosurgery  -Neuro checks q 1 hours   -Hold all blood thinners.   -Keep blood pressure systolic pressure less than 150 goal.   -TTE w/bubble pending  intra-cardiac thrombus or valvular heart disease.  4. History of Dementia  -Internal MD to manage    5. History of Stroke  -Hold blood thinners for now  6. History of Paroxysmal atrial fibrillation   -Hold  Eliquis  7. Hypothyroidism  -Internal MD to manage   8. HTN  -Internal MD to manage     10/4: Patient CT of head showed a 5 mm focus of acute petechial hemorrhage with in the left occipital lobe. Small area of sub acute vs remote lacunar infarction within the left nya. Age related appropriate cerebral volume loss. Chronic microvascular ischemic changes within the periventricular white matter of the supratentorial brain. MRI of brain showed limited examination revealing small 6 mm focus acute petechial hemorraghic vs hemorraghic infarction within the subcortical white mater of the medical left occipital lobe. CUS impression: No sonographically evident hemodynamically significant > 50% luminal stenosis observed within the left or right internal carotid artery. Repeat Ct of head 10/4 w/o contrast impression: Stable appearance to the brain and ventricles relative the previous day's studies with a possible acute to early subacute punctate hemorrhage in the left occipital lobe cortex or adjacent gyrus.  There is no regional mass effect.No new findings.     Recommends holding blood thinners,  Seizure precautions, Neurosurgery following, neuro checks q 1 hour, Hold all blood thinners. Recommend blood pressure systolic pressure management less than 150 goal. Will order EEG 30  mins.      [facility] at [phone] within 3 days from discharge.    Stroke education was provided including stroke risk factors modification and any acute neurological changes including weakness, confusion, visual changes to come straight to the ER.  Seizure education was provided including no driving, no swimming by self, no operation of heavy machinery or climbing on ladders.     All side effects of new medications were discussed with patient and/or next of kin and all questions were answered.                I spent 45 minutes face to face with the patient More than 30 minutes of the time spent in  coordination of care including discussions etiology of diagnosis, pathogenesis of diagnosis, prognosis of diagnosis, diagnostic results, impression and recommendations, diagnostic studies, management, risks and benefits of treatment, instructions of disease self-management, treatment instructions,  All of the patient's/son questions were answered during this discussion.  Active Diagnoses:    Diagnosis Date Noted POA    PRINCIPAL PROBLEM:  Cerebral hemorrhage, acute [I61.9] 10/03/2020 Yes    Intracranial hemorrhage [I62.9] 10/03/2020 Yes    Goals of care, counseling/discussion [Z71.89] 10/03/2020 Not Applicable    History of cerebral hemorrhage [Z86.79] 09/05/2019 Not Applicable    Paroxysmal atrial fibrillation [I48.0] 12/12/2018 Yes    Essential hypertension [I10] 07/27/2018 Yes    Gait abnormality [R26.9] 06/26/2017 Yes      Problems Resolved During this Admission:       VTE Risk Mitigation (From admission, onward)         Ordered     IP VTE HIGH RISK PATIENT  Once      10/03/20 1829     Place sequential compression device  Until discontinued      10/03/20 1829     Reason for No Pharmacological VTE Prophylaxis  Once     Question:  Reasons:  Answer:  Active Bleeding    10/03/20 1829                Thank you for your consult. I will follow-up with patient. Please contact  us if you have any additional questions.    Donya Ott NP  Neurology  [facility]

## 2020-10-05 PROBLEM — E44.0 MODERATE MALNUTRITION: Status: ACTIVE | Noted: 2020-10-05

## 2020-10-05 LAB
ALBUMIN SERPL BCP-MCNC: 3.3 G/DL (ref 3.5–5.2)
ALP SERPL-CCNC: 59 U/L (ref 55–135)
ALT SERPL W/O P-5'-P-CCNC: 13 U/L (ref 10–44)
ANION GAP SERPL CALC-SCNC: 10 MMOL/L (ref 8–16)
AORTIC ROOT ANNULUS: 3.46 CM
AORTIC VALVE CUSP SEPERATION: 1.53 CM
ASCENDING AORTA: 3.47 CM
AST SERPL-CCNC: 31 U/L (ref 10–40)
AV INDEX (PROSTH): 1.11
AV MEAN GRADIENT: 2 MMHG
AV PEAK GRADIENT: 4 MMHG
AV VALVE AREA: 5.02 CM2
AV VELOCITY RATIO: 1.04
BASOPHILS # BLD AUTO: 0.06 K/UL (ref 0–0.2)
BASOPHILS NFR BLD: 0.9 % (ref 0–1.9)
BILIRUB SERPL-MCNC: 1.1 MG/DL (ref 0.1–1)
BSA FOR ECHO PROCEDURE: 1.82 M2
BUN SERPL-MCNC: 17 MG/DL (ref 8–23)
CALCIUM SERPL-MCNC: 8.6 MG/DL (ref 8.7–10.5)
CHLORIDE SERPL-SCNC: 103 MMOL/L (ref 95–110)
CO2 SERPL-SCNC: 26 MMOL/L (ref 23–29)
CREAT SERPL-MCNC: 1.2 MG/DL (ref 0.5–1.4)
CV ECHO LV RWT: 0.43 CM
DIFFERENTIAL METHOD: ABNORMAL
DOP CALC AO PEAK VEL: 0.99 M/S
DOP CALC AO VTI: 20.08 CM
DOP CALC LVOT AREA: 4.5 CM2
DOP CALC LVOT DIAMETER: 2.4 CM
DOP CALC LVOT PEAK VEL: 1.03 M/S
DOP CALC LVOT STROKE VOLUME: 100.74 CM3
DOP CALCLVOT PEAK VEL VTI: 22.28 CM
E WAVE DECELERATION TIME: 334.86 MSEC
E/A RATIO: 0.71
E/E' RATIO: 6.92 M/S
ECHO LV POSTERIOR WALL: 0.96 CM (ref 0.6–1.1)
EOSINOPHIL # BLD AUTO: 0.3 K/UL (ref 0–0.5)
EOSINOPHIL NFR BLD: 3.6 % (ref 0–8)
ERYTHROCYTE [DISTWIDTH] IN BLOOD BY AUTOMATED COUNT: 12.1 % (ref 11.5–14.5)
EST. GFR  (AFRICAN AMERICAN): >60 ML/MIN/1.73 M^2
EST. GFR  (NON AFRICAN AMERICAN): 56 ML/MIN/1.73 M^2
FRACTIONAL SHORTENING: 14 % (ref 28–44)
GLUCOSE SERPL-MCNC: 96 MG/DL (ref 70–110)
HCT VFR BLD AUTO: 39.1 % (ref 40–54)
HGB BLD-MCNC: 13.5 G/DL (ref 14–18)
IMM GRANULOCYTES # BLD AUTO: 0.01 K/UL (ref 0–0.04)
IMM GRANULOCYTES NFR BLD AUTO: 0.1 % (ref 0–0.5)
INTERVENTRICULAR SEPTUM: 1.08 CM (ref 0.6–1.1)
IVRT: 137.01 MSEC
LA MAJOR: 5.87 CM
LA MINOR: 4.73 CM
LA WIDTH: 3.31 CM
LEFT ATRIUM SIZE: 3.57 CM
LEFT ATRIUM VOLUME INDEX: 28.8 ML/M2
LEFT ATRIUM VOLUME: 52.62 CM3
LEFT INTERNAL DIMENSION IN SYSTOLE: 3.81 CM (ref 2.1–4)
LEFT VENTRICLE DIASTOLIC VOLUME INDEX: 48.76 ML/M2
LEFT VENTRICLE DIASTOLIC VOLUME: 88.93 ML
LEFT VENTRICLE MASS INDEX: 84 G/M2
LEFT VENTRICLE SYSTOLIC VOLUME INDEX: 34.1 ML/M2
LEFT VENTRICLE SYSTOLIC VOLUME: 62.16 ML
LEFT VENTRICULAR INTERNAL DIMENSION IN DIASTOLE: 4.43 CM (ref 3.5–6)
LEFT VENTRICULAR MASS: 153.61 G
LV LATERAL E/E' RATIO: 5.63 M/S
LV SEPTAL E/E' RATIO: 9 M/S
LYMPHOCYTES # BLD AUTO: 1.7 K/UL (ref 1–4.8)
LYMPHOCYTES NFR BLD: 24.9 % (ref 18–48)
MCH RBC QN AUTO: 31.9 PG (ref 27–31)
MCHC RBC AUTO-ENTMCNC: 34.5 G/DL (ref 32–36)
MCV RBC AUTO: 92 FL (ref 82–98)
MONOCYTES # BLD AUTO: 0.7 K/UL (ref 0.3–1)
MONOCYTES NFR BLD: 9.8 % (ref 4–15)
MV A" WAVE DURATION": 152 MSEC
MV MEAN GRADIENT: 1 MMHG
MV PEAK A VEL: 0.63 M/S
MV PEAK E VEL: 0.45 M/S
MV STENOSIS PRESSURE HALF TIME: 97.11 MS
MV VALVE AREA P 1/2 METHOD: 2.27 CM2
NEUTROPHILS # BLD AUTO: 4.2 K/UL (ref 1.8–7.7)
NEUTROPHILS NFR BLD: 60.7 % (ref 38–73)
NRBC BLD-RTO: 0 /100 WBC
PISA MRMAX VEL: 0.04 M/S
PISA TR MAX VEL: 2.29 M/S
PLATELET # BLD AUTO: 99 K/UL (ref 150–350)
PMV BLD AUTO: 13.8 FL (ref 9.2–12.9)
POTASSIUM SERPL-SCNC: 3.7 MMOL/L (ref 3.5–5.1)
PROT SERPL-MCNC: 6.2 G/DL (ref 6–8.4)
PULM VEIN A" WAVE DURATION": 126 MSEC
PV PEAK VELOCITY: 0.77 CM/S
RA MAJOR: 5.38 CM
RA PRESSURE: 3 MMHG
RA WIDTH: 3.41 CM
RBC # BLD AUTO: 4.23 M/UL (ref 4.6–6.2)
RIGHT VENTRICULAR END-DIASTOLIC DIMENSION: 2.63 CM
SINUS: 3.13 CM
SODIUM SERPL-SCNC: 139 MMOL/L (ref 136–145)
STJ: 2.92 CM
TDI LATERAL: 0.08 M/S
TDI SEPTAL: 0.05 M/S
TDI: 0.07 M/S
TR MAX PG: 21 MMHG
TRICUSPID ANNULAR PLANE SYSTOLIC EXCURSION: 2.5 CM
TV REST PULMONARY ARTERY PRESSURE: 24 MMHG
WBC # BLD AUTO: 6.96 K/UL (ref 3.9–12.7)

## 2020-10-05 PROCEDURE — 97802 MEDICAL NUTRITION INDIV IN: CPT

## 2020-10-05 PROCEDURE — 30200315 PPD INTRADERMAL TEST REV CODE 302: Performed by: INTERNAL MEDICINE

## 2020-10-05 PROCEDURE — 97166 OT EVAL MOD COMPLEX 45 MIN: CPT

## 2020-10-05 PROCEDURE — 85025 COMPLETE CBC W/AUTO DIFF WBC: CPT

## 2020-10-05 PROCEDURE — 25000003 PHARM REV CODE 250: Performed by: NURSE PRACTITIONER

## 2020-10-05 PROCEDURE — 92610 EVALUATE SWALLOWING FUNCTION: CPT

## 2020-10-05 PROCEDURE — 86580 TB INTRADERMAL TEST: CPT | Performed by: INTERNAL MEDICINE

## 2020-10-05 PROCEDURE — 36415 COLL VENOUS BLD VENIPUNCTURE: CPT

## 2020-10-05 PROCEDURE — 25000003 PHARM REV CODE 250: Performed by: INTERNAL MEDICINE

## 2020-10-05 PROCEDURE — 94761 N-INVAS EAR/PLS OXIMETRY MLT: CPT

## 2020-10-05 PROCEDURE — 80053 COMPREHEN METABOLIC PANEL: CPT

## 2020-10-05 PROCEDURE — 97162 PT EVAL MOD COMPLEX 30 MIN: CPT

## 2020-10-05 PROCEDURE — 20000000 HC ICU ROOM

## 2020-10-05 PROCEDURE — 97116 GAIT TRAINING THERAPY: CPT

## 2020-10-05 PROCEDURE — 25000003 PHARM REV CODE 250: Performed by: EMERGENCY MEDICINE

## 2020-10-05 RX ORDER — DIPHENHYDRAMINE HCL 25 MG
25 CAPSULE ORAL ONCE AS NEEDED
Status: COMPLETED | OUTPATIENT
Start: 2020-10-05 | End: 2020-10-05

## 2020-10-05 RX ORDER — TRAZODONE HYDROCHLORIDE 50 MG/1
50 TABLET ORAL NIGHTLY
Status: DISCONTINUED | OUTPATIENT
Start: 2020-10-05 | End: 2020-10-06

## 2020-10-05 RX ADMIN — NICARDIPINE HYDROCHLORIDE 5 MG/HR: 0.2 INJECTION, SOLUTION INTRAVENOUS at 02:10

## 2020-10-05 RX ADMIN — TUBERCULIN PURIFIED PROTEIN DERIVATIVE 5 UNITS: 5 INJECTION, SOLUTION INTRADERMAL at 05:10

## 2020-10-05 RX ADMIN — LABETALOL HYDROCHLORIDE 10 MG: 5 INJECTION, SOLUTION INTRAVENOUS at 01:10

## 2020-10-05 RX ADMIN — DIPHENHYDRAMINE HYDROCHLORIDE 25 MG: 25 CAPSULE ORAL at 09:10

## 2020-10-05 RX ADMIN — LEVOTHYROXINE SODIUM 75 MCG: 0.03 TABLET ORAL at 08:10

## 2020-10-05 RX ADMIN — OXYCODONE HYDROCHLORIDE AND ACETAMINOPHEN 500 MG: 500 TABLET ORAL at 08:10

## 2020-10-05 RX ADMIN — ATORVASTATIN CALCIUM 40 MG: 40 TABLET, FILM COATED ORAL at 08:10

## 2020-10-05 RX ADMIN — TRAZODONE HYDROCHLORIDE 50 MG: 50 TABLET ORAL at 09:10

## 2020-10-05 RX ADMIN — THERA TABS 1 TABLET: TAB at 08:10

## 2020-10-05 NOTE — PROGRESS NOTES
[facility] Hospital Medicine  Progress Note    Patient Name: Adriano Borjas  MRN: [id number]  Patient Class: IP- Inpatient   Admission Date: 10/3/2020  Length of Stay: 2 days  Attending Physician: Tamela Pearl MD  Primary Care Provider: Bud Kamara MD        Subjective:     Principal Problem:Cerebral hemorrhage, acute    HPI:  Patient is and 85-year-old male with past medical history significant for hypertension, history of paroxysmal atrial fibrillation (on Eliquis), history of TIA, dementia, nephrolithiasis, hypothyroidism, prior history of cerebral hemorrhage and gait instability is being admitted to Hospital Medicine under inpatient status from [facility] Emergency Room with complaint of increasing confusion and altered mental status for the past week.  Patient is a resident of [facility].  Part of the history obtained from patient's son.  According to the son, patient is having progressive decline in mental status lately and this morning patient was not aware very was at the time of breakfast.  It appeared to be more than usual confusion in family is concerned about possibility of new cerebrovascular accident. Patient reports that currently he feels back to his baseline. The patient is unsure if he had a headache or not earlier in the morning. Son states that the patient has had some recent medication changes, reporting an increase in trazodone, but son returned to original dose secondary to increased agitation. The son states that the patients agitation has not been as intense as it was in the previous week.The patient denies chest pain, SOB, fever, cough, pain with urination, or any other complaint at this time. Son denies the patient having speech difficulty, facial asymmetry, weakness, numbness, or any other complaint at this time.  No recent fall, head injury, loss of consciousness or seizure activity reported.      Overview/Hospital  Course:  No notes on file    Interval History:  Patient is in intensive care unit secondary to left occipital hemorrhage.  Overnight patient was increasingly agitated.  Presently sleeping after receiving intravenous haloperidol.    Review of Systems   Constitutional: Positive for appetite change and fatigue.   Neurological: Positive for weakness and headaches.   Psychiatric/Behavioral: Positive for agitation and confusion.   All other systems reviewed and are negative.    Objective:     Vital Signs (Most Recent):  Temp: 97.8 °F (36.6 °C) (10/05/20 0800)  Pulse: 64 (10/05/20 0915)  Resp: 20 (10/05/20 0915)  BP: (!) 123/55 (10/05/20 0915)  SpO2: 100 % (10/05/20 0915) Vital Signs (24h Range):  Temp:  [97.4 °F (36.3 °C)-98.9 °F (37.2 °C)] 97.8 °F (36.6 °C)  Pulse:  [] 64  Resp:  [0-63] 20  SpO2:  [89 %-100 %] 100 %  BP: ()/(50-99) 123/55     Weight: 69.4 kg (153 lb)  Body mass index is 22.93 kg/m².    Intake/Output Summary (Last 24 hours) at 10/5/2020 1010  Last data filed at 10/5/2020 0500  Gross per 24 hour   Intake 358.75 ml   Output 200 ml   Net 158.75 ml      Physical Exam  Vitals signs and nursing note reviewed.   Constitutional:       Appearance: Normal appearance. He is well-developed.   HENT:      Head: Normocephalic and atraumatic.      Nose: Nose normal. No septal deviation.   Eyes:      Conjunctiva/sclera: Conjunctivae normal.      Pupils: Pupils are equal, round, and reactive to light.   Neck:      Thyroid: No thyroid mass.      Vascular: No JVD.      Trachea: No tracheal tenderness or tracheal deviation.   Cardiovascular:      Rate and Rhythm: Normal rate and regular rhythm.      Heart sounds: S1 normal and S2 normal. No murmur. No friction rub. No gallop.    Pulmonary:      Effort: Pulmonary effort is normal.      Breath sounds: Normal breath sounds. No decreased breath sounds, wheezing, rhonchi or rales.   Abdominal:      General: Bowel sounds are normal. There is no distension.       Palpations: Abdomen is soft. There is no hepatomegaly, splenomegaly or mass.      Tenderness: There is no abdominal tenderness.   Skin:     Findings: No rash.   Neurological:      Mental Status: He is alert.      Cranial Nerves: No cranial nerve deficit.      Sensory: No sensory deficit.         Significant Labs:   CBC:   Recent Labs   Lab 10/04/20  0510 10/05/20  0441   WBC 7.54 6.96   HGB 13.5* 13.5*   HCT 39.0* 39.1*   * 99*     CMP:   Recent Labs   Lab 10/04/20  0511 10/05/20  0441    139   K 3.4* 3.7    103   CO2 27 26   GLU 94 96   BUN 12 17   CREATININE 1.1 1.2   CALCIUM 8.4* 8.6*   PROT 6.6 6.2   ALBUMIN 3.5 3.3*   BILITOT 1.6* 1.1*   ALKPHOS 60 59   AST 34 31   ALT 16 13   ANIONGAP 11 10   EGFRNONAA >60 56*     Coagulation:   Recent Labs   Lab 10/04/20  0510   INR 1.1   APTT 36.7*       Significant Imaging:   CXR: No definite acute cardiopulmonary disease     CT head without contrast:  1. 5 mm focus of acute petechial hemorrhage within the left occipital lobe.  2. Small area of subacute versus remote lacunar infarction within the left aspect of the nya.  It is unclear whether this represents artifact or a true finding.  Additional evaluation could be achieved with MRI as deemed clinically appropriate.  3. Age-appropriate cerebral volume loss and chronic microvascular ischemic changes within the periventricular white matter of the supratentorial brain.     MRI brain:  1. Limited examination revealing a small 6 mm focus of acute petechial hemorrhage versus hemorrhagic infarction within the subcortical white matter of the medial left occipital lobe (as demonstrated by recent MRI).  2. Recently noted hypodensity within the left paramidline anterior nya is representative of artifact.  No evidence of acute or remote ischemia/infarction within the nya.  3. Age-appropriate cerebral volume loss and chronic microvascular ischemic changes within the periventricular white matter of the  supratentorial brain.  4. Mild ethmoid and frontal sinus disease.    Repeat CT head without contrast:  Stable appearance to the brain and ventricles relative the previous day's studies with a possible acute to early subacute punctate hemorrhage in the left occipital lobe cortex or adjacent gyrus.  There is no regional mass effect.:        Assessment/Plan:      * Cerebral hemorrhage, acute  Follow General surgery recommendations by Dr. Fang.  Will continue to hold Eliquis use for now.  Continue tele monitoring  Neurochecks q 4 hrs x 24 hrs.  Fall and seizure precautions.  NPO until evaluated by speech therapist for swallowing assessment.  Repeat CT head results reviewed.  Follow Neurology and Neurosurgery recommendations.  Follow 2D echocardiogram results.  Consult Physical Therapy and Occupational therapy for evaluation and treatment.          History of cerebral hemorrhage  Noted.      Goals of care, counseling/discussion  Advance Care Planning     Living Will  During this visit, I engaged with patient's son -healthcare power of    in the advance care planning process.  The patient and I reviewed the role for advance directives and their purpose in directing future healthcare if the patient's unable to speak for him/herself.  At this point in time, the patient does have full decision-making capacity.  We discussed different extreme health states that he could experience, and reviewed what kind of medical care he would want in those situations.  The healthcare power of   communicated that if he were comatose and had little chance of a meaningful recovery, he would want machines/life-sustaining treatments used.  He will be discussing with family and leaning towards do not resuscitate order, he will let us know after talking to the family members.  I spent a total of 20 minutes engaging the patient in this advance care planning discussion.      Discussed on 10/05/2020        Paroxysmal atrial  fibrillation  Hold Eliquis for now.  Tele-monitoring.      Essential hypertension  Chronic problem. Will continue chronic medications and monitor for any changes, adjusting as needed.  Avoid elevated systolic blood pressure.        Gait abnormality  PT and OT consultation with fall precautions.       Discussed with patient's son - hayley, answered all questions.  No remaining questions at the end of conversation.  He understands patient likely needs 247 care.  Family will be considering long-term nursing home placement.  For now they are interested in skilled nursing facility placement.  Will continue PT and OT and place PPD.   is being consulted for assistance.    VTE Risk Mitigation (From admission, onward)         Ordered     IP VTE HIGH RISK PATIENT  Once      10/03/20 1829     Place sequential compression device  Until discontinued      10/03/20 1829     Reason for No Pharmacological VTE Prophylaxis  Once     Question:  Reasons:  Answer:  Active Bleeding    10/03/20 1829                Discharge Planning   KAY:      Code Status: Full Code   Is the patient medically ready for discharge?:     Reason for patient still in hospital (select all that apply): Patient trending condition         Critical care time spent on the evaluation and treatment of severe organ dysfunction, review of pertinent labs and imaging studies, discussions with consulting providers and discussions with patient/family: 35 minutes.      Tamela Pearl MD  Department of Hospital Medicine   [facility]

## 2020-10-05 NOTE — NURSING
Pt's children expressed concerns about the dosage of Trazodone pt is on.  Informed Dr. Pearl.  LUKE to modify Trazodone to 50mg nightly.

## 2020-10-05 NOTE — PLAN OF CARE
Pt Pressure running high.  BP cuff moved, Pt repositioned, and multiple BP cycled taken.  NP Dexter contacted with last BP with SBP in the 180s.  Okayed to give dose of Labetolol before starting Cardene gtt.  Will give medication and monitor closely.

## 2020-10-05 NOTE — PT/OT/SLP EVAL
Physical Therapy Evaluation    Patient Name:  Adriano Borjas   MRN:  [id number]    Recommendations:     Discharge Recommendations:  HHPT at [facility]   Discharge Equipment Recommendations: (TBD)   Barriers to discharge: Decreased caregiver support    Assessment:     Adriano Borjas is a 85 y.o. male admitted with a medical diagnosis of Cerebral hemorrhage, acute.  He presents with the following impairments/functional limitations:  weakness, impaired endurance, impaired self care skills, impaired functional mobilty, gait instability, impaired cognition, decreased safety awareness . Pt seen at ICU, alert, interactive, forgetful. Pt moving all extremeties well- ambulated with RW to hallways 40ft with min assist. Pt to benefit from continued therapies  At [facility].    Rehab Prognosis: Fair; patient would benefit from acute skilled PT services to address these deficits and reach maximum level of function.    Recent Surgery: * No surgery found *      Plan:     During this hospitalization, patient to be seen daily to address the identified rehab impairments via gait training, therapeutic activities, therapeutic exercises and progress toward the following goals:    · Plan of Care Expires:  10/30/20    Subjective   Pt stated live at [facility] for less than a year and that he is   Stated is able to care for self and ambulatory with no device  Goes to [employer] everyday for all his meals  Pt admits that his memory is not good anymore  Chief Complaint: cold  Patient/Family Comments/goals: get well  Pain/Comfort:  · Pain Rating 1: 0/10    Patients cultural, spiritual, Alevism conflicts given the current situation:      Living Environment:  [facility]  Prior to admission, patients level of function was independent.  Equipment used at home: none.  DME owned (not currently used): none.  Upon discharge, patient will have assistance from assisted living.    Objective:      Communicated with nurse Stout prior to session.  Patient found HOB elevated with blood pressure cuff, peripheral IV, pulse ox (continuous)  upon PT entry to room.    General Precautions: Standard, fall, anti-coagulation medicine   Orthopedic Precautions:N/A   Braces: N/A     Exams:  · Postural Exam:  Patient presented with the following abnormalities:    · -       Rounded shoulders  · -       Forward head  · RLE ROM: WFL  · RLE Strength: WFL  · LLE ROM: WFL  · LLE Strength: WFL    Functional Mobility:  · Bed Mobility:     · Rolling Right: minimum assistance  · Scooting: minimum assistance  · Supine to Sit: minimum assistance  · Transfers:     · Sit to Stand:  minimum assistance with rolling walker  · Bed to Chair: minimum assistance with  rolling walker  using  Stand Pivot  · Gait: 40ft with RW min assist- assist for turning and safe use of RW   · VC for safety- is quick to move and impulsive    Therapeutic Activities and Exercises:Patient was educated on the importance of OOB activity and functional mobility to negate negative effects of prolonged bed rest during hospitalization, safe transfers and ambulation, and D/C planning   thera ex while seated EOB  OOB to chair post PT with tray table in front and OT Ivon at bedside       AM-PAC 6 CLICK MOBILITY  Total Score:16     Patient left up in chair with all lines intact, call button in reach and Ivon OT present.    GOALS:   Multidisciplinary Problems     Physical Therapy Goals        Problem: Physical Therapy Goal    Goal Priority Disciplines Outcome Goal Variances Interventions   Physical Therapy Goal     PT, PT/OT Ongoing, Progressing     Description: Goals to be met by: 10-     Patient will increase functional independence with mobility by performin. Supine to sit with Contact Guard Assistance  2. Sit to stand transfer with Contact Guard Assistance  3. Bed to chair transfer with Minimal Assistance using Rolling Walker  4. Gait  x 250 feet with  Minimal Assistance using Rolling Walker.   5. Lower extremity exercise program x20 reps                    History:     Past Medical History:   Diagnosis Date    A-fib     Elevated cholesterol     Essential hypertension     Kidney stone     passed on his own    Memory loss     dememtia per family    Osteoarthritis     Stroke     2012 TIA     Thyroid disease        Past Surgical History:   Procedure Laterality Date    CYSTOSCOPY      EYE SURGERY      MAGNETIC RESONANCE IMAGING N/A 8/23/2019    Procedure: MRI (Magnetic Resonance Imagine) needs anesthesia;  Surgeon: Panchito Montoya MD;  Location: [facility];  Service: Anesthesiology;  Laterality: N/A;       Time Tracking:     PT Received On: 10/05/20  PT Start Time: 1109     PT Stop Time: 1131  PT Total Time (min): 22 min     Billable Minutes: Evaluation 10 and Gait Training 12      Gillian Luciano, PT  10/05/2020

## 2020-10-05 NOTE — PLAN OF CARE
Pt remained free of falls or injuries this shift.  Pt remained confused and oriented to self only.  Slight agitation noted with equipment.  An IV was pulled and the other was nearly lost.  Pt overall pleasant with nursing staff and helpful with bath.  Pt still unable to remember why he came in or why he needs to stay after being repeatedly oriented.  Pressures high during the night, Labetolol given and when no relief obtained Cardene started.  Cardene only on for short time before pressures down and have not risen again.  HR running bradycardic when sleeping.  VSS stable, will continue to monitor.

## 2020-10-05 NOTE — CONSULTS
"  [facility]  Adult Nutrition  Consult Note    SUMMARY    Intervention: general healthful diet and nutrition education     Recommendation:  1) Change diet to cardiac   2) Add boost plus BID   3 ) weigh pt weekly   4) Nutrition education and handout given    Goals: 1) PO intakes > 50% meals and supplements at f/u  Nutrition Goal Status: new  Communication of RD Recs: (POC, sticky note, second sign)    Reason for Assessment    Reason For Assessment: consult  Diagnosis: (cerebral hemorrhage)  Relevant Medical History: HTN, AFIB, TIA, dementia ( per family), nephrolithiasis  Interdisciplinary Rounds: did not attend    General Information Comments: 84 y/o male admitted with cerebral hemorrhage, noted to be confused but pt answered questions appropriately at visit. Ate 50% of breakfast, passed SLP eval. NFPE done 10/5/20, mild-moderate wasting seen. Pt claims to work out and lift weights at home. Claims to have been eating well PTA. Significant 3% wt loss x < 2 weeks. Educated pt on cardiac diet per consult, may need review at f/u.    Nutrition Discharge Planning: to be determined- Cardiac diet    Nutrition Risk Screen    Nutrition Risk Screen: no indicators present    Nutrition/Diet History    Patient Reported Diet/Restrictions/Preferences: general  Typical Food/Fluid Intake: Claims to live in a facility where meals are made for him. Likes ham and cheese sandwiches. Says he eats lots of fruits and vegetables but ? accuracy of report, he was saying yes to almost everything.  Spiritual, Cultural Beliefs, Judaism Practices, Values that Affect Care: no  Food Allergies: NKFA  Factors Affecting Nutritional Intake: decreased appetite, impaired cognitive status/motor control    Anthropometrics    Temp: 97.1 °F (36.2 °C)  Height Method: Measured  Height: 5' 8.5"  Height (inches): 68.5 in  Weight Method: Bed Scale  Weight: 69.4 kg (153 lb)  Weight (lb): 153 lb  Ideal Body Weight (IBW), Male: 157 lb  % Ideal " Body Weight, Male (lb): 97.45 %  BMI (Calculated): 22.9  BMI Grade: 18.5-24.9 - normal  Weight Loss: unintentional  Usual Body Weight (UBW), k.4 kg(20)  Weight Change Amount: (72.4 kg 20)  % Usual Body Weight: 91.03  % Weight Change From Usual Weight: -9.16 %       Lab/Procedures/Meds    Pertinent Labs Reviewed: reviewed  BMP  Lab Results   Component Value Date     10/05/2020    K 3.7 10/05/2020     10/05/2020    CO2 26 10/05/2020    BUN 17 10/05/2020    CREATININE 1.2 10/05/2020    CALCIUM 8.6 (L) 10/05/2020    ANIONGAP 10 10/05/2020    ESTGFRAFRICA >60 10/05/2020    EGFRNONAA 56 (A) 10/05/2020     Lab Results   Component Value Date    ALBUMIN 3.3 (L) 10/05/2020     No results found for: IRON, TIBC, FERRITIN, SATURATEDIRO  Lab Results   Component Value Date    [id number] 2020       Pertinent Medications Reviewed: reviewed  Pertinent Medications Comments: statin, vitamin C, MVI, KCl, zofran    Estimated/Assessed Needs    Weight Used For Calorie Calculations: 69.4 kg (153 lb)  Energy Calorie Requirements (kcal): MSJ ( x 1.4 g protein/kg wt mainenance) = 1920 kcal  Energy Need Method: Katelynn Headley  Protein Requirements: 1.2-1.4 g protein/kg ( age/wasting/critical care) = 83-97 g protein  Weight Used For Protein Calculations: 69.4 kg (153 lb)  Fluid Requirements (mL): 1900 ml or per MD  Estimated Fluid Requirement Method: RDA Method  CHO Requirement: N/A      Nutrition Prescription Ordered    Current Diet Order: Regular    Evaluation of Received Nutrient/Fluid Intake    Energy Calories Required: not meeting needs  Protein Required: not meeting needs  Fluid Required: meeting needs  Tolerance: tolerating  % Intake of Estimated Energy Needs: 65%  % Meal Intake: 50%    Nutrition Risk    Level of Risk/Frequency of Follow-up: moderate  2 x weekly    Assessment and Plan    Moderate malnutrition  Contributing Nutrition Diagnosis  Moderate chronic condition related malnutrition    Related  to (etiology):   Decreased appetite    Signs and Symptoms (as evidenced by):   1) mild-moderate wasting as charted below  2) 9% wt loss x 2 months    Interventions/Recommendations (treatment strategy):  above    Nutrition Diagnosis Status:   New           Monitor and Evaluation    Food and Nutrient Intake: energy intake, food and beverage intake  Food and Nutrient Adminstration: diet order  Anthropometric Measurements: weight  Biochemical Data, Medical Tests and Procedures: electrolyte and renal panel  Nutrition-Focused Physical Findings: overall appearance     Malnutrition Assessment  Malnutrition Type: chronic illness  Skin (Micronutrient): (Jarek = 18)  Teeth (Micronutrient): (plaque)   Micronutrient Evaluation: suspected deficiency  Micronutrient Evaluation Comments: check iron and B12   Weight Loss (Malnutrition): greater than 7.5% in 3 months   Orbital Region (Subcutaneous Fat Loss): mild depletion  Upper Arm Region (Subcutaneous Fat Loss): moderate depletion  Thoracic and Lumbar Region: moderate depletion   [place] Region (Muscle Loss): mild depletion  Clavicle Bone Region (Muscle Loss): moderate depletion  Clavicle and Acromion Bone Region (Muscle Loss): moderate depletion  Scapular Bone Region (Muscle Loss): mild depletion  Dorsal Hand (Muscle Loss): moderate depletion  Patellar Region (Muscle Loss): moderate depletion  Anterior Thigh Region (Muscle Loss): mild depletion  Posterior Calf Region (Muscle Loss): mild depletion   Edema (Fluid Accumulation): 0-->no edema present   Subcutaneous Fat Loss (Final Summary): moderate protein-calorie malnutrition  Muscle Loss Evaluation (Final Summary): moderate protein-calorie malnutrition         Nutrition Follow-Up    RD Follow-up?: Yes

## 2020-10-05 NOTE — PLAN OF CARE
Problem: Occupational Therapy Goal  Goal: Occupational Therapy Goal  Description: Goals to be met by: 10/18/2020     Patient will increase functional independence with ADLs by performing:    UE Dressing with Set-up Assistance and Supervision.  LE Dressing with Set-up Assistance, Contact Guard Assistance, and Assistive Devices as needed.  Grooming while seated at sink with Stand-by Assistance and Assistive Devices as needed.  Toileting from toilet with Set-up Assistance, Minimal Assistance, and Assistive Devices as needed for hygiene and clothing management.   Toilet transfer to toilet with Contact Guard Assistance.    Outcome: Ongoing, Progressing   OT evaluation completed today. Goals & care plan established.  JAMIE Laboy  10/5/2020

## 2020-10-05 NOTE — PLAN OF CARE
Pt to transfer to the floor when room is available.  VSS.  Pt was restarted on Cardene gtt for only a brief period this morning.  Pt confused but pleasant.  TTE completed today; awaiting results.  MRA also completed today.  Son at bedside majority of the day.  MD discussed POC with him.  Son would like RN to call him when pt transfers to floor.  Safety maintained entire shift.

## 2020-10-05 NOTE — PT/OT/SLP EVAL
Speech Language Pathology Evaluation  Bedside Swallow    Patient Name:  Adriano Borjas   MRN:  [id number]  Admitting Diagnosis: Cerebral hemorrhage, acute    Recommendations:                 General Recommendations:  Follow-up not indicated  Diet recommendations:  Regular, Thin   Aspiration Precautions: Set up assist   General Precautions: Standard, fall  Communication strategies:  none    History:     Past Medical History:   Diagnosis Date    A-fib     Elevated cholesterol     Essential hypertension     Kidney stone     passed on his own    Memory loss     dememtia per family    Osteoarthritis     Stroke     2012 TIA     Thyroid disease        Past Surgical History:   Procedure Laterality Date    CYSTOSCOPY      EYE SURGERY      MAGNETIC RESONANCE IMAGING N/A 8/23/2019    Procedure: MRI (Magnetic Resonance Imagine) needs anesthesia;  Surgeon: Panchito Montoya MD;  Location: [facility]ending [facility] to [facility];  Service: Anesthesiology;  Laterality: N/A;       Social History: Patient was living at [facility]. Recently moved in with family. .    Prior Intubation HX:  None this admit    Modified Barium Swallow: None in epic    Imaging:  CT Head Without Contrast   Final Result      Stable appearance to the brain and ventricles relative the previous day's studies with a possible acute to early subacute punctate hemorrhage in the left occipital lobe cortex or adjacent gyrus.  There is no regional mass effect.      No new findings         Electronically signed by: Joey Haney MD   Date:    10/04/2020   Time:    12:45      US Carotid Bilateral   Final Result      No sonographically evident hemodynamically significant > 50% luminal stenosis observed within the left or right internal carotid artery.         Electronically signed by: Brokc Alberto MD   Date:    10/04/2020   Time:    11:52      MRI Brain Without Contrast   Final Result   Abnormal      1. Limited examination revealing a small 6 mm focus of acute  "petechial hemorrhage versus hemorrhagic infarction within the subcortical white matter of the medial left occipital lobe (as demonstrated by recent MRI).   2. Recently noted hypodensity within the left paramidline anterior nya is representative of artifact.  No evidence of acute or remote ischemia/infarction within the nya.   3. Age-appropriate cerebral volume loss and chronic microvascular ischemic changes within the periventricular white matter of the supratentorial brain.   4. Mild ethmoid and frontal sinus disease.   This report was flagged in Epic as abnormal.         Electronically signed by: Brock Alberto MD   Date:    10/03/2020   Time:    13:33      CT Head Without Contrast   Final Result   Abnormal      1. 5 mm focus of acute petechial hemorrhage within the left occipital lobe.   2. Small area of subacute versus remote lacunar infarction within the left aspect of the nya.  It is unclear whether this represents artifact or a true finding.  Additional evaluation could be achieved with MRI as deemed clinically appropriate.   3. Age-appropriate cerebral volume loss and chronic microvascular ischemic changes within the periventricular white matter of the supratentorial brain.   This report was flagged in Epic as abnormal.         Electronically signed by: Brock Alberto MD   Date:    10/03/2020   Time:    10:57      X-Ray Chest AP Portable   Final Result      No definite acute cardiopulmonary disease         Electronically signed by: Joey Haney MD   Date:    10/03/2020   Time:    09:54      MRA Brain without contrast    (Results Pending)        Prior diet: Regular/thin.    Occupation/hobbies/homemaking: Pt reports he was ambulatory prior to admission. Likely requires 24/7 supervision 2' dementia.    Subjective   When asked if he was ambulatory, pt responded, "Yes, and you should see me run!"  No c/o    Objective:   Pt seen in ICU. He is awake, alert and oriented to self and place. He is very pleasant and " cooperative. Follows simple commands and answer simple questions appropriately. Able to make needs known and engage in simple conversation. Baseline dementia.     Oral Musculature Evaluation  · Oral Musculature: WFL(mildly reduced on L)  · Dentition: present and adequate(missing molars)  · Secretion Management: adequate  · Mucosal Quality: good  · Mandibular Strength and Mobility: WFL  · Oral Labial Strength and Mobility: WFL  · Lingual Strength and Mobility: WFL  · Volitional Cough: adequate  · Volitional Swallow: able to palpate larygneal rise  · Voice Prior to PO Intake: clear    Bedside Swallow Eval:   Consistencies Assessed:  · Thin liquids --via cup, spoon and straw  · Puree --applesauce  · Mixed consistencies --diced peaches  · Solids --cookie     Oral Phase:   · WFL    Pharyngeal Phase:   · no overt clinical signs/symptoms of aspiration    Compensatory Strategies  · None    Assessment:   Clinical swallowing evaluation completed. Pt alert, very pleasant, calm and cooperative. Baseline demential. Per chart, son reports mental status has returned to baseline. All po trials tolerated with no overt s/s swallow dysfunction. REC Regular textures with thin liquids. No f/u indicated ATT. Please re consult PRN.     Plan:     · Patient to be seen:      · Plan of Care expires:     · Plan of Care reviewed with:  patient   · SLP Follow-Up:  No       Discharge recommendations:  assisted living facility(assisted living memory care facility)   Barriers to Discharge:  None    Time Tracking:     SLP Treatment Date:   10/05/20  Speech Start Time:  1027  Speech Stop Time:  1036     Speech Total Time (min):  9 min    Billable Minutes: Eval Swallow and Oral Function 9 and Total Time 9    Cat Olivier CCC-SLP  10/05/2020

## 2020-10-05 NOTE — PLAN OF CARE
Intervention: general healthful diet and nutrition education      Recommendation:  1) Change diet to cardiac   2) Add boost plus BID   3 ) weigh pt weekly   4) Nutrition education and handout given     Goals: 1) PO intakes > 50% meals and supplements at f/u  Nutrition Goal Status: new  Communication of RD Recs: (POC, sticky note, second sign)

## 2020-10-05 NOTE — PT/OT/SLP EVAL
Occupational Therapy   Evaluation    Name: Adriano Borjas  MRN: [id number]  Admitting Diagnosis:  Cerebral hemorrhage, acute      Recommendations:     Discharge Recommendations: home health OT, home health PT  Discharge Equipment Recommendations:  (TBD)  Barriers to discharge:  None    Assessment:     Adriano Borjas is a 85 y.o. male with a medical diagnosis of Cerebral hemorrhage, acute.  He presents with a decline in functional status due to the listed impairments, impacting ADLs and functional mobility. Pt found seated in bedside chair at end of physical therapy session. He was alert, oriented to self, place, year and current president. He was very pleasant and was able to follow 1-2 step commands.     Pt displayed B UE 5/5 strength and AROM WFL. He was able to feed himself with partial set-up and increased time due to mild B UE tremors impairing hand function. He stood from chair with Min A and cues for hand placement. Patient is likely close to his functional baseline but will benefit from OT treatment to maximize endurance, safety & independence with ADL's & functional mobility.    Performance deficits affecting function: weakness, impaired endurance, impaired cognition, decreased coordination, impaired self care skills, impaired fine motor, impaired functional mobilty, gait instability, decreased safety awareness.      Pt will benefit from returning to his [facility] with HH OT & PT to increase ADL independence and safety in pt's home environment.    Rehab Prognosis: Good; patient would benefit from acute skilled OT services to address these deficits and reach maximum level of function.       Plan:     Patient to be seen 4 x/week to address the above listed problems via self-care/home management, therapeutic activities, therapeutic exercises  · Plan of Care Expires: 10/18/20  · Plan of Care Reviewed with: patient    Subjective     Chief Complaint: None  Patient/Family Comments/goals: Everyone is taking good care of  me here. My son said they would.    Occupational Profile:  Living Environment: Pt resides at [facility] but has been staying with his children recently due to pt's recent cognitive decline. They would like him to move to the memory care unit at [place] per medical record.  Previous level of function: Pt reports that he was able to perform self care tasks with supervision or independence and did not use AD for functional mobility.  Roles and Routines: Pt reports that he walked to the dining room for all his meals.   Equipment Used at Home:  grab bar  Assistance upon Discharge: Nursing staff will help patient.    Pain/Comfort:  · Pain Rating 1: 0/10  · Pain Rating Post-Intervention 1: 0/10    Patients cultural, spiritual, Alevism conflicts given the current situation:      Objective:     Communicated with: Argelia nurse and Tiffani PT prior to session.  Patient found up in chair with blood pressure cuff, peripheral IV, pulse ox (continuous), telemetry upon OT entry to room.    General Precautions: Standard, anti-coagulation medicine, fall   Orthopedic Precautions:N/A   Braces: N/A     Occupational Performance:    Functional Mobility/Transfers:  · Patient completed Sit <> Stand Transfer with minimum assistance and cues for hand placement  with  no assistive device and hand-held assist       Activities of Daily Living:  · Feeding:  partial set-up to open containers and increased time to scoop food, taking small bites, seated in chair    · Lower Body Dressing: minimum assistance to don/doff socks seated in chair     Cognitive/Visual Perceptual:  Cognitive/Psychosocial Skills:     -       Oriented to: Person, Place and year   -       Follows Commands/attention:Follows one-step commands and Follows two-step commands  -       Communication: clear/fluent  -       Memory: Impaired STM and Poor immediate recall  -       Safety awareness/insight to disability: impaired   -       Mood/Affect/Coping skills/emotional  control: Appropriate to situation, Cooperative and Pleasant  Visual/Perceptual:      -Intact  acuity, R/L discrimination and visual field      Physical Exam:  Postural examination/scapula alignment:    -       Rounded shoulders  -       Forward head  Skin integrity: Thin and Dry  Edema:  None noted  Sensation:    -       Intact  light/touch B UE's  Motor Planning:    -       Intact  Dominant hand:    -       right  Upper Extremity Range of Motion:     -       Right Upper Extremity: WFL  -       Left Upper Extremity: WFL  Upper Extremity Strength:    -       Right Upper Extremity: WNL  -       Left Upper Extremity: WNL   Strength:    -       Right Upper Extremity: WNL  -       Left Upper Extremity: WNL  Fine Motor Coordination:    -       Intact  Left hand thumb/finger opposition skills and Right hand thumb/finger opposition skills  -       Impaired  Left hand, manipulation of objects   and Right hand, manipulation of objects    Gross motor coordination:   mild B UE tremor    AMPAC 6 Click ADL:  AMPAC Total Score: 18    Treatment & Education:  OT oriented pt to month & situation as well as how to use call button.  OT ed pt on fall risk and strongly advised pt to call for help for all OOB mobility.  Pt verbalized understanding.    Education:    Patient left up in chair with all lines intact, call button in reach, chair alarm on and nurse Argelia present    GOALS:   Multidisciplinary Problems     Occupational Therapy Goals        Problem: Occupational Therapy Goal    Goal Priority Disciplines Outcome Interventions   Occupational Therapy Goal     OT, PT/OT Ongoing, Progressing    Description: Goals to be met by: 10/18/2020     Patient will increase functional independence with ADLs by performing:    UE Dressing with Set-up Assistance and Supervision.  LE Dressing with Set-up Assistance, Contact Guard Assistance, and Assistive Devices as needed.  Grooming while seated at sink with Stand-by Assistance and Assistive  Devices as needed.  Toileting from toilet with Set-up Assistance, Minimal Assistance, and Assistive Devices as needed for hygiene and clothing management.   Toilet transfer to toilet with Contact Guard Assistance.                     History:     Past Medical History:   Diagnosis Date    A-fib     Elevated cholesterol     Essential hypertension     Kidney stone     passed on his own    Memory loss     dememtia per family    Osteoarthritis     Stroke     2012 TIA     Thyroid disease        Past Surgical History:   Procedure Laterality Date    CYSTOSCOPY      EYE SURGERY      MAGNETIC RESONANCE IMAGING N/A 8/23/2019    Procedure: MRI (Magnetic Resonance Imagine) needs anesthesia;  Surgeon: Panchito Montoya MD;  Location: [facility];  Service: Anesthesiology;  Laterality: N/A;       Time Tracking:     OT Date of Treatment: 10/05/20  OT Start Time: 1130  OT Stop Time: 1145  OT Total Time (min): 15 min    Billable Minutes:Evaluation 15    JAMIE Leong  10/5/2020

## 2020-10-05 NOTE — PROGRESS NOTES
[facility]  Neurology  Progress Note    Patient Name: Adriano Borjas  MRN: [id number]  Admission Date: 10/3/2020  Hospital Length of Stay: 2 days  Code Status: Full Code   Attending Provider: Tamela Pearl MD  Primary Care Physician: Bud Kamara MD   Principal Problem:Cerebral hemorrhage, acute    Subjective:     Interval History: Patient seen and examined this morning. He is alert and disoriented to time. No focal deficits noted. B/l hand resting tremor noted to hands, no cogwheel rigidity noted.     Brain imaging  MRA brain w/o contrast: pending     EEG:   IMPRESSION:   Abnormal EEG due to the finding of a mild, generalized, non-specific cerebral dysfunction.  No electrographic seizures or indications of seizure tendency.    Heart imaging:   TTE with bubble pending     Current Neurological Medications:     Current Facility-Administered Medications   Medication Dose Route Frequency Provider Last Rate Last Dose    ascorbic acid (vitamin C) tablet 500 mg  500 mg Oral Daily Tamela Pearl MD   500 mg at 10/05/20 0804    atorvastatin tablet 40 mg  40 mg Oral Daily Tamela Pearl MD   40 mg at 10/05/20 0803    haloperidol lactate injection 2 mg  2 mg Intravenous Q6H PRN NITHYA Petersen   2 mg at 10/04/20 0840    labetaloL injection 10 mg  10 mg Intravenous Q15 Min PRN Tamela Pearl MD   10 mg at 10/05/20 0104    levothyroxine tablet 75 mcg  75 mcg Oral Daily Tamela Pearl MD   75 mcg at 10/05/20 0803    multivitamin tablet  1 tablet Oral Daily Tamela Pearl MD   1 tablet at 10/05/20 0803    niCARdipine 40 mg/200 mL (0.2 mg/mL) infusion  5 mg/hr Intravenous Continuous Rolan Alcantara III, MD   Stopped at 10/05/20 0822    ondansetron injection 4 mg  4 mg Intravenous Q12H PRN Tamela Pearl MD        sodium chloride 0.9% bolus 500 mL  500 mL Intravenous Continuous PRN Tamela Pearl MD        sodium chloride 0.9% flush 10 mL  10 mL Intravenous PRN Tamela Pearl MD        traZODone tablet 100  mg  100 mg Oral QHS NITHYA Petersen   100 mg at 10/04/20 2200     Facility-Administered Medications Ordered in Other Encounters   Medication Dose Route Frequency Provider Last Rate Last Dose    lactated ringers infusion   Intravenous Continuous Panchito Montoya MD        lidocaine (PF) 10 mg/ml (1%) injection 10 mg  1 mL Intradermal Once Panchito Montoya MD           Review of Systems   As per HPI   Objective:     Vital Signs (Most Recent):  Temp: 97.8 °F (36.6 °C) (10/05/20 0800)  Pulse: 64 (10/05/20 0915)  Resp: 20 (10/05/20 0915)  BP: (!) 123/55 (10/05/20 0915)  SpO2: 100 % (10/05/20 0915) Vital Signs (24h Range):  Temp:  [97.4 °F (36.3 °C)-98.9 °F (37.2 °C)] 97.8 °F (36.6 °C)  Pulse:  [] 64  Resp:  [0-63] 20  SpO2:  [89 %-100 %] 100 %  BP: ()/(50-99) 123/55     Weight: 69.4 kg (153 lb)  Body mass index is 22.93 kg/m².    Physical Exam  Physical Exam  Vitals signs and nursing note reviewed.   Constitutional:       General: He is awake.      Appearance: Normal appearance.      Comments: elderly    HENT:      Head: Normocephalic and atraumatic.      Nose: Nose normal.      Mouth/Throat:      Mouth: Mucous membranes are moist.      Pharynx: Oropharynx is clear.   Eyes:      General: Lids are normal.      Extraocular Movements: Extraocular movements intact.      Right eye: Normal extraocular motion.      Left eye: Normal extraocular motion.      Conjunctiva/sclera: Conjunctivae normal.      Pupils: Pupils are equal, round, and reactive to light.   Neck:      Musculoskeletal: Full passive range of motion without pain and neck supple.   Cardiovascular:      Rate and Rhythm: Normal rate. Rhythm irregular.      Pulses: Normal pulses.      Heart sounds: Normal heart sounds.   Pulmonary:      Effort: Pulmonary effort is normal.      Breath sounds: Normal breath sounds.   Abdominal:      General: Abdomen is flat. Bowel sounds are normal.      Palpations: Abdomen is soft.   Musculoskeletal:  Normal range of motion.   Skin:     General: Skin is warm and dry.      Capillary Refill: Capillary refill takes less than 2 seconds.   Neurological:      General: No focal deficit present.      Mental Status: He is alert. Mental status is at baseline. He is disoriented.      GCS: GCS eye subscore is 4. GCS verbal subscore is 4. GCS motor subscore is 5.      Cranial Nerves: Cranial nerves are intact.      Sensory: Sensation is intact.      Motor: tremor present.      Coordination: Finger-Nose-Finger Test abnormal.      Deep Tendon Reflexes:      Reflex Scores:       Brachioradialis reflexes are 2+ on the right side and 2+ on the left side.       Patellar reflexes are 2+ on the right side and 2+ on the left side.     Comments: Alert and oriented  to person and place , Gait not observed    Psychiatric:         Attention and Perception: Attention normal. He is attentive. He does not perceive visual hallucinations.         Mood and Affect: Mood normal.         Speech: Speech normal.         Behavior: Behavior normal. Behavior is cooperative.         Thought Content: Thought content normal.         Cognition and Memory: Cognition is impaired. Memory is impaired.         Judgment: Judgment normal.      Comments: Very cooperative             NEUROLOGICAL EXAMINATION:      MENTAL STATUS   Oriented to person.   Disoriented to place. Disoriented to country, city and area.   Disoriented to time.   Registration: recalls 3 of 3 objects. Follows 3 step commands.   Attention: normal. Concentration: normal.   Speech: speech is normal   Level of consciousness: alert  Knowledge: poor and inconsistent with education.   Able to name object.        Baseline dementia       CRANIAL NERVES      CN II   Visual fields full to confrontation.   Visual acuity: normal     CN III, IV, VI   Pupils are equal, round, and reactive to light.  Right pupil: Size: 3 mm. Shape: regular. Reactivity: brisk.   Left pupil: Size: 3 mm. Shape: regular.  Reactivity: brisk.      CN V   Facial sensation intact.      CN VII   Facial expression full, symmetric.      CN VIII   Hearing: impaired     MOTOR EXAM      Strength   Right neck flexion: 4/5  Left neck flexion: 4/5  Right neck extension: 4/5  Left neck extension: 4/5  Right deltoid: 4/5  Left deltoid: 4/5  Right biceps: 4/5  Left biceps: 4/5  Right triceps: 4/5  Left triceps: 4/5  Right wrist flexion: 4/5  Left wrist flexion: 4/5  Right wrist extension: 4/5  Left wrist extension: 4/5  Right interossei: 4/5  Left interossei: 4/5  Right abdominals: 4/5  Left abdominals: 4/5  Right iliopsoas: 4/5  Left iliopsoas: 4/5  Right quadriceps: 4/5  Left quadriceps: 4/5  Right hamstrin/5  Left hamstrin/5  Right glutei: 4/5  Left glutei: 4/5  Right anterior tibial: 4/5  Left anterior tibial: 4/5  Right posterior tibial: 4/5  Left posterior tibial: 4/5  Right peroneal: 4/5  Left peroneal: 4/5  Right gastroc: 4/5  Left gastroc: 4/5     REFLEXES      Reflexes   Right brachioradialis: 2+  Left brachioradialis: 2+  Right patellar: 2+  Left patellar: 2+     SENSORY EXAM   Light touch normal.   Right arm light touch: normal  Left arm light touch: normal  Right leg vibration: decreased from toes  Left leg vibration: decreased from toes     GAIT AND COORDINATION      Coordination   Finger to nose coordination: abnormal     Tremor   Resting tremor: present       Gait not observed            Significant Labs:   Hemoglobin A1c:   Recent Labs   Lab 10/04/20  0510   HGBA1C 5.2     BMP:   Recent Labs   Lab 10/04/20  0511 10/05/20  0441   GLU 94 96    139   K 3.4* 3.7    103   CO2 27 26   BUN 12 17   CREATININE 1.1 1.2   CALCIUM 8.4* 8.6*   MG 2.0  --      CBC:   Recent Labs   Lab 10/04/20  0510 10/05/20  0441   WBC 7.54 6.96   HGB 13.5* 13.5*   HCT 39.0* 39.1*   * 99*     CMP:   Recent Labs   Lab 10/04/20  0511 10/05/20  0441   GLU 94 96    139   K 3.4* 3.7    103   CO2 27 26   BUN 12 17   CREATININE  1.1 1.2   CALCIUM 8.4* 8.6*   MG 2.0  --    PROT 6.6 6.2   ALBUMIN 3.5 3.3*   BILITOT 1.6* 1.1*   ALKPHOS 60 59   AST 34 31   ALT 16 13   ANIONGAP 11 10   EGFRNONAA >60 56*     All pertinent lab results from the past 24 hours have been reviewed.    Significant Imaging: I have reviewed all pertinent imaging results/findings within the past 24 hours.    Assessment and Plan:  1. Cerebral hemorrhage, acute vs  Hemorrhagic infarction within the subcortical white matter of the medial left occipital lobe /Acute Encephalopathy /Confusion  -Dr. Fang from neurosurgery was consulted by Dr. Anderson from ER who suspects, occipital hemorrhage could be artifact.  Will continue to hold Eliquis use for now.-  -Neurochecks q 4 hrs   - Fall and seizure precautions.  -MRI brain w/o contrast noted    -.4-continue statin therapy for goal <70   -Seizure precautions    - Neurosurgery recs noted and appreciated   -Neuro checks q 1 hours   -Keep blood pressure systolic pressure less than 150 goal.   -TTE w/bubble pending  intra-cardiac thrombus or valvular heart disease.    2. . History of Dementia  -Internal MD to manage      3. History of Stroke  -Hold blood thinners for now    4. History of Paroxysmal atrial fibrillation   -Hold  Eliquis  -Neurosurgery rec noted   -Repeat CT appears to be stable     5. Hypothyroidism  -Internal MD to manage     6. HTN  -Internal MD to manage      10/4: Patient CT of head showed a 5 mm focus of acute petechial hemorrhage with in the left occipital lobe. Small area of sub acute vs remote lacunar infarction within the left nya. Age related appropriate cerebral volume loss. Chronic microvascular ischemic changes within the periventricular white matter of the supratentorial brain. MRI of brain showed limited examination revealing small 6 mm focus acute petechial hemorraghic vs hemorraghic infarction within the subcortical white mater of the medical left occipital lobe. CUS impression: No sonographically  evident hemodynamically significant > 50% luminal stenosis observed within the left or right internal carotid artery. Repeat Ct of head 10/4 w/o contrast impression: Stable appearance to the brain and ventricles relative the previous day's studies with a possible acute to early subacute punctate hemorrhage in the left occipital lobe cortex or adjacent gyrus.  There is no regional mass effect.No new findings.    10/5: Ct head w/o contrast obtained on yesterday stable per report. Patient is alert and oriented to self and place, disoriented to time. If patient remains stable, OK with pt being transferred to tele unit.           [facility] at [phone] within 3 days from discharge.     Stroke education was provided including stroke risk factors modification and any acute neurological changes including weakness, confusion, visual changes to come straight to the ER.  Seizure education was provided including no driving, no swimming by self, no operation of heavy machinery or climbing on ladders.     All side effects of new medications were discussed with patient and/or next of kin and all questions were answered.                                                                                   Active Diagnoses:    Diagnosis Date Noted POA    PRINCIPAL PROBLEM:  Cerebral hemorrhage, acute [I61.9] 10/03/2020 Yes    Intracranial hemorrhage [I62.9] 10/03/2020 Yes    Goals of care, counseling/discussion [Z71.89] 10/03/2020 Not Applicable    History of cerebral hemorrhage [Z86.79] 09/05/2019 Not Applicable    Paroxysmal atrial fibrillation [I48.0] 12/12/2018 Yes    Essential hypertension [I10] 07/27/2018 Yes    Gait abnormality [R26.9] 06/26/2017 Yes      Problems Resolved During this Admission:       VTE Risk Mitigation (From admission, onward)         Ordered     IP VTE HIGH RISK PATIENT  Once      10/03/20 1829     Place sequential compression device  Until discontinued       10/03/20 1829     Reason for No Pharmacological VTE Prophylaxis  Once     Question:  Reasons:  Answer:  Active Bleeding    10/03/20 1829                Petra Weiss DNP  Neurology  [facility]

## 2020-10-05 NOTE — ASSESSMENT & PLAN NOTE
Contributing Nutrition Diagnosis  Moderate chronic condition related malnutrition    Related to (etiology):   Decreased appetite    Signs and Symptoms (as evidenced by):   1) mild-moderate wasting as charted below  2) 9% wt loss x 2 months    Interventions/Recommendations (treatment strategy):  above    Nutrition Diagnosis Status:   New

## 2020-10-05 NOTE — SUBJECTIVE & OBJECTIVE
Interval History:  Patient is in intensive care unit secondary to left occipital hemorrhage.  Overnight patient was increasingly agitated.  Presently sleeping after receiving intravenous haloperidol.    Review of Systems   Constitutional: Positive for appetite change and fatigue.   Neurological: Positive for weakness and headaches.   Psychiatric/Behavioral: Positive for agitation and confusion.   All other systems reviewed and are negative.    Objective:     Vital Signs (Most Recent):  Temp: 97.8 °F (36.6 °C) (10/05/20 0800)  Pulse: 64 (10/05/20 0915)  Resp: 20 (10/05/20 0915)  BP: (!) 123/55 (10/05/20 0915)  SpO2: 100 % (10/05/20 0915) Vital Signs (24h Range):  Temp:  [97.4 °F (36.3 °C)-98.9 °F (37.2 °C)] 97.8 °F (36.6 °C)  Pulse:  [] 64  Resp:  [0-63] 20  SpO2:  [89 %-100 %] 100 %  BP: ()/(50-99) 123/55     Weight: 69.4 kg (153 lb)  Body mass index is 22.93 kg/m².    Intake/Output Summary (Last 24 hours) at 10/5/2020 1010  Last data filed at 10/5/2020 0500  Gross per 24 hour   Intake 358.75 ml   Output 200 ml   Net 158.75 ml      Physical Exam  Vitals signs and nursing note reviewed.   Constitutional:       Appearance: Normal appearance. He is well-developed.   HENT:      Head: Normocephalic and atraumatic.      Nose: Nose normal. No septal deviation.   Eyes:      Conjunctiva/sclera: Conjunctivae normal.      Pupils: Pupils are equal, round, and reactive to light.   Neck:      Thyroid: No thyroid mass.      Vascular: No JVD.      Trachea: No tracheal tenderness or tracheal deviation.   Cardiovascular:      Rate and Rhythm: Normal rate and regular rhythm.      Heart sounds: S1 normal and S2 normal. No murmur. No friction rub. No gallop.    Pulmonary:      Effort: Pulmonary effort is normal.      Breath sounds: Normal breath sounds. No decreased breath sounds, wheezing, rhonchi or rales.   Abdominal:      General: Bowel sounds are normal. There is no distension.      Palpations: Abdomen is soft. There is  no hepatomegaly, splenomegaly or mass.      Tenderness: There is no abdominal tenderness.   Skin:     Findings: No rash.   Neurological:      Mental Status: He is alert.      Cranial Nerves: No cranial nerve deficit.      Sensory: No sensory deficit.         Significant Labs:   CBC:   Recent Labs   Lab 10/04/20  0510 10/05/20  0441   WBC 7.54 6.96   HGB 13.5* 13.5*   HCT 39.0* 39.1*   * 99*     CMP:   Recent Labs   Lab 10/04/20  0511 10/05/20  0441    139   K 3.4* 3.7    103   CO2 27 26   GLU 94 96   BUN 12 17   CREATININE 1.1 1.2   CALCIUM 8.4* 8.6*   PROT 6.6 6.2   ALBUMIN 3.5 3.3*   BILITOT 1.6* 1.1*   ALKPHOS 60 59   AST 34 31   ALT 16 13   ANIONGAP 11 10   EGFRNONAA >60 56*     Coagulation:   Recent Labs   Lab 10/04/20  0510   INR 1.1   APTT 36.7*       Significant Imaging:   CXR: No definite acute cardiopulmonary disease     CT head without contrast:  1. 5 mm focus of acute petechial hemorrhage within the left occipital lobe.  2. Small area of subacute versus remote lacunar infarction within the left aspect of the nya.  It is unclear whether this represents artifact or a true finding.  Additional evaluation could be achieved with MRI as deemed clinically appropriate.  3. Age-appropriate cerebral volume loss and chronic microvascular ischemic changes within the periventricular white matter of the supratentorial brain.     MRI brain:  1. Limited examination revealing a small 6 mm focus of acute petechial hemorrhage versus hemorrhagic infarction within the subcortical white matter of the medial left occipital lobe (as demonstrated by recent MRI).  2. Recently noted hypodensity within the left paramidline anterior nya is representative of artifact.  No evidence of acute or remote ischemia/infarction within the nya.  3. Age-appropriate cerebral volume loss and chronic microvascular ischemic changes within the periventricular white matter of the supratentorial brain.  4. Mild ethmoid and  frontal sinus disease.    Repeat CT head without contrast:  Stable appearance to the brain and ventricles relative the previous day's studies with a possible acute to early subacute punctate hemorrhage in the left occipital lobe cortex or adjacent gyrus.  There is no regional mass effect.:

## 2020-10-05 NOTE — CARE UPDATE
10/05/20 0748   PRE-TX-O2   O2 Device (Oxygen Therapy) room air   SpO2 100 %   Pulse Oximetry Type Continuous   $ Pulse Oximetry - Multiple Charge Pulse Oximetry - Multiple   Pulse 60   Resp (!) 24   BP (!) 150/73

## 2020-10-05 NOTE — PLAN OF CARE
"Initial discharge planning assessment completed at bedside with patient and son Adriano Augustin. Patient lives at [facility] [address] but "recently having memory issues and has been staying with his children. Difficulty sleeping; saw neuro; placed on a sleeping pill and was increased about ten days ago". PCP is [facility] Joyce. Address listed on face sheet is different than his residence at [facility] although receives mail at the address and son did not want to change at this time. Pt's children are very supportive and provide assistance and transportation as needed. Prior to COVID patient was going to a gym to workout. Patient has no issues with affording meds. Son states, "when he needs Eliquis we share the cost". Pharmacy used is [facility] in [place]. Discussed therapy consult and if needed would want "[facility]" at discharge. No prior home health.       10/05/20 1233   Discharge Assessment   Assessment Type Discharge Planning Assessment   Confirmed/corrected address and phone number on facesheet? Yes   Assessment information obtained from? Patient   Prior to hospitilization cognitive status: Alert/Oriented   Prior to hospitalization functional status: Needs Assistance   Current cognitive status: Alert/Oriented   Current Functional Status: Needs Assistance   Facility Arrived From: [facility]   Lives With facility resident  (Assisted Living Facility)   Is patient able to care for self after discharge? Unable to determine at this time (comments)  (Therapy consult pending)   Who are your caregiver(s) and their phone number(s)? Prieto Borjas son [phone]; Chris Monae daughter [phone]   Readmission Within the Last 30 Days no previous admission in last 30 days   Patient currently receives any other outside agency services? No   Equipment Currently Used at Home none   Part D Coverage PHN   Do you have any problems affording any of your prescribed medications? No   Is the patient taking " medications as prescribed? yes   Does the patient have transportation home? Yes   Transportation Anticipated family or friend will provide   Discharge Plan A Home with family   Discharge Plan B Home Health   Patient/Family in Agreement with Plan yes

## 2020-10-05 NOTE — PLAN OF CARE
Problem: Physical Therapy Goal  Goal: Physical Therapy Goal  Description: Goals to be met by: 10-     Patient will increase functional independence with mobility by performin. Supine to sit with Contact Guard Assistance  2. Sit to stand transfer with Contact Guard Assistance  3. Bed to chair transfer with Minimal Assistance using Rolling Walker  4. Gait  x 250 feet with Minimal Assistance using Rolling Walker.   5. Lower extremity exercise program x20 reps   Outcome: Ongoing, Progressing   PT eval and treat- pt is calm and cooperative. Is forgetful- pt ambulated 40ft with RW min assist. OOB to chair

## 2020-10-06 ENCOUNTER — PATIENT MESSAGE (OUTPATIENT)
Dept: NEUROLOGY | Facility: CLINIC | Age: 85
End: 2020-10-06

## 2020-10-06 PROCEDURE — 63600175 PHARM REV CODE 636 W HCPCS: Performed by: NURSE PRACTITIONER

## 2020-10-06 PROCEDURE — 92507 TX SP LANG VOICE COMM INDIV: CPT

## 2020-10-06 PROCEDURE — 25000003 PHARM REV CODE 250: Performed by: INTERNAL MEDICINE

## 2020-10-06 PROCEDURE — 12000002 HC ACUTE/MED SURGE SEMI-PRIVATE ROOM

## 2020-10-06 PROCEDURE — 84425 ASSAY OF VITAMIN B-1: CPT

## 2020-10-06 PROCEDURE — 94761 N-INVAS EAR/PLS OXIMETRY MLT: CPT

## 2020-10-06 PROCEDURE — 97110 THERAPEUTIC EXERCISES: CPT

## 2020-10-06 PROCEDURE — 97535 SELF CARE MNGMENT TRAINING: CPT

## 2020-10-06 PROCEDURE — 97116 GAIT TRAINING THERAPY: CPT

## 2020-10-06 PROCEDURE — 84207 ASSAY OF VITAMIN B-6: CPT

## 2020-10-06 PROCEDURE — 36415 COLL VENOUS BLD VENIPUNCTURE: CPT

## 2020-10-06 RX ORDER — HALOPERIDOL 5 MG/ML
5 INJECTION INTRAMUSCULAR ONCE
Status: COMPLETED | OUTPATIENT
Start: 2020-10-06 | End: 2020-10-06

## 2020-10-06 RX ORDER — MIRTAZAPINE 7.5 MG/1
15 TABLET, FILM COATED ORAL NIGHTLY
Status: DISCONTINUED | OUTPATIENT
Start: 2020-10-06 | End: 2020-10-07 | Stop reason: HOSPADM

## 2020-10-06 RX ADMIN — LEVOTHYROXINE SODIUM 75 MCG: 0.03 TABLET ORAL at 08:10

## 2020-10-06 RX ADMIN — OXYCODONE HYDROCHLORIDE AND ACETAMINOPHEN 500 MG: 500 TABLET ORAL at 08:10

## 2020-10-06 RX ADMIN — MIRTAZAPINE 15 MG: 7.5 TABLET ORAL at 09:10

## 2020-10-06 RX ADMIN — ATORVASTATIN CALCIUM 40 MG: 40 TABLET, FILM COATED ORAL at 08:10

## 2020-10-06 RX ADMIN — THERA TABS 1 TABLET: TAB at 08:10

## 2020-10-06 RX ADMIN — HALOPERIDOL LACTATE 5 MG: 5 INJECTION, SOLUTION INTRAMUSCULAR at 04:10

## 2020-10-06 NOTE — NURSING
Nurse notified JL Reeder that pt was having increased chewing time with breakfast. Took approximately 5min to chew and swallow a spoonful of eggs. Cat to floor to see pt. She recommended changing to Level 6 diet. Dr. Pearl notified at this time.

## 2020-10-06 NOTE — PLAN OF CARE
"Pt up to side of bed to urinate, unsteady when walking but able to urinate without issue.  Pt sat in chair at bedside for bath and for new linens to be placed on the bed.  Pt became uncooperative, refusing to listen to any instructions or suggestions given.  Began saying things like "I need to go home or I'll die. There is nothing wrong with me, I was fine at home."  Even when oriented to situation and current diagnosis as well as severity of diagnosis Pt continues to insist that he is fine, and wishes to call his son to leave right now.  Pt refusing to take medications or return to bed.  DRU Renteria called for direction, Haldol ordered.  Charge nurse to bedside to convince pt to return to bed and dose administered.  Pt currently resting, will continue to monitor    "

## 2020-10-06 NOTE — PLAN OF CARE
Problem: Physical Therapy Goal  Goal: Physical Therapy Goal  Description: Goals to be met by: 10-     Patient will increase functional independence with mobility by performin. Supine to sit with Contact Guard Assistance  2. Sit to stand transfer with Contact Guard Assistance  3. Bed to chair transfer with Minimal Assistance using Rolling Walker  4. Gait  x 250 feet with Minimal Assistance using Rolling Walker.   5. Lower extremity exercise program x20 reps   Outcome: Ongoing, Progressing   Pt seen 303. Son at bedside. Pt ambulated 250ft with RW min assist. OOB to chair

## 2020-10-06 NOTE — PT/OT/SLP PROGRESS
Occupational Therapy   Treatment    Name: Adriano Borjas  MRN: [id number]  Admitting Diagnosis:  Cerebral hemorrhage, acute       Recommendations:     Discharge Recommendations: assisted living facility, home health PT, home health OT(memory care)  Discharge Equipment Recommendations:  walker, rolling  Barriers to discharge:  None    Assessment:     Adriano Borjas is a 85 y.o. male with a medical diagnosis of Cerebral hemorrhage, acute.  He presents with good participation and ability to follow all simple commands. He needed ongoing cues for safety skills with walker use and displayed a narrow stance and posterior lean with standing and walking. He displayed perseveration with washing his hands and increased time to respond to motor commands. He is at risk for falls. Pt's son was present and very supportive, stating he and his siblings would like pt to return to [facility] but move to the memory care unit. Continue OT treatment to maximize safety & independence with ADLs.   Performance deficits affecting function are weakness, impaired endurance, impaired cognition, decreased coordination, impaired self care skills, impaired functional mobilty, decreased safety awareness, impaired balance, gait instability.   Pt will benefit from returning to [facility] with  OT & PT to increase ADL independence and safety in pt's home environment.    Rehab Prognosis:  Fair; patient would benefit from acute skilled OT services to address these deficits and reach maximum level of function.       Plan:     Patient to be seen 4 x/week to address the above listed problems via self-care/home management, therapeutic activities, therapeutic exercises  · Plan of Care Expires: 10/18/20  · Plan of Care Reviewed with: patient, son    Subjective     Pain/Comfort:  · Pain Rating 1: 0/10  · Pain Rating Post-Intervention 1: 0/10    Objective:     Communicated with: nurse Eugene prior to session.  Patient found supine with  bed alarm, telemetry upon OT entry to room.    General Precautions: Standard, aspiration, fall   Orthopedic Precautions:N/A   Braces: N/A     Occupational Performance:     Bed Mobility:    · Patient completed Scooting/Bridging with stand by assistance  · Patient completed Supine to Sit with stand by assistance     Functional Mobility/Transfers:  · Patient completed Sit <> Stand Transfer with minimum assistance  with  rolling walker   · Patient completed Bed <> Chair Transfer using Stand Pivot technique with minimum assistance with rolling walker  · Patient completed Toilet Transfer Stand Pivot technique with minimum assistance with  rolling walker and grab bars  Functional Mobility: Cues needed for hand placement with sit<>stand with walker. OT ed patient on safety with walker use for functional mobility with cues for hand placement & sequencing. Cues needed for taking smaller steps and assist for walker management with turning and backing up to sit.  · Pt walked from bedside to bathroom, then to sink & back to bedside using walker with Min A, cues & no LOB noted.      Activities of Daily Living:  · Grooming: minimum assistance and cues for sequencing with perseveration noted while washing and drying hands standing at sink; Min A for balance with posterior lean    · Toileting: minimum assistance for clothing management in standing at toilet; set-up and SBA seated for hygiene to find toilet paper      [facility] 6 Click ADL: 18    Treatment & Education:  Pt performed B UE AROM therapeutic exercise x 10 reps each shoulder flexion, horizontal abduction/adduction and boxing punches with brief rest breaks taken between sets.  OT ed pt on B UE arm chair push-up therapeutic exercise to increase ability with transfers. Pt completed 3 sets of 5 reps with brief rest breaks between sets.  OT ed pt on fall risk and strongly advised pt to call for help for all OOB mobility.        Patient left up in chair with all lines intact, call  button in reach, chair alarm on and Eugene, nurse and pt's son presentEducation:      GOALS:   Multidisciplinary Problems     Occupational Therapy Goals        Problem: Occupational Therapy Goal    Goal Priority Disciplines Outcome Interventions   Occupational Therapy Goal     OT, PT/OT Ongoing, Progressing    Description: Goals to be met by: 10/18/2020     Patient will increase functional independence with ADLs by performing:    UE Dressing with Set-up Assistance and Supervision.  LE Dressing with Set-up Assistance, Contact Guard Assistance, and Assistive Devices as needed.  Grooming while seated at sink with Stand-by Assistance and Assistive Devices as needed.  Toileting from toilet with Set-up Assistance, Minimal Assistance, and Assistive Devices as needed for hygiene and clothing management.   Toilet transfer to toilet with Contact Guard Assistance.                     Time Tracking:     OT Date of Treatment: 10/06/20  OT Start Time: 0949  OT Stop Time: 1021  OT Total Time (min): 32 min    Billable Minutes:Self Care/Home Management 20  Therapeutic Exercise 12    JAMIE Leong  10/6/2020

## 2020-10-06 NOTE — PT/OT/SLP PROGRESS
Physical Therapy Treatment    Patient Name:  Adriano Borjas   MRN:  [id number]    Recommendations:     Discharge Recommendations:  home health PT   Discharge Equipment Recommendations: (TBD)   Barriers to discharge: None    Assessment:     Adriano Borjas is a 85 y.o. male admitted with a medical diagnosis of Cerebral hemorrhage, acute.  He presents with the following impairments/functional limitations:  weakness, impaired endurance, impaired self care skills, impaired functional mobilty, decreased lower extremity function, decreased safety awareness . Pt asleep but easily awakened- pt is calm and agreeable to PT- middle son at the bedside encouraging pt. Pt ambulated with RW 250ft with slight unsteadiness, OOB to chair.    Rehab Prognosis: Fair; patient would benefit from acute skilled PT services to address these deficits and reach maximum level of function.    Recent Surgery: * No surgery found *      Plan:     During this hospitalization, patient to be seen daily to address the identified rehab impairments via gait training, therapeutic activities, therapeutic exercises and progress toward the following goals:    · Plan of Care Expires:  10/30/20    Subjective   Pt pleasant and interactive- following directions  Pt wanting ham sandwich with tomato and gerald- nurse made aware    Chief Complaint: none  Patient/Family Comments/goals: none stated  Pain/Comfort:  · Pain Rating 1: 0/10      Objective:     Communicated with nurse Knight prior to session.  Patient found HOB elevated with telemetry, bed alarm upon PT entry to room.     General Precautions: Standard, fall   Orthopedic Precautions:N/A   Braces: N/A     Functional Mobility:  · Bed Mobility:     · Rolling Left:  contact guard assistance  · Scooting: contact guard assistance  · Supine to Sit: contact guard assistance  · Transfers:     · Sit to Stand:  minimum assistance with rolling walker  · Bed to Chair: minimum assistance with  rolling walker  using  Stand  Pivot  · Gait: 250ft with RW min assist with slight unsteadiness      AM-PAC 6 CLICK MOBILITY          Therapeutic Activities and Exercises:   Patient was educated on the importance of OOB activity and functional mobility to negate negative effects of prolonged bed rest during hospitalization, safe transfers and ambulation, and D/C planning   OOB to chair post PT  Encouraged AP exercises    Patient left up in chair with all lines intact, call button in reach, nurse Eugene notified and son present..    GOALS:   Multidisciplinary Problems     Physical Therapy Goals        Problem: Physical Therapy Goal    Goal Priority Disciplines Outcome Goal Variances Interventions   Physical Therapy Goal     PT, PT/OT Ongoing, Progressing     Description: Goals to be met by: 10-     Patient will increase functional independence with mobility by performin. Supine to sit with Contact Guard Assistance  2. Sit to stand transfer with Contact Guard Assistance  3. Bed to chair transfer with Minimal Assistance using Rolling Walker  4. Gait  x 250 feet with Minimal Assistance using Rolling Walker.   5. Lower extremity exercise program x20 reps                    Time Tracking:     PT Received On: 10/06/20  PT Start Time: 1114     PT Stop Time: 1133  PT Total Time (min): 19 min     Billable Minutes: Gait Training 19    Treatment Type: Treatment  PT/PTA: PT     PTA Visit Number: 0     Gillian Luciano, PT  10/06/2020

## 2020-10-06 NOTE — RESPIRATORY THERAPY
10/06/20 0722   Patient Assessment/Suction   Level of Consciousness (AVPU) alert   Respiratory Effort Normal;Unlabored   Expansion/Accessory Muscles/Retractions no use of accessory muscles;no retractions;expansion symmetric   All Lung Fields Breath Sounds clear;equal bilaterally   Rhythm/Pattern, Respiratory unlabored;pattern regular;depth regular   Cough Frequency no cough   PRE-TX-O2   O2 Device (Oxygen Therapy) room air   SpO2 98 %   Pulse Oximetry Type Continuous   $ Pulse Oximetry - Multiple Charge Pulse Oximetry - Multiple   Pulse 77   Resp (!) 21   Positioning HOB elevated 30 degrees

## 2020-10-06 NOTE — PLAN OF CARE
Pt noticeably more restless this shift, constantly moving in bed, often confusing where he is and why he's here.  Pt remained free of falls or injuries, though he continues to climb out of bed without assistance despite constant reorientation and instructions to stay in bed and call when need help ambulating.  Pt did have instance of agitation and refusal to be reoriented, DRU Renteria made aware and Haldol given.  Pt resting comfortably since then.  Will continue to monitor.

## 2020-10-06 NOTE — PLAN OF CARE
"Intermittent confusion noted throughout shift needing frequent reorientation. Mild agitation noted when reorienting, pt. States "I know what's going on, you don't have to keep telling me" even with confusion noted. Ambulates with RW 1-person assist to bathroom. Pending placement back to [facility]ita. Avasys in use. Bed alarm set and functioning. Purposeful rounding utilized to ensure pt. Needs met and safety maintained.   "

## 2020-10-06 NOTE — PT/OT/SLP PROGRESS
"Speech Language Pathology Treatment    Patient Name:  Adriano Borjas   MRN:  [id number]  Admitting Diagnosis: Cerebral hemorrhage, acute    Recommendations:                 General Recommendations:  Will monitor tolerance x1 day  Diet recommendations:  Dental Soft(IDDSI 6) ALLOW BREAD,  Liquid Diet Level: Thin   Aspiration Precautions: Set up assist   General Precautions: Standard, aspiration, fall  Communication strategies:  none    Subjective   "This breakfast is delicious. Something sweet [oatmeal] and something salty [sausage]."    Objective:   [place] [facility] [place] [facility] [place] be re evaluated. Nurse reports "chewing scrambled eggs for 5 minutes." Upon entrance into room, pt eating breakfast. He is alert, calm, cooperative and pleasantly confused. He consumed sausage nigel with prolonged oral prep (excessive mastication) but functional. Boost and oatmeal were confused without difficulty. No overt s/s noted across any PO trials. Small amounts of expectorated food also noted on patients tray (fruit ?).     Has the patient been evaluated by SLP for swallowing?   Yes  Keep patient NPO? No   Current Respiratory Status:        Assessment:   Pt demonstrates excessive mastication likely 2' cognition. He is missing his upper an lower molars as well. REC downgrading textures to dental soft (IDDSI 6) with thin liquids. Will follow x1 day to monitor tolerance.     Plan:     · Patient to be seen:      · Plan of Care expires:  10/07/20  · Plan of Care reviewed with:  patient   · SLP Follow-Up:  Yes       Discharge recommendations:  assisted living facility(assisted living memory care facility)   Barriers to Discharge:  None    Time Tracking:     SLP Treatment Date:   10/06/20  Speech Start Time:  0820  Speech Stop Time:  0830     Speech Total Time (min):  10 min    Billable Minutes: Treatment Swallowing Dysfunction 10 and Total Time 10    Cat Olivier CCC-SLP  10/06/2020  "

## 2020-10-06 NOTE — SUBJECTIVE & OBJECTIVE
Interval History:  Patient is in intensive care unit secondary to left occipital hemorrhage.  Patient is calm and looking and feeling much better, answering questions appropriately with intermittent confusion.  No subjective complaints.  No new focal neuro deficits reported.    Review of Systems   Constitutional: Positive for appetite change and fatigue.   Neurological: Positive for weakness and headaches.   Psychiatric/Behavioral: Positive for agitation and confusion.   All other systems reviewed and are negative.    Objective:     Vital Signs (Most Recent):  Temp: 98 °F (36.7 °C) (10/06/20 0900)  Pulse: 77 (10/06/20 0722)  Resp: (!) 21 (10/06/20 0722)  BP: (!) 154/65 (10/06/20 0900)  SpO2: 98 % (10/06/20 0900) Vital Signs (24h Range):  Temp:  [97.9 °F (36.6 °C)-98.5 °F (36.9 °C)] 98 °F (36.7 °C)  Pulse:  [] 77  Resp:  [9-22] 21  SpO2:  [91 %-100 %] 98 %  BP: ()/(51-93) 154/65     Weight: 69.1 kg (152 lb 5.4 oz)  Body mass index is 23.16 kg/m².    Intake/Output Summary (Last 24 hours) at 10/6/2020 1255  Last data filed at 10/6/2020 0400  Gross per 24 hour   Intake 18.54 ml   Output 350 ml   Net -331.46 ml      Physical Exam  Vitals signs and nursing note reviewed.   Constitutional:       Appearance: Normal appearance. He is well-developed.   HENT:      Head: Normocephalic and atraumatic.      Nose: Nose normal. No septal deviation.   Eyes:      Conjunctiva/sclera: Conjunctivae normal.      Pupils: Pupils are equal, round, and reactive to light.   Neck:      Thyroid: No thyroid mass.      Vascular: No JVD.      Trachea: No tracheal tenderness or tracheal deviation.   Cardiovascular:      Rate and Rhythm: Normal rate and regular rhythm.      Heart sounds: S1 normal and S2 normal. No murmur. No friction rub. No gallop.    Pulmonary:      Effort: Pulmonary effort is normal.      Breath sounds: Normal breath sounds. No decreased breath sounds, wheezing, rhonchi or rales.   Abdominal:      General: Bowel  sounds are normal. There is no distension.      Palpations: Abdomen is soft. There is no hepatomegaly, splenomegaly or mass.      Tenderness: There is no abdominal tenderness.   Skin:     Findings: No rash.   Neurological:      Mental Status: He is alert.      Cranial Nerves: No cranial nerve deficit.      Sensory: No sensory deficit.         Significant Labs:   CBC:   Recent Labs   Lab 10/05/20  0441   WBC 6.96   HGB 13.5*   HCT 39.1*   PLT 99*     CMP:   Recent Labs   Lab 10/05/20  0441      K 3.7      CO2 26   GLU 96   BUN 17   CREATININE 1.2   CALCIUM 8.6*   PROT 6.2   ALBUMIN 3.3*   BILITOT 1.1*   ALKPHOS 59   AST 31   ALT 13   ANIONGAP 10   EGFRNONAA 56*     Coagulation:   No results for input(s): PT, INR, APTT in the last 48 hours.    Significant Imaging:   CXR: No definite acute cardiopulmonary disease     CT head without contrast:  1. 5 mm focus of acute petechial hemorrhage within the left occipital lobe.  2. Small area of subacute versus remote lacunar infarction within the left aspect of the nya.  It is unclear whether this represents artifact or a true finding.  Additional evaluation could be achieved with MRI as deemed clinically appropriate.  3. Age-appropriate cerebral volume loss and chronic microvascular ischemic changes within the periventricular white matter of the supratentorial brain.     MRI brain:  1. Limited examination revealing a small 6 mm focus of acute petechial hemorrhage versus hemorrhagic infarction within the subcortical white matter of the medial left occipital lobe (as demonstrated by recent MRI).  2. Recently noted hypodensity within the left paramidline anterior nya is representative of artifact.  No evidence of acute or remote ischemia/infarction within the nya.  3. Age-appropriate cerebral volume loss and chronic microvascular ischemic changes within the periventricular white matter of the supratentorial brain.  4. Mild ethmoid and frontal sinus  disease.    Repeat CT head without contrast:  Stable appearance to the brain and ventricles relative the previous day's studies with a possible acute to early subacute punctate hemorrhage in the left occipital lobe cortex or adjacent gyrus.  There is no regional mass effect.:

## 2020-10-06 NOTE — PLAN OF CARE
Spoke with the pt's son, Prieto who has POA to discuss SNF, he stated the physical therapist just came to work with the pt and he ambulated 250ft with his RW. At this time the son wants the pt to return to [place] with . I provided him with my name and number to call me back after he speaks with the hospitialist and neurologist if they change their mind....Steven Nj     10/06/20 1212   Post-Acute Status   Post-Acute Authorization Placement   Post-Acute Placement Status Patient/Family Changed Mind

## 2020-10-06 NOTE — NURSING
Pt. Resting quietly at this time. Denies needs or complaints. Bed alarm on and functioning. SRx3. Room near nurses station.

## 2020-10-06 NOTE — PROGRESS NOTES
Discussed with patient's original neurologist Dr. Destinee Gomez who is very familiar with the patient.  She recommends to discontinue trazodone and wanted to try a trial of Remeron 15 mg q.h.s. see orders for details.  Will avoid use of antipsychotics.

## 2020-10-06 NOTE — RESPIRATORY THERAPY
10/06/20 1606   Patient Assessment/Suction   Level of Consciousness (AVPU) alert   All Lung Fields Breath Sounds clear   PRE-TX-O2   O2 Device (Oxygen Therapy) room air   SpO2 97 %   Pulse Oximetry Type Intermittent   $ Pulse Oximetry - Multiple Charge Pulse Oximetry - Multiple   Pulse 76   Resp 16

## 2020-10-06 NOTE — PROGRESS NOTES
[facility] Hospital Medicine  Progress Note    Patient Name: Adriano Borjas  MRN: [id number]  Patient Class: IP- Inpatient   Admission Date: 10/3/2020  Length of Stay: 3 days  Attending Physician: Tamela Pearl MD  Primary Care Provider: Marina Joyce MD        Subjective:     Principal Problem:Cerebral hemorrhage, acute        HPI:  Patient is and 85-year-old male with past medical history significant for hypertension, history of paroxysmal atrial fibrillation (on Eliquis), history of TIA, dementia, nephrolithiasis, hypothyroidism, prior history of cerebral hemorrhage and gait instability is being admitted to Hospital Medicine under inpatient status from [facility] Emergency Room with complaint of increasing confusion and altered mental status for the past week.  Patient is a resident of [facility].  Part of the history obtained from patient's son.  According to the son, patient is having progressive decline in mental status lately and this morning patient was not aware very was at the time of breakfast.  It appeared to be more than usual confusion in family is concerned about possibility of new cerebrovascular accident. Patient reports that currently he feels back to his baseline. The patient is unsure if he had a headache or not earlier in the morning. Son states that the patient has had some recent medication changes, reporting an increase in trazodone, but son returned to original dose secondary to increased agitation. The son states that the patients agitation has not been as intense as it was in the previous week.The patient denies chest pain, SOB, fever, cough, pain with urination, or any other complaint at this time. Son denies the patient having speech difficulty, facial asymmetry, weakness, numbness, or any other complaint at this time.  No recent fall, head injury, loss of consciousness or seizure activity reported.       Overview/Hospital Course:  No notes on file    Interval History:  Patient is in intensive care unit secondary to left occipital hemorrhage.  Patient is calm and looking and feeling much better, answering questions appropriately with intermittent confusion.  No subjective complaints.  No new focal neuro deficits reported.    Review of Systems   Constitutional: Positive for appetite change and fatigue.   Neurological: Positive for weakness and headaches.   Psychiatric/Behavioral: Positive for agitation and confusion.   All other systems reviewed and are negative.    Objective:     Vital Signs (Most Recent):  Temp: 98 °F (36.7 °C) (10/06/20 0900)  Pulse: 77 (10/06/20 0722)  Resp: (!) 21 (10/06/20 0722)  BP: (!) 154/65 (10/06/20 0900)  SpO2: 98 % (10/06/20 0900) Vital Signs (24h Range):  Temp:  [97.9 °F (36.6 °C)-98.5 °F (36.9 °C)] 98 °F (36.7 °C)  Pulse:  [] 77  Resp:  [9-22] 21  SpO2:  [91 %-100 %] 98 %  BP: ()/(51-93) 154/65     Weight: 69.1 kg (152 lb 5.4 oz)  Body mass index is 23.16 kg/m².    Intake/Output Summary (Last 24 hours) at 10/6/2020 1255  Last data filed at 10/6/2020 0400  Gross per 24 hour   Intake 18.54 ml   Output 350 ml   Net -331.46 ml      Physical Exam  Vitals signs and nursing note reviewed.   Constitutional:       Appearance: Normal appearance. He is well-developed.   HENT:      Head: Normocephalic and atraumatic.      Nose: Nose normal. No septal deviation.   Eyes:      Conjunctiva/sclera: Conjunctivae normal.      Pupils: Pupils are equal, round, and reactive to light.   Neck:      Thyroid: No thyroid mass.      Vascular: No JVD.      Trachea: No tracheal tenderness or tracheal deviation.   Cardiovascular:      Rate and Rhythm: Normal rate and regular rhythm.      Heart sounds: S1 normal and S2 normal. No murmur. No friction rub. No gallop.    Pulmonary:      Effort: Pulmonary effort is normal.      Breath sounds: Normal breath sounds. No decreased breath sounds, wheezing,  rhonchi or rales.   Abdominal:      General: Bowel sounds are normal. There is no distension.      Palpations: Abdomen is soft. There is no hepatomegaly, splenomegaly or mass.      Tenderness: There is no abdominal tenderness.   Skin:     Findings: No rash.   Neurological:      Mental Status: He is alert.      Cranial Nerves: No cranial nerve deficit.      Sensory: No sensory deficit.         Significant Labs:   CBC:   Recent Labs   Lab 10/05/20  0441   WBC 6.96   HGB 13.5*   HCT 39.1*   PLT 99*     CMP:   Recent Labs   Lab 10/05/20  0441      K 3.7      CO2 26   GLU 96   BUN 17   CREATININE 1.2   CALCIUM 8.6*   PROT 6.2   ALBUMIN 3.3*   BILITOT 1.1*   ALKPHOS 59   AST 31   ALT 13   ANIONGAP 10   EGFRNONAA 56*     Coagulation:   No results for input(s): PT, INR, APTT in the last 48 hours.    Significant Imaging:   CXR: No definite acute cardiopulmonary disease     CT head without contrast:  1. 5 mm focus of acute petechial hemorrhage within the left occipital lobe.  2. Small area of subacute versus remote lacunar infarction within the left aspect of the nya.  It is unclear whether this represents artifact or a true finding.  Additional evaluation could be achieved with MRI as deemed clinically appropriate.  3. Age-appropriate cerebral volume loss and chronic microvascular ischemic changes within the periventricular white matter of the supratentorial brain.     MRI brain:  1. Limited examination revealing a small 6 mm focus of acute petechial hemorrhage versus hemorrhagic infarction within the subcortical white matter of the medial left occipital lobe (as demonstrated by recent MRI).  2. Recently noted hypodensity within the left paramidline anterior nya is representative of artifact.  No evidence of acute or remote ischemia/infarction within the nya.  3. Age-appropriate cerebral volume loss and chronic microvascular ischemic changes within the periventricular white matter of the supratentorial  brain.  4. Mild ethmoid and frontal sinus disease.    Repeat CT head without contrast:  Stable appearance to the brain and ventricles relative the previous day's studies with a possible acute to early subacute punctate hemorrhage in the left occipital lobe cortex or adjacent gyrus.  There is no regional mass effect.:        Assessment/Plan:      * Cerebral hemorrhage, acute  Follow General surgery recommendations by Dr. Fang.  Will continue to hold Eliquis use for now.  Continue tele monitoring  Neurochecks q 4 hrs x 24 hrs.  Fall and seizure precautions.  NPO until evaluated by speech therapist for swallowing assessment.  Repeat CT head results reviewed.  Follow Neurology and Neurosurgery recommendations.  Follow 2D echocardiogram results.  Consult Physical Therapy and Occupational therapy for evaluation and treatment.          History of cerebral hemorrhage  Noted.      Goals of care, counseling/discussion  Advance Care Planning     Living Will  During this visit, I engaged with patient's son -healthcare power of    in the advance care planning process.  The patient and I reviewed the role for advance directives and their purpose in directing future healthcare if the patient's unable to speak for him/herself.  At this point in time, the patient does have full decision-making capacity.  We discussed different extreme health states that he could experience, and reviewed what kind of medical care he would want in those situations.  The healthcare power of   communicated that if he were comatose and had little chance of a meaningful recovery, he would want machines/life-sustaining treatments used.  He will be discussing with family and leaning towards do not resuscitate order, he will let us know after talking to the family members.  I spent a total of 20 minutes engaging the patient in this advance care planning discussion.            Paroxysmal atrial fibrillation  Hold Eliquis for  now.  Tele-monitoring.      Essential hypertension  Chronic problem. Will continue chronic medications and monitor for any changes, adjusting as needed.  Avoid elevated systolic blood pressure.        Gait abnormality  PT and OT consultation with fall precautions.       Transfer patient out of ICU.  Start physical therapy.  VTE Risk Mitigation (From admission, onward)         Ordered     IP VTE HIGH RISK PATIENT  Once      10/03/20 1829     Place sequential compression device  Until discontinued      10/03/20 1829     Reason for No Pharmacological VTE Prophylaxis  Once     Question:  Reasons:  Answer:  Active Bleeding    10/03/20 1829                Discharge Planning   KAY:      Code Status: Full Code   Is the patient medically ready for discharge?:     Reason for patient still in hospital (select all that apply): Pending disposition  Discharge Plan A: Home Health, Assisted Living                  Tamela Pearl MD  Department of Hospital Medicine   [facility]

## 2020-10-06 NOTE — PLAN OF CARE
Problem: Occupational Therapy Goal  Goal: Occupational Therapy Goal  Description: Goals to be met by: 10/18/2020     Patient will increase functional independence with ADLs by performing:    UE Dressing with Set-up Assistance and Supervision.  LE Dressing with Set-up Assistance, Contact Guard Assistance, and Assistive Devices as needed.  Grooming while seated at sink with Stand-by Assistance and Assistive Devices as needed.  Toileting from toilet with Set-up Assistance, Minimal Assistance, and Assistive Devices as needed for hygiene and clothing management.   Toilet transfer to toilet with Contact Guard Assistance.    Outcome: Ongoing, Progressing   Patient is making progress. Goals remain appropriate. Continue OT plan of care.  JAMIE Laboy  10/6/2020     Problem: Patient Care Overview (Adult)  Goal: Plan of Care Review  Outcome: Ongoing (interventions implemented as appropriate)    10/18/17 0304   Coping/Psychosocial Response Interventions   Plan Of Care Reviewed With patient   Patient Care Overview   Progress improving   Outcome Evaluation   Outcome Summary/Follow up Plan Patient VSS, pain controlled by PO meds, ambulated to bedside commode once during shift       Goal: Adult Individualization and Mutuality  Outcome: Ongoing (interventions implemented as appropriate)  Goal: Discharge Needs Assessment  Outcome: Ongoing (interventions implemented as appropriate)    Problem: Knee Replacement, Total (Adult)  Goal: Signs and Symptoms of Listed Potential Problems Will be Absent or Manageable (Knee Replacement, Total)  Outcome: Ongoing (interventions implemented as appropriate)

## 2020-10-06 NOTE — PLAN OF CARE
Received call from pt's son, Prieto; he spoke with someone at [facility] who is requesting the specific dates and times HH will be coming in to see the pt. Prieto also has some questions regarding the home medications for Dr Pearl, I passed the message onto Dr Pearl.  I called and spoke with Marjorie at [facility] (ph#[phone]) regarding the above request, she was not the one who spoke with the pt's son. She is asking that I send her clinicals to review for the pt's anticipated return. I also asked her how soon the pt would be getting into their memory care unit because the provider here feels that is more appropriate than the pt living alone in his apartment, she states they do have a bed available and she will speak with her  once she receives the clinicals.   I updated Dr Pearl and the pt's son, Prieto on the above. Faxed clinicals to Marjorie as requested......DALTON Nj     10/06/20 4909   Discharge Reassessment   Assessment Type Discharge Planning Reassessment

## 2020-10-07 ENCOUNTER — TELEPHONE (OUTPATIENT)
Dept: NEUROLOGY | Facility: CLINIC | Age: 85
End: 2020-10-07

## 2020-10-07 ENCOUNTER — TELEPHONE (OUTPATIENT)
Dept: NEUROSURGERY | Facility: CLINIC | Age: 85
End: 2020-10-07

## 2020-10-07 VITALS
WEIGHT: 152.31 LBS | TEMPERATURE: 97 F | HEART RATE: 91 BPM | SYSTOLIC BLOOD PRESSURE: 81 MMHG | HEIGHT: 68 IN | BODY MASS INDEX: 23.08 KG/M2 | DIASTOLIC BLOOD PRESSURE: 52 MMHG | RESPIRATION RATE: 20 BRPM | OXYGEN SATURATION: 97 %

## 2020-10-07 DIAGNOSIS — I61.9: Primary | ICD-10-CM

## 2020-10-07 PROCEDURE — 94761 N-INVAS EAR/PLS OXIMETRY MLT: CPT

## 2020-10-07 PROCEDURE — 97110 THERAPEUTIC EXERCISES: CPT

## 2020-10-07 PROCEDURE — 25000003 PHARM REV CODE 250: Performed by: INTERNAL MEDICINE

## 2020-10-07 PROCEDURE — 97116 GAIT TRAINING THERAPY: CPT

## 2020-10-07 RX ORDER — MIRTAZAPINE 30 MG/1
30 TABLET, FILM COATED ORAL NIGHTLY
Qty: 30 TABLET | Refills: 0 | Status: SHIPPED | OUTPATIENT
Start: 2020-10-07 | End: 2023-11-28

## 2020-10-07 RX ORDER — ATORVASTATIN CALCIUM 40 MG/1
40 TABLET, FILM COATED ORAL DAILY
Qty: 30 TABLET | Refills: 0 | Status: SHIPPED | OUTPATIENT
Start: 2020-10-07 | End: 2023-11-28

## 2020-10-07 RX ADMIN — OXYCODONE HYDROCHLORIDE AND ACETAMINOPHEN 500 MG: 500 TABLET ORAL at 10:10

## 2020-10-07 RX ADMIN — THERA TABS 1 TABLET: TAB at 10:10

## 2020-10-07 RX ADMIN — LEVOTHYROXINE SODIUM 75 MCG: 0.03 TABLET ORAL at 10:10

## 2020-10-07 RX ADMIN — ATORVASTATIN CALCIUM 40 MG: 40 TABLET, FILM COATED ORAL at 10:10

## 2020-10-07 NOTE — PLAN OF CARE
10/07/20 1515   Final Note   Assessment Type Discharge Planning Reassessment   Hospital Follow Up  Appt(s) scheduled? No   Note AVS - Messages sent to offices as no availability at the requested f/u dates. Case management awaiting a return call to schedule appointments.

## 2020-10-07 NOTE — PLAN OF CARE
Per PHN, home health assigned to [facility].    DME assigned to [phone].  Pending hospital delivery of roll walker.        10/07/20 1413   Post-Acute Status   Post-Acute Authorization Home Health;HME   HME Status Pending Delivery Hospital   Home Health Status Set-up Complete

## 2020-10-07 NOTE — PLAN OF CARE
SW left voicemail message for Marjorie regalado/ [facility] [phone] --re: update on memory unit acceptance & d/c for today w/ home health.       10/07/20 0902   Post-Acute Status   Post-Acute Authorization Home Health

## 2020-10-07 NOTE — PLAN OF CARE
Problem: Physical Therapy Goal  Goal: Physical Therapy Goal  Description: Goals to be met by: 10-     Patient will increase functional independence with mobility by performin. Supine to sit with Contact Guard Assistance  2. Sit to stand transfer with Contact Guard Assistance  3. Bed to chair transfer with Minimal Assistance using Rolling Walker  4. Gait  x 250 feet with Minimal Assistance using Rolling Walker.   5. Lower extremity exercise program x20 reps   Outcome: Ongoing, Progressing   Pt ambulated 200ft with RW CGA. OOB to chair. Completed LE's thera ex

## 2020-10-07 NOTE — PLAN OF CARE
Pt clearfor d/c from case management.  Roll walker delivered, home health set up w/ [facility].  Pt to d/c to [facility] (w/ [facility]).       10/07/20 1538   Final Note   Assessment Type Final Discharge Note   Anticipated Discharge Disposition Home-Health  ([facility])

## 2020-10-07 NOTE — PLAN OF CARE
Understanding verbalized. Copy left at bedside.      10/07/20 1423   Medicare Message   Important Message from Medicare regarding Discharge Appeal Rights Given to patient/caregiver;Explained to patient/caregiver;Signed/date by patient/caregiver;Other (comments)  (D/C IMM via telephone with Son (POA) Prieto Borjas)   Date IMM was signed 10/07/20   Time IMM was signed [id number]

## 2020-10-07 NOTE — PLAN OF CARE
Marjorie contacted [facility]--advised SW need to speak w/ Nelson (admin of memory care unit @ [facility] ).  [facility] spoke w/ Nelson --she has approved placement w/ [facility].  She reported pt's family will be moving pt's belongings from [place] to [facility] today around 11-11:30.    Nelson requesting updated covid test and d/c orders to be faxed to [phone].    [facility] faxed Covid results & d/c orders.  Pt's POA:  Shawna [phone]; son prieto [phone].    Call report to Lorena @ [phone].    [facility] contacted pt's son, Prieto re: pt d/c toda w/ update.  In process of getting his belongings moved to [facility] today.  Per Prieto, someone should be here to get patient by 3:00pm today.           10/07/20 1038   Post-Acute Status   Post-Acute Authorization Placement   Post-Acute Placement Status Set-up Complete

## 2020-10-07 NOTE — DISCHARGE SUMMARY
[facility] Hospital Medicine  Discharge Summary      Patient Name: Adriano Borjas  MRN: [id number]  Admission Date: 10/3/2020  Hospital Length of Stay: 4 days  Discharge Date and Time:  10/07/2020 9:12 AM  Attending Physician: Tamela Pearl MD   Discharging Provider: Tamela Pearl MD  Primary Care Provider: Marina Joyce MD      HPI:   Patient is and 85-year-old male with past medical history significant for hypertension, history of paroxysmal atrial fibrillation (on Eliquis), history of TIA, dementia, nephrolithiasis, hypothyroidism, prior history of cerebral hemorrhage and gait instability is being admitted to Hospital Medicine under inpatient status from [facility] Emergency Room with complaint of increasing confusion and altered mental status for the past week.  Patient is a resident of [facility].  Part of the history obtained from patient's son.  According to the son, patient is having progressive decline in mental status lately and this morning patient was not aware very was at the time of breakfast.  It appeared to be more than usual confusion in family is concerned about possibility of new cerebrovascular accident. Patient reports that currently he feels back to his baseline. The patient is unsure if he had a headache or not earlier in the morning. Son states that the patient has had some recent medication changes, reporting an increase in trazodone, but son returned to original dose secondary to increased agitation. The son states that the patients agitation has not been as intense as it was in the previous week.The patient denies chest pain, SOB, fever, cough, pain with urination, or any other complaint at this time. Son denies the patient having speech difficulty, facial asymmetry, weakness, numbness, or any other complaint at this time.  No recent fall, head injury, loss of consciousness or seizure activity reported.      Hospital  Course:   Patient was admitted to intensive care unit and was evaluated by neurologist and neurosurgeon.  Subsequent CT scan of the head did not report any further expansion of left occipital hemorrhage.  During hospital stay patient exhibited features of dementia and frequent agitation.  Case discussed with patient's original neurologist Dr. Destinee Gomez who recommended to discontinue trazodone and patient was initiated on Remeron.  Fall precautions discussed with patient's children.  We also discussed dangers of continuing use of Eliquis in the setting of dementia and frequent falls and left occipital hemorrhage.  Patient's family opted not to consider Eliquis in future.  Patient will have a repeat CT scan of the head performed in 2 weeks and will discuss with neurologist regarding further plan of care.  Is a home walker has been arranged.  Patient will be admitted to [place] memory unit for further care.  The whole discharge plan of care was reviewed with patient's son, I answered all questions, no further questions remained at the end of our conversation.    Consults:   Consults (From admission, onward)        Status Ordering Provider     Inpatient consult to Neurology  Once     Provider:  Haresh Duque MD    Completed SULTAN, AQIB     Inpatient consult to Neurosurgery  Once     Provider:  Nikolay Fang MD    Acknowledged SULTAN, AQIB     Inpatient consult to Physical Medicine Rehab  Once     Provider:  (Not yet assigned)    Acknowledged SULTAN, AQIB     Inpatient consult to Registered Dietitian/Nutritionist  Once     Provider:  (Not yet assigned)    Completed SULTAN, AQIB     Inpatient consult to Social Work/Case Management  Once     Provider:  (Not yet assigned)    Completed SULTAN, AQIB     IP consult to case management/social work  Once     Provider:  (Not yet assigned)    Completed SULTAN, AQIB        CXR: No definite acute cardiopulmonary disease     CT head without contrast:  1. 5 mm focus of acute  petechial hemorrhage within the left occipital lobe.  2. Small area of subacute versus remote lacunar infarction within the left aspect of the nya.  It is unclear whether this represents artifact or a true finding.  Additional evaluation could be achieved with MRI as deemed clinically appropriate.  3. Age-appropriate cerebral volume loss and chronic microvascular ischemic changes within the periventricular white matter of the supratentorial brain.     MRI brain:  1. Limited examination revealing a small 6 mm focus of acute petechial hemorrhage versus hemorrhagic infarction within the subcortical white matter of the medial left occipital lobe (as demonstrated by recent MRI).  2. Recently noted hypodensity within the left paramidline anterior nya is representative of artifact.  No evidence of acute or remote ischemia/infarction within the nya.  3. Age-appropriate cerebral volume loss and chronic microvascular ischemic changes within the periventricular white matter of the supratentorial brain.  4. Mild ethmoid and frontal sinus disease.     Repeat CT head without contrast:  Stable appearance to the brain and ventricles relative the previous day's studies with a possible acute to early subacute punctate hemorrhage in the left occipital lobe cortex or adjacent gyrus.  There is no regional mass effect.:    Final Active Diagnoses:    Diagnosis Date Noted POA    PRINCIPAL PROBLEM:  Cerebral hemorrhage, acute [I61.9] 10/03/2020 Yes    History of cerebral hemorrhage [Z86.79] 09/05/2019 Not Applicable    Moderate malnutrition [E44.0] 10/05/2020 Yes    Intracranial hemorrhage [I62.9] 10/03/2020 Yes    Goals of care, counseling/discussion [Z71.89] 10/03/2020 Not Applicable    Paroxysmal atrial fibrillation [I48.0] 12/12/2018 Yes    Essential hypertension [I10] 07/27/2018 Yes    Gait abnormality [R26.9] 06/26/2017 Yes      Problems Resolved During this Admission:       Discharged Condition: good    Disposition: Home  "or Self Care    Follow Up:  Follow-up Information     Marina Joyce MD.    Specialty: Family Medicine  Contact information:  [address] [facility] [address] [phone]             [facility].    Contact information:  [address]  [facility] [address] [phone]           Destinee Gomez MD In 3 weeks.    Specialty: Neurology  Contact information:  [address] [facility] [id number] [phone]             Nikolay Fang MD In 2 weeks.    Specialty: Neurosurgery  Contact information:  [address]  [facility] [address]  [phone]                 Patient Instructions:      WALKER FOR HOME USE     Order Specific Question Answer Comments   Type of Walker: Adult (5'4"-6'6")    With wheels? Yes    Height: 5' 8" (1.727 m)    Weight: 69.1 kg (152 lb 5.4 oz)    Length of need (1-99 months): 99    Does patient have medical equipment at home? none    Please check all that apply: Patient's condition impairs ambulation.      CT Head Without Contrast   Standing Status: Future Standing Exp. Date: 10/07/21     Order Specific Question Answer Comments   May the Radiologist modify the order per protocol to meet the clinical needs of the patient? Yes      Referral to Home health   Referral Priority: Routine Referral Type: Home Health   Referral Reason: Specialty Services Required   Requested Specialty: Home Health Services   Number of Visits Requested: 1     Diet Cardiac   Order Comments: Boost Plus can with meals     Other restrictions (specify):   Order Comments: PLEASE OBSERVE FALL PRECAUTIONS, use walker for ambulation.     Call MD for:   Order Comments: For worsening symptoms, chest pain, shortness of breath, increased abdominal pain, high grade fever, stroke or stroke like symptoms, immediately go to the nearest Emergency Room or call 911 as soon as possible.       Significant Diagnostic Studies: Labs: CMP No results for input(s): NA, K, CL, CO2, GLU, BUN, CREATININE, CALCIUM, PROT, " ALBUMIN, BILITOT, ALKPHOS, AST, ALT, ANIONGAP, ESTGFRAFRICA, EGFRNONAA in the last 48 hours. and CBC No results for input(s): WBC, HGB, HCT, PLT in the last 48 hours.    Pending Diagnostic Studies:     Procedure Component Value Units Date/Time    EKG 12-lead [[id number]] Collected: 10/04/20 1752    Order Status: Sent Lab Status: In process Updated: 10/05/20 0758    Narrative:      Test Reason : Z03.89,    Vent. Rate : 074 BPM     Atrial Rate : 074 BPM     P-R Int : 156 ms          QRS Dur : 096 ms      QT Int : 394 ms       P-R-T Axes : 033 -26 032 degrees     QTc Int : 437 ms    Sinus rhythm with Premature atrial complexes with Aberrant conduction  Otherwise normal ECG  When compared with ECG of 07-MAY-2019 16:39,  Aberrant conduction is now Present    Referred By: [email or web address]   SELF           Confirmed By:     Vitamin B1 [[id number]] Collected: 10/06/20 1733    Order Status: Sent Lab Status: In process Updated: 10/06/20 1752    Specimen: Blood     Vitamin B6 [[id number]] Collected: 10/06/20 1733    Order Status: Sent Lab Status: In process Updated: 10/06/20 1752    Specimen: Blood          Medications:  Reconciled Home Medications:      Medication List      START taking these medications    atorvastatin 40 MG tablet  Commonly known as: LIPITOR  Take 1 tablet (40 mg total) by mouth once daily.        CONTINUE taking these medications    acetaminophen 500 MG tablet  Commonly known as: TYLENOL  Take 500 mg by mouth every evening.     levothyroxine 75 MCG tablet  Commonly known as: SYNTHROID  Take 1 tablet by mouth once daily     mirtazapine 30 MG tablet  Commonly known as: REMERON  Take 1 tablet (30 mg total) by mouth every evening.     multivitamin tablet  Commonly known as: THERAGRAN  Take 1 tablet by mouth once daily.     VITAMIN C 500 MG tablet  Generic drug: ascorbic acid (vitamin C)  Take 500 mg by mouth once daily.        STOP taking these medications    apixaban 5 mg Tab  Commonly known as: ELIQUIS     traZODone  100 MG tablet  Commonly known as: DESYREL            Indwelling Lines/Drains at time of discharge:   Lines/Drains/Airways     None                 Time spent on the discharge of patient: 34 minutes  Patient was seen and examined on the date of discharge and determined to be suitable for discharge.         Tamela Pearl MD  Department of Hospital Medicine  [facility]

## 2020-10-07 NOTE — DISCHARGE INSTRUCTIONS
Please do not take Eliquis until you have a repeat CT scan of the head without contrast done in 2 weeks and then follow-up with Dr. Destinee Gomez to discuss whether it is safe to take Eliquis again.  Do not take trazodone again.

## 2020-10-07 NOTE — NURSING
Discharge instructions reviewed with patient's son; verbalized understanding. IV catheter removed intact; pressure and gauze applied. Tele monitor removed. Pt wheeled off unit with belongings to son's car to be transported to [place].

## 2020-10-07 NOTE — PLAN OF CARE
SW faxed referral w/ orders for home health and DME (roll walker) to N for assignment.      10/7/2020 10:54:06 AM New: Patient discharging today, will be receiving services at [facility] Home health orders & DME (roll walker) are attached.  Shawna Kay     10/07/20 1054   Post-Acute Status   Post-Acute Authorization Home Health;HME   HME Status Referrals Sent   Home Health Status Referrals Sent

## 2020-10-07 NOTE — PROGRESS NOTES
[facility]  Neurology  Progress Note    Patient Name: Adriano Borjas  MRN: [id number]  Admission Date: 10/3/2020  Hospital Length of Stay: 3 days  Code Status: Full Code   Attending Provider: Tamela Pearl MD  Primary Care Physician: Marina Joyce MD   Principal Problem:Cerebral hemorrhage, acute    Subjective:     Interval History: Patient seen and examined this morning. He is alert and disoriented to time. No focal deficits noted. B/l hand resting tremor noted to hands, no cogwheel rigidity noted.     Brain imaging  MRA brain w/o contrast: pending     EEG:   IMPRESSION:   Abnormal EEG due to the finding of a mild, generalized, non-specific cerebral dysfunction.  No electrographic seizures or indications of seizure tendency.    Heart imaging:   TTE with bubble pending     10/6:  Patient seen and examined.  Plan of care discussed with Dr. Pasha Duque.  Patient's son at bedside.  Discussed with the CT, MRI, MRA, carotid ultrasound results.  Patient concerned that his father to getting too much trazodone.  Discussed with him that it would be best for his neurology Destinee Gomez to adjust his sleeping medications at this time.  They do not want melatonin because they believe it does not work.  Patient will be weaned off the trazodone and start another option as discuss with Dr. Destinee Gomez.  Discussed that the MRI showed no infarction as possibly thought on the CT.  Mr. aguilar responds to questions but is not oriented to year or month.  He follows commands and can name 3 of 3 objects.  No focal deficits noted.    Current Neurological Medications:     Current Facility-Administered Medications   Medication Dose Route Frequency Provider Last Rate Last Dose    ascorbic acid (vitamin C) tablet 500 mg  500 mg Oral Daily Tamela Pearl MD   500 mg at 10/06/20 0801    atorvastatin tablet 40 mg  40 mg Oral Daily Tamela Pearl MD   40 mg at 10/06/20 0801    levothyroxine tablet 75 mcg  75 mcg Oral Daily Tamela Pearl  MD   75 mcg at 10/06/20 0800    mirtazapine tablet 15 mg  15 mg Oral QHS Tamela Pearl MD        multivitamin tablet  1 tablet Oral Daily Tamela Pearl MD   1 tablet at 10/06/20 0801    ondansetron injection 4 mg  4 mg Intravenous Q12H PRN Tamela Pearl MD        sodium chloride 0.9% bolus 500 mL  500 mL Intravenous Continuous PRN Tamela Pearl MD        sodium chloride 0.9% flush 10 mL  10 mL Intravenous PRN Tamela Pearl MD         Facility-Administered Medications Ordered in Other Encounters   Medication Dose Route Frequency Provider Last Rate Last Dose    lactated ringers infusion   Intravenous Continuous Panchito Montoya MD        lidocaine (PF) 10 mg/ml (1%) injection 10 mg  1 mL Intradermal Once Panchito Montoya MD           Review of Systems     As per HPI   Objective:     Vital Signs (Most Recent):  Temp: 97.9 °F (36.6 °C) (10/06/20 1943)  Pulse: 86 (10/06/20 1943)  Resp: 18 (10/06/20 1943)  BP: (!) 154/73 (10/06/20 1943)  SpO2: 97 % (10/06/20 1943) Vital Signs (24h Range):  Temp:  [97.5 °F (36.4 °C)-98.1 °F (36.7 °C)] 97.9 °F (36.6 °C)  Pulse:  [] 86  Resp:  [9-22] 18  SpO2:  [94 %-98 %] 97 %  BP: ()/(51-91) 154/73     Weight: 69.1 kg (152 lb 5.4 oz)  Body mass index is 23.16 kg/m².    Physical Exam    Physical Exam  Vitals signs and nursing note reviewed.   Constitutional:       General: He is awake.      Appearance: Normal appearance.      Comments: elderly    HENT:      Head: Normocephalic and atraumatic.      Nose: Nose normal.      Mouth/Throat:      Mouth: Mucous membranes are moist.      Pharynx: Oropharynx is clear.   Eyes:      General: Lids are normal.      Extraocular Movements: Extraocular movements intact.      Right eye: Normal extraocular motion.      Left eye: Normal extraocular motion.      Conjunctiva/sclera: Conjunctivae normal.      Pupils: Pupils are equal, round, and reactive to light.   Neck:      Musculoskeletal: Full passive range of motion without pain and  neck supple.   Cardiovascular:      Rate and Rhythm: Normal rate. Rhythm irregular.      Pulses: Normal pulses.      Heart sounds: Normal heart sounds.   Pulmonary:      Effort: Pulmonary effort is normal.      Breath sounds: Normal breath sounds.   Abdominal:      General: Abdomen is flat. Bowel sounds are normal.      Palpations: Abdomen is soft.   Musculoskeletal: Normal range of motion.   Skin:     General: Skin is warm and dry.      Capillary Refill: Capillary refill takes less than 2 seconds.   Neurological:      General: No focal deficit present.      Mental Status: He is alert. Mental status is at baseline. He is disoriented.      GCS: GCS eye subscore is 4. GCS verbal subscore is 4. GCS motor subscore is 5.      Cranial Nerves: Cranial nerves are intact.      Sensory: Sensation is intact.      Motor: tremor present.      Coordination: Finger-Nose-Finger Test abnormal.      Deep Tendon Reflexes:      Reflex Scores:       Brachioradialis reflexes are 2+ on the right side and 2+ on the left side.       Patellar reflexes are 2+ on the right side and 2+ on the left side.     Comments: Alert and oriented  to person and place , Gait not observed    Psychiatric:         Attention and Perception: Attention normal. He is attentive. He does not perceive visual hallucinations.         Mood and Affect: Mood normal.         Speech: Speech normal.         Behavior: Behavior normal. Behavior is cooperative.         Thought Content: Thought content normal.         Cognition and Memory: Cognition is impaired. Memory is impaired.         Judgment: Judgment normal.      Comments: Very cooperative             NEUROLOGICAL EXAMINATION:      MENTAL STATUS   Oriented to person.   Disoriented to place. Disoriented to country, city and area.   Disoriented to time.   Registration: recalls 3 of 3 objects. Follows 3 step commands.   Attention: normal. Concentration: normal.   Speech: speech is normal   Level of consciousness:  alert  Knowledge: poor and inconsistent with education.   Able to name object.        Baseline dementia       CRANIAL NERVES      CN II   Visual fields full to confrontation.   Visual acuity: normal     CN III, IV, VI   Pupils are equal, round, and reactive to light.  Right pupil: Size: 3 mm. Shape: regular. Reactivity: brisk.   Left pupil: Size: 3 mm. Shape: regular. Reactivity: brisk.      CN V   Facial sensation intact.      CN VII   Facial expression full, symmetric.      CN VIII   Hearing: impaired     MOTOR EXAM      Strength   Right neck flexion: 4/5  Left neck flexion: 4/5  Right neck extension: 4/5  Left neck extension: 4/5  Right deltoid: 4/5  Left deltoid: 4/5  Right biceps: 4/5  Left biceps: 4/5  Right triceps: 4/5  Left triceps: 4/5  Right wrist flexion: 4/5  Left wrist flexion: 4/5  Right wrist extension: 4/5  Left wrist extension: 4/5  Right interossei: 4/5  Left interossei: 4/5  Right abdominals: 4/5  Left abdominals: 4/5  Right iliopsoas: 4/5  Left iliopsoas: 4/5  Right quadriceps: 4/5  Left quadriceps: 4/5  Right hamstrin/5  Left hamstrin/5  Right glutei: 4/5  Left glutei: 4/5  Right anterior tibial: 4/5  Left anterior tibial: 4/5  Right posterior tibial: 4/5  Left posterior tibial: 4/5  Right peroneal: 4/5  Left peroneal: 4/5  Right gastroc: 4/5  Left gastroc: 4/5     REFLEXES      Reflexes   Right brachioradialis: 2+  Left brachioradialis: 2+  Right patellar: 2+  Left patellar: 2+     SENSORY EXAM   Light touch normal.   Right arm light touch: normal  Left arm light touch: normal  Right leg vibration: decreased from toes  Left leg vibration: decreased from toes     GAIT AND COORDINATION      Coordination   Finger to nose coordination: abnormal     Tremor   Resting tremor: present       Gait not observed              Significant Labs:   Hemoglobin A1c:   Recent Labs   Lab 10/04/20  0510   HGBA1C 5.2     BMP:   Recent Labs   Lab 10/05/20  0441   GLU 96      K 3.7      CO2 26    BUN 17   CREATININE 1.2   CALCIUM 8.6*     CBC:   Recent Labs   Lab 10/05/20  0441   WBC 6.96   HGB 13.5*   HCT 39.1*   PLT 99*     CMP:   Recent Labs   Lab 10/05/20  0441   GLU 96      K 3.7      CO2 26   BUN 17   CREATININE 1.2   CALCIUM 8.6*   PROT 6.2   ALBUMIN 3.3*   BILITOT 1.1*   ALKPHOS 59   AST 31   ALT 13   ANIONGAP 10   EGFRNONAA 56*     All pertinent lab results from the past 24 hours have been reviewed.    Significant Imaging: I have reviewed all pertinent imaging results/findings within the past 24 hours.    Assessment and Plan:  1. Cerebral hemorrhage, acute vs  Hemorrhagic infarction within the subcortical white matter of the medial left occipital lobe /Acute Encephalopathy /Confusion  -Dr. Fang from neurosurgery was consulted by Dr. Anderson from ER who suspects, occipital hemorrhage could be artifact.  Will continue to hold Eliquis use for now.-  -Neurochecks q 4 hrs   - Fall and seizure precautions.  -MRI brain w/o contrast:  Negative for infarction    -.4-continue statin therapy for goal <70   -Seizure precautions    - Neurosurgery recs noted and appreciated   -Neuro checks q 1 hours   -Keep blood pressure systolic pressure less than 150 goal.   -TTE w/bubble pending  intra-cardiac thrombus or valvular heart disease.    2. . History of Dementia  -Internal MD to manage      3. History of Stroke  -Hold blood thinners for now    4. History of Paroxysmal atrial fibrillation   -Hold  Eliquis  -Neurosurgery rec noted   -Repeat CT appears to be stable     5. Hypothyroidism  -Internal MD to manage     6. HTN  -Internal MD to manage      10/4: Patient CT of head showed a 5 mm focus of acute petechial hemorrhage with in the left occipital lobe. Small area of sub acute vs remote lacunar infarction within the left nya. Age related appropriate cerebral volume loss. Chronic microvascular ischemic changes within the periventricular white matter of the supratentorial brain. MRI of brain showed  limited examination revealing small 6 mm focus acute petechial hemorraghic vs hemorraghic infarction within the subcortical white mater of the medical left occipital lobe. CUS impression: No sonographically evident hemodynamically significant > 50% luminal stenosis observed within the left or right internal carotid artery. Repeat Ct of head 10/4 w/o contrast impression: Stable appearance to the brain and ventricles relative the previous day's studies with a possible acute to early subacute punctate hemorrhage in the left occipital lobe cortex or adjacent gyrus.  There is no regional mass effect.No new findings.    10/5: Ct head w/o contrast obtained on yesterday stable per report. Patient is alert and oriented to self and place, disoriented to time. If patient remains stable, OK with pt being transferred to tele unit.      Plan:  Continue to hold blood thinners and anticoagulant at this time.  In 2 weeks repeat CT head.  If the CT head is negative for blood patient can resume Eliquis.  Discussed importance of blood pressure control, along with neuro monitoring of patient.  An to go straight to ED with any decline in neuro status.     Patient to [facility] at [phone] within 3 days from discharge.     Stroke education was provided including stroke risk factors modification and any acute neurological changes including weakness, confusion, visual changes to come straight to the ER.  Seizure education was provided including no driving, no swimming by self, no operation of heavy machinery or climbing on ladders.     All side effects of new medications were discussed with patient and/or next of kin and all questions were answered.                                                                                   Active Diagnoses:    Diagnosis Date Noted POA    PRINCIPAL PROBLEM:  Cerebral hemorrhage, acute [I61.9] 10/03/2020 Yes    Moderate malnutrition [E44.0] 10/05/2020 Yes     Intracranial hemorrhage [I62.9] 10/03/2020 Yes    Goals of care, counseling/discussion [Z71.89] 10/03/2020 Not Applicable    History of cerebral hemorrhage [Z86.79] 09/05/2019 Not Applicable    Paroxysmal atrial fibrillation [I48.0] 12/12/2018 Yes    Essential hypertension [I10] 07/27/2018 Yes    Gait abnormality [R26.9] 06/26/2017 Yes      Problems Resolved During this Admission:       VTE Risk Mitigation (From admission, onward)         Ordered     IP VTE HIGH RISK PATIENT  Once      10/03/20 1829     Place sequential compression device  Until discontinued      10/03/20 1829     Reason for No Pharmacological VTE Prophylaxis  Once     Question:  Reasons:  Answer:  Active Bleeding    10/03/20 1829                Veda Graham NP  Neurology  [facility]

## 2020-10-07 NOTE — PLAN OF CARE
10/07/20 0806   PRE-TX-O2   O2 Device (Oxygen Therapy) room air   SpO2 97 %   Pulse Oximetry Type Intermittent   $ Pulse Oximetry - Multiple Charge Pulse Oximetry - Multiple

## 2020-10-08 ENCOUNTER — PATIENT OUTREACH (OUTPATIENT)
Dept: ADMINISTRATIVE | Facility: CLINIC | Age: 85
End: 2020-10-08

## 2020-10-08 NOTE — PROGRESS NOTES
C3 nurse attempted to contact patient. No answer.  C3 nurse attempted to contact Adriano for a TCC post hospital discharge follow up call. No answer at phone number listed and no voicemail available. The patient does not have a scheduled HOSFU appointment within 7-14 days post hospital discharge date 10/7/20. Non [facility] PCP,

## 2020-10-09 ENCOUNTER — TELEPHONE (OUTPATIENT)
Dept: NEUROLOGY | Facility: CLINIC | Age: 85
End: 2020-10-09

## 2020-10-09 NOTE — TELEPHONE ENCOUNTER
----- Message from Florence  sent at 10/9/2020 10:59 AM CDT -----  Regarding: appt access  Contact: daughter Shawna  Type: Needs Medical Advice    Who Called:      Best Call Back Number:     Additional Information: Requesting a call back from Nurse, regarding a 2 weeks hops f/u and a CT scan ,please call back with advice soon per daughter

## 2020-10-12 LAB — PYRIDOXAL SERPL-MCNC: 42 UG/L (ref 5–50)

## 2020-10-13 ENCOUNTER — LAB VISIT (OUTPATIENT)
Dept: LAB | Facility: HOSPITAL | Age: 85
End: 2020-10-13
Attending: FAMILY MEDICINE
Payer: MEDICARE

## 2020-10-13 DIAGNOSIS — I63.9 IMPENDING CEREBROVASCULAR ACCIDENT: Primary | ICD-10-CM

## 2020-10-13 LAB
ANION GAP SERPL CALC-SCNC: 10 MMOL/L (ref 8–16)
BASOPHILS # BLD AUTO: 0.09 K/UL (ref 0–0.2)
BASOPHILS NFR BLD: 1.3 % (ref 0–1.9)
BUN SERPL-MCNC: 29 MG/DL (ref 8–23)
CALCIUM SERPL-MCNC: 8.6 MG/DL (ref 8.7–10.5)
CHLORIDE SERPL-SCNC: 105 MMOL/L (ref 95–110)
CO2 SERPL-SCNC: 28 MMOL/L (ref 23–29)
CREAT SERPL-MCNC: 1.1 MG/DL (ref 0.5–1.4)
DIFFERENTIAL METHOD: ABNORMAL
EOSINOPHIL # BLD AUTO: 0.4 K/UL (ref 0–0.5)
EOSINOPHIL NFR BLD: 5.7 % (ref 0–8)
ERYTHROCYTE [DISTWIDTH] IN BLOOD BY AUTOMATED COUNT: 12.2 % (ref 11.5–14.5)
EST. GFR  (AFRICAN AMERICAN): >60 ML/MIN/1.73 M^2
EST. GFR  (NON AFRICAN AMERICAN): >60 ML/MIN/1.73 M^2
GLUCOSE SERPL-MCNC: 116 MG/DL (ref 70–110)
HCT VFR BLD AUTO: 34.4 % (ref 40–54)
HGB BLD-MCNC: 11.6 G/DL (ref 14–18)
IMM GRANULOCYTES # BLD AUTO: 0.02 K/UL (ref 0–0.04)
IMM GRANULOCYTES NFR BLD AUTO: 0.3 % (ref 0–0.5)
LYMPHOCYTES # BLD AUTO: 2 K/UL (ref 1–4.8)
LYMPHOCYTES NFR BLD: 29.3 % (ref 18–48)
MCH RBC QN AUTO: 31 PG (ref 27–31)
MCHC RBC AUTO-ENTMCNC: 33.7 G/DL (ref 32–36)
MCV RBC AUTO: 92 FL (ref 82–98)
MONOCYTES # BLD AUTO: 0.9 K/UL (ref 0.3–1)
MONOCYTES NFR BLD: 13.2 % (ref 4–15)
NEUTROPHILS # BLD AUTO: 3.4 K/UL (ref 1.8–7.7)
NEUTROPHILS NFR BLD: 50.2 % (ref 38–73)
NRBC BLD-RTO: 0 /100 WBC
PLATELET # BLD AUTO: 98 K/UL (ref 150–350)
PMV BLD AUTO: 14.2 FL (ref 9.2–12.9)
POTASSIUM SERPL-SCNC: 3.9 MMOL/L (ref 3.5–5.1)
RBC # BLD AUTO: 3.74 M/UL (ref 4.6–6.2)
SODIUM SERPL-SCNC: 143 MMOL/L (ref 136–145)
VIT B1 BLD-MCNC: 66 UG/L (ref 38–122)
WBC # BLD AUTO: 6.82 K/UL (ref 3.9–12.7)

## 2020-10-13 PROCEDURE — 80048 BASIC METABOLIC PNL TOTAL CA: CPT

## 2020-10-13 PROCEDURE — 85025 COMPLETE CBC W/AUTO DIFF WBC: CPT

## 2020-10-18 ENCOUNTER — PATIENT MESSAGE (OUTPATIENT)
Dept: NEUROLOGY | Facility: CLINIC | Age: 85
End: 2020-10-18

## 2020-10-18 DIAGNOSIS — D64.9 ANEMIA, UNSPECIFIED TYPE: Primary | ICD-10-CM

## 2020-10-19 ENCOUNTER — PATIENT MESSAGE (OUTPATIENT)
Dept: NEUROLOGY | Facility: CLINIC | Age: 85
End: 2020-10-19

## 2020-10-20 RX ORDER — SERTRALINE HYDROCHLORIDE 25 MG/1
25 TABLET, FILM COATED ORAL DAILY
Qty: 30 TABLET | Refills: 11 | Status: SHIPPED | OUTPATIENT
Start: 2020-10-20 | End: 2023-11-28

## 2020-10-20 NOTE — TELEPHONE ENCOUNTER
Patient with sundowning reported by daughter and they are having a hard time getting him to take his evening medication. Will try to start low dose zoloft 25 mg daily and can uptitrate as needed.

## 2020-10-23 ENCOUNTER — HOSPITAL ENCOUNTER (OUTPATIENT)
Dept: RADIOLOGY | Facility: HOSPITAL | Age: 85
Discharge: HOME | End: 2020-10-23
Attending: NEUROLOGICAL SURGERY
Payer: MEDICARE

## 2020-10-23 ENCOUNTER — OFFICE VISIT (OUTPATIENT)
Dept: NEUROLOGY | Facility: CLINIC | Age: 85
End: 2020-10-23
Payer: MEDICARE

## 2020-10-23 ENCOUNTER — OFFICE VISIT (OUTPATIENT)
Dept: NEUROSURGERY | Facility: CLINIC | Age: 85
End: 2020-10-23
Payer: MEDICARE

## 2020-10-23 VITALS
SYSTOLIC BLOOD PRESSURE: 152 MMHG | RESPIRATION RATE: 16 BRPM | HEART RATE: 66 BPM | TEMPERATURE: 97 F | HEIGHT: 68 IN | WEIGHT: 167 LBS | BODY MASS INDEX: 25.31 KG/M2 | DIASTOLIC BLOOD PRESSURE: 73 MMHG

## 2020-10-23 DIAGNOSIS — I10 ESSENTIAL HYPERTENSION: ICD-10-CM

## 2020-10-23 DIAGNOSIS — I62.9 INTRACRANIAL HEMORRHAGE: Primary | ICD-10-CM

## 2020-10-23 DIAGNOSIS — G31.09 FRONTOTEMPORAL DEMENTIA: ICD-10-CM

## 2020-10-23 DIAGNOSIS — E78.5 HYPERLIPIDEMIA, UNSPECIFIED HYPERLIPIDEMIA TYPE: ICD-10-CM

## 2020-10-23 DIAGNOSIS — I48.0 PAROXYSMAL ATRIAL FIBRILLATION: ICD-10-CM

## 2020-10-23 DIAGNOSIS — F02.80 FRONTOTEMPORAL DEMENTIA: ICD-10-CM

## 2020-10-23 DIAGNOSIS — I61.9: Primary | ICD-10-CM

## 2020-10-23 DIAGNOSIS — I61.9: ICD-10-CM

## 2020-10-23 PROCEDURE — 1159F MED LIST DOCD IN RCRD: CPT | Mod: S$GLB,,, | Performed by: NURSE PRACTITIONER

## 2020-10-23 PROCEDURE — 1126F PR PAIN SEVERITY QUANTIFIED, NO PAIN PRESENT: ICD-10-PCS | Mod: S$GLB,,, | Performed by: NURSE PRACTITIONER

## 2020-10-23 PROCEDURE — 99999 PR PBB SHADOW E&M-EST. PATIENT-LVL IV: CPT | Mod: PBBFAC,,, | Performed by: NURSE PRACTITIONER

## 2020-10-23 PROCEDURE — 99499 RISK ADDL DX/OHS AUDIT: ICD-10-PCS | Mod: S$GLB,,, | Performed by: NURSE PRACTITIONER

## 2020-10-23 PROCEDURE — 1159F MED LIST DOCD IN RCRD: CPT | Mod: 95,,, | Performed by: NURSE PRACTITIONER

## 2020-10-23 PROCEDURE — 1101F PR PT FALLS ASSESS DOC 0-1 FALLS W/OUT INJ PAST YR: ICD-10-PCS | Mod: CPTII,95,, | Performed by: NURSE PRACTITIONER

## 2020-10-23 PROCEDURE — 1159F PR MEDICATION LIST DOCUMENTED IN MEDICAL RECORD: ICD-10-PCS | Mod: S$GLB,,, | Performed by: NURSE PRACTITIONER

## 2020-10-23 PROCEDURE — 1126F AMNT PAIN NOTED NONE PRSNT: CPT | Mod: S$GLB,,, | Performed by: NURSE PRACTITIONER

## 2020-10-23 PROCEDURE — 99499 UNLISTED E&M SERVICE: CPT | Mod: S$GLB,,, | Performed by: NURSE PRACTITIONER

## 2020-10-23 PROCEDURE — 70450 CT HEAD/BRAIN W/O DYE: CPT | Mod: 26,,, | Performed by: RADIOLOGY

## 2020-10-23 PROCEDURE — 99999 PR PBB SHADOW E&M-EST. PATIENT-LVL IV: ICD-10-PCS | Mod: PBBFAC,,, | Performed by: NURSE PRACTITIONER

## 2020-10-23 PROCEDURE — 1159F PR MEDICATION LIST DOCUMENTED IN MEDICAL RECORD: ICD-10-PCS | Mod: 95,,, | Performed by: NURSE PRACTITIONER

## 2020-10-23 PROCEDURE — 1101F PT FALLS ASSESS-DOCD LE1/YR: CPT | Mod: CPTII,S$GLB,, | Performed by: NURSE PRACTITIONER

## 2020-10-23 PROCEDURE — 99212 OFFICE O/P EST SF 10 MIN: CPT | Mod: 95,,, | Performed by: NURSE PRACTITIONER

## 2020-10-23 PROCEDURE — 1101F PT FALLS ASSESS-DOCD LE1/YR: CPT | Mod: CPTII,95,, | Performed by: NURSE PRACTITIONER

## 2020-10-23 PROCEDURE — 70450 CT HEAD/BRAIN W/O DYE: CPT | Mod: TC,PO

## 2020-10-23 PROCEDURE — 70450 CT HEAD WITHOUT CONTRAST: ICD-10-PCS | Mod: 26,,, | Performed by: RADIOLOGY

## 2020-10-23 PROCEDURE — 99214 OFFICE O/P EST MOD 30 MIN: CPT | Mod: S$GLB,,, | Performed by: NURSE PRACTITIONER

## 2020-10-23 PROCEDURE — 99212 PR OFFICE/OUTPT VISIT, EST, LEVL II, 10-19 MIN: ICD-10-PCS | Mod: 95,,, | Performed by: NURSE PRACTITIONER

## 2020-10-23 PROCEDURE — 99214 PR OFFICE/OUTPT VISIT, EST, LEVL IV, 30-39 MIN: ICD-10-PCS | Mod: S$GLB,,, | Performed by: NURSE PRACTITIONER

## 2020-10-23 PROCEDURE — 1101F PR PT FALLS ASSESS DOC 0-1 FALLS W/OUT INJ PAST YR: ICD-10-PCS | Mod: CPTII,S$GLB,, | Performed by: NURSE PRACTITIONER

## 2020-10-23 NOTE — ASSESSMENT & PLAN NOTE
- Last MMSE 7/30   - family hesitant to start cognition enhancing medication as pt had side effects from Aricept   - family main concern is patient's insomnia. He could not tolerate Trazadone and was placed on Remeron. He was also started on low dose Zoloft for sundowning. Family is unsure as to when this medication was actually started at the memory care facility. Consider increase in either Zoloft or Remeron in future.   - no medication changes for now. Pt to f/u with Dr. Gomez

## 2020-10-23 NOTE — ASSESSMENT & PLAN NOTE
- Stroke risk factor   -    - Started on Lipitor 40 mg in hospital. Family concerned of increased confusion side effects with this drug, though they have not seen any as of yet. They are open to continuing the drug for now and getting repeat Lipid profile in about 3 mths or so   - PCP to manage.

## 2020-10-23 NOTE — ASSESSMENT & PLAN NOTE
- CT from 10/3/2020 noted to have 5 mm focus of acute petechial hemorrhage within the left occipital lobe.   - repeat CT from today shows unchanged small hyperdense focus along the left occipital cortex.   - pt following up with [facility] today   - Pt not currently on Eliquis as family opted to not restart this medication. They will have a discussion about anticoagulants with cardiology.    - hold blood thinners for now.    - If anticoagulant is restarted, recommend repeat CT thereafter to r/o bleed.

## 2020-10-23 NOTE — LETTER
October 23, 2020      Destinee Gomez MD  [address] [facility] [address] [place] [address]           [facility]  [address] [place] [address]  Phone: [phone]  Fax: [phone]          Patient: Adriano Borjas   MR Number: [id number]   YOB: 1935   Date of Visit: 10/23/2020       Dear Dr. Destinee Gomez:    Thank you for referring Adriano Borjas to me for evaluation. Attached you will find relevant portions of my assessment and plan of care.    If you have questions, please do not hesitate to call me. I look forward to following Adriano Borjas along with you.    Sincerely,    Lisa Wiggins, [facility]    Enclosure  CC:  No Recipients    If you would like to receive this communication electronically, please contact [email or web address] or [phone] to request more information on [facility] access.    For providers and/or their staff who would like to refer a patient to [facility], please contact us through our one-stop-shop provider referral line, [facility], at [phone].    If you feel you have received this communication in error or would no longer like to receive these types of communications, please e-mail [email or web address]

## 2020-10-23 NOTE — PROGRESS NOTES
NEUROLOGY  Outpatient Follow Up    [facility]  [address] [facility] [address], [place] [address]  [phone] (office) / [phone] (fax)    Patient Name:  Adriano Borjas  :  1935  MR #:  [id number]  Acct #:  [id number]    Date of Neurology Visit: 10/23/2020  Name of Provider: ELBA Loredo    Other Physicians:  Marina Joyce MD (Primary Care Physician); Destinee Gomez MD (Referring)      Chief Complaint: Hospital Follow Up      History of Present Illness (HPI):  As per Epic chart review:  Patient is and 85-year-old male with past medical history significant for hypertension, history of paroxysmal atrial fibrillation (on Eliquis), history of TIA, dementia, nephrolithiasis, hypothyroidism, prior history of cerebral hemorrhage and gait instability is being admitted to Hospital Medicine under inpatient status from [facility] with complaint of increasing confusion and altered mental status for the past week.  Patient is a resident of [facility].  Part of the history obtained from patient's son.  According to the son, patient is having progressive decline in mental status lately and this morning patient was not aware very was at the time of breakfast.  It appeared to be more than usual confusion in family is concerned about possibility of new cerebrovascular accident. Patient reports that currently he feels back to his baseline. The patient is unsure if he had a headache or not earlier in the morning. Son states that the patient has had some recent medication changes, reporting an increase in trazodone, but son returned to original dose secondary to increased agitation. The son states that the patients agitation has not been as intense as it was in the previous week.The patient denies chest pain, SOB, fever, cough, pain with urination, or any other complaint at this time. Son denies the patient having speech difficulty,  facial asymmetry, weakness, numbness, or any other complaint at this time.  No recent fall, head injury, loss of consciousness or seizure activity reported.       Hospital Course:   Patient was admitted to intensive care unit and was evaluated by neurologist and neurosurgeon.  Subsequent CT scan of the head did not report any further expansion of left occipital hemorrhage.  During hospital stay patient exhibited features of dementia and frequent agitation.  Case discussed with patient's original neurologist Dr. Destinee Gomez who recommended to discontinue trazodone and patient was initiated on Remeron.  Fall precautions discussed with patient's children.  We also discussed dangers of continuing use of Eliquis in the setting of dementia and frequent falls and left occipital hemorrhage.  Patient's family opted not to consider Eliquis in future.  Patient will have a repeat CT scan of the head performed in 2 weeks and will discuss with neurologist regarding further plan of care.  Is a home walker has been arranged.  Patient will be admitted to [facility] memory unit for further care.  The whole discharge plan of care was reviewed with patient's son, I answered all questions, no further questions remained at the end of our conversation.    Interval history: 9/25/2020 (Dr. Gomez)   His memory continues to be impaired. He didn't tolerate aricept. He has not tried namenda. His son notes that his memory continues to decline. He has trouble with the memory. He feels like she is not understanding as well as he did before.      He has a lot of trouble sleeping. He has tried xanax in the past for sleep. He has tried benadryl in the past but doesn't work well--wears off. Melatonin gave him weird dreams. We tried trazodone and this helped with mood but at 100 mg nightly he continues to have sleep difficulty.      He living in [facility] in [place] and he has attention all day long. He has some assistance for bathing. He is getting 3  meals per day. They come in and give him his medications. He has some social activities.      He is more depressed at night. He feels he is depressed some days because he can't sleep.      He continues on Eliquis for his afib and h/o TIA.      His son notes that he has been eating more sugar--pouring cups of sugar on his meals. He also endorses cough and bruising/bleeding.               Interval Hx 10/23/2020:   Patient is new to me  Patient is here today for a hospital follow up visit. He is accompanied by his son who provides most information. Since being discharged from the hospital patient is back at [facility] but is now in the Memory Care unit. Patient states he feels well and son seems to think patient is back to baseline cognitively. 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 His son is concerned about the patient not sleeping at night. Trazodone was recently discontinued as it made his confusion worse. He is currently on Remeron and was also started on low dose Zoloft for possible sundowning. Son is unsure if he has actually started this medication yet at the memory care unit.   Patient did have repeat CT scan today and it was noted to be unchanged. Family is hesitant about restarting Eliquis as patient is a fall risk. They will discuss this with his cardiologist.                     Treatment to date:   Aricept - could not tolerate side effects          Review of Systems:    General: Weight gain: No, Weight Loss: Yes, Fatigue: No,   Fever: No, Chills: No, Night Sweats: No, Insomnia: Yes, Excessive sleeping: No   Respiratory:  Cough: No, Shortness of Breath: No,   Wheezing: No, Excessive Snoring: No, Coughing up blood: No  Endocrine: Heat Intolerance: No, Cold Intolerance: Yes,   Excessive Thirst: No, Excessive Hunger: No,   Eyes:  Blurred Vision: No, Double Vision: No,   Light Sensitivity: Yes, Eye pain: No  Musculoskeletal: Muscle Aches/Pain: Yes, Joint Pain/Swelling: Yes, Muscle Cramps: Yes, Muscle  Weakness: No, Neck Pain: Yes, Back Pain: Yes   Neurological: Difficulty Walking/Falls: Yes, Headache Migraine: No, Dizziness/Vertigo: No, Fainting: No, Difficulty with Speech: No, Weakness: No, Tingling/Numbness: No, Tremors: No, Memory Problems: Yes, Seizures: No, Difficulty Swallowing: No, Altered Taste: No.  Cardiovascular: Chest Pain: No, Shortness of Breath: No,   Palpitations: No,  Gastrointestinal: Nausea/Vomiting: No, Constipation: No, Diarrhea: No, Bloody Stools: No   Psych/Cog:  Depression: Yes, Anxiety: Yes, Hallucinations: No, Problems Concentrating: Yes  : Frequent Urination: No, Incontinence: Yes, Blood of Urine: No, Urinary Infections: No  ENT:Hearing Loss: Yes, Earache: No, Ringing in Ears: No,   Facial Pain: No, Chronic Congestion: Yes   Immune: Seasonal Allergies: Yes, Hives and/or Rashes: No  The remainder of the review of twelve body systems was reviewed and normal.    Past Medical, Surgical, Family & Social History:   Reviewed and updated.    Home Medications:     Current Outpatient Medications:     acetaminophen (TYLENOL) 500 MG tablet, Take 500 mg by mouth every evening., Disp: , Rfl:     ascorbic acid, vitamin C, (VITAMIN C) 500 MG tablet, Take 500 mg by mouth once daily., Disp: , Rfl:     atorvastatin (LIPITOR) 40 MG tablet, Take 1 tablet (40 mg total) by mouth once daily., Disp: 30 tablet, Rfl: 0    levothyroxine (SYNTHROID) 75 MCG tablet, Take 1 tablet by mouth once daily, Disp: 90 tablet, Rfl: 0    mirtazapine (REMERON) 30 MG tablet, Take 1 tablet (30 mg total) by mouth every evening., Disp: 30 tablet, Rfl: 0    multivitamin (THERAGRAN) tablet, Take 1 tablet by mouth once daily., Disp: , Rfl:     sertraline (ZOLOFT) 25 MG tablet, Take 1 tablet (25 mg total) by mouth once daily., Disp: 30 tablet, Rfl: 11    Current Facility-Administered Medications:     tuberculin injection 5 Units, 5 Units, Intradermal, 1 time in Clinic/HOD, Panfilo Steward DNP    Facility-Administered  "Medications Ordered in Other Visits:     lactated ringers infusion, , Intravenous, Continuous, Panchito Montoya MD    lidocaine (PF) 10 mg/ml (1%) injection 10 mg, 1 mL, Intradermal, Once, Panchito Montoya MD    Physical Examination:  BP (!) 152/73 (BP Location: Left arm, Patient Position: Sitting, BP Method: Medium (Automatic))   Pulse 66   Temp 97.4 °F (36.3 °C)   Resp 16   Ht 5' 8" (1.727 m)   Wt 75.8 kg (167 lb)   BMI 25.39 kg/m²     GENERAL:  General appearance: Well, non-toxic appearing.  No apparent distress.  Neck: supple.    MENTAL STATUS:  Alertness, attention span & concentration: normal.  Language: normal.  Oriented to self, month and place.   Memory, recent & remote: decreased  Fund of knowledge: normal.      SPEECH:  Clear and fluent. No aphasia  Follows complex commands.    CRANIAL NERVES:  Cranial Nerves II-XII were examined.  II - Visual fields: normal.  III, IV, VI: PERRL, EOMI, No ptosis, No nystagmus.  V - Facial sensation: normal.  VII - Face symmetry & mobility: normal.  VIII - Hearing: normal.  IX, X - Palate: mobile & midline.  XI - Shoulder shrug: normal.  XII - Tongue protrusion: normal.    GROSS MOTOR:  Gait & station: normal.  Tone: normal.  Abnormal movements: none.  Finger-nose & Heel-knee-shin: normal.  Rapid alternating movements: normal.  Pronator drift: normal  Romberg: absent    MUSCLE STRENGTH:   5/5 strength throughout    REFLEXES:    RIGHT Reflex   LEFT   2+ Biceps 2+   2+ Brachiorad. 2+   2+ Triceps 2+        2+ Patellar 2+     SENSORY:  Light touch: Normal throughout.   Sharp touch: Normal throughout.  Vibration: Normal throughout.   Temperature: Normal throughout.                   Diagnostic Data Reviewed:   Component      Latest Ref Rng & Units 10/13/2020 10/6/2020 10/4/2020   WBC      3.90 - 12.70 K/uL 6.82     RBC      4.60 - 6.20 M/uL 3.74 (L)     Hemoglobin      14.0 - 18.0 g/dL 11.6 (L)     Hematocrit      40.0 - 54.0 % 34.4 (L)     MCV      82 - 98 fL " 92     MCH      27.0 - 31.0 pg 31.0     MCHC      32.0 - 36.0 g/dL 33.7     RDW      11.5 - 14.5 % 12.2     Platelets      150 - 350 K/uL 98 (L)     MPV      9.2 - 12.9 fL 14.2 (H)     Immature Granulocytes      0.0 - 0.5 % 0.3     Gran # (ANC)      1.8 - 7.7 K/uL 3.4     Immature Grans (Abs)      0.00 - 0.04 K/uL 0.02     Lymph #      1.0 - 4.8 K/uL 2.0     Mono #      0.3 - 1.0 K/uL 0.9     Eos #      0.0 - 0.5 K/uL 0.4     Baso #      0.00 - 0.20 K/uL 0.09     nRBC      0 /100 WBC 0     Gran%      38.0 - 73.0 % 50.2     Lymph%      18.0 - 48.0 % 29.3     Mono%      4.0 - 15.0 % 13.2     Eosinophil%      0.0 - 8.0 % 5.7     Basophil%      0.0 - 1.9 % 1.3     Differential Method       Automated     Specimen UA            Color, UA      Yellow, Straw, Amy      Appearance, UA      Clear      pH, UA      5.0 - 8.0      Specific Gravity, UA      1.005 - 1.030      Protein, UA      Negative      Glucose, UA      Negative      Ketones, UA      Negative      Bilirubin (UA)      Negative      Occult Blood UA      Negative      NITRITE UA      Negative      UROBILINOGEN UA      <2.0 EU/dL      Leukocytes, UA      Negative      Sodium      136 - 145 mmol/L 143     Potassium      3.5 - 5.1 mmol/L 3.9     Chloride      95 - 110 mmol/L 105     CO2      23 - 29 mmol/L 28     Glucose      70 - 110 mg/dL 116 (H)     BUN, Bld      8 - 23 mg/dL 29 (H)     Creatinine      0.5 - 1.4 mg/dL 1.1     Calcium      8.7 - 10.5 mg/dL 8.6 (L)     Anion Gap      8 - 16 mmol/L 10     eGFR if African American      >60 mL/min/1.73 m:2 >60.0     eGFR if non African American      >60 mL/min/1.73 m:2 >60.0     Cholesterol      120 - 199 mg/dL   166   Triglycerides      30 - 150 mg/dL   98   HDL      40 - 75 mg/dL   32 (L)   LDL Cholesterol External      63.0 - 159.0 mg/dL   114.4   Hdl/Cholesterol Ratio      20.0 - 50.0 %   19.3 (L)   Total Cholesterol/HDL Ratio      2.0 - 5.0   5.2 (H)   Non-HDL Cholesterol      mg/dL   134   Hemoglobin A1C  External      4.0 - 5.6 %   5.2   Estimated Avg Glucose      68 - 131 mg/dL   103   Thiamine      38 - 122 ug/L  66    Vitamin B6      5 - 50 ug/L  42      Component      Latest Ref Rng & Units 10/3/2020   WBC      3.90 - 12.70 K/uL    RBC      4.60 - 6.20 M/uL    Hemoglobin      14.0 - 18.0 g/dL    Hematocrit      40.0 - 54.0 %    MCV      82 - 98 fL    MCH      27.0 - 31.0 pg    MCHC      32.0 - 36.0 g/dL    RDW      11.5 - 14.5 %    Platelets      150 - 350 K/uL    MPV      9.2 - 12.9 fL    Immature Granulocytes      0.0 - 0.5 %    Gran # (ANC)      1.8 - 7.7 K/uL    Immature Grans (Abs)      0.00 - 0.04 K/uL    Lymph #      1.0 - 4.8 K/uL    Mono #      0.3 - 1.0 K/uL    Eos #      0.0 - 0.5 K/uL    Baso #      0.00 - 0.20 K/uL    nRBC      0 /100 WBC    Gran%      38.0 - 73.0 %    Lymph%      18.0 - 48.0 %    Mono%      4.0 - 15.0 %    Eosinophil%      0.0 - 8.0 %    Basophil%      0.0 - 1.9 %    Differential Method          Specimen UA       Urine, Clean Catch   Color, UA      Yellow, Straw, Amy Yellow   Appearance, UA      Clear Clear   pH, UA      5.0 - 8.0 6.0   Specific Gravity, UA      1.005 - 1.030 >=1.030 (A)   Protein, UA      Negative Negative   Glucose, UA      Negative Negative   Ketones, UA      Negative Trace (A)   Bilirubin (UA)      Negative Negative   Occult Blood UA      Negative Negative   NITRITE UA      Negative Negative   UROBILINOGEN UA      <2.0 EU/dL 2.0-3.0 (A)   Leukocytes, UA      Negative Negative   Sodium      136 - 145 mmol/L    Potassium      3.5 - 5.1 mmol/L    Chloride      95 - 110 mmol/L    CO2      23 - 29 mmol/L    Glucose      70 - 110 mg/dL    BUN, Bld      8 - 23 mg/dL    Creatinine      0.5 - 1.4 mg/dL    Calcium      8.7 - 10.5 mg/dL    Anion Gap      8 - 16 mmol/L    eGFR if African American      >60 mL/min/1.73 m:2    eGFR if non African American      >60 mL/min/1.73 m:2    Cholesterol      120 - 199 mg/dL    Triglycerides      30 - 150 mg/dL    HDL      40 - 75  mg/dL    LDL Cholesterol External      63.0 - 159.0 mg/dL    Hdl/Cholesterol Ratio      20.0 - 50.0 %    Total Cholesterol/HDL Ratio      2.0 - 5.0    Non-HDL Cholesterol      mg/dL    Hemoglobin A1C External      4.0 - 5.6 %    Estimated Avg Glucose      68 - 131 mg/dL    Thiamine      38 - 122 ug/L    Vitamin B6      5 - 50 ug/L          CT head 10/3/2020:  1. 5 mm focus of acute petechial hemorrhage within the left occipital lobe.  2. Small area of subacute versus remote lacunar infarction within the left aspect of the nya.  It is unclear whether this represents artifact or a true finding.  Additional evaluation could be achieved with MRI as deemed clinically appropriate.  3. Age-appropriate cerebral volume loss and chronic microvascular ischemic changes within the periventricular white matter of the supratentorial brain.  This report was flagged in Epic as abnormal.    MRI brain 10/3/2020:  1. Limited examination revealing a small 6 mm focus of acute petechial hemorrhage versus hemorrhagic infarction within the subcortical white matter of the medial left occipital lobe (as demonstrated by recent MRI).  2. Recently noted hypodensity within the left paramidline anterior nya is representative of artifact.  No evidence of acute or remote ischemia/infarction within the nya.  3. Age-appropriate cerebral volume loss and chronic microvascular ischemic changes within the periventricular white matter of the supratentorial brain.  4. Mild ethmoid and frontal sinus disease.  This report was flagged in Epic as abnormal.    CUS 10/4/2020:  No sonographically evident hemodynamically significant > 50% luminal stenosis observed within the left or right internal carotid artery.    CT head 10/4/2020:  Stable appearance to the brain and ventricles relative the previous day's studies with a possible acute to early subacute punctate hemorrhage in the left occipital lobe cortex or adjacent gyrus.  There is no regional mass  effect.  No new findings    MRA brain 10/5/2020:  1. This is a significantly limited evaluation due to marked patient motion in spite of repeating the study.  No flow is demonstrated within the right vertebral artery.  This could be secondary to developmental variation with the right vertebral artery terminates in a posteroinferior cerebellar artery.  This could also be secondary to a more proximal stenosis or occlusion.  2. There is no large vessel occlusion in the anterior circulation.     Repeat CT 10/23/2020:  Unchanged small hyperdense focus along the left occipital cortex likely representing a small recent parenchymal hemorrhage.  No new findings            Assessment and Plan:    Problem List Items Addressed This Visit        Neuro    Frontotemporal dementia    Current Assessment & Plan      - Last MMSE 7/30   - family hesitant to start cognition enhancing medication as pt had side effects from Aricept   - family main concern is patient's insomnia. He could not tolerate Trazadone and was placed on Remeron. He was also started on low dose Zoloft for sundowning. Family is unsure as to when this medication was actually started at the memory care facility. Consider increase in either Zoloft or Remeron in future.   - no medication changes for now. Pt to f/u with Dr. Gomez         Cerebral hemorrhage, acute - Primary    Current Assessment & Plan      - CT from 10/3/2020 noted to have 5 mm focus of acute petechial hemorrhage within the left occipital lobe.   - repeat CT from today shows unchanged small hyperdense focus along the left occipital cortex.   - pt following up with [facility] today   - Pt not currently on Eliquis as family opted to not restart this medication. They will have a discussion about anticoagulants with cardiology.    - hold blood thinners for now.    - If anticoagulant is restarted, recommend repeat CT thereafter to r/o bleed.             Cardiac/Vascular    Essential hypertension    Current  Assessment & Plan      - Stroke risk factor   - BP management as per Cards/PCP         Paroxysmal atrial fibrillation    Current Assessment & Plan      - Family will reach out to cardiology to discuss restarting the Eliquis.         Hyperlipidemia    Current Assessment & Plan      - Stroke risk factor   -    - Started on Lipitor 40 mg in hospital. Family concerned of increased confusion side effects with this drug, though they have not seen any as of yet. They are open to continuing the drug for now and getting repeat Lipid profile in about 3 mths or so   - PCP to manage.                      Important to note, also  has a past medical history of A-fib, Elevated cholesterol, Essential hypertension, Kidney stone, Memory loss, Osteoarthritis, Stroke, and Thyroid disease.          The patient will return to clinic in 2-3 months.    All questions were answered and patient is comfortable with the plan.         Thank you very much for the opportunity to assist in this patient's care.    If you have any questions or concerns, please do not hesitate to contact me at any time.      Sincerely,     ELBA Loredo  [facility] [place]

## 2020-10-23 NOTE — PROGRESS NOTES
"Neurosurgery  Established Patient    SUBJECTIVE:   Established Patient - Audio Only Telehealth Visit     The patient location is: Home  The chief complaint leading to consultation is: ICH Follow-up  Visit type: Virtual visit with audio only (telephone)  Total time spent with patient:   The reason for the audio only service rather than synchronous audio and video virtual visit was related to technical difficulties or patient preference/necessity.    Each patient to whom I provide medical services by telemedicine is:  (1) informed of the relationship between the physician and patient and the respective role of any other health care provider with respect to management of the patient; and (2) notified that they may decline to receive medical services by telemedicine and may withdraw from such care at any time. Patient verbally consented to receive this service via voice-only telephone call.    History of Present Illness: Adriano Borjas is a 85 y.o. male with PMH of HTN, Afib on Eliquis, TIA, dementia, nephrolithiasis, hypothyroidism, prior history of cerebral hemorrhage, and gait instability. He is being seen today via audio visit with his daughter, Chris, to follow-up on the left occipital ICH. Subsequent CT scan of the head did not report any further expansion of left occipital hemorrhage.  The patient was admitted to the hospital on 10/3 for close evaluation due to AMS and increased agitation that might have been due to the Trazadone. His hospital course was overall uncomplicated. No neurosurgical intervention was indicated. He was discharged back to the assisted living facility on 10/7.    Since his discharge from the hospital, the patient's daughter reports that he has been doing well. Denies recent falls or trauma. Denies weakness, headaches, vision changes, seizure activity, nausea/vomiting, slurred speech, or dizziness. The patient feels like he is "back to baseline". His family remains concerned about his " confusion. He saw neurology today and will continue to be followed by Dr. Gomez. The patient has not restarted the Eliquis for his a.fib. His daughter will call Dr. Piper, cardiology, to clarify if he needs to restart this medication or if there is an alternative.     Review of patient's allergies indicates:   Allergen Reactions    Ciprofloxacin Other (See Comments)     Shoulder and leg pain/cramping     Codeine Other (See Comments)     Pt cannot remember been a long time ago    Sulfa (sulfonamide antibiotics)      Allergic reaction to bactrim       Current Outpatient Medications   Medication Sig Dispense Refill    acetaminophen (TYLENOL) 500 MG tablet Take 500 mg by mouth every evening.      ascorbic acid, vitamin C, (VITAMIN C) 500 MG tablet Take 500 mg by mouth once daily.      atorvastatin (LIPITOR) 40 MG tablet Take 1 tablet (40 mg total) by mouth once daily. 30 tablet 0    levothyroxine (SYNTHROID) 75 MCG tablet Take 1 tablet by mouth once daily 90 tablet 0    mirtazapine (REMERON) 30 MG tablet Take 1 tablet (30 mg total) by mouth every evening. 30 tablet 0    multivitamin (THERAGRAN) tablet Take 1 tablet by mouth once daily.      sertraline (ZOLOFT) 25 MG tablet Take 1 tablet (25 mg total) by mouth once daily. 30 tablet 11     Current Facility-Administered Medications   Medication Dose Route Frequency Provider Last Rate Last Dose    tuberculin injection 5 Units  5 Units Intradermal 1 time in Clinic/HOD Panfilo Steward DNP         Facility-Administered Medications Ordered in Other Visits   Medication Dose Route Frequency Provider Last Rate Last Dose    lactated ringers infusion   Intravenous Continuous Panchito Montoya MD        lidocaine (PF) 10 mg/ml (1%) injection 10 mg  1 mL Intradermal Once Panchito Montoya MD           Past Medical History:   Diagnosis Date    A-fib     Elevated cholesterol     Essential hypertension     Kidney stone     passed on his own    Memory loss      dememtia per family    Osteoarthritis     Stroke     2012 TIA     Thyroid disease      Past Surgical History:   Procedure Laterality Date    CYSTOSCOPY      EYE SURGERY      MAGNETIC RESONANCE IMAGING N/A 8/23/2019    Procedure: MRI (Magnetic Resonance Imagine) needs anesthesia;  Surgeon: Panchito Montoya MD;  Location: [facility];  Service: Anesthesiology;  Laterality: N/A;     Family History     Problem Relation (Age of Onset)    Cancer Mother    Dementia Sister, Brother    Diabetes Mother    Heart disease Father, Brother    Hyperlipidemia Mother, Father    Migraines Daughter    Tremor Daughter, Son        Social History     Socioeconomic History    Marital status:      Spouse name: Not on file    Number of children: Not on file    Years of education: Not on file    Highest education level: Not on file   Occupational History    Not on file   Social Needs    Financial resource strain: Not on file    Food insecurity     Worry: Not on file     Inability: Not on file    Transportation needs     Medical: Not on file     Non-medical: Not on file   Tobacco Use    Smoking status: Former Smoker     Years: 15.00    Smokeless tobacco: Never Used    Tobacco comment: quit 50 years ago   Substance and Sexual Activity    Alcohol use: No    Drug use: No    Sexual activity: Not on file   Lifestyle    Physical activity     Days per week: Not on file     Minutes per session: Not on file    Stress: Not on file   Relationships    Social connections     Talks on phone: Not on file     Gets together: Not on file     Attends Jainism service: Not on file     Active member of club or organization: Not on file     Attends meetings of clubs or organizations: Not on file     Relationship status: Not on file   Other Topics Concern    Not on file   Social History Narrative    Not on file       Review of Systems   Constitutional: Negative for activity change, appetite change and fever.   HENT: Negative for  postnasal drip and trouble swallowing.    Eyes: Negative for pain and visual disturbance.   Respiratory: Negative for shortness of breath.    Cardiovascular: Negative for chest pain and palpitations.   Gastrointestinal: Negative for abdominal pain.   Endocrine: Negative for polydipsia, polyphagia and polyuria.   Genitourinary: Negative for difficulty urinating, frequency and urgency.   Musculoskeletal: Negative for back pain and gait problem.   Skin: Negative for color change and wound.   Neurological: Negative for dizziness, seizures, speech difficulty, weakness, numbness and headaches.   Hematological: Does not bruise/bleed easily.   Psychiatric/Behavioral: Positive for confusion. Negative for dysphoric mood.        OBJECTIVE:     Vital Signs     There is no height or weight on file to calculate BMI.    Neurosurgery Physical Exam    Diagnostic Results:  I have personally reviewed the CTH dated 10/23/20. The imaging shows no evidence of acute hemorrhage. Unchanged subcentimeter hyperdense focus along the left occipital lobe.      ASSESSMENT/PLAN:   Adriano Borjas is a 85 y.o. male with PMH of HTN, Afib on Eliquis, TIA, dementia, nephrolithiasis, hypothyroidism, prior history of cerebral hemorrhage, and gait instability. He was seen today via audio visit with his daughter, Chris, to follow-up on the left occipital ICH. Since his discharge from the hospital, the patient's daughter reports that he has been doing well. Denies neurological complaints with the exception of confusion, which he is being followed by neurology. His daughter will reach out to cardiology to discuss restarting the Eliquis. We discussed that if the medication is restarted that a repeat CT 2 weeks after restarting the medication is suggested. She verbalized understanding and will inform the clinic if he is restarted on the Eliquis.     I would like the patient to follow-up in clinic as needed. I have encouraged him to contact the clinic with  any questions, concerns, or adverse clinical changes. He verbalized understanding.       ELBA Fajardo  Neurosurgery  [facility].   Note dictated with voice recognition software, please excuse any grammatical errors.

## 2020-10-28 ENCOUNTER — DOCUMENT SCAN (OUTPATIENT)
Dept: HOME HEALTH SERVICES | Facility: HOSPITAL | Age: 85
End: 2020-10-28
Payer: MEDICARE

## 2020-11-03 ENCOUNTER — EXTERNAL HOME HEALTH (OUTPATIENT)
Dept: HOME HEALTH SERVICES | Facility: HOSPITAL | Age: 85
End: 2020-11-03

## 2020-11-12 RX ORDER — LEVOTHYROXINE SODIUM 75 UG/1
75 TABLET ORAL DAILY
Qty: 90 TABLET | Refills: 1 | Status: ON HOLD | OUTPATIENT
Start: 2020-11-12 | End: 2023-06-29 | Stop reason: SDUPTHER

## 2020-11-27 ENCOUNTER — EXTERNAL HOME HEALTH (OUTPATIENT)
Dept: HOME HEALTH SERVICES | Facility: HOSPITAL | Age: 85
End: 2020-11-27
Payer: MEDICARE

## 2020-12-16 ENCOUNTER — PATIENT MESSAGE (OUTPATIENT)
Dept: FAMILY MEDICINE | Facility: CLINIC | Age: 85
End: 2020-12-16

## 2020-12-16 NOTE — TELEPHONE ENCOUNTER
Patient needs orders placed to receive a Covid test from [facility]. Please advise.

## 2020-12-16 NOTE — TELEPHONE ENCOUNTER
I do not know the 1st thing about getting orders at an outside facility other than reference labs.  Please have Danni or another nurse look into this.  Also need ask the patient if they are symptomatic or not  Document symptoms  Fill out the heart stops on COVID testing

## 2020-12-17 NOTE — TELEPHONE ENCOUNTER
Patient is a resident at [facility]. A signed order for baseline and weekly covid testing is needed.   Form has been signed by Dr Joyce and faxed back to [facility]. Form has been sent to be scanned into patients record.

## 2020-12-18 ENCOUNTER — LAB VISIT (OUTPATIENT)
Dept: LAB | Facility: OTHER | Age: 85
End: 2020-12-18
Attending: INTERNAL MEDICINE
Payer: MEDICARE

## 2020-12-18 DIAGNOSIS — Z03.818 ENCOUNTER FOR OBSERVATION FOR SUSPECTED EXPOSURE TO OTHER BIOLOGICAL AGENTS RULED OUT: ICD-10-CM

## 2020-12-18 PROCEDURE — U0003 INFECTIOUS AGENT DETECTION BY NUCLEIC ACID (DNA OR RNA); SEVERE ACUTE RESPIRATORY SYNDROME CORONAVIRUS 2 (SARS-COV-2) (CORONAVIRUS DISEASE [COVID-19]), AMPLIFIED PROBE TECHNIQUE, MAKING USE OF HIGH THROUGHPUT TECHNOLOGIES AS DESCRIBED BY CMS-2020-01-R: HCPCS

## 2020-12-20 LAB — SARS-COV-2 RNA RESP QL NAA+PROBE: NOT DETECTED

## 2020-12-26 ENCOUNTER — HOSPITAL ENCOUNTER (EMERGENCY)
Facility: HOSPITAL | Age: 85
Discharge: HOME | End: 2020-12-26
Attending: EMERGENCY MEDICINE
Payer: MEDICARE

## 2020-12-26 VITALS
SYSTOLIC BLOOD PRESSURE: 113 MMHG | BODY MASS INDEX: 25.31 KG/M2 | HEIGHT: 68 IN | HEART RATE: 70 BPM | WEIGHT: 167 LBS | RESPIRATION RATE: 20 BRPM | TEMPERATURE: 99 F | OXYGEN SATURATION: 98 % | DIASTOLIC BLOOD PRESSURE: 50 MMHG

## 2020-12-26 DIAGNOSIS — U07.1 COVID-19: ICD-10-CM

## 2020-12-26 DIAGNOSIS — U07.1 COVID-19 VIRUS DETECTED: ICD-10-CM

## 2020-12-26 LAB
CTP QC/QA: YES
SARS-COV-2 RDRP RESP QL NAA+PROBE: POSITIVE

## 2020-12-26 PROCEDURE — U0002 COVID-19 LAB TEST NON-CDC: HCPCS | Performed by: EMERGENCY MEDICINE

## 2020-12-26 PROCEDURE — 99283 EMERGENCY DEPT VISIT LOW MDM: CPT | Mod: 25

## 2020-12-26 NOTE — ED PROVIDER NOTES
Encounter Date: 12/26/2020    SCRIBE #1 NOTE: I, Julissaevelin Watts, am scribing for, and in the presence of, Merritt Burrell MD.       History     Chief Complaint   Patient presents with    COVID-19 Concerns     pt sent from [facility] for eval of possible covid     Time seen by provider: 12:27 PM on 12/26/2020    Adriano Borjas is a 85 y.o. male with a PMHx of thyroid disease, kidney stone, A-fib, HTN, stroke, and memory loss who presents to the ED via EMS for a COVID-19 test. Pt had a COVID-19 test 6 days ago and it came back negative.  EMS reports that Pt's son thinks pt has COVID-19. The patient denies fever, cough, chills, or any other symptoms at this time. No significant PSHx.     The history is provided by the patient and the EMS personnel.     Review of patient's allergies indicates:   Allergen Reactions    Ciprofloxacin Other (See Comments)     Shoulder and leg pain/cramping     Codeine Other (See Comments)     Pt cannot remember been a long time ago    Sulfa (sulfonamide antibiotics)      Allergic reaction to bactrim     Past Medical History:   Diagnosis Date    A-fib     Elevated cholesterol     Essential hypertension     Kidney stone     passed on his own    Memory loss     dememtia per family    Osteoarthritis     Stroke     2012 TIA     Thyroid disease      Past Surgical History:   Procedure Laterality Date    CYSTOSCOPY      EYE SURGERY      MAGNETIC RESONANCE IMAGING N/A 8/23/2019    Procedure: MRI (Magnetic Resonance Imagine) needs anesthesia;  Surgeon: Panchito Montoya MD;  Location: [facility];  Service: Anesthesiology;  Laterality: N/A;     Family History   Problem Relation Age of Onset    Cancer Mother     Diabetes Mother     Hyperlipidemia Mother     Heart disease Father     Hyperlipidemia Father     Dementia Sister     Dementia Brother     Heart disease Brother     Migraines Daughter     Tremor Daughter     Tremor Son     Urolithiasis Neg Hx     Prostate cancer  Neg Hx     Kidney cancer Neg Hx      Social History     Tobacco Use    Smoking status: Former Smoker     Years: 15.00    Smokeless tobacco: Never Used    Tobacco comment: quit 50 years ago   Substance Use Topics    Alcohol use: No    Drug use: No     Review of Systems   Constitutional: Negative for chills and fever.   HENT: Negative for congestion, rhinorrhea and sore throat.    Respiratory: Negative for cough and shortness of breath.    Cardiovascular: Negative for chest pain.   Gastrointestinal: Negative for diarrhea, nausea and vomiting.   Musculoskeletal: Negative for myalgias.   Neurological: Negative for headaches.       Physical Exam     Initial Vitals [12/26/20 1248]   BP Pulse Resp Temp SpO2   (!) 113/50 70 20 98.5 °F (36.9 °C) 98 %      MAP       --         Physical Exam    Nursing note and vitals reviewed.  Constitutional: He appears well-developed and well-nourished. He is not diaphoretic.  Non-toxic appearance. He does not have a sickly appearance. He does not appear ill. No distress.   Very well-appearing   HENT:   Head: Normocephalic and atraumatic.   Eyes: EOM are normal.   Neck: Normal range of motion. Neck supple. Normal range of motion present. No neck rigidity.   Cardiovascular: Normal rate, regular rhythm and normal heart sounds. Exam reveals no gallop and no friction rub.    No murmur heard.  Pulmonary/Chest: Breath sounds normal. No respiratory distress. He has no wheezes. He has no rhonchi. He has no rales.   Breath sounds clear   Musculoskeletal: Normal range of motion.   Neurological: He is alert.   Skin: Skin is warm and dry. No rash noted.   Psychiatric: He has a normal mood and affect. His behavior is normal. Judgment and thought content normal.         ED Course   Procedures  Labs Reviewed   SARS-COV-2 RDRP GENE - Abnormal; Notable for the following components:       Result Value    POC Rapid COVID Positive (*)     All other components within normal limits    Narrative:     This  test utilizes isothermal nucleic acid amplification   technology to detect the SARS-CoV-2 RdRp nucleic acid segment.   The analytical sensitivity (limit of detection) is 125 genome   equivalents/mL.   A POSITIVE result implies infection with the SARS-CoV-2 virus;   the patient is presumed to be contagious.     A NEGATIVE result means that SARS-CoV-2 nucleic acids are not   present above the limit of detection. A NEGATIVE result should be   treated as presumptive. It does not rule out the possibility of   COVID-19 and should not be the sole basis for treatment decisions.   If COVID-19 is strongly suspected based on clinical and exposure   history, re-testing using an alternate molecular assay should be   considered.   This test is only for use under the Food and Drug   Administration s Emergency Use Authorization (EUA).   Commercial kits are provided by [employer].   Performance characteristics of the EUA have been independently   verified by [facility] Department of   Pathology and Laboratory Medicine.   _________________________________________________________________   The authorized Fact Sheet for Healthcare Providers and the authorized Fact   Sheet for Patients of the ID NOW COVID-19 are available on the FDA   website:     https://www.fda.gov/media/904967/download  https://www.fda.gov/media/875029/download                Imaging Results          X-Ray Chest AP Portable (Final result)  Result time 12/26/20 13:08:30    Final result by Julius Latham MD (12/26/20 13:08:30)                 Impression:      Mild chronic interstitial change without focal consolidation.      Electronically signed by: Julius Latham  Date:    12/26/2020  Time:    13:08             Narrative:    EXAMINATION:  XR CHEST AP PORTABLE    CLINICAL HISTORY:  COVID-19    TECHNIQUE:  Single frontal view of the chest was performed.    COMPARISON:  10/03/2020 and 07/08/2019.    FINDINGS:  There is mild chronic interstitial  change.  No focal consolidation.  No significant pleural effusion.    Heart size is top-normal.  Mediastinal contours unremarkable.  Trachea midline.  Mild elevation left hemidiaphragm.    Bony thorax intact.                                 Medical Decision Making:   History:   Old Medical Records: I decided to obtain old medical records.  Clinical Tests:   Lab Tests: Ordered and Reviewed            Scribe Attestation:   Scribe #1: I performed the above scribed service and the documentation accurately describes the services I performed. I attest to the accuracy of the note.    I, Dr. Burrell, personally performed the services described in this documentation. All medical record entries made by the scribe were at my direction and in my presence.  I have reviewed the chart and agree that the record reflects my personal performance and is accurate and complete.2:07 PM 12/26/2020            ED Course as of Dec 26 1344   Sat Dec 26, 2020   1255 COVID test is positive.  Patient is very well-appearing    [EF]   1302 SpO2: 98 % [EF]   1302 Resp: 20 [EF]   1302 Pulse: 70 [EF]   1302 Temp src: Oral [EF]   1302 Temp: 98.5 °F (36.9 °C) [EF]   1302 BP(!): 113/50 [EF]   1315 X-Ray Chest AP Portable [EF]   1318 SARS-CoV-2 RNA, Amplification, Qual(!): Positive [EF]   1328 Lengthy discussion with the patient's daughter Shawna.  She was informed that the patient is well-appearing has no shortness of breath no cough.  COVID is positive chest x-ray is well-appearing.  He is a candidate for the infusion center.  I will enroll him in this and print out info sheet for her that will be included in his discharge information.  Nursing staff working on how to get him back to [facility].    [EF]   1329 COVID-19 Antibody Treatment FDA EUA Documentation  The patient and/or parent/caregiver has been provided with the Fact Sheet(s) for Patients and Parents/Caregivers and has been counseled that the FDA has authorized the emergency use  of these medications, which are not FDA approved. The significant known and potential risks and benefits are unknown, and there are limited alternatives available. The patient and/or parent/caregiver has been given the option to accept or refuse therapy.            [EF]      ED Course User Index  [EF] Merritt Burrell MD            Clinical Impression:     ICD-10-CM ICD-9-CM   1. COVID-19  U07.1 079.89                      Disposition:   Disposition: Discharged  Condition: Stable     ED Disposition Condition    Discharge Stable        ED Prescriptions     None        Follow-up Information     Follow up With Specialties Details Why Contact Info    [facility] Emergency Medicine  As needed, If symptoms worsen [address] [place] [id number] [phone]                                       Merritt Burrell MD  12/26/20 [id number]

## 2020-12-28 ENCOUNTER — TELEPHONE (OUTPATIENT)
Dept: FAMILY MEDICINE | Facility: CLINIC | Age: 85
End: 2020-12-28

## 2020-12-28 DIAGNOSIS — U07.1 COVID-19: Primary | ICD-10-CM

## 2020-12-28 NOTE — TELEPHONE ENCOUNTER
"----- Message from Puma Matta sent at 12/28/2020 12:05 PM CST -----   Consult/Advisory:    Name Of Caller: Shawna (daughter)   Contact Preference?: [phone]    Does patient feel the need to be seen today? No     What is the nature of the call?:  -request a callback per patient seen over the weekend tested positive for COVID-19  Dr Hernandez  asked about pt having an infusion and would put in order or referral      Additional Notes:  "Thank you for all that you do for our patients'"       "

## 2020-12-29 ENCOUNTER — PATIENT MESSAGE (OUTPATIENT)
Dept: FAMILY MEDICINE | Facility: CLINIC | Age: 85
End: 2020-12-29

## 2020-12-29 ENCOUNTER — TELEPHONE (OUTPATIENT)
Dept: FAMILY MEDICINE | Facility: CLINIC | Age: 85
End: 2020-12-29

## 2020-12-29 NOTE — TELEPHONE ENCOUNTER
We have to provide him with the [facility] EUA fact sheet.  The manufactured antibodies can be helpful he would certainly fall within the parameters as long as his oxygen checks out okay at the infusion center.  It is given at the infusion center at [place].  He tested positive 3 days ago.  FACT SHEET FOR PATIENTS, PARENTS AND CAREGIVERS  EMERGENCY USE AUTHORIZATION (EUA) OF CASIRIVIMAB AND IMDEVIMAB  FOR CORONAVIRUS DISEASE 2019  (COVID-19)  You are being given a medicine called casirivimab and imdevimab for the treatment of  coronavirus disease 2019 (COVID-19). This Fact Sheet contains information to help you  understand the potential risks and potential benefits of taking casirivimab and imdevimab, which  you may receive.  Receiving casirivimab and imdevimab may benefit certain people with COVID-19.  Read this Fact Sheet for information about casirivimab and imdevimab. Talk to your healthcare  provider if you have questions. It is your choice to receive casirivimab and imdevimab or stop at  any time.  WHAT IS COVID-19?  COVID-19 is caused by a virus called a coronavirus. People can get COVID-19 through contact  with another person who has the virus.  COVID-19 illnesses have ranged from very mild (including some with no reported symptoms) to  severe, including illness resulting in death. While information so far suggests that most COVID19 illness is mild, serious illness can occur and may cause some of your other medical conditions  to become worse. People of all ages with severe, long-lasting (chronic) medical conditions like  heart disease, lung disease, and diabetes, for example, seem to be at higher risk of being  hospitalized for COVID-19.  WHAT ARE THE SYMPTOMS OF COVID-19?  The symptoms of COVID-19 include fever, cough, and shortness of breath, which may appear 2  to 14 days after exposure. Serious illness including breathing problems can occur and may cause  your other medical conditions to become  "worse.  WHAT IS CASIRIVIMAB AND IMDEVIMAB?  Casirivimab and imdevimab are investigational medicines used to treat mild to moderate  symptoms of COVID-19 in non-hospitalized adults and adolescents (12 years of age and older  who weigh at least 88 pounds (40 kg)), and who are at high risk for developing severe COVID19 symptoms or the need for hospitalization. Casirivimab and imdevimab are investigational  because they are still being studied. There is limited information known about the safety and  effectiveness of using casirivimab and imdevimab to treat people with COVID-19.  The FDA has authorized the emergency use of casirivimab and imdevimab for the treatment of  COVID-19 under an Emergency Use Authorization (EUA). For more information on EUA, see  the "What is an Emergency Use Authorization (EUA)?" section at the end of this Fact Sheet.  Page 2 of 3  WHAT SHOULD I TELL MY HEALTH CARE PROVIDER BEFORE I RECEIVE  CASIRIVIMAB AND IMDEVIMAB?  Tell your healthcare provider about all of your medical conditions, including if you:   Have any allergies   Are pregnant or plan to become pregnant   Are breastfeeding or plan to breastfeed   Have any serious illnesses   Are taking any medications (prescription, over-the-counter, vitamins, and herbal  products)  HOW WILL I RECEIVE CASIRIVIMAB AND IMDEVIMAB?   Casirivimab and imdevimab are two investigational medicines given together as a single  intravenous infusion (through a vein) for at least 1 hour.   You will receive one dose of casirivimab and imdevimab by intravenous infusion.  WHAT ARE THE IMPORTANT POSSIBLE SIDE EFFECTS OF CASIRIVIMAB AND  IMDEVIMAB?  Possible side effects of casirivimab and imdevimab are:   Allergic reactions. Allergic reactions can happen during and after infusion with  casirivimab and imdevimab. Tell your healthcare provider or nurse, or get medical help  right away if you get any of the following signs and symptoms of allergic " reactions:  fever, chills, low blood pressure, changes in your heartbeat, shortness of breath,  wheezing, swelling of your lips, face, or throat, rash including hives, itching, headache,  nausea, vomiting, sweating, muscle aches, dizziness and shivering.  The side effects of getting any medicine by vein may include brief pain, bleeding, bruising of the  skin, soreness, swelling, and possible infection at the infusion site.  These are not all the possible side effects of casirivimab and imdevimab. Not a lot of people have  been given casirivimab and imdevimab. Serious and unexpected side effects may happen.  Casirivimab and imdevimab are still being studied so it is possible that all of the risks are not  known at this time.  It is possible that casirivimab and imdevimab could interfere with your body's own ability to  fight off a future infection of SARS-CoV-2. Similarly, casirivimab and imdevimab may reduce  your body's immune response to a vaccine for SARS-CoV-2. Specific studies have not been  conducted to address these possible risks. Talk to your healthcare provider if you have any  questions.  WHAT OTHER TREATMENT CHOICES ARE THERE?  Like casirivimab and imdevimab, FDA may allow for the emergency use of other medicines to  treat people with COVID-19. Go to https://www.gwish51dcmwsdmxlircdwvthvc.nih.gov/ for  information on other medicines used to treat people with COVID-19.  It is your choice to be treated or not to be treated with casirivimab and imdevimab. Should you  decide not to receive casirivimab and imdevimab or stop it at any time, it will not change your  standard medical care.  Page 3 of 3  WHAT IF I AM PREGNANT OR BREASTFEEDING?  There is limited experience treating pregnant women or breastfeeding mothers with casirivimab  and imdevimab. For a mother and unborn baby, the benefit of receiving casirivimab and  imdevimab may be greater than the risk from the treatment. If you are pregnant or  breastfeeding,  discuss your options and specific situation with your healthcare provider.  HOW DO I REPORT SIDE EFFECTS WITH CASIRIVIMAB AND IMDEVIMAB?  Tell your healthcare provider right away if you have any side effect that bothers you or does not  go away.  Report side effects to FDA MedWatch at www.fda.gov/medwatch or call 3-227-HDB-2284 or  call 1-398.381.1143.  HOW CAN I LEARN MORE?   Ask your health care provider.   Visit [email or web address]   Visit https://www.ahxlh68vxrmmbqeffgcvpwflnf.nih.gov/   Contact your local or state public health department.  WHAT IS AN EMERGENCY USE AUTHORIZATION (EUA)?  The [place] FDA has made casirivimab and imdevimab available under an emergency  access mechanism called an EUA. The EUA is supported by a [place] of Health and Human  Service (HHS) declaration that circumstances exist to justify the emergency use of drugs and  biological products during the COVID-19 pandemic.  Casirivimab and imdevimab have not undergone the same type of review as an FDA-approved or  cleared product. The FDA may issue an EUA when certain criteria are met, which includes that  there are no adequate, approved, available alternatives. In addition, the FDA decision is based on  the totality of scientific evidence available showing that it is reasonable to believe that the  product meets certain criteria for safety, performance, and labeling and may be effective in  treatment of patients during the COVID-19 pandemic. All of these criteria must be met to allow  for the product to be used in the treatment of patients during the COVID-19 pandemic.  The EUA for casirivimab and imdevimab is in effect for the duration of the COVID-19  declaration justifying emergency use of these products, unless terminated or revoked (after  which the products may no longer be used).  Manufactured by:  [employer]  [address] [place] [id number]  ©2020 [employer] All rights reserved.  Authorized: 11/2020

## 2020-12-30 ENCOUNTER — TELEPHONE (OUTPATIENT)
Dept: FAMILY MEDICINE | Facility: CLINIC | Age: 85
End: 2020-12-30

## 2020-12-30 RX ORDER — GUAIFENESIN AND DEXTROMETHORPHAN HYDROBROMIDE 1200; 60 MG/1; MG/1
1 TABLET, EXTENDED RELEASE ORAL EVERY 12 HOURS PRN
Qty: 20 TABLET | COMMUNITY
Start: 2020-12-30 | End: 2021-01-09

## 2020-12-30 NOTE — TELEPHONE ENCOUNTER
----- Message from Gurwinder Reid sent at 12/30/2020 11:38 AM CST -----  Contact: Shawna  Type: Needs Medical Advice  Who Called:  Shawna patient daughter  Symptoms (please be specific):    How long has patient had these symptoms:    Pharmacy name and phone #:    Best Call Back Number:   Additional Information: requesting a call back regarding message that was sent via [employer]

## 2020-12-31 ENCOUNTER — TELEPHONE (OUTPATIENT)
Dept: ADMINISTRATIVE | Facility: CLINIC | Age: 85
End: 2020-12-31

## 2020-12-31 ENCOUNTER — TELEPHONE (OUTPATIENT)
Dept: FAMILY MEDICINE | Facility: CLINIC | Age: 85
End: 2020-12-31

## 2020-12-31 ENCOUNTER — NURSE TRIAGE (OUTPATIENT)
Dept: ADMINISTRATIVE | Facility: CLINIC | Age: 85
End: 2020-12-31

## 2020-12-31 DIAGNOSIS — U07.1 COVID-19: Primary | ICD-10-CM

## 2020-12-31 NOTE — TELEPHONE ENCOUNTER
Contacted by PES agent for enrollment. Declined surveillance. Enrolled in [facility]     Tasks removed and order placed ADITI [id number]    Reason for Disposition   Caller has already spoken with another triager and has no further questions    Protocols used: NO CONTACT OR DUPLICATE CONTACT CALL-A-[place]

## 2021-01-19 ENCOUNTER — PATIENT MESSAGE (OUTPATIENT)
Dept: FAMILY MEDICINE | Facility: CLINIC | Age: 86
End: 2021-01-19

## 2021-01-21 ENCOUNTER — TELEPHONE (OUTPATIENT)
Dept: FAMILY MEDICINE | Facility: CLINIC | Age: 86
End: 2021-01-21

## 2021-01-21 RX ORDER — PROPYLENE GLYCOL 0.06 MG/ML
2 EMULSION OPHTHALMIC 3 TIMES DAILY
Qty: 10 ML | Refills: 0 | Status: SHIPPED | OUTPATIENT
Start: 2021-01-21 | End: 2021-02-20

## 2021-02-05 ENCOUNTER — HOSPITAL ENCOUNTER (OUTPATIENT)
Dept: RADIOLOGY | Facility: CLINIC | Age: 86
Discharge: HOME | End: 2021-02-05
Attending: NURSE PRACTITIONER
Payer: MEDICARE

## 2021-02-05 ENCOUNTER — OFFICE VISIT (OUTPATIENT)
Dept: FAMILY MEDICINE | Facility: CLINIC | Age: 86
End: 2021-02-05
Payer: MEDICARE

## 2021-02-05 VITALS
WEIGHT: 179.44 LBS | SYSTOLIC BLOOD PRESSURE: 144 MMHG | HEIGHT: 68 IN | DIASTOLIC BLOOD PRESSURE: 84 MMHG | TEMPERATURE: 98 F | HEART RATE: 69 BPM | BODY MASS INDEX: 27.19 KG/M2

## 2021-02-05 DIAGNOSIS — G31.09 FRONTOTEMPORAL DEMENTIA: ICD-10-CM

## 2021-02-05 DIAGNOSIS — Z86.79 HISTORY OF CEREBRAL HEMORRHAGE: ICD-10-CM

## 2021-02-05 DIAGNOSIS — Z78.9 NEED FOR EXTENDED CARE FACILITY: ICD-10-CM

## 2021-02-05 DIAGNOSIS — F03.90 DEMENTIA WITHOUT BEHAVIORAL DISTURBANCE, UNSPECIFIED DEMENTIA TYPE: Primary | ICD-10-CM

## 2021-02-05 DIAGNOSIS — I10 ESSENTIAL HYPERTENSION: ICD-10-CM

## 2021-02-05 DIAGNOSIS — F02.80 FRONTOTEMPORAL DEMENTIA: ICD-10-CM

## 2021-02-05 DIAGNOSIS — R26.9 GAIT ABNORMALITY: ICD-10-CM

## 2021-02-05 DIAGNOSIS — E03.9 HYPOTHYROIDISM, UNSPECIFIED TYPE: ICD-10-CM

## 2021-02-05 DIAGNOSIS — I48.0 PAROXYSMAL ATRIAL FIBRILLATION: ICD-10-CM

## 2021-02-05 DIAGNOSIS — E44.0 MODERATE MALNUTRITION: ICD-10-CM

## 2021-02-05 DIAGNOSIS — N48.6 PEYRONIE'S DISEASE: ICD-10-CM

## 2021-02-05 DIAGNOSIS — I68.0 CEREBRAL AMYLOID ANGIOPATHY: ICD-10-CM

## 2021-02-05 DIAGNOSIS — E85.4 CEREBRAL AMYLOID ANGIOPATHY: ICD-10-CM

## 2021-02-05 DIAGNOSIS — M21.371 FOOT DROP, RIGHT: ICD-10-CM

## 2021-02-05 PROCEDURE — 1159F PR MEDICATION LIST DOCUMENTED IN MEDICAL RECORD: ICD-10-PCS | Mod: S$GLB,,, | Performed by: NURSE PRACTITIONER

## 2021-02-05 PROCEDURE — 3288F PR FALLS RISK ASSESSMENT DOCUMENTED: ICD-10-PCS | Mod: CPTII,S$GLB,, | Performed by: NURSE PRACTITIONER

## 2021-02-05 PROCEDURE — 1101F PT FALLS ASSESS-DOCD LE1/YR: CPT | Mod: CPTII,S$GLB,, | Performed by: NURSE PRACTITIONER

## 2021-02-05 PROCEDURE — 71046 X-RAY EXAM CHEST 2 VIEWS: CPT | Mod: TC,FY,PO

## 2021-02-05 PROCEDURE — 71046 XR CHEST PA AND LATERAL: ICD-10-PCS | Mod: 26,,, | Performed by: RADIOLOGY

## 2021-02-05 PROCEDURE — 99499 RISK ADDL DX/OHS AUDIT: ICD-10-PCS | Mod: S$GLB,,, | Performed by: NURSE PRACTITIONER

## 2021-02-05 PROCEDURE — 99499 UNLISTED E&M SERVICE: CPT | Mod: S$GLB,,, | Performed by: NURSE PRACTITIONER

## 2021-02-05 PROCEDURE — 1126F PR PAIN SEVERITY QUANTIFIED, NO PAIN PRESENT: ICD-10-PCS | Mod: S$GLB,,, | Performed by: NURSE PRACTITIONER

## 2021-02-05 PROCEDURE — 71046 X-RAY EXAM CHEST 2 VIEWS: CPT | Mod: 26,,, | Performed by: RADIOLOGY

## 2021-02-05 PROCEDURE — 99215 PR OFFICE/OUTPT VISIT, EST, LEVL V, 40-54 MIN: ICD-10-PCS | Mod: S$GLB,,, | Performed by: NURSE PRACTITIONER

## 2021-02-05 PROCEDURE — 3288F FALL RISK ASSESSMENT DOCD: CPT | Mod: CPTII,S$GLB,, | Performed by: NURSE PRACTITIONER

## 2021-02-05 PROCEDURE — 99999 PR PBB SHADOW E&M-EST. PATIENT-LVL III: ICD-10-PCS | Mod: PBBFAC,,, | Performed by: NURSE PRACTITIONER

## 2021-02-05 PROCEDURE — 99999 PR PBB SHADOW E&M-EST. PATIENT-LVL III: CPT | Mod: PBBFAC,,, | Performed by: NURSE PRACTITIONER

## 2021-02-05 PROCEDURE — 1159F MED LIST DOCD IN RCRD: CPT | Mod: S$GLB,,, | Performed by: NURSE PRACTITIONER

## 2021-02-05 PROCEDURE — 99215 OFFICE O/P EST HI 40 MIN: CPT | Mod: S$GLB,,, | Performed by: NURSE PRACTITIONER

## 2021-02-05 PROCEDURE — 1126F AMNT PAIN NOTED NONE PRSNT: CPT | Mod: S$GLB,,, | Performed by: NURSE PRACTITIONER

## 2021-02-05 PROCEDURE — 1101F PR PT FALLS ASSESS DOC 0-1 FALLS W/OUT INJ PAST YR: ICD-10-PCS | Mod: CPTII,S$GLB,, | Performed by: NURSE PRACTITIONER

## 2021-02-24 ENCOUNTER — PATIENT MESSAGE (OUTPATIENT)
Dept: FAMILY MEDICINE | Facility: CLINIC | Age: 86
End: 2021-02-24

## 2021-02-25 ENCOUNTER — TELEPHONE (OUTPATIENT)
Dept: FAMILY MEDICINE | Facility: CLINIC | Age: 86
End: 2021-02-25

## 2021-02-26 ENCOUNTER — CLINICAL SUPPORT (OUTPATIENT)
Dept: FAMILY MEDICINE | Facility: CLINIC | Age: 86
End: 2021-02-26
Payer: MEDICARE

## 2021-02-26 DIAGNOSIS — Z11.1 SCREENING EXAMINATION FOR PULMONARY TUBERCULOSIS: Primary | ICD-10-CM

## 2021-02-26 PROCEDURE — 86580 POCT TB SKIN TEST: ICD-10-PCS | Mod: S$GLB,,, | Performed by: NURSE PRACTITIONER

## 2021-02-26 PROCEDURE — 86580 TB INTRADERMAL TEST: CPT | Mod: S$GLB,,, | Performed by: NURSE PRACTITIONER

## 2021-03-05 ENCOUNTER — HOSPITAL ENCOUNTER (EMERGENCY)
Facility: HOSPITAL | Age: 86
End: 2021-03-05
Attending: EMERGENCY MEDICINE
Payer: MEDICARE

## 2021-03-05 VITALS
RESPIRATION RATE: 18 BRPM | BODY MASS INDEX: 26.37 KG/M2 | OXYGEN SATURATION: 99 % | DIASTOLIC BLOOD PRESSURE: 72 MMHG | HEART RATE: 99 BPM | TEMPERATURE: 98 F | SYSTOLIC BLOOD PRESSURE: 138 MMHG | HEIGHT: 68 IN | WEIGHT: 174 LBS

## 2021-03-05 DIAGNOSIS — E87.6 HYPOKALEMIA: Primary | ICD-10-CM

## 2021-03-05 LAB
ALBUMIN SERPL BCP-MCNC: 3.6 G/DL (ref 3.5–5.2)
ALP SERPL-CCNC: 53 U/L (ref 55–135)
ALT SERPL W/O P-5'-P-CCNC: 21 U/L (ref 10–44)
ANION GAP SERPL CALC-SCNC: 13 MMOL/L (ref 8–16)
AST SERPL-CCNC: 37 U/L (ref 10–40)
BASOPHILS # BLD AUTO: 0.06 K/UL (ref 0–0.2)
BASOPHILS NFR BLD: 0.9 % (ref 0–1.9)
BILIRUB SERPL-MCNC: 1.3 MG/DL (ref 0.1–1)
BILIRUB UR QL STRIP: NEGATIVE
BUN SERPL-MCNC: 20 MG/DL (ref 8–23)
CALCIUM SERPL-MCNC: 8.6 MG/DL (ref 8.7–10.5)
CHLORIDE SERPL-SCNC: 106 MMOL/L (ref 95–110)
CLARITY UR: CLEAR
CO2 SERPL-SCNC: 21 MMOL/L (ref 23–29)
COLOR UR: YELLOW
CREAT SERPL-MCNC: 1.4 MG/DL (ref 0.5–1.4)
DIFFERENTIAL METHOD: ABNORMAL
EOSINOPHIL # BLD AUTO: 0.1 K/UL (ref 0–0.5)
EOSINOPHIL NFR BLD: 2.1 % (ref 0–8)
ERYTHROCYTE [DISTWIDTH] IN BLOOD BY AUTOMATED COUNT: 13.7 % (ref 11.5–14.5)
EST. GFR  (AFRICAN AMERICAN): 53.3 ML/MIN/1.73 M^2
EST. GFR  (NON AFRICAN AMERICAN): 46.1 ML/MIN/1.73 M^2
GLUCOSE SERPL-MCNC: 133 MG/DL (ref 70–110)
GLUCOSE UR QL STRIP: NEGATIVE
HCT VFR BLD AUTO: 35.3 % (ref 40–54)
HGB BLD-MCNC: 12.1 G/DL (ref 14–18)
HGB UR QL STRIP: NEGATIVE
IMM GRANULOCYTES # BLD AUTO: 0.02 K/UL (ref 0–0.04)
IMM GRANULOCYTES NFR BLD AUTO: 0.3 % (ref 0–0.5)
KETONES UR QL STRIP: NEGATIVE
LEUKOCYTE ESTERASE UR QL STRIP: NEGATIVE
LYMPHOCYTES # BLD AUTO: 1.3 K/UL (ref 1–4.8)
LYMPHOCYTES NFR BLD: 19.2 % (ref 18–48)
MCH RBC QN AUTO: 31.5 PG (ref 27–31)
MCHC RBC AUTO-ENTMCNC: 34.3 G/DL (ref 32–36)
MCV RBC AUTO: 92 FL (ref 82–98)
MONOCYTES # BLD AUTO: 0.7 K/UL (ref 0.3–1)
MONOCYTES NFR BLD: 10.2 % (ref 4–15)
NEUTROPHILS # BLD AUTO: 4.5 K/UL (ref 1.8–7.7)
NEUTROPHILS NFR BLD: 67.3 % (ref 38–73)
NITRITE UR QL STRIP: NEGATIVE
NRBC BLD-RTO: 0 /100 WBC
PH UR STRIP: 7 [PH] (ref 5–8)
PLATELET # BLD AUTO: 140 K/UL (ref 150–350)
PMV BLD AUTO: 12.8 FL (ref 9.2–12.9)
POTASSIUM SERPL-SCNC: 3.2 MMOL/L (ref 3.5–5.1)
PROT SERPL-MCNC: 7.2 G/DL (ref 6–8.4)
PROT UR QL STRIP: NEGATIVE
RBC # BLD AUTO: 3.84 M/UL (ref 4.6–6.2)
SODIUM SERPL-SCNC: 140 MMOL/L (ref 136–145)
SP GR UR STRIP: 1.02 (ref 1–1.03)
URN SPEC COLLECT METH UR: NORMAL
UROBILINOGEN UR STRIP-ACNC: NEGATIVE EU/DL
WBC # BLD AUTO: 6.68 K/UL (ref 3.9–12.7)

## 2021-03-05 PROCEDURE — 99284 EMERGENCY DEPT VISIT MOD MDM: CPT | Mod: 25

## 2021-03-05 PROCEDURE — 51798 US URINE CAPACITY MEASURE: CPT

## 2021-03-05 PROCEDURE — 96360 HYDRATION IV INFUSION INIT: CPT

## 2021-03-05 PROCEDURE — 25000003 PHARM REV CODE 250: Performed by: EMERGENCY MEDICINE

## 2021-03-05 PROCEDURE — 85025 COMPLETE CBC W/AUTO DIFF WBC: CPT | Performed by: EMERGENCY MEDICINE

## 2021-03-05 PROCEDURE — 87086 URINE CULTURE/COLONY COUNT: CPT | Performed by: EMERGENCY MEDICINE

## 2021-03-05 PROCEDURE — 80053 COMPREHEN METABOLIC PANEL: CPT | Performed by: EMERGENCY MEDICINE

## 2021-03-05 PROCEDURE — 81003 URINALYSIS AUTO W/O SCOPE: CPT | Performed by: EMERGENCY MEDICINE

## 2021-03-05 PROCEDURE — 96361 HYDRATE IV INFUSION ADD-ON: CPT

## 2021-03-05 RX ORDER — HYDROXYZINE PAMOATE 25 MG/1
25 CAPSULE ORAL EVERY 8 HOURS PRN
Qty: 15 CAPSULE | Refills: 0 | Status: ON HOLD | OUTPATIENT
Start: 2021-03-05 | End: 2023-11-09 | Stop reason: HOSPADM

## 2021-03-05 RX ADMIN — POTASSIUM BICARBONATE 50 MEQ: 977.5 TABLET, EFFERVESCENT ORAL at 08:03

## 2021-03-05 RX ADMIN — SODIUM CHLORIDE 1000 ML: 0.9 INJECTION, SOLUTION INTRAVENOUS at 08:03

## 2021-03-05 RX ADMIN — SODIUM CHLORIDE 1000 ML: 0.9 INJECTION, SOLUTION INTRAVENOUS at 07:03

## 2021-03-08 LAB — BACTERIA UR CULT: NO GROWTH

## 2021-03-13 ENCOUNTER — HOSPITAL ENCOUNTER (EMERGENCY)
Facility: HOSPITAL | Age: 86
Discharge: PSYCHIATRIC OR SUBSTANCE USE FACILITY | End: 2021-03-13
Attending: EMERGENCY MEDICINE
Payer: MEDICARE

## 2021-03-13 VITALS
WEIGHT: 180 LBS | SYSTOLIC BLOOD PRESSURE: 160 MMHG | RESPIRATION RATE: 18 BRPM | BODY MASS INDEX: 27.28 KG/M2 | HEIGHT: 68 IN | HEART RATE: 88 BPM | OXYGEN SATURATION: 99 % | TEMPERATURE: 98 F | DIASTOLIC BLOOD PRESSURE: 77 MMHG

## 2021-03-13 DIAGNOSIS — F29 PSYCHOSIS, UNSPECIFIED PSYCHOSIS TYPE: Primary | ICD-10-CM

## 2021-03-13 LAB
ALBUMIN SERPL BCP-MCNC: 3.7 G/DL (ref 3.5–5.2)
ALP SERPL-CCNC: 66 U/L (ref 55–135)
ALT SERPL W/O P-5'-P-CCNC: 21 U/L (ref 10–44)
AMPHET+METHAMPHET UR QL: NEGATIVE
ANION GAP SERPL CALC-SCNC: 7 MMOL/L (ref 8–16)
APAP SERPL-MCNC: <10 UG/ML (ref 10–20)
AST SERPL-CCNC: 32 U/L (ref 10–40)
BARBITURATES UR QL SCN>200 NG/ML: NEGATIVE
BASOPHILS # BLD AUTO: 0.05 K/UL (ref 0–0.2)
BASOPHILS NFR BLD: 0.8 % (ref 0–1.9)
BENZODIAZ UR QL SCN>200 NG/ML: NEGATIVE
BILIRUB SERPL-MCNC: 1.7 MG/DL (ref 0.1–1)
BILIRUB UR QL STRIP: NEGATIVE
BUN SERPL-MCNC: 20 MG/DL (ref 8–23)
BZE UR QL SCN: NEGATIVE
CALCIUM SERPL-MCNC: 9 MG/DL (ref 8.7–10.5)
CANNABINOIDS UR QL SCN: NEGATIVE
CHLORIDE SERPL-SCNC: 104 MMOL/L (ref 95–110)
CLARITY UR: CLEAR
CO2 SERPL-SCNC: 30 MMOL/L (ref 23–29)
COLOR UR: YELLOW
CREAT SERPL-MCNC: 1.3 MG/DL (ref 0.5–1.4)
CREAT UR-MCNC: 170 MG/DL (ref 23–375)
DIFFERENTIAL METHOD: ABNORMAL
EOSINOPHIL # BLD AUTO: 0 K/UL (ref 0–0.5)
EOSINOPHIL NFR BLD: 0 % (ref 0–8)
ERYTHROCYTE [DISTWIDTH] IN BLOOD BY AUTOMATED COUNT: 13.4 % (ref 11.5–14.5)
EST. GFR  (AFRICAN AMERICAN): 58.3 ML/MIN/1.73 M^2
EST. GFR  (NON AFRICAN AMERICAN): 50.5 ML/MIN/1.73 M^2
ETHANOL SERPL-MCNC: 6 MG/DL
GLUCOSE SERPL-MCNC: 121 MG/DL (ref 70–110)
GLUCOSE UR QL STRIP: NEGATIVE
HCT VFR BLD AUTO: 37.6 % (ref 40–54)
HGB BLD-MCNC: 12.9 G/DL (ref 14–18)
HGB UR QL STRIP: NEGATIVE
IMM GRANULOCYTES # BLD AUTO: 0.01 K/UL (ref 0–0.04)
IMM GRANULOCYTES NFR BLD AUTO: 0.2 % (ref 0–0.5)
KETONES UR QL STRIP: NEGATIVE
LEUKOCYTE ESTERASE UR QL STRIP: NEGATIVE
LYMPHOCYTES # BLD AUTO: 1 K/UL (ref 1–4.8)
LYMPHOCYTES NFR BLD: 15.8 % (ref 18–48)
MCH RBC QN AUTO: 31.5 PG (ref 27–31)
MCHC RBC AUTO-ENTMCNC: 34.3 G/DL (ref 32–36)
MCV RBC AUTO: 92 FL (ref 82–98)
MONOCYTES # BLD AUTO: 0.4 K/UL (ref 0.3–1)
MONOCYTES NFR BLD: 6.7 % (ref 4–15)
NEUTROPHILS # BLD AUTO: 4.9 K/UL (ref 1.8–7.7)
NEUTROPHILS NFR BLD: 76.5 % (ref 38–73)
NITRITE UR QL STRIP: NEGATIVE
NRBC BLD-RTO: 0 /100 WBC
OPIATES UR QL SCN: NEGATIVE
PCP UR QL SCN>25 NG/ML: NEGATIVE
PH UR STRIP: 7 [PH] (ref 5–8)
PLATELET # BLD AUTO: 127 K/UL (ref 150–350)
PMV BLD AUTO: 12.8 FL (ref 9.2–12.9)
POTASSIUM SERPL-SCNC: 4.3 MMOL/L (ref 3.5–5.1)
PROT SERPL-MCNC: 7.1 G/DL (ref 6–8.4)
PROT UR QL STRIP: NEGATIVE
RBC # BLD AUTO: 4.09 M/UL (ref 4.6–6.2)
SALICYLATES SERPL-MCNC: <4 MG/DL (ref 15–30)
SODIUM SERPL-SCNC: 141 MMOL/L (ref 136–145)
SP GR UR STRIP: 1.01 (ref 1–1.03)
TOXICOLOGY INFORMATION: NORMAL
TSH SERPL DL<=0.005 MIU/L-ACNC: 1.01 UIU/ML (ref 0.34–5.6)
URN SPEC COLLECT METH UR: NORMAL
UROBILINOGEN UR STRIP-ACNC: NEGATIVE EU/DL
WBC # BLD AUTO: 6.38 K/UL (ref 3.9–12.7)

## 2021-03-13 PROCEDURE — 80053 COMPREHEN METABOLIC PANEL: CPT | Performed by: EMERGENCY MEDICINE

## 2021-03-13 PROCEDURE — 80179 DRUG ASSAY SALICYLATE: CPT | Performed by: EMERGENCY MEDICINE

## 2021-03-13 PROCEDURE — 82077 ASSAY SPEC XCP UR&BREATH IA: CPT | Performed by: EMERGENCY MEDICINE

## 2021-03-13 PROCEDURE — 80143 DRUG ASSAY ACETAMINOPHEN: CPT | Performed by: EMERGENCY MEDICINE

## 2021-03-13 PROCEDURE — 81003 URINALYSIS AUTO W/O SCOPE: CPT | Performed by: EMERGENCY MEDICINE

## 2021-03-13 PROCEDURE — 99285 EMERGENCY DEPT VISIT HI MDM: CPT

## 2021-03-13 PROCEDURE — 80307 DRUG TEST PRSMV CHEM ANLYZR: CPT | Performed by: EMERGENCY MEDICINE

## 2021-03-13 PROCEDURE — 84443 ASSAY THYROID STIM HORMONE: CPT | Performed by: EMERGENCY MEDICINE

## 2021-03-13 PROCEDURE — 85025 COMPLETE CBC W/AUTO DIFF WBC: CPT | Performed by: EMERGENCY MEDICINE

## 2021-03-13 RX ORDER — AMLODIPINE BESYLATE 5 MG/1
5 TABLET ORAL DAILY
Status: ON HOLD | COMMUNITY
End: 2023-09-29 | Stop reason: HOSPADM

## 2021-03-17 ENCOUNTER — PATIENT MESSAGE (OUTPATIENT)
Dept: NEUROLOGY | Facility: CLINIC | Age: 86
End: 2021-03-17

## 2021-04-13 ENCOUNTER — LAB VISIT (OUTPATIENT)
Dept: LAB | Facility: OTHER | Age: 86
End: 2021-04-13
Payer: MEDICARE

## 2021-04-13 DIAGNOSIS — Z20.822 ENCOUNTER FOR LABORATORY TESTING FOR COVID-19 VIRUS: ICD-10-CM

## 2021-04-13 PROCEDURE — U0003 INFECTIOUS AGENT DETECTION BY NUCLEIC ACID (DNA OR RNA); SEVERE ACUTE RESPIRATORY SYNDROME CORONAVIRUS 2 (SARS-COV-2) (CORONAVIRUS DISEASE [COVID-19]), AMPLIFIED PROBE TECHNIQUE, MAKING USE OF HIGH THROUGHPUT TECHNOLOGIES AS DESCRIBED BY CMS-2020-01-R: HCPCS | Performed by: NURSE PRACTITIONER

## 2021-04-14 LAB — SARS-COV-2 RNA RESP QL NAA+PROBE: NOT DETECTED

## 2021-04-20 ENCOUNTER — LAB VISIT (OUTPATIENT)
Dept: LAB | Facility: OTHER | Age: 86
End: 2021-04-20
Payer: MEDICARE

## 2021-04-20 DIAGNOSIS — Z20.822 ENCOUNTER FOR LABORATORY TESTING FOR COVID-19 VIRUS: ICD-10-CM

## 2021-04-20 PROCEDURE — U0003 INFECTIOUS AGENT DETECTION BY NUCLEIC ACID (DNA OR RNA); SEVERE ACUTE RESPIRATORY SYNDROME CORONAVIRUS 2 (SARS-COV-2) (CORONAVIRUS DISEASE [COVID-19]), AMPLIFIED PROBE TECHNIQUE, MAKING USE OF HIGH THROUGHPUT TECHNOLOGIES AS DESCRIBED BY CMS-2020-01-R: HCPCS | Performed by: NURSE PRACTITIONER

## 2021-04-22 LAB — SARS-COV-2 RNA RESP QL NAA+PROBE: NOT DETECTED

## 2021-05-04 ENCOUNTER — LAB VISIT (OUTPATIENT)
Dept: LAB | Facility: OTHER | Age: 86
End: 2021-05-04
Payer: MEDICARE

## 2021-05-04 DIAGNOSIS — Z20.822 ENCOUNTER FOR LABORATORY TESTING FOR COVID-19 VIRUS: ICD-10-CM

## 2021-05-04 PROCEDURE — U0003 INFECTIOUS AGENT DETECTION BY NUCLEIC ACID (DNA OR RNA); SEVERE ACUTE RESPIRATORY SYNDROME CORONAVIRUS 2 (SARS-COV-2) (CORONAVIRUS DISEASE [COVID-19]), AMPLIFIED PROBE TECHNIQUE, MAKING USE OF HIGH THROUGHPUT TECHNOLOGIES AS DESCRIBED BY CMS-2020-01-R: HCPCS | Performed by: NURSE PRACTITIONER

## 2021-05-06 LAB — SARS-COV-2 RNA RESP QL NAA+PROBE: NOT DETECTED

## 2021-05-11 ENCOUNTER — LAB VISIT (OUTPATIENT)
Dept: LAB | Facility: OTHER | Age: 86
End: 2021-05-11
Payer: MEDICARE

## 2021-05-11 DIAGNOSIS — Z20.822 ENCOUNTER FOR LABORATORY TESTING FOR COVID-19 VIRUS: ICD-10-CM

## 2021-05-11 PROCEDURE — U0003 INFECTIOUS AGENT DETECTION BY NUCLEIC ACID (DNA OR RNA); SEVERE ACUTE RESPIRATORY SYNDROME CORONAVIRUS 2 (SARS-COV-2) (CORONAVIRUS DISEASE [COVID-19]), AMPLIFIED PROBE TECHNIQUE, MAKING USE OF HIGH THROUGHPUT TECHNOLOGIES AS DESCRIBED BY CMS-2020-01-R: HCPCS | Performed by: NURSE PRACTITIONER

## 2021-05-13 LAB — SARS-COV-2 RNA RESP QL NAA+PROBE: NOT DETECTED

## 2021-05-18 ENCOUNTER — LAB VISIT (OUTPATIENT)
Dept: LAB | Facility: OTHER | Age: 86
End: 2021-05-18
Payer: MEDICARE

## 2021-05-18 DIAGNOSIS — Z20.822 ENCOUNTER FOR LABORATORY TESTING FOR COVID-19 VIRUS: ICD-10-CM

## 2021-05-18 PROCEDURE — U0003 INFECTIOUS AGENT DETECTION BY NUCLEIC ACID (DNA OR RNA); SEVERE ACUTE RESPIRATORY SYNDROME CORONAVIRUS 2 (SARS-COV-2) (CORONAVIRUS DISEASE [COVID-19]), AMPLIFIED PROBE TECHNIQUE, MAKING USE OF HIGH THROUGHPUT TECHNOLOGIES AS DESCRIBED BY CMS-2020-01-R: HCPCS | Performed by: NURSE PRACTITIONER

## 2021-05-19 LAB — SARS-COV-2 RNA RESP QL NAA+PROBE: NOT DETECTED

## 2021-07-20 ENCOUNTER — LAB VISIT (OUTPATIENT)
Dept: LAB | Facility: OTHER | Age: 86
End: 2021-07-20
Payer: MEDICARE

## 2021-07-20 DIAGNOSIS — Z20.822 ENCOUNTER FOR LABORATORY TESTING FOR COVID-19 VIRUS: ICD-10-CM

## 2021-07-20 PROCEDURE — U0003 INFECTIOUS AGENT DETECTION BY NUCLEIC ACID (DNA OR RNA); SEVERE ACUTE RESPIRATORY SYNDROME CORONAVIRUS 2 (SARS-COV-2) (CORONAVIRUS DISEASE [COVID-19]), AMPLIFIED PROBE TECHNIQUE, MAKING USE OF HIGH THROUGHPUT TECHNOLOGIES AS DESCRIBED BY CMS-2020-01-R: HCPCS | Performed by: NURSE PRACTITIONER

## 2021-07-20 NOTE — PROGRESS NOTES
Labs are normal.  Dr. Kamara has reviewed your result, let us know if you have any problems 

## 2021-07-21 LAB
SARS-COV-2 RNA RESP QL NAA+PROBE: NOT DETECTED
SARS-COV-2- CYCLE NUMBER: -1

## 2021-07-27 ENCOUNTER — LAB VISIT (OUTPATIENT)
Dept: LAB | Facility: OTHER | Age: 86
End: 2021-07-27
Payer: MEDICARE

## 2021-07-27 DIAGNOSIS — Z20.822 ENCOUNTER FOR LABORATORY TESTING FOR COVID-19 VIRUS: ICD-10-CM

## 2021-07-27 PROCEDURE — U0003 INFECTIOUS AGENT DETECTION BY NUCLEIC ACID (DNA OR RNA); SEVERE ACUTE RESPIRATORY SYNDROME CORONAVIRUS 2 (SARS-COV-2) (CORONAVIRUS DISEASE [COVID-19]), AMPLIFIED PROBE TECHNIQUE, MAKING USE OF HIGH THROUGHPUT TECHNOLOGIES AS DESCRIBED BY CMS-2020-01-R: HCPCS | Performed by: NURSE PRACTITIONER

## 2021-07-29 LAB
SARS-COV-2 RNA RESP QL NAA+PROBE: NOT DETECTED
SARS-COV-2- CYCLE NUMBER: -1

## 2021-09-07 ENCOUNTER — LAB VISIT (OUTPATIENT)
Dept: LAB | Facility: OTHER | Age: 86
End: 2021-09-07
Payer: MEDICARE

## 2021-09-07 DIAGNOSIS — Z20.822 ENCOUNTER FOR LABORATORY TESTING FOR COVID-19 VIRUS: ICD-10-CM

## 2021-09-07 PROCEDURE — U0003 INFECTIOUS AGENT DETECTION BY NUCLEIC ACID (DNA OR RNA); SEVERE ACUTE RESPIRATORY SYNDROME CORONAVIRUS 2 (SARS-COV-2) (CORONAVIRUS DISEASE [COVID-19]), AMPLIFIED PROBE TECHNIQUE, MAKING USE OF HIGH THROUGHPUT TECHNOLOGIES AS DESCRIBED BY CMS-2020-01-R: HCPCS | Performed by: NURSE PRACTITIONER

## 2021-09-08 LAB
SARS-COV-2 RNA RESP QL NAA+PROBE: NOT DETECTED
SARS-COV-2- CYCLE NUMBER: NORMAL

## 2021-09-21 ENCOUNTER — OFFICE VISIT (OUTPATIENT)
Dept: DERMATOLOGY | Facility: CLINIC | Age: 86
End: 2021-09-21
Payer: MEDICARE

## 2021-09-21 VITALS — HEIGHT: 68 IN | BODY MASS INDEX: 27.26 KG/M2 | WEIGHT: 179.88 LBS

## 2021-09-21 DIAGNOSIS — L21.9 SEBORRHEIC DERMATITIS: ICD-10-CM

## 2021-09-21 DIAGNOSIS — Q80.0 ICHTHYOSIS VULGARIS: ICD-10-CM

## 2021-09-21 DIAGNOSIS — L82.1 SEBORRHEIC KERATOSES: ICD-10-CM

## 2021-09-21 DIAGNOSIS — L57.0 ACTINIC KERATOSES: Primary | ICD-10-CM

## 2021-09-21 PROCEDURE — 99999 PR PBB SHADOW E&M-EST. PATIENT-LVL III: ICD-10-PCS | Mod: PBBFAC,,, | Performed by: DERMATOLOGY

## 2021-09-21 PROCEDURE — 17000 DESTRUCT PREMALG LESION: CPT | Mod: S$GLB,,, | Performed by: DERMATOLOGY

## 2021-09-21 PROCEDURE — 3288F PR FALLS RISK ASSESSMENT DOCUMENTED: ICD-10-PCS | Mod: CPTII,S$GLB,, | Performed by: DERMATOLOGY

## 2021-09-21 PROCEDURE — 1126F PR PAIN SEVERITY QUANTIFIED, NO PAIN PRESENT: ICD-10-PCS | Mod: CPTII,S$GLB,, | Performed by: DERMATOLOGY

## 2021-09-21 PROCEDURE — 1160F PR REVIEW ALL MEDS BY PRESCRIBER/CLIN PHARMACIST DOCUMENTED: ICD-10-PCS | Mod: CPTII,S$GLB,, | Performed by: DERMATOLOGY

## 2021-09-21 PROCEDURE — 1126F AMNT PAIN NOTED NONE PRSNT: CPT | Mod: CPTII,S$GLB,, | Performed by: DERMATOLOGY

## 2021-09-21 PROCEDURE — 1159F PR MEDICATION LIST DOCUMENTED IN MEDICAL RECORD: ICD-10-PCS | Mod: CPTII,S$GLB,, | Performed by: DERMATOLOGY

## 2021-09-21 PROCEDURE — 3288F FALL RISK ASSESSMENT DOCD: CPT | Mod: CPTII,S$GLB,, | Performed by: DERMATOLOGY

## 2021-09-21 PROCEDURE — 1159F MED LIST DOCD IN RCRD: CPT | Mod: CPTII,S$GLB,, | Performed by: DERMATOLOGY

## 2021-09-21 PROCEDURE — 1101F PR PT FALLS ASSESS DOC 0-1 FALLS W/OUT INJ PAST YR: ICD-10-PCS | Mod: CPTII,S$GLB,, | Performed by: DERMATOLOGY

## 2021-09-21 PROCEDURE — 1160F RVW MEDS BY RX/DR IN RCRD: CPT | Mod: CPTII,S$GLB,, | Performed by: DERMATOLOGY

## 2021-09-21 PROCEDURE — 1101F PT FALLS ASSESS-DOCD LE1/YR: CPT | Mod: CPTII,S$GLB,, | Performed by: DERMATOLOGY

## 2021-09-21 PROCEDURE — 99203 OFFICE O/P NEW LOW 30 MIN: CPT | Mod: 25,S$GLB,, | Performed by: DERMATOLOGY

## 2021-09-21 PROCEDURE — 17000 PR DESTRUCTION(LASER SURGERY,CRYOSURGERY,CHEMOSURGERY),PREMALIGNANT LESIONS,FIRST LESION: ICD-10-PCS | Mod: S$GLB,,, | Performed by: DERMATOLOGY

## 2021-09-21 PROCEDURE — 99203 PR OFFICE/OUTPT VISIT, NEW, LEVL III, 30-44 MIN: ICD-10-PCS | Mod: 25,S$GLB,, | Performed by: DERMATOLOGY

## 2021-09-21 PROCEDURE — 99999 PR PBB SHADOW E&M-EST. PATIENT-LVL III: CPT | Mod: PBBFAC,,, | Performed by: DERMATOLOGY

## 2021-09-21 RX ORDER — MEMANTINE HYDROCHLORIDE 5 MG/1
5 TABLET ORAL 2 TIMES DAILY
COMMUNITY
Start: 2021-07-13

## 2021-09-21 RX ORDER — OLANZAPINE 10 MG/1
10 TABLET ORAL NIGHTLY
Status: ON HOLD | COMMUNITY
Start: 2021-07-13 | End: 2023-10-27 | Stop reason: HOSPADM

## 2021-09-21 RX ORDER — HYDROCORTISONE 25 MG/G
CREAM TOPICAL
Qty: 28 G | Refills: 1 | Status: ON HOLD | OUTPATIENT
Start: 2021-09-21 | End: 2023-10-27 | Stop reason: HOSPADM

## 2021-09-21 RX ORDER — KETOCONAZOLE 20 MG/G
CREAM TOPICAL
Qty: 60 G | Refills: 3 | Status: ON HOLD | OUTPATIENT
Start: 2021-09-21 | End: 2023-10-27 | Stop reason: HOSPADM

## 2021-09-21 RX ORDER — KETOCONAZOLE 20 MG/ML
SHAMPOO, SUSPENSION TOPICAL
Qty: 120 ML | Refills: 5 | Status: ON HOLD | OUTPATIENT
Start: 2021-09-21 | End: 2023-10-27 | Stop reason: HOSPADM

## 2021-09-22 ENCOUNTER — TELEPHONE (OUTPATIENT)
Dept: DERMATOLOGY | Facility: CLINIC | Age: 86
End: 2021-09-22

## 2021-09-28 ENCOUNTER — LAB VISIT (OUTPATIENT)
Dept: LAB | Facility: OTHER | Age: 86
End: 2021-09-28
Payer: MEDICARE

## 2021-09-28 DIAGNOSIS — Z20.822 ENCOUNTER FOR LABORATORY TESTING FOR COVID-19 VIRUS: ICD-10-CM

## 2021-09-28 PROCEDURE — U0003 INFECTIOUS AGENT DETECTION BY NUCLEIC ACID (DNA OR RNA); SEVERE ACUTE RESPIRATORY SYNDROME CORONAVIRUS 2 (SARS-COV-2) (CORONAVIRUS DISEASE [COVID-19]), AMPLIFIED PROBE TECHNIQUE, MAKING USE OF HIGH THROUGHPUT TECHNOLOGIES AS DESCRIBED BY CMS-2020-01-R: HCPCS | Performed by: NURSE PRACTITIONER

## 2021-09-29 DIAGNOSIS — Z20.822 ENCOUNTER FOR LABORATORY TESTING FOR COVID-19 VIRUS: ICD-10-CM

## 2021-09-29 LAB
SARS-COV-2 RNA RESP QL NAA+PROBE: NOT DETECTED
SARS-COV-2- CYCLE NUMBER: NORMAL

## 2021-10-05 ENCOUNTER — LAB VISIT (OUTPATIENT)
Dept: LAB | Facility: OTHER | Age: 86
End: 2021-10-05
Payer: MEDICARE

## 2021-10-05 DIAGNOSIS — Z20.822 ENCOUNTER FOR LABORATORY TESTING FOR COVID-19 VIRUS: ICD-10-CM

## 2021-10-05 PROCEDURE — U0003 INFECTIOUS AGENT DETECTION BY NUCLEIC ACID (DNA OR RNA); SEVERE ACUTE RESPIRATORY SYNDROME CORONAVIRUS 2 (SARS-COV-2) (CORONAVIRUS DISEASE [COVID-19]), AMPLIFIED PROBE TECHNIQUE, MAKING USE OF HIGH THROUGHPUT TECHNOLOGIES AS DESCRIBED BY CMS-2020-01-R: HCPCS | Performed by: NURSE PRACTITIONER

## 2021-10-06 LAB
SARS-COV-2 RNA RESP QL NAA+PROBE: NOT DETECTED
SARS-COV-2- CYCLE NUMBER: NORMAL

## 2021-11-16 ENCOUNTER — PES CALL (OUTPATIENT)
Dept: ADMINISTRATIVE | Facility: CLINIC | Age: 86
End: 2021-11-16
Payer: MEDICARE

## 2021-12-28 ENCOUNTER — LAB VISIT (OUTPATIENT)
Dept: LAB | Facility: OTHER | Age: 86
End: 2021-12-28
Payer: MEDICARE

## 2021-12-28 DIAGNOSIS — Z20.822 ENCOUNTER FOR LABORATORY TESTING FOR COVID-19 VIRUS: ICD-10-CM

## 2021-12-28 PROCEDURE — U0003 INFECTIOUS AGENT DETECTION BY NUCLEIC ACID (DNA OR RNA); SEVERE ACUTE RESPIRATORY SYNDROME CORONAVIRUS 2 (SARS-COV-2) (CORONAVIRUS DISEASE [COVID-19]), AMPLIFIED PROBE TECHNIQUE, MAKING USE OF HIGH THROUGHPUT TECHNOLOGIES AS DESCRIBED BY CMS-2020-01-R: HCPCS | Performed by: NURSE PRACTITIONER

## 2021-12-30 LAB
SARS-COV-2 RNA RESP QL NAA+PROBE: DETECTED
SARS-COV-2- CYCLE NUMBER: 30

## 2022-01-03 DIAGNOSIS — U07.1 COVID-19 VIRUS DETECTED: ICD-10-CM

## 2022-03-22 ENCOUNTER — PES CALL (OUTPATIENT)
Dept: ADMINISTRATIVE | Facility: CLINIC | Age: 87
End: 2022-03-22
Payer: MEDICARE

## 2022-06-03 ENCOUNTER — APPOINTMENT (OUTPATIENT)
Dept: LAB | Facility: HOSPITAL | Age: 87
End: 2022-06-03
Attending: INTERNAL MEDICINE
Payer: MEDICARE

## 2022-06-03 DIAGNOSIS — R26.81 UNSTEADY: Primary | ICD-10-CM

## 2022-06-03 LAB
BILIRUB UR QL STRIP: NEGATIVE
CLARITY UR: CLEAR
COLOR UR: YELLOW
GLUCOSE UR QL STRIP: NEGATIVE
HGB UR QL STRIP: NEGATIVE
KETONES UR QL STRIP: ABNORMAL
LEUKOCYTE ESTERASE UR QL STRIP: NEGATIVE
NITRITE UR QL STRIP: NEGATIVE
PH UR STRIP: 6 [PH] (ref 5–8)
PROT UR QL STRIP: NEGATIVE
SP GR UR STRIP: >=1.03 (ref 1–1.03)
URN SPEC COLLECT METH UR: ABNORMAL
UROBILINOGEN UR STRIP-ACNC: ABNORMAL EU/DL

## 2022-06-03 PROCEDURE — 81003 URINALYSIS AUTO W/O SCOPE: CPT | Performed by: INTERNAL MEDICINE

## 2023-04-21 ENCOUNTER — LAB VISIT (OUTPATIENT)
Dept: LAB | Facility: HOSPITAL | Age: 88
End: 2023-04-21
Attending: INTERNAL MEDICINE
Payer: MEDICARE

## 2023-04-21 DIAGNOSIS — Z13.228 CYSTIC FIBROSIS SCREENING: Primary | ICD-10-CM

## 2023-04-21 DIAGNOSIS — Z00.00 ROUTINE GENERAL MEDICAL EXAMINATION AT A HEALTH CARE FACILITY: ICD-10-CM

## 2023-04-21 LAB
ALBUMIN SERPL BCP-MCNC: 3.5 G/DL (ref 3.5–5.2)
ALP SERPL-CCNC: 87 U/L (ref 55–135)
ALT SERPL W/O P-5'-P-CCNC: 26 U/L (ref 10–44)
ANION GAP SERPL CALC-SCNC: 12 MMOL/L (ref 8–16)
AST SERPL-CCNC: 39 U/L (ref 10–40)
BASOPHILS # BLD AUTO: 0.08 K/UL (ref 0–0.2)
BASOPHILS NFR BLD: 1.1 % (ref 0–1.9)
BILIRUB SERPL-MCNC: 0.7 MG/DL (ref 0.1–1)
BUN SERPL-MCNC: 18 MG/DL (ref 8–23)
CALCIUM SERPL-MCNC: 8.8 MG/DL (ref 8.7–10.5)
CHLORIDE SERPL-SCNC: 106 MMOL/L (ref 95–110)
CO2 SERPL-SCNC: 24 MMOL/L (ref 23–29)
CREAT SERPL-MCNC: 1.3 MG/DL (ref 0.5–1.4)
DIFFERENTIAL METHOD: ABNORMAL
EOSINOPHIL # BLD AUTO: 0.2 K/UL (ref 0–0.5)
EOSINOPHIL NFR BLD: 2.9 % (ref 0–8)
ERYTHROCYTE [DISTWIDTH] IN BLOOD BY AUTOMATED COUNT: 13.4 % (ref 11.5–14.5)
EST. GFR  (NO RACE VARIABLE): 54 ML/MIN/1.73 M^2
GLUCOSE SERPL-MCNC: 137 MG/DL (ref 70–110)
HCT VFR BLD AUTO: 41.2 % (ref 40–54)
HGB BLD-MCNC: 13.7 G/DL (ref 14–18)
IMM GRANULOCYTES # BLD AUTO: 0.01 K/UL (ref 0–0.04)
IMM GRANULOCYTES NFR BLD AUTO: 0.1 % (ref 0–0.5)
LYMPHOCYTES # BLD AUTO: 1.7 K/UL (ref 1–4.8)
LYMPHOCYTES NFR BLD: 23.1 % (ref 18–48)
MCH RBC QN AUTO: 31.1 PG (ref 27–31)
MCHC RBC AUTO-ENTMCNC: 33.3 G/DL (ref 32–36)
MCV RBC AUTO: 93 FL (ref 82–98)
MONOCYTES # BLD AUTO: 1 K/UL (ref 0.3–1)
MONOCYTES NFR BLD: 13.1 % (ref 4–15)
NEUTROPHILS # BLD AUTO: 4.5 K/UL (ref 1.8–7.7)
NEUTROPHILS NFR BLD: 59.7 % (ref 38–73)
NRBC BLD-RTO: 0 /100 WBC
PLATELET # BLD AUTO: 138 K/UL (ref 150–450)
PMV BLD AUTO: 13.7 FL (ref 9.2–12.9)
POTASSIUM SERPL-SCNC: 4.1 MMOL/L (ref 3.5–5.1)
PROT SERPL-MCNC: 7 G/DL (ref 6–8.4)
RBC # BLD AUTO: 4.41 M/UL (ref 4.6–6.2)
SODIUM SERPL-SCNC: 142 MMOL/L (ref 136–145)
WBC # BLD AUTO: 7.53 K/UL (ref 3.9–12.7)

## 2023-04-21 PROCEDURE — 85025 COMPLETE CBC W/AUTO DIFF WBC: CPT | Performed by: INTERNAL MEDICINE

## 2023-04-21 PROCEDURE — 80053 COMPREHEN METABOLIC PANEL: CPT | Performed by: INTERNAL MEDICINE

## 2023-04-21 PROCEDURE — 36415 COLL VENOUS BLD VENIPUNCTURE: CPT | Performed by: INTERNAL MEDICINE

## 2023-05-08 ENCOUNTER — PES CALL (OUTPATIENT)
Dept: ADMINISTRATIVE | Facility: CLINIC | Age: 88
End: 2023-05-08
Payer: MEDICARE

## 2023-06-06 ENCOUNTER — PES CALL (OUTPATIENT)
Dept: ADMINISTRATIVE | Facility: CLINIC | Age: 88
End: 2023-06-06
Payer: MEDICARE

## 2023-06-26 ENCOUNTER — HOSPITAL ENCOUNTER (INPATIENT)
Facility: HOSPITAL | Age: 88
LOS: 1 days | DRG: 982 | End: 2023-06-29
Attending: EMERGENCY MEDICINE | Admitting: STUDENT IN AN ORGANIZED HEALTH CARE EDUCATION/TRAINING PROGRAM
Payer: MEDICARE

## 2023-06-26 DIAGNOSIS — L89.90 PRESSURE ULCER: ICD-10-CM

## 2023-06-26 DIAGNOSIS — T14.8XXA INFECTED WOUND: ICD-10-CM

## 2023-06-26 DIAGNOSIS — L89.210: Primary | ICD-10-CM

## 2023-06-26 DIAGNOSIS — R07.9 CHEST PAIN: ICD-10-CM

## 2023-06-26 DIAGNOSIS — L08.9 INFECTED WOUND: ICD-10-CM

## 2023-06-26 PROBLEM — I48.0 PAROXYSMAL ATRIAL FIBRILLATION: Status: RESOLVED | Noted: 2018-12-12 | Resolved: 2023-06-26

## 2023-06-26 PROBLEM — Z86.79 HISTORY OF CEREBRAL HEMORRHAGE: Status: RESOLVED | Noted: 2019-09-05 | Resolved: 2023-06-26

## 2023-06-26 LAB
ALBUMIN SERPL BCP-MCNC: 2.9 G/DL (ref 3.5–5.2)
ALP SERPL-CCNC: 85 U/L (ref 55–135)
ALT SERPL W/O P-5'-P-CCNC: 19 U/L (ref 10–44)
ANION GAP SERPL CALC-SCNC: 9 MMOL/L (ref 8–16)
AST SERPL-CCNC: 22 U/L (ref 10–40)
BASOPHILS # BLD AUTO: 0.07 K/UL (ref 0–0.2)
BASOPHILS NFR BLD: 0.5 % (ref 0–1.9)
BILIRUB SERPL-MCNC: 0.8 MG/DL (ref 0.1–1)
BUN SERPL-MCNC: 27 MG/DL (ref 8–23)
CALCIUM SERPL-MCNC: 8.7 MG/DL (ref 8.7–10.5)
CHLORIDE SERPL-SCNC: 104 MMOL/L (ref 95–110)
CO2 SERPL-SCNC: 30 MMOL/L (ref 23–29)
CREAT SERPL-MCNC: 1.1 MG/DL (ref 0.5–1.4)
DIFFERENTIAL METHOD: ABNORMAL
EOSINOPHIL # BLD AUTO: 0 K/UL (ref 0–0.5)
EOSINOPHIL NFR BLD: 0.3 % (ref 0–8)
ERYTHROCYTE [DISTWIDTH] IN BLOOD BY AUTOMATED COUNT: 13.2 % (ref 11.5–14.5)
EST. GFR  (NO RACE VARIABLE): >60 ML/MIN/1.73 M^2
GLUCOSE SERPL-MCNC: 117 MG/DL (ref 70–110)
HCT VFR BLD AUTO: 38.8 % (ref 40–54)
HGB BLD-MCNC: 12.7 G/DL (ref 14–18)
IMM GRANULOCYTES # BLD AUTO: 0.06 K/UL (ref 0–0.04)
IMM GRANULOCYTES NFR BLD AUTO: 0.4 % (ref 0–0.5)
LYMPHOCYTES # BLD AUTO: 1.5 K/UL (ref 1–4.8)
LYMPHOCYTES NFR BLD: 10.8 % (ref 18–48)
MCH RBC QN AUTO: 31.1 PG (ref 27–31)
MCHC RBC AUTO-ENTMCNC: 32.7 G/DL (ref 32–36)
MCV RBC AUTO: 95 FL (ref 82–98)
MONOCYTES # BLD AUTO: 1.6 K/UL (ref 0.3–1)
MONOCYTES NFR BLD: 11.6 % (ref 4–15)
NEUTROPHILS # BLD AUTO: 10.8 K/UL (ref 1.8–7.7)
NEUTROPHILS NFR BLD: 76.4 % (ref 38–73)
NRBC BLD-RTO: 0 /100 WBC
PLATELET # BLD AUTO: 157 K/UL (ref 150–450)
PMV BLD AUTO: 12.5 FL (ref 9.2–12.9)
POTASSIUM SERPL-SCNC: 3.6 MMOL/L (ref 3.5–5.1)
PROT SERPL-MCNC: 7.1 G/DL (ref 6–8.4)
RBC # BLD AUTO: 4.09 M/UL (ref 4.6–6.2)
SODIUM SERPL-SCNC: 143 MMOL/L (ref 136–145)
WBC # BLD AUTO: 14.09 K/UL (ref 3.9–12.7)

## 2023-06-26 PROCEDURE — 85025 COMPLETE CBC W/AUTO DIFF WBC: CPT | Performed by: EMERGENCY MEDICINE

## 2023-06-26 PROCEDURE — 36415 COLL VENOUS BLD VENIPUNCTURE: CPT | Performed by: EMERGENCY MEDICINE

## 2023-06-26 PROCEDURE — 63600175 PHARM REV CODE 636 W HCPCS: Performed by: NURSE PRACTITIONER

## 2023-06-26 PROCEDURE — 87186 SC STD MICRODIL/AGAR DIL: CPT | Performed by: EMERGENCY MEDICINE

## 2023-06-26 PROCEDURE — 87070 CULTURE OTHR SPECIMN AEROBIC: CPT | Performed by: EMERGENCY MEDICINE

## 2023-06-26 PROCEDURE — 99222 1ST HOSP IP/OBS MODERATE 55: CPT | Mod: ,,, | Performed by: FAMILY MEDICINE

## 2023-06-26 PROCEDURE — 99285 EMERGENCY DEPT VISIT HI MDM: CPT

## 2023-06-26 PROCEDURE — 25000003 PHARM REV CODE 250: Performed by: NURSE PRACTITIONER

## 2023-06-26 PROCEDURE — 99222 PR INITIAL HOSPITAL CARE,LEVL II: ICD-10-PCS | Mod: ,,, | Performed by: FAMILY MEDICINE

## 2023-06-26 PROCEDURE — 25000003 PHARM REV CODE 250: Performed by: FAMILY MEDICINE

## 2023-06-26 PROCEDURE — 96365 THER/PROPH/DIAG IV INF INIT: CPT

## 2023-06-26 PROCEDURE — 80053 COMPREHEN METABOLIC PANEL: CPT | Performed by: EMERGENCY MEDICINE

## 2023-06-26 PROCEDURE — G0378 HOSPITAL OBSERVATION PER HR: HCPCS

## 2023-06-26 PROCEDURE — 87077 CULTURE AEROBIC IDENTIFY: CPT | Performed by: EMERGENCY MEDICINE

## 2023-06-26 PROCEDURE — 96366 THER/PROPH/DIAG IV INF ADDON: CPT

## 2023-06-26 RX ORDER — ACETAMINOPHEN 325 MG/1
650 TABLET ORAL EVERY 4 HOURS PRN
Status: DISCONTINUED | OUTPATIENT
Start: 2023-06-26 | End: 2023-06-27

## 2023-06-26 RX ORDER — OLANZAPINE 2.5 MG/1
10 TABLET ORAL NIGHTLY
Status: DISCONTINUED | OUTPATIENT
Start: 2023-06-26 | End: 2023-06-29 | Stop reason: HOSPADM

## 2023-06-26 RX ORDER — LEVOTHYROXINE SODIUM 100 UG/1
100 TABLET ORAL DAILY
Status: DISCONTINUED | OUTPATIENT
Start: 2023-06-27 | End: 2023-06-29 | Stop reason: HOSPADM

## 2023-06-26 RX ORDER — TALC
6 POWDER (GRAM) TOPICAL NIGHTLY PRN
Status: DISCONTINUED | OUTPATIENT
Start: 2023-06-26 | End: 2023-06-29 | Stop reason: HOSPADM

## 2023-06-26 RX ORDER — ENOXAPARIN SODIUM 100 MG/ML
40 INJECTION SUBCUTANEOUS EVERY 24 HOURS
Status: DISCONTINUED | OUTPATIENT
Start: 2023-06-26 | End: 2023-06-26

## 2023-06-26 RX ORDER — NALOXONE HCL 0.4 MG/ML
0.02 VIAL (ML) INJECTION
Status: DISCONTINUED | OUTPATIENT
Start: 2023-06-26 | End: 2023-06-29 | Stop reason: HOSPADM

## 2023-06-26 RX ORDER — ASCORBIC ACID 500 MG
500 TABLET ORAL DAILY
Status: DISCONTINUED | OUTPATIENT
Start: 2023-06-27 | End: 2023-06-29 | Stop reason: HOSPADM

## 2023-06-26 RX ORDER — SODIUM,POTASSIUM PHOSPHATES 280-250MG
2 POWDER IN PACKET (EA) ORAL
Status: DISCONTINUED | OUTPATIENT
Start: 2023-06-26 | End: 2023-06-29 | Stop reason: HOSPADM

## 2023-06-26 RX ORDER — ONDANSETRON 2 MG/ML
4 INJECTION INTRAMUSCULAR; INTRAVENOUS EVERY 6 HOURS PRN
Status: DISCONTINUED | OUTPATIENT
Start: 2023-06-26 | End: 2023-06-29 | Stop reason: HOSPADM

## 2023-06-26 RX ORDER — MIRTAZAPINE 15 MG/1
30 TABLET, FILM COATED ORAL NIGHTLY
Status: DISCONTINUED | OUTPATIENT
Start: 2023-06-26 | End: 2023-06-29 | Stop reason: HOSPADM

## 2023-06-26 RX ORDER — MEMANTINE HYDROCHLORIDE 5 MG/1
5 TABLET ORAL 2 TIMES DAILY
Status: DISCONTINUED | OUTPATIENT
Start: 2023-06-26 | End: 2023-06-29 | Stop reason: HOSPADM

## 2023-06-26 RX ORDER — AMLODIPINE BESYLATE 5 MG/1
5 TABLET ORAL DAILY
Status: DISCONTINUED | OUTPATIENT
Start: 2023-06-27 | End: 2023-06-29 | Stop reason: HOSPADM

## 2023-06-26 RX ORDER — SERTRALINE HYDROCHLORIDE 25 MG/1
25 TABLET, FILM COATED ORAL DAILY
Status: DISCONTINUED | OUTPATIENT
Start: 2023-06-27 | End: 2023-06-29 | Stop reason: HOSPADM

## 2023-06-26 RX ORDER — LANOLIN ALCOHOL/MO/W.PET/CERES
800 CREAM (GRAM) TOPICAL
Status: DISCONTINUED | OUTPATIENT
Start: 2023-06-26 | End: 2023-06-29 | Stop reason: HOSPADM

## 2023-06-26 RX ORDER — GLUCAGON 1 MG
1 KIT INJECTION
Status: DISCONTINUED | OUTPATIENT
Start: 2023-06-26 | End: 2023-06-29 | Stop reason: HOSPADM

## 2023-06-26 RX ORDER — DEXTROSE 40 %
15 GEL (GRAM) ORAL
Status: DISCONTINUED | OUTPATIENT
Start: 2023-06-26 | End: 2023-06-29 | Stop reason: HOSPADM

## 2023-06-26 RX ORDER — DEXTROSE 40 %
30 GEL (GRAM) ORAL
Status: DISCONTINUED | OUTPATIENT
Start: 2023-06-26 | End: 2023-06-29 | Stop reason: HOSPADM

## 2023-06-26 RX ORDER — SODIUM CHLORIDE 0.9 % (FLUSH) 0.9 %
10 SYRINGE (ML) INJECTION EVERY 12 HOURS PRN
Status: DISCONTINUED | OUTPATIENT
Start: 2023-06-26 | End: 2023-06-29 | Stop reason: HOSPADM

## 2023-06-26 RX ADMIN — OLANZAPINE 10 MG: 2.5 TABLET, FILM COATED ORAL at 08:06

## 2023-06-26 RX ADMIN — VANCOMYCIN HYDROCHLORIDE 1250 MG: 1.25 INJECTION, POWDER, LYOPHILIZED, FOR SOLUTION INTRAVENOUS at 04:06

## 2023-06-26 RX ADMIN — MIRTAZAPINE 30 MG: 15 TABLET, FILM COATED ORAL at 08:06

## 2023-06-26 RX ADMIN — ACETAMINOPHEN 650 MG: 325 TABLET ORAL at 04:06

## 2023-06-26 RX ADMIN — PIPERACILLIN AND TAZOBACTAM 4.5 G: 4; .5 INJECTION, POWDER, LYOPHILIZED, FOR SOLUTION INTRAVENOUS; PARENTERAL at 12:06

## 2023-06-26 RX ADMIN — COLLAGENASE SANTYL: 250 OINTMENT TOPICAL at 04:06

## 2023-06-26 RX ADMIN — MEMANTINE 5 MG: 5 TABLET ORAL at 08:06

## 2023-06-26 RX ADMIN — PIPERACILLIN AND TAZOBACTAM 4.5 G: 4; .5 INJECTION, POWDER, LYOPHILIZED, FOR SOLUTION INTRAVENOUS; PARENTERAL at 08:06

## 2023-06-26 NOTE — ED PROVIDER NOTES
Encounter Date: 6/26/2023    SCRIBE #1 NOTE: IKyle, am scribing for, and in the presence of,  Rolan Alcantara III, MD.     History     Chief Complaint   Patient presents with    Wound Infection     Rt. Hip wound draining      Time seen by provider: 10:06 AM on 06/26/2023    Adriano Borjas is a 88 y.o. male who presents to the ED via EMS from a nearby nursing home with an onset of a ulcer to his right hip that developed recently. History limited by the patient's history of dementia. He is bedridden. He has reportedly not been experiencing a fever, vomiting, or diarrhea. PMHx of dementia, A-fib, HTN, HLD, and TIA. There is no pertinent PSHx.    The history is provided by the nursing home. History limited by: dementia.   Review of patient's allergies indicates:   Allergen Reactions    Ciprofloxacin Other (See Comments)     Shoulder and leg pain/cramping     Codeine Other (See Comments)     Pt cannot remember been a long time ago    Sulfa (sulfonamide antibiotics)      Allergic reaction to bactrim     Past Medical History:   Diagnosis Date    A-fib     Dementia     Elevated cholesterol     Essential hypertension     History of cerebral hemorrhage 9/5/2019    Kidney stone     passed on his own    Memory loss     dememtia per family    Osteoarthritis     Paroxysmal atrial fibrillation 12/12/2018    Stroke     2012 TIA     Thyroid disease      Past Surgical History:   Procedure Laterality Date    CYSTOSCOPY      EYE SURGERY      MAGNETIC RESONANCE IMAGING N/A 8/23/2019    Procedure: MRI (Magnetic Resonance Imagine) needs anesthesia;  Surgeon: Panchito Montoya MD;  Location: [facility];  Service: Anesthesiology;  Laterality: N/A;     Family History   Problem Relation Age of Onset    Cancer Mother     Diabetes Mother     Hyperlipidemia Mother     Heart disease Father     Hyperlipidemia Father     Dementia Sister     Dementia Brother     Heart disease Brother     Migraines Daughter     Tremor Daughter     Tremor  Son     Urolithiasis Neg Hx     Prostate cancer Neg Hx     Kidney cancer Neg Hx      Social History     Tobacco Use    Smoking status: Former     Years: 15.00     Types: Cigarettes    Smokeless tobacco: Never    Tobacco comments:     quit 50 years ago   Substance Use Topics    Alcohol use: No    Drug use: No     Review of Systems   Unable to perform ROS: Dementia     Physical Exam     Initial Vitals [06/26/23 0956]   BP Pulse Resp Temp SpO2   (!) 163/90 88 18 98.4 °F (36.9 °C) 96 %      MAP       --         Physical Exam    Nursing note and vitals reviewed.  Constitutional: He appears well-developed and well-nourished.   HENT:   Head: Normocephalic and atraumatic.   Eyes: Conjunctivae are normal.   Neck: Neck supple.   Normal range of motion.  Cardiovascular:  Normal rate, regular rhythm and normal heart sounds.     Exam reveals no gallop and no friction rub.       No murmur heard.  Pulmonary/Chest: Breath sounds normal. No respiratory distress. He has no wheezes. He has no rhonchi. He has no rales.   Abdominal: Abdomen is soft. He exhibits no distension. There is no abdominal tenderness.   Musculoskeletal:         General: Normal range of motion.      Cervical back: Normal range of motion and neck supple.     Neurological: He is alert and oriented to person, place, and time.   Skin: Skin is warm and dry.   5 cm ulceration with surrounding induration to the right hip.   Psychiatric: He has a normal mood and affect.       ED Course   Procedures  Labs Reviewed   CBC W/ AUTO DIFFERENTIAL - Abnormal; Notable for the following components:       Result Value    WBC 14.09 (*)     RBC 4.09 (*)     Hemoglobin 12.7 (*)     Hematocrit 38.8 (*)     MCH 31.1 (*)     Gran # (ANC) 10.8 (*)     Immature Grans (Abs) 0.06 (*)     Mono # 1.6 (*)     Gran % 76.4 (*)     Lymph % 10.8 (*)     All other components within normal limits   COMPREHENSIVE METABOLIC PANEL - Abnormal; Notable for the following components:    CO2 30 (*)      Glucose 117 (*)     BUN 27 (*)     Albumin 2.9 (*)     All other components within normal limits   CULTURE, AEROBIC  (SPECIFY SOURCE)          Imaging Results              X-Ray Hip 2 or 3 views Right (with Pelvis when performed) (Final result)  Result time 06/26/23 11:07:20      Final result by Mayra Steel MD (06/26/23 11:07:20)                   Impression:      No acute fracture or dislocation right      Electronically signed by: Mayra Steel MD  Date:    06/26/2023  Time:    11:07               Narrative:    EXAMINATION:  XR HIP WITH PELVIS WHEN PERFORMED, 2 OR 3  VIEWS RIGHT    CLINICAL HISTORY:  Pressure ulcer of unspecified site, unspecified stage    TECHNIQUE:  AP view of the pelvis and frog leg lateral view of the right hip were performed.    COMPARISON:  None    FINDINGS:  Mild degenerative changes at the right hip.  Note is made that on the AP view the pelvis the left hip is in the frogleg lateral position.  Mild degenerative changes also evident left hip.    No acute fracture or dislocation right hip.  No findings indicate osteomyelitis although note is made that MRI is typically much more sensitive than CT for detection of such.                                       Medications   piperacillin-tazobactam (ZOSYN) 4.5 g in dextrose 5 % in water (D5W) 5 % 100 mL IVPB (MB+) (4.5 g Intravenous New Bag 6/26/23 1257)   collagenase ointment (has no administration in time range)     Medical Decision Making:   History:   Old Medical Records: I decided to obtain old medical records.  Clinical Tests:   Lab Tests: Ordered and Reviewed  Radiological Study: Ordered and Reviewed  ED Management:  88-year-old male presents with a pressor ulceration with surrounding induration, erythema and purulent exudate.  He will be admitted for parental antibiotics and wound care.  Other:   I have discussed this case with another health care provider.     APC / Resident Notes:   I, Dr. Rolan Alcantara III, personally performed  the services described in this documentation. All medical record entries made by the scribe were at my direction and in my presence.  I have reviewed the chart and agree that the record reflects my personal performance and is accurate and complete           Scribe Attestation:   Scribe #1: I performed the above scribed service and the documentation accurately describes the services I performed. I attest to the accuracy of the note.                   Clinical Impression:   Final diagnoses:  [L89.90] Pressure ulcer  [T14.8XXA, L08.9] Infected wound        ED Disposition Condition    Observation Stable                Rolan Alcantara III, MD  06/26/23 0754

## 2023-06-26 NOTE — ED NOTES
Assumed care:  Adriano Borjas is awake, alert and oriented to self, skin warm and dry, in NAD.  Patient sent by [facility] for evaluation of wound to right hip.      Patient identifiers for Adriano Borjas checked and correct.  LOC:  Adriano Borjas is awake, alert, and aware of environment with an appropriate affect.   APPEARANCE:  He is resting comfortably and in no acute distress. He is clean and well groomed, patient's clothing is properly fastened.  SKIN:  The skin is warm and dry. He has normal skin turgor and moist mucus membranes. Wound to right hip with drainage  MUSCULOSKELETAL:  He is moving all extremities well, no obvious deformities noted. Pulses intact.   RESPIRATORY:  Airway is open and patent. Respirations are spontaneous and non-labored with normal effort and rate.  CARDIAC:  He has a normal rate and rhythm. No peripheral edema noted. Capillary refill < 3 seconds.  ABDOMEN:  No distention noted.  Soft and non-tender upon palpation.  NEUROLOGICAL:  PERRL. Facial expression is symmetrical. Hand grasps are equal bilaterally. Normal sensation in all extremities when touched with finger.  Allergies reported:    Review of patient's allergies indicates:   Allergen Reactions    Ciprofloxacin Other (See Comments)     Shoulder and leg pain/cramping     Codeine Other (See Comments)     Pt cannot remember been a long time ago    Sulfa (sulfonamide antibiotics)      Allergic reaction to bactrim

## 2023-06-26 NOTE — NURSING
Updated pt's son german of plan of care at bedside. He states he was told that pt should avoid anticoagulants including Lovenox due to pt hx of hemorrhagic stroke a couple yrs ago. Notified MAGDIEL Soto np who ordered to hold lovenox admin.

## 2023-06-26 NOTE — CONSULTS
Chief complaint:  Wound Infection (Rt. Hip wound draining )      HPI:  Adriano Borjas is a 88 y.o. male presenting with a left heel unstagable pressure ulcer and a right hip stage 4 pressure ulcer, both POA. Pt is awake and oriented and with his daughter at bedside. Pt is a resident at [place], and has had the pressure ulcers for a few months getting progressively worse. Hip started as a DTI and deteriorated and presents today very red and painful to touch. No other complaints today.    PMH:  As per HPI and below:  Past Medical History:   Diagnosis Date    A-fib     Dementia     Elevated cholesterol     Essential hypertension     Kidney stone     passed on his own    Memory loss     dememtia per family    Osteoarthritis     Stroke     2012 TIA     Thyroid disease        Social History     Socioeconomic History    Marital status:    Tobacco Use    Smoking status: Former     Years: 15.00     Types: Cigarettes    Smokeless tobacco: Never    Tobacco comments:     quit 50 years ago   Substance and Sexual Activity    Alcohol use: No    Drug use: No       Past Surgical History:   Procedure Laterality Date    CYSTOSCOPY      EYE SURGERY      MAGNETIC RESONANCE IMAGING N/A 8/23/2019    Procedure: MRI (Magnetic Resonance Imagine) needs anesthesia;  Surgeon: Panchito Montoya MD;  Location: [facility];  Service: Anesthesiology;  Laterality: N/A;       Family History   Problem Relation Age of Onset    Cancer Mother     Diabetes Mother     Hyperlipidemia Mother     Heart disease Father     Hyperlipidemia Father     Dementia Sister     Dementia Brother     Heart disease Brother     Migraines Daughter     Tremor Daughter     Tremor Son     Urolithiasis Neg Hx     Prostate cancer Neg Hx     Kidney cancer Neg Hx        Review of patient's allergies indicates:   Allergen Reactions    Ciprofloxacin Other (See Comments)     Shoulder and leg pain/cramping     Codeine Other (See Comments)     Pt cannot remember been a long  time ago    Sulfa (sulfonamide antibiotics)      Allergic reaction to bactrim       Current Facility-Administered Medications on File Prior to Encounter   Medication Dose Route Frequency Provider Last Rate Last Admin    lactated ringers infusion   Intravenous Continuous Panchito Montoya MD   New Bag at 08/23/19 1150    lidocaine (PF) 10 mg/ml (1%) injection 10 mg  1 mL Intradermal Once Panchito Montoya MD        tuberculin injection 5 Units  5 Units Intradermal 1 time in Clinic/HOD Panfilo Meier DNP         Current Outpatient Medications on File Prior to Encounter   Medication Sig Dispense Refill    acetaminophen (TYLENOL) 500 MG tablet Take 500 mg by mouth every evening.      amLODIPine (NORVASC) 5 MG tablet Take 5 mg by mouth once daily. MAR      ascorbic acid, vitamin C, (VITAMIN C) 500 MG tablet Take 500 mg by mouth once daily.      atorvastatin (LIPITOR) 40 MG tablet Take 1 tablet (40 mg total) by mouth once daily. 30 tablet 0    hydrocortisone 2.5 % cream Thin film to AA onface daily PRN flare 28 g 1    hydrOXYzine pamoate (VISTARIL) 25 MG Cap Take 1 capsule (25 mg total) by mouth every 8 (eight) hours as needed (Itching). 15 capsule 0    ketoconazole (NIZORAL) 2 % cream Thin film to red scaly rash on face twice daily as needed for flare 60 g 3    ketoconazole (NIZORAL) 2 % shampoo Wash hair with medicated shampoo at least 2x/week - let sit on scalp at least 5 minutes prior to rinsing 120 mL 5    levothyroxine (SYNTHROID) 75 MCG tablet Take 1 tablet (75 mcg total) by mouth once daily. 90 tablet 1    memantine (NAMENDA) 5 MG Tab Take 5 mg by mouth 2 (two) times daily.      mirtazapine (REMERON) 30 MG tablet Take 1 tablet (30 mg total) by mouth every evening. 30 tablet 0    multivitamin (THERAGRAN) tablet Take 1 tablet by mouth once daily.      OLANZapine (ZYPREXA) 10 MG tablet Take 10 mg by mouth nightly.      propylene glycoL 0.6 % Drop Place 2 drops into both eyes 3 (three) times daily. For dry  "eyes      pulse oximeter (PULSE OXIMETER) device by Apply Externally route 2 (two) times a day. Use twice daily at 8 AM and 3 PM and record the value in MyChart as directed. 1 each 0    sertraline (ZOLOFT) 25 MG tablet Take 1 tablet (25 mg total) by mouth once daily. 30 tablet 11    UNABLE TO FIND Admit to nursing home. 1 Piece 0       ROS: As per HPI and below:  Pertinent items are noted in HPI.      Physical Exam:     Vitals:    23 1006 23 1033 23 1102 23 1133   BP:  (!) 165/91 (!) 148/66 (!) 155/70   Pulse: 78 74 74 72   Resp:  18 20    Temp:       SpO2: 97% 97% 96% 98%   Weight:       Height:           BP  Min: 148/66  Max: 165/91  Temp  Av.4 °F (36.9 °C)  Min: 98.4 °F (36.9 °C)  Max: 98.4 °F (36.9 °C)  Pulse  Av.2  Min: 72  Max: 88  Resp  Av.7  Min: 18  Max: 20  SpO2  Av.8 %  Min: 96 %  Max: 98 %  Height  Av' 8" (172.7 cm)  Min: 5' 8" (172.7 cm)  Max: 5' 8" (172.7 cm)  Weight  Av.2 kg (179 lb)  Min: 81.2 kg (179 lb)  Max: 81.2 kg (179 lb)    Body mass index is 27.22 kg/m².          General:             Well developed, well nourished, no apparent distress  HEENT:              NCAT, no JVD, mucous membranes moist, EOM intact  Cardiovascular:  Regular rate and rhythm, normal S1, normal S2, No murmurs, rubs, or gallops  Respiratory:        Normal breath sounds, no wheezes, no rales, no rhonchi  Abdomen:           Bowel sounds present, non tender, non distended, no masses, no hepatojugular reflux  Extremities:        No clubbing, no cyanosis, no edema  Vascular:            2+ b/l radial.  Peripheral pulses intact.  No carotid bruits.  Neurological:      No focal deficits  Skin:                   No obvious rashes or erythema, left heel un-stagable pressure ulcer POA, right hip stage 4 pressure ulcer, + erythema justine-wound, tender to touch, heavy drainage    Lab Results   Component Value Date    WBC 14.09 (H) 2023    HGB 12.7 (L) 2023    HCT 38.8 (L) " 06/26/2023    MCV 95 06/26/2023     06/26/2023     Lab Results   Component Value Date    CHOL 166 10/04/2020    CHOL 203 (H) 09/13/2017    CHOL 221 (H) 09/02/2014     Lab Results   Component Value Date    HDL 32 (L) 10/04/2020    HDL 36 (L) 09/13/2017    HDL 33 (L) 09/02/2014     Lab Results   Component Value Date    LDLCALC 114.4 10/04/2020    LDLCALC 143.8 09/13/2017    LDLCALC 151.2 09/02/2014     Lab Results   Component Value Date    TRIG 98 10/04/2020    TRIG 116 09/13/2017    TRIG 184 (H) 09/02/2014     Lab Results   Component Value Date    CHOLHDL 19.3 (L) 10/04/2020    CHOLHDL 17.7 (L) 09/13/2017    CHOLHDL 14.9 (L) 09/02/2014     CMP  Recent Labs   Lab 06/26/23  1017   *   CALCIUM 8.7   ALBUMIN 2.9*   PROT 7.1      K 3.6   CO2 30*      BUN 27*   CREATININE 1.1   ALKPHOS 85   ALT 19   AST 22   BILITOT 0.8      Lab Results   Component Value Date    TSH 1.010 03/13/2021       Assessment and Recommendations       Diagnoses:    1. Stage 4 right hip pressure ulcer  2. Un-stagable pressure ulcer of the left heel    Plan:  1. Santyl to the right hip, cover with adaptic and ABD  2. Discussed debridement tomorrow with the patients daughter, she will be at bedside and agreed with the plan  3. Betadine to the left heel daily    Complexity:    medium

## 2023-06-26 NOTE — PLAN OF CARE
Hospice consult, packet and current meds sent to [facility] for informational hospice visit. CM following.    06/26/23 1116   Post-Acute Status   Post-Acute Authorization Hospice   Hospice Status Referrals Sent   Patient choice form signed by patient/caregiver List with quality metrics by geographic area provided

## 2023-06-26 NOTE — ASSESSMENT & PLAN NOTE
Advanced stage  Cont home meds  asst with ADLs  Fall precaution  Dementia precautions  Hospice d/w daughter at  and she is interested in this service - CM consulted for hospice informational visit

## 2023-06-26 NOTE — ASSESSMENT & PLAN NOTE
Large unstageable decub ulcer present on admission with surrounding cellulitis  Wound care consulted  IV anne and zosyn

## 2023-06-26 NOTE — NURSING
Dressing to right hip changed, santyl applied covered with adaptic and abd as Dr fine with wound care ordered. Dr Fine placed pics of wound in chart when seen in ER this AM.    Nurses Note -- 4 Eyes      6/26/2023   4:31 PM      Skin assessed during: Admit      [] No Altered Skin Integrity Present    []Prevention Measures Documented      [x] Yes- Altered Skin Integrity Present or Discovered   [x] LDA Added if Not in Epic (Describe Wound)   [x] New Altered Skin Integrity was Present on Admit and Documented in LDA   [x] Wound Image Taken    Wound Care Consulted? Yes    Attending Nurse:  Isabella Gutierrez RN     Second RN/Staff Member: JESSICA Anna

## 2023-06-26 NOTE — ASSESSMENT & PLAN NOTE
Large unstageable decub ulcer present on admission with surrounding cellulitis  Wound care consulted  IV vanc and zosyn  Wound culture growing GNR - de escalate Vanc, Continue Zosyn

## 2023-06-26 NOTE — PROGRESS NOTES
Pharmacokinetic Initial Assessment: IV Vancomycin    Assessment/Plan:    Initiate intravenous vancomycin with loading dose of 1250 mg once   Desired empiric serum trough concentration is 10 to 15 mcg/mL  Draw vancomycin random level on 6/27/23 at 0800.  Pharmacy will continue to follow and monitor vancomycin.      Please contact pharmacy at extension [phone] with any questions regarding this assessment.     Thank you for the consult,   Dominique Del Angelen       Patient brief summary:  Adriano Borjas is a 88 y.o. male initiated on antimicrobial therapy with IV Vancomycin for treatment of suspected skin & soft tissue infection    Drug Allergies:   Review of patient's allergies indicates:   Allergen Reactions    Ciprofloxacin Other (See Comments)     Shoulder and leg pain/cramping     Codeine Other (See Comments)     Pt cannot remember been a long time ago    Sulfa (sulfonamide antibiotics)      Allergic reaction to bactrim       Actual Body Weight:   81.2 kg    Renal Function:   Estimated Creatinine Clearance: 46.6 mL/min (based on SCr of 1.1 mg/dL).,     Dialysis Method (if applicable):  N/A    CBC (last 72 hours):  Recent Labs   Lab Result Units 06/26/23  1017   WBC K/uL 14.09*   Hemoglobin g/dL 12.7*   Hematocrit % 38.8*   Platelets K/uL 157   Gran % % 76.4*   Lymph % % 10.8*   Mono % % 11.6   Eosinophil % % 0.3   Basophil % % 0.5   Differential Method  Automated       Metabolic Panel (last 72 hours):  Recent Labs   Lab Result Units 06/26/23  1017   Sodium mmol/L 143   Potassium mmol/L 3.6   Chloride mmol/L 104   CO2 mmol/L 30*   Glucose mg/dL 117*   BUN mg/dL 27*   Creatinine mg/dL 1.1   Albumin g/dL 2.9*   Total Bilirubin mg/dL 0.8   Alkaline Phosphatase U/L 85   AST U/L 22   ALT U/L 19       Drug levels (last 3 results):  No results for input(s): VANCOMYCINRA, VANCORANDOM, VANCOMYCINPE, VANCOPEAK, VANCOMYCINTR, VANCOTROUGH in the last 72 hours.    Microbiologic Results:  Microbiology Results (last 7 days)       Procedure  Component Value Units Date/Time    Aerobic culture (Specify Source) **CANNOT BE ORDERED AS STAT** [[id number]] Collected: 06/26/23 1011    Order Status: Sent Specimen: Wound from Hip, Right Updated: 06/26/23 1023

## 2023-06-26 NOTE — ASSESSMENT & PLAN NOTE
Chronic, controlled.  Latest blood pressure and vitals reviewed-     Temp:  [98.4 °F (36.9 °C)]   Pulse:  [72-88]   Resp:  [18-20]   BP: (148-175)/(66-91)   SpO2:  [96 %-98 %] .   Home meds for hypertension were reviewed and noted below.   Hypertension Medications             amLODIPine (NORVASC) 5 MG tablet Take 5 mg by mouth once daily. MAR          While in the hospital, will manage blood pressure as follows; Continue home antihypertensive regimen    Will utilize p.r.n. blood pressure medication only if patient's blood pressure greater than 180/110 and he develops symptoms such as worsening chest pain or shortness of breath.

## 2023-06-26 NOTE — H&P
[facility] Medicine  History & Physical    Patient Name: Adriano Borjas  MRN: [id number]  Patient Class: OP- Observation  Admission Date: 6/26/2023  Attending Physician: Macho Matt MD   Primary Care Provider: Primary Doctor No         Patient information was obtained from relative(s), past medical records and ER records.     Subjective:     Principal Problem:Decubitus ulcer, hip, right, unstageable    Chief Complaint:   Chief Complaint   Patient presents with    Wound Infection     Rt. Hip wound draining         HPI: Adriano Borjas is an 88 yr old male with PMHx significant for dementia, A-fib, HTN, HLD, and TIA presenting today for right hip wound. Pt resides at [facility]. Daughter present at . Pt unable to provide history secondary to advanced dementia and nonverbal. Per daughter pt developed a wound to his left heel approx 4 weeks ago. He developed the right hip wound shortly after this was discovered and has been receiving wound care at the facility. Daughter was notified today that pt was being sent to the ED for concern of infected wound to right hip. She is not aware of pt having any recent fevers, n/v or other complaints. Pt is currently at his baseline mental status. Daughter reports pt is bed bound but able to get into wheelchair some. He requires max asst with all ADLs including feedings. Pt will be admitted to hospital med services for further workup and management.         Past Medical History:   Diagnosis Date    A-fib     Dementia     Elevated cholesterol     Essential hypertension     Kidney stone     passed on his own    Memory loss     dememtia per family    Osteoarthritis     Stroke     2012 TIA     Thyroid disease        Past Surgical History:   Procedure Laterality Date    CYSTOSCOPY      EYE SURGERY      MAGNETIC RESONANCE IMAGING N/A 8/23/2019    Procedure: MRI (Magnetic Resonance Imagine) needs anesthesia;  Surgeon: Panchito Montoya MD;   Location: [facility];  Service: Anesthesiology;  Laterality: N/A;       Review of patient's allergies indicates:   Allergen Reactions    Ciprofloxacin Other (See Comments)     Shoulder and leg pain/cramping     Codeine Other (See Comments)     Pt cannot remember been a long time ago    Sulfa (sulfonamide antibiotics)      Allergic reaction to bactrim       Current Facility-Administered Medications on File Prior to Encounter   Medication    lactated ringers infusion    lidocaine (PF) 10 mg/ml (1%) injection 10 mg    tuberculin injection 5 Units     Current Outpatient Medications on File Prior to Encounter   Medication Sig    acetaminophen (TYLENOL) 500 MG tablet Take 500 mg by mouth every evening.    amLODIPine (NORVASC) 5 MG tablet Take 5 mg by mouth once daily. MAR    ascorbic acid, vitamin C, (VITAMIN C) 500 MG tablet Take 500 mg by mouth once daily.    atorvastatin (LIPITOR) 40 MG tablet Take 1 tablet (40 mg total) by mouth once daily.    hydrocortisone 2.5 % cream Thin film to AA onface daily PRN flare    hydrOXYzine pamoate (VISTARIL) 25 MG Cap Take 1 capsule (25 mg total) by mouth every 8 (eight) hours as needed (Itching).    ketoconazole (NIZORAL) 2 % cream Thin film to red scaly rash on face twice daily as needed for flare    ketoconazole (NIZORAL) 2 % shampoo Wash hair with medicated shampoo at least 2x/week - let sit on scalp at least 5 minutes prior to rinsing    levothyroxine (SYNTHROID) 75 MCG tablet Take 1 tablet (75 mcg total) by mouth once daily.    memantine (NAMENDA) 5 MG Tab Take 5 mg by mouth 2 (two) times daily.    mirtazapine (REMERON) 30 MG tablet Take 1 tablet (30 mg total) by mouth every evening.    multivitamin (THERAGRAN) tablet Take 1 tablet by mouth once daily.    OLANZapine (ZYPREXA) 10 MG tablet Take 10 mg by mouth nightly.    propylene glycoL 0.6 % Drop Place 2 drops into both eyes 3 (three) times daily. For dry eyes    pulse oximeter (PULSE OXIMETER) device by  Apply Externally route 2 (two) times a day. Use twice daily at 8 AM and 3 PM and record the value in MyChart as directed.    sertraline (ZOLOFT) 25 MG tablet Take 1 tablet (25 mg total) by mouth once daily.    UNABLE TO FIND Admit to nursing home.     Family History       Problem Relation (Age of Onset)    Cancer Mother    Dementia Sister, Brother    Diabetes Mother    Heart disease Father, Brother    Hyperlipidemia Mother, Father    Migraines Daughter    Tremor Daughter, Son          Tobacco Use    Smoking status: Former     Years: 15.00     Types: Cigarettes    Smokeless tobacco: Never    Tobacco comments:     quit 50 years ago   Substance and Sexual Activity    Alcohol use: No    Drug use: No    Sexual activity: Not on file     Review of Systems   Unable to perform ROS: Dementia   Objective:     Vital Signs (Most Recent):  Temp: 98.4 °F (36.9 °C) (06/26/23 0956)  Pulse: 72 (06/26/23 1133)  Resp: 20 (06/26/23 1102)  BP: (!) 155/70 (06/26/23 1133)  SpO2: 98 % (06/26/23 1133) Vital Signs (24h Range):  Temp:  [98.4 °F (36.9 °C)] 98.4 °F (36.9 °C)  Pulse:  [72-88] 72  Resp:  [18-20] 20  SpO2:  [96 %-98 %] 98 %  BP: (148-165)/(66-91) 155/70     Weight: 81.2 kg (179 lb)  Body mass index is 27.22 kg/m².     Physical Exam  Vitals and nursing note reviewed.   Constitutional:       General: He is not in acute distress.     Appearance: He is well-developed. He is ill-appearing (chronically ill appearing). He is not diaphoretic.   HENT:      Head: Normocephalic and atraumatic.      Right Ear: External ear normal.      Left Ear: External ear normal.      Nose: Nose normal. No congestion or rhinorrhea.      Mouth/Throat:      Mouth: Mucous membranes are dry.      Pharynx: Oropharynx is clear. No oropharyngeal exudate or posterior oropharyngeal erythema.   Eyes:      General: No scleral icterus.     Conjunctiva/sclera: Conjunctivae normal.      Pupils: Pupils are equal, round, and reactive to light.   Neck:       Vascular: No JVD.   Cardiovascular:      Rate and Rhythm: Normal rate and regular rhythm.      Pulses: Normal pulses.      Heart sounds: Normal heart sounds. No murmur heard.  Pulmonary:      Effort: Pulmonary effort is normal. No respiratory distress.      Breath sounds: Normal breath sounds. No stridor. No wheezing, rhonchi or rales.   Abdominal:      General: Bowel sounds are normal. There is no distension.      Palpations: Abdomen is soft.      Tenderness: There is no abdominal tenderness.   Musculoskeletal:         General: No swelling or tenderness.      Cervical back: Normal range of motion and neck supple.      Comments: Rigidity to bilat upper and lower exts. Muscle wasting present   Skin:     General: Skin is warm and dry.      Capillary Refill: Capillary refill takes 2 to 3 seconds.      Coloration: Skin is not jaundiced or pale.      Findings: No erythema.      Comments: Right hip with large unstageable decubitus with surrounding erythema and foul smelling purulent drainage from wound.   Left heel - large unstageable area of eschar   Neurological:      General: No focal deficit present.      Mental Status: He is alert and oriented to person, place, and time.      Cranial Nerves: No cranial nerve deficit.      Sensory: No sensory deficit.   Psychiatric:         Mood and Affect: Mood normal.         Behavior: Behavior normal.         Thought Content: Thought content normal.            CRANIAL NERVES     CN III, IV, VI   Pupils are equal, round, and reactive to light.     Significant Labs: All pertinent labs within the past 24 hours have been reviewed.  CBC:   Recent Labs   Lab 06/26/23  1017   WBC 14.09*   HGB 12.7*   HCT 38.8*        CMP:   Recent Labs   Lab 06/26/23  1017      K 3.6      CO2 30*   *   BUN 27*   CREATININE 1.1   CALCIUM 8.7   PROT 7.1   ALBUMIN 2.9*   BILITOT 0.8   ALKPHOS 85   AST 22   ALT 19   ANIONGAP 9       Significant Imaging: I have reviewed all pertinent  imaging results/findings within the past 24 hours.    Assessment/Plan:     * Decubitus ulcer, hip, right, unstageable  Large unstageable decub ulcer present on admission with surrounding cellulitis  Wound care consulted  IV vanc and zosyn      Hyperlipidemia  Chronic, stable  Cont home meds      Moderate malnutrition  Consult nutritionist  Monitor labs       Frontotemporal dementia  Advanced stage  Cont home meds  asst with ADLs  Fall precaution  Dementia precautions  Hospice d/w daughter at  and she is interested in this service - CM consulted for hospice informational visit        Essential hypertension  Chronic, controlled.  Latest blood pressure and vitals reviewed-     Temp:  [98.4 °F (36.9 °C)]   Pulse:  [72-88]   Resp:  [18-20]   BP: (148-175)/(66-91)   SpO2:  [96 %-98 %] .   Home meds for hypertension were reviewed and noted below.   Hypertension Medications             amLODIPine (NORVASC) 5 MG tablet Take 5 mg by mouth once daily. MAR          While in the hospital, will manage blood pressure as follows; Continue home antihypertensive regimen    Will utilize p.r.n. blood pressure medication only if patient's blood pressure greater than 180/110 and he develops symptoms such as worsening chest pain or shortness of breath.          VTE Risk Mitigation (From admission, onward)    Lovenox               On 06/26/2023, patient should be placed in hospital observation services under my care in collaboration with Dr. Macho Matt MD.      Dara Epperson NP  Department of Hospital Medicine  [facility] - Emergency Dept

## 2023-06-26 NOTE — HPI
Adriano Borjas is an 88 yr old male with PMHx significant for dementia, A-fib, HTN, HLD, and TIA presenting today for right hip wound. Pt resides at [facility]. Daughter present at . Pt unable to provide history secondary to advanced dementia and nonverbal. Per daughter pt developed a wound to his left heel approx 4 weeks ago. He developed the right hip wound shortly after this was discovered and has been receiving wound care at the facility. Daughter was notified today that pt was being sent to the ED for concern of infected wound to right hip. She is not aware of pt having any recent fevers, n/v or other complaints. Pt is currently at his baseline mental status. Daughter reports pt is bed bound but able to get into wheelchair some. He requires max asst with all ADLs including feedings. Pt will be admitted to hospital med services for further workup and management.

## 2023-06-26 NOTE — PLAN OF CARE
[facility]  Initial Discharge Assessment       Primary Care Provider: Primary Doctor No    Admission Diagnosis: Infected wound [T14.8XXA, L08.9]  Pressure ulcer [L89.90]  Chest pain [R07.9]    Admission Date: 6/26/2023    Assessment completed with pts son at [facility] [phone]- He confirmed that pt has been living at [facility] for almost one year. Pt does not ambulate and needs a lift device to move because he is dead weight. Pts discharge plan is to return to [facility] . Pt has a hospice consult for an informational visit. CM following.   Expected Discharge Date:     Transition of Care Barriers: None    Payor: [employer] / Plan: Vivaty 65 / Product Type: Medicare Advantage /     Extended Emergency Contact Information  Primary Emergency Contact: Prieto Borjas  Address: [address]  Home Phone: [phone]  Work Phone: [phone]  Mobile Phone: [phone]  Relation: Son  Secondary Emergency Contact: Chris Monae  Address: [address]  Home Phone: [phone]  Mobile Phone: [phone]  Relation: Daughter    Discharge Plan A: Return to nursing home  Discharge Plan B: Return to Nursing Home, Hospice/home      [id number] - [place], [place] - [address] [facility]  [address] [facility] [address]  Phone: [phone] Fax: [phone]      Initial Assessment (most recent)       Adult Discharge Assessment - 06/26/23 1447          Discharge Assessment    Assessment Type Discharge Planning Assessment     Confirmed/corrected address, phone number and insurance Yes     Confirmed Demographics Correct on Facesheet     Source of Information patient;family     Does patient/caregiver understand observation status Yes     Communicated KAY with patient/caregiver Yes     Reason For Admission Infected wound     People in Home facility  resident     Facility Arrived From: [facility]     Do you expect to return to your current living situation? Yes     Do you have help at home or someone to help you manage your care at home? Yes     Who are your caregiver(s) and their phone number(s)? Facility staff     Prior to hospitilization cognitive status: Alert/Oriented     Current cognitive status: Alert/Oriented;Unable to Assess     Home Accessibility wheelchair accessible     Home Layout Able to live on 1st floor     Equipment Currently Used at Home hospital bed;lift device     Readmission within 30 days? No     Patient currently being followed by outpatient case management? No     Do you currently have service(s) that help you manage your care at home? No     Do you take prescription medications? Yes     Do you have prescription coverage? Yes     Do you have any problems affording any of your prescribed medications? No     Is the patient taking medications as prescribed? yes     Who is going to help you get home at discharge?  staff     How do you get to doctors appointments? family or friend will provide     Are you on dialysis? No     Do you take coumadin? No     Discharge Plan A Return to nursing home     Discharge Plan B Return to Nursing Home;Hospice/home     DME Needed Upon Discharge  none     Discharge Plan discussed with: Patient;Adult children     Transition of Care Barriers None        Physical Activity    On average, how many days per week do you engage in moderate to strenuous exercise (like a brisk walk)? Patient refused     On average, how many minutes do you engage in exercise at this level? Patient refused        Financial Resource Strain    How hard is it for you to pay for the very basics like food, housing, medical care, and heating? Patient refused        Housing Stability    In the last 12 months, was there a time when you were not able to pay the mortgage or rent on time? Patient refused     In the last 12 months, was there a time  when you did not have a steady place to sleep or slept in a shelter (including now)? Patient refused        Transportation Needs    In the past 12 months, has lack of transportation kept you from medical appointments or from getting medications? Patient refused     In the past 12 months, has lack of transportation kept you from meetings, work, or from getting things needed for daily living? Patient refused        Food Insecurity    Within the past 12 months, you worried that your food would run out before you got the money to buy more. Patient refused     Within the past 12 months, the food you bought just didn't last and you didn't have money to get more. Patient refused        Stress    Do you feel stress - tense, restless, nervous, or anxious, or unable to sleep at night because your mind is troubled all the time - these days? Patient refused        Social Connections    In a typical week, how many times do you talk on the phone with family, friends, or neighbors? Patient refused     How often do you get together with friends or relatives? Patient refused     How often do you attend Spiritism or Moravian services? Patient refused     Do you belong to any clubs or organizations such as Spiritism groups, unions, fraternal or athletic groups, or school groups? Patient refused     How often do you attend meetings of the clubs or organizations you belong to? Patient refused     Are you , , , , never , or living with a partner? Patient refused        Alcohol Use    Q1: How often do you have a drink containing alcohol? Patient refused     Q2: How many drinks containing alcohol do you have on a typical day when you are drinking? Patient refused     Q3: How often do you have six or more drinks on one occasion? Patient refused

## 2023-06-27 LAB
ALBUMIN SERPL BCP-MCNC: 2.7 G/DL (ref 3.5–5.2)
ALP SERPL-CCNC: 75 U/L (ref 55–135)
ALT SERPL W/O P-5'-P-CCNC: 15 U/L (ref 10–44)
ANION GAP SERPL CALC-SCNC: 11 MMOL/L (ref 8–16)
AST SERPL-CCNC: 21 U/L (ref 10–40)
BASOPHILS # BLD AUTO: 0.08 K/UL (ref 0–0.2)
BASOPHILS NFR BLD: 0.7 % (ref 0–1.9)
BILIRUB SERPL-MCNC: 0.9 MG/DL (ref 0.1–1)
BUN SERPL-MCNC: 23 MG/DL (ref 8–23)
CALCIUM SERPL-MCNC: 8.5 MG/DL (ref 8.7–10.5)
CHLORIDE SERPL-SCNC: 107 MMOL/L (ref 95–110)
CO2 SERPL-SCNC: 27 MMOL/L (ref 23–29)
CREAT SERPL-MCNC: 1.2 MG/DL (ref 0.5–1.4)
DIFFERENTIAL METHOD: ABNORMAL
EOSINOPHIL # BLD AUTO: 0.2 K/UL (ref 0–0.5)
EOSINOPHIL NFR BLD: 1.6 % (ref 0–8)
ERYTHROCYTE [DISTWIDTH] IN BLOOD BY AUTOMATED COUNT: 13 % (ref 11.5–14.5)
EST. GFR  (NO RACE VARIABLE): 59 ML/MIN/1.73 M^2
GLUCOSE SERPL-MCNC: 120 MG/DL (ref 70–110)
HCT VFR BLD AUTO: 38.2 % (ref 40–54)
HGB BLD-MCNC: 12.6 G/DL (ref 14–18)
IMM GRANULOCYTES # BLD AUTO: 0.04 K/UL (ref 0–0.04)
IMM GRANULOCYTES NFR BLD AUTO: 0.4 % (ref 0–0.5)
LYMPHOCYTES # BLD AUTO: 1.6 K/UL (ref 1–4.8)
LYMPHOCYTES NFR BLD: 14.3 % (ref 18–48)
MAGNESIUM SERPL-MCNC: 2.1 MG/DL (ref 1.6–2.6)
MCH RBC QN AUTO: 31 PG (ref 27–31)
MCHC RBC AUTO-ENTMCNC: 33 G/DL (ref 32–36)
MCV RBC AUTO: 94 FL (ref 82–98)
MONOCYTES # BLD AUTO: 1.2 K/UL (ref 0.3–1)
MONOCYTES NFR BLD: 10.7 % (ref 4–15)
NEUTROPHILS # BLD AUTO: 8 K/UL (ref 1.8–7.7)
NEUTROPHILS NFR BLD: 72.3 % (ref 38–73)
NRBC BLD-RTO: 0 /100 WBC
PLATELET # BLD AUTO: 150 K/UL (ref 150–450)
PMV BLD AUTO: 12.6 FL (ref 9.2–12.9)
POTASSIUM SERPL-SCNC: 3.6 MMOL/L (ref 3.5–5.1)
PROT SERPL-MCNC: 6.7 G/DL (ref 6–8.4)
RBC # BLD AUTO: 4.06 M/UL (ref 4.6–6.2)
SODIUM SERPL-SCNC: 145 MMOL/L (ref 136–145)
VANCOMYCIN SERPL-MCNC: 7 UG/ML
WBC # BLD AUTO: 11.01 K/UL (ref 3.9–12.7)

## 2023-06-27 PROCEDURE — 85025 COMPLETE CBC W/AUTO DIFF WBC: CPT | Performed by: NURSE PRACTITIONER

## 2023-06-27 PROCEDURE — 63600175 PHARM REV CODE 636 W HCPCS: Performed by: NURSE PRACTITIONER

## 2023-06-27 PROCEDURE — 36415 COLL VENOUS BLD VENIPUNCTURE: CPT | Performed by: STUDENT IN AN ORGANIZED HEALTH CARE EDUCATION/TRAINING PROGRAM

## 2023-06-27 PROCEDURE — 80053 COMPREHEN METABOLIC PANEL: CPT | Performed by: NURSE PRACTITIONER

## 2023-06-27 PROCEDURE — 80202 ASSAY OF VANCOMYCIN: CPT | Performed by: STUDENT IN AN ORGANIZED HEALTH CARE EDUCATION/TRAINING PROGRAM

## 2023-06-27 PROCEDURE — 96366 THER/PROPH/DIAG IV INF ADDON: CPT

## 2023-06-27 PROCEDURE — 92610 EVALUATE SWALLOWING FUNCTION: CPT

## 2023-06-27 PROCEDURE — 25000003 PHARM REV CODE 250: Performed by: NURSE PRACTITIONER

## 2023-06-27 PROCEDURE — 25000003 PHARM REV CODE 250: Performed by: FAMILY MEDICINE

## 2023-06-27 PROCEDURE — G0378 HOSPITAL OBSERVATION PER HR: HCPCS

## 2023-06-27 PROCEDURE — 36415 COLL VENOUS BLD VENIPUNCTURE: CPT | Performed by: NURSE PRACTITIONER

## 2023-06-27 PROCEDURE — 83735 ASSAY OF MAGNESIUM: CPT | Performed by: NURSE PRACTITIONER

## 2023-06-27 RX ORDER — ACETAMINOPHEN 500 MG
1000 TABLET ORAL EVERY 6 HOURS PRN
Status: DISCONTINUED | OUTPATIENT
Start: 2023-06-27 | End: 2023-06-29 | Stop reason: HOSPADM

## 2023-06-27 RX ORDER — HYDROCODONE BITARTRATE AND ACETAMINOPHEN 5; 325 MG/1; MG/1
1 TABLET ORAL EVERY 6 HOURS PRN
Status: DISCONTINUED | OUTPATIENT
Start: 2023-06-27 | End: 2023-06-29 | Stop reason: HOSPADM

## 2023-06-27 RX ORDER — ACETAMINOPHEN 500 MG
1000 TABLET ORAL EVERY 4 HOURS PRN
Status: DISCONTINUED | OUTPATIENT
Start: 2023-06-27 | End: 2023-06-27

## 2023-06-27 RX ADMIN — PIPERACILLIN AND TAZOBACTAM 4.5 G: 4; .5 INJECTION, POWDER, LYOPHILIZED, FOR SOLUTION INTRAVENOUS; PARENTERAL at 06:06

## 2023-06-27 RX ADMIN — PIPERACILLIN AND TAZOBACTAM 4.5 G: 4; .5 INJECTION, POWDER, LYOPHILIZED, FOR SOLUTION INTRAVENOUS; PARENTERAL at 12:06

## 2023-06-27 RX ADMIN — MIRTAZAPINE 30 MG: 15 TABLET, FILM COATED ORAL at 10:06

## 2023-06-27 RX ADMIN — COLLAGENASE SANTYL: 250 OINTMENT TOPICAL at 09:06

## 2023-06-27 RX ADMIN — OXYCODONE HYDROCHLORIDE AND ACETAMINOPHEN 500 MG: 500 TABLET ORAL at 09:06

## 2023-06-27 RX ADMIN — SERTRALINE HYDROCHLORIDE 25 MG: 25 TABLET ORAL at 09:06

## 2023-06-27 RX ADMIN — THERA TABS 1 TABLET: TAB at 09:06

## 2023-06-27 RX ADMIN — MEMANTINE 5 MG: 5 TABLET ORAL at 09:06

## 2023-06-27 RX ADMIN — LEVOTHYROXINE SODIUM 100 MCG: 0.1 TABLET ORAL at 09:06

## 2023-06-27 RX ADMIN — AMLODIPINE BESYLATE 5 MG: 5 TABLET ORAL at 09:06

## 2023-06-27 RX ADMIN — ACETAMINOPHEN 1000 MG: 500 TABLET ORAL at 02:06

## 2023-06-27 RX ADMIN — OLANZAPINE 10 MG: 2.5 TABLET, FILM COATED ORAL at 10:06

## 2023-06-27 RX ADMIN — HYDROCODONE BITARTRATE AND ACETAMINOPHEN 1 TABLET: 5; 325 TABLET ORAL at 04:06

## 2023-06-27 RX ADMIN — MEMANTINE 5 MG: 5 TABLET ORAL at 10:06

## 2023-06-27 NOTE — PT/OT/SLP EVAL
Speech Language Pathology Evaluation  Bedside Swallow    Patient Name:  Adriano Borjas   MRN:  [id number]  Admitting Diagnosis: Decubitus ulcer, hip, right, unstageable    Recommendations:                 General Recommendations:   Dysphagia management  Diet recommendations:  Puree Diet - IDDSI Level 4, Thin   Aspiration Precautions:  allow for extended meal times, Alternating bites/sips, Assistance with meals, Feed only when awake/alert, Frequent oral care, HOB to 90 degrees, and Meds crushed in puree   General Precautions: Standard, aspiration, fall  Communication strategies:  none    Assessment:     Adriano Borjas is a 88 y.o. male with an SLP diagnosis of cognitive based oral Dysphagia. Clinical swallowing evaluation completed. All po trials consumed with no overt s/s airway compromise. Prolonged, excessive mastication noted with soft solids.  REC Pureed textures (IDDSI 4) with thin liquids. Will follow and advance as tolerated.     History:   Per HPI: Adriano Borjas is an 88 yr old male with PMHx significant for dementia, A-fib, HTN, HLD, and TIA presenting today for right hip wound. Pt resides at [facility]. Daughter present at . Pt unable to provide history secondary to advanced dementia and nonverbal. Per daughter pt developed a wound to his left heel approx 4 weeks ago. He developed the right hip wound shortly after this was discovered and has been receiving wound care at the facility. Daughter was notified today that pt was being sent to the ED for concern of infected wound to right hip. She is not aware of pt having any recent fevers, n/v or other complaints. Pt is currently at his baseline mental status. Daughter reports pt is bed bound but able to get into wheelchair some. He requires max asst with all ADLs including feedings. Pt will be admitted to hospital med services for further workup and management.      Past Medical History:   Diagnosis Date    A-fib     Dementia     Elevated cholesterol   "   Essential hypertension     History of cerebral hemorrhage 9/5/2019    Kidney stone     passed on his own    Memory loss     dememtia per family    Osteoarthritis     Paroxysmal atrial fibrillation 12/12/2018    Stroke     2012 TIA     Thyroid disease        Past Surgical History:   Procedure Laterality Date    CYSTOSCOPY      EYE SURGERY      MAGNETIC RESONANCE IMAGING N/A 8/23/2019    Procedure: MRI (Magnetic Resonance Imagine) needs anesthesia;  Surgeon: Panchito Montoya MD;  Location: [facility];  Service: Anesthesiology;  Laterality: N/A;       Social History: Resident of [facility].    Prior Intubation HX:  None this admit    Modified Barium Swallow: None in Epic    Imaging:  X-Ray Hip 2 or 3 views Right (with Pelvis when performed)   Final Result      No acute fracture or dislocation right         Electronically signed by: Mayra Steel MD   Date:    06/26/2023   Time:    11:07           Prior diet: Regular/thin (HOPE)    Occupation/hobbies/homemaking: Bedbound. MAX A with ADLs per chart review.    Subjective     "Is that where I am?" [SLP oriented patient to place]  "I like that." (Applesauce)      Pain/Comfort:  Pain Rating 1:  (did not rate. Grimaced in pain with uprigh position)  Location 1:  ("middle of my stomach." Pt with R hip wound)  Pain Addressed 1: Reposition    Respiratory Status: Room air    Objective:   Pt seen for clinical swallow evaluation. Asleep upon entrance into room. Opens eyes to verbal/tactile stimulation. Oriented to self only; baseline dementia. Difficulty following simple commands. Calm, pleasant and cooperative. No family at bedside.     Oral Musculature Evaluation  Oral Musculature: unable to assess due to poor participation/comprehension  Dentition:  (missing molars)  Secretion Management: adequate  Mucosal Quality: dry  Oral Labial Strength and Mobility: functional seal  Volitional Swallow: able to palpate laryngeal rise  Voice Prior to PO Intake: hoarse    Bedside " Swallow Eval:   Consistencies Assessed:  Thin liquids --via straw  Puree --pureed eggs, sausage/gravy, oatmeal, pudding, applesauce  Soft solids --diced peaches      Oral Phase:   WFL for liquids, applesauce, pudding  Prolonged and excessive mastication noted with diced peaches  Mild oral residue noted with pureed sausage    Pharyngeal Phase:   no overt clinical signs/symptoms of aspiration    Compensatory Strategies  Liquid push somewhat effective in eliciting more timely swallow with soft solids  Alteration bites/sips effective in reducing oral residue      Goals:   Multidisciplinary Problems       SLP Goals          Problem: SLP    Goal Priority Disciplines Outcome   SLP Goal     SLP Ongoing, Progressing   Description: 1) Pt will consume Dental soft textures (IDDSI 6) and thin liquids with functional oral phase and no overt s/s penetration/aspiration.                           Plan:     Patient to be seen:  3 x/week   Plan of Care expires:  07/11/23  Plan of Care reviewed with:  patient   SLP Follow-Up:  Yes       Discharge recommendations:  nursing facility, basic   Barriers to Discharge:  None    Time Tracking:     SLP Treatment Date:   06/27/23  Speech Start Time:  0928  Speech Stop Time:  0940     Speech Total Time (min):  12 min    Billable Minutes: Eval Swallow and Oral Function 12 and Total Time 12    06/27/2023

## 2023-06-27 NOTE — PROGRESS NOTES
[facility] Hospital Medicine  Progress Note    Patient Name: Adriano Borjas  MRN: [id number]  Patient Class: OP- Observation   Admission Date: 6/26/2023  Length of Stay: 0 days  Attending Physician: Macho Matt MD  Primary Care Provider: Primary Doctor No        Subjective:     Principal Problem:Decubitus ulcer, hip, right, unstageable        HPI:  Adriano Borjas is an 88 yr old male with PMHx significant for dementia, A-fib, HTN, HLD, and TIA presenting today for right hip wound. Pt resides at [facility]. Daughter present at . Pt unable to provide history secondary to advanced dementia and nonverbal. Per daughter pt developed a wound to his left heel approx 4 weeks ago. He developed the right hip wound shortly after this was discovered and has been receiving wound care at the facility. Daughter was notified today that pt was being sent to the ED for concern of infected wound to right hip. She is not aware of pt having any recent fevers, n/v or other complaints. Pt is currently at his baseline mental status. Daughter reports pt is bed bound but able to get into wheelchair some. He requires max asst with all ADLs including feedings. Pt will be admitted to hospital med services for further workup and management.         Overview/Hospital Course:  No notes on file    Interval History:  Patient seen and examined.  No acute events overnight.  He is receiving a bed bath during my examination.  Denies any pain.    Review of Systems   Unable to perform ROS: Dementia   Objective:     Vital Signs (Most Recent):  Temp: 97.7 °F (36.5 °C) (06/27/23 0727)  Pulse: 70 (06/27/23 0727)  Resp: 16 (06/27/23 0727)  BP: 126/81 (06/27/23 0727)  SpO2: 97 % (06/27/23 0727) Vital Signs (24h Range):  Temp:  [97.7 °F (36.5 °C)-98.9 °F (37.2 °C)] 97.7 °F (36.5 °C)  Pulse:  [69-80] 70  Resp:  [15-20] 16  SpO2:  [88 %-98 %] 97 %  BP: (108-175)/(56-91) 126/81     Weight: 78.9 kg (173 lb 15.1 oz)  Body mass index is  26.45 kg/m².    Intake/Output Summary (Last 24 hours) at 6/27/2023 1015  Last data filed at 6/26/2023 2250  Gross per 24 hour   Intake 275 ml   Output --   Net 275 ml         Physical Exam  Constitutional:       General: He is not in acute distress.     Appearance: He is not toxic-appearing.      Comments: Frail, appears chronically ill   HENT:      Head: Normocephalic and atraumatic.   Cardiovascular:      Rate and Rhythm: Normal rate and regular rhythm.   Pulmonary:      Effort: Pulmonary effort is normal. No respiratory distress.      Breath sounds: Normal breath sounds.   Abdominal:      General: Bowel sounds are normal. There is no distension.      Palpations: Abdomen is soft.   Skin:     General: Skin is warm and dry.      Comments: Right hip dressing clean dry and intact.  Left heel -large unstageable area of eschar.   Neurological:      Mental Status: He is alert.           Significant Labs: All pertinent labs within the past 24 hours have been reviewed.  CBC:   Recent Labs   Lab 06/26/23  1017 06/27/23  0444   WBC 14.09* 11.01   HGB 12.7* 12.6*   HCT 38.8* 38.2*    150     CMP:   Recent Labs   Lab 06/26/23  1017 06/27/23  0444    145   K 3.6 3.6    107   CO2 30* 27   * 120*   BUN 27* 23   CREATININE 1.1 1.2   CALCIUM 8.7 8.5*   PROT 7.1 6.7   ALBUMIN 2.9* 2.7*   BILITOT 0.8 0.9   ALKPHOS 85 75   AST 22 21   ALT 19 15   ANIONGAP 9 11       Significant Imaging: I have reviewed all pertinent imaging results/findings within the past 24 hours.      Assessment/Plan:      * Decubitus ulcer, hip, right, unstageable  Large unstageable decub ulcer present on admission with surrounding cellulitis  Wound care consulted  IV vanc and zosyn  Wound culture growing GNR - de escalate Vanc, Continue Zosyn      Hyperlipidemia  Chronic, stable  Cont home meds      Moderate malnutrition  Consult nutritionist  Monitor labs       Frontotemporal dementia  Advanced stage  Cont home meds  asst with  ADLs  Fall precaution  Dementia precautions  Hospice d/w daughter at  and she is interested in this service - CM consulted for hospice informational visit        Essential hypertension  Chronic, controlled.  Latest blood pressure and vitals reviewed-     Temp:  [97.7 °F (36.5 °C)-98.9 °F (37.2 °C)]   Pulse:  [69-80]   Resp:  [15-20]   BP: (108-175)/(56-82)   SpO2:  [88 %-98 %] .   Home meds for hypertension were reviewed and noted below.   Hypertension Medications             amLODIPine (NORVASC) 5 MG tablet Take 5 mg by mouth once daily. MAR          While in the hospital, will manage blood pressure as follows; Continue home antihypertensive regimen    Will utilize p.r.n. blood pressure medication only if patient's blood pressure greater than 180/110 and he develops symptoms such as worsening chest pain or shortness of breath.          VTE Risk Mitigation (From admission, onward)         Ordered     IP VTE HIGH RISK PATIENT  Once         06/26/23 1418     Place sequential compression device  Until discontinued         06/26/23 1418                Discharge Planning   KAY:      Code Status: Full Code   Is the patient medically ready for discharge?:     Reason for patient still in hospital (select all that apply): Patient trending condition, Treatment and Consult recommendations  Discharge Plan A: Return to nursing home                  Julia Rasmussen NP  Department of Hospital Medicine   [facility]

## 2023-06-27 NOTE — NURSING
Alert oriented to self, VSS, med's crushed in pudding, no complaints of pain, incontinent and weight shifting provided, resting between care, will monitor,

## 2023-06-27 NOTE — ASSESSMENT & PLAN NOTE
Chronic, controlled.  Latest blood pressure and vitals reviewed-     Temp:  [97.7 °F (36.5 °C)-98.9 °F (37.2 °C)]   Pulse:  [69-80]   Resp:  [15-20]   BP: (108-175)/(56-82)   SpO2:  [88 %-98 %] .   Home meds for hypertension were reviewed and noted below.   Hypertension Medications             amLODIPine (NORVASC) 5 MG tablet Take 5 mg by mouth once daily. MAR          While in the hospital, will manage blood pressure as follows; Continue home antihypertensive regimen    Will utilize p.r.n. blood pressure medication only if patient's blood pressure greater than 180/110 and he develops symptoms such as worsening chest pain or shortness of breath.

## 2023-06-27 NOTE — PLAN OF CARE
Elbert Solo with [facility]- she reached out to one of the brothers about an informational visit but she is going to talk to the other brother and sister Shawna today about goals of care. CM following.    06/27/23 1131   Post-Acute Status   Post-Acute Authorization Hospice   Hospice Status Referrals Sent

## 2023-06-27 NOTE — PROGRESS NOTES
Pharmacokinetic Assessment Follow Up: IV Vancomycin    Vancomycin serum concentration assessment(s):    The random level was drawn correctly and can be used to guide therapy at this time. The measurement is below the desired definitive target range of 10 to 15 mcg/mL.    Vancomycin Regimen Plan:    Re-dose vancomycin 1250 mg x 1. Vancomycin random level ordered for 6/28 at 0400.    Drug levels (last 3 results):  Recent Labs   Lab Result Units 06/27/23  0829   Vancomycin, Random ug/mL 7.0       Pharmacy will continue to follow and monitor vancomycin.    Please contact pharmacy at extension [phone] for questions regarding this assessment.    Thank you for the consult,   Reynaldo Renee       Patient brief summary:  Adriano Borjas is a 88 y.o. male initiated on antimicrobial therapy with IV Vancomycin for treatment of skin & soft tissue infection    The patient's current regimen is pulse dosing    Drug Allergies:   Review of patient's allergies indicates:   Allergen Reactions    Ciprofloxacin Other (See Comments)     Shoulder and leg pain/cramping     Codeine Other (See Comments)     Pt cannot remember been a long time ago    Sulfa (sulfonamide antibiotics)      Allergic reaction to bactrim       Actual Body Weight:   78.9    Renal Function:   Estimated Creatinine Clearance: 42.8 mL/min (based on SCr of 1.2 mg/dL).,     Dialysis Method (if applicable):  N/A    CBC (last 72 hours):  Recent Labs   Lab Result Units 06/26/23  1017 06/27/23  0444   WBC K/uL 14.09* 11.01   Hemoglobin g/dL 12.7* 12.6*   Hematocrit % 38.8* 38.2*   Platelets K/uL 157 150   Gran % % 76.4* 72.3   Lymph % % 10.8* 14.3*   Mono % % 11.6 10.7   Eosinophil % % 0.3 1.6   Basophil % % 0.5 0.7   Differential Method  Automated Automated       Metabolic Panel (last 72 hours):  Recent Labs   Lab Result Units 06/26/23  1017 06/27/23  0444   Sodium mmol/L 143 145   Potassium mmol/L 3.6 3.6   Chloride mmol/L 104 107   CO2 mmol/L 30* 27   Glucose mg/dL 117* 120*    BUN mg/dL 27* 23   Creatinine mg/dL 1.1 1.2   Albumin g/dL 2.9* 2.7*   Total Bilirubin mg/dL 0.8 0.9   Alkaline Phosphatase U/L 85 75   AST U/L 22 21   ALT U/L 19 15   Magnesium mg/dL  --  2.1       Vancomycin Administrations:  vancomycin given in the last 96 hours                     vancomycin 1,250 mg in dextrose 5 % (D5W) 250 mL IVPB (Vial-Mate) (mg) 1,250 mg New Bag 06/26/23 1612                    Microbiologic Results:  Microbiology Results (last 7 days)       Procedure Component Value Units Date/Time    Aerobic culture (Specify Source) **CANNOT BE ORDERED AS STAT** [[id number]]  (Abnormal) Collected: 06/26/23 1011    Order Status: Completed Specimen: Wound from Hip, Right Updated: 06/27/23 0946     Aerobic Bacterial Culture GRAM NEGATIVE BRITTNEY  Many  Identification and susceptibility pending

## 2023-06-27 NOTE — PROGRESS NOTES
Adriano Borjas [id number] is a 88 y.o. male who has been consulted for vancomycin dosing.    Pharmacy consult for vancomycin dosing in no longer required.  Vancomycin was discontinued.    Thank you for allowing us to participate in this patient's care.     Ghassan Otoole, PharmD  [phone]

## 2023-06-27 NOTE — HOSPITAL COURSE
Patient was admitted with right hip wound infection.  Cultures were obtained.  Patient was initiated on broad-spectrum antibiotics.  Wound Care was consulted.  His labs and vital signs were trended closely.  Buttocks were de-escalated based on cultures-these were superficial cultures.  A debridement was done on 06/28 with Dr. Middleton.  He obtained cultures with debridement.  Pt remained afebrile.  He underwent CT of the hip to rule out any findings of osteomyelitis given his inability to tolerate MRI.  CT was not concerning for any bony changes.  Results were discussed with the son, Prieto.  He was D/C on 06/29 to continue wound care and Abx x1 week at [facility] with follow-up to be arranged with Dr. Middleton for ongoing wound care and decision making regarding need for ongoing antibiotic therapy.  Patient's family agreeable with discharge plan.    Pt was seen on day of discharge and deemed appropriate for discharge.   Gabapentin Counseling: I discussed with the patient the risks of gabapentin including but not limited to dizziness, somnolence, fatigue and ataxia.

## 2023-06-27 NOTE — PLAN OF CARE
Problem: SLP  Goal: SLP Goal  Description: 1) Pt will consume Dental soft textures (IDDSI 6) and thin liquids with functional oral phase and no overt s/s penetration/aspiration.      Outcome: Ongoing, Progressing    Clinical swallowing evaluation completed. All po trials consumed with no overt s/s airway compromise. Prolonged, excessive mastication noted with soft solids.  REC Pureed textures (IDDSI 4) with thin liquids. Will follow and advance as tolerated.

## 2023-06-27 NOTE — SUBJECTIVE & OBJECTIVE
Interval History:  Patient seen and examined.  No acute events overnight.  He is receiving a bed bath during my examination.  Denies any pain.    Review of Systems   Unable to perform ROS: Dementia   Objective:     Vital Signs (Most Recent):  Temp: 97.7 °F (36.5 °C) (06/27/23 0727)  Pulse: 70 (06/27/23 0727)  Resp: 16 (06/27/23 0727)  BP: 126/81 (06/27/23 0727)  SpO2: 97 % (06/27/23 0727) Vital Signs (24h Range):  Temp:  [97.7 °F (36.5 °C)-98.9 °F (37.2 °C)] 97.7 °F (36.5 °C)  Pulse:  [69-80] 70  Resp:  [15-20] 16  SpO2:  [88 %-98 %] 97 %  BP: (108-175)/(56-91) 126/81     Weight: 78.9 kg (173 lb 15.1 oz)  Body mass index is 26.45 kg/m².    Intake/Output Summary (Last 24 hours) at 6/27/2023 1015  Last data filed at 6/26/2023 2250  Gross per 24 hour   Intake 275 ml   Output --   Net 275 ml         Physical Exam  Constitutional:       General: He is not in acute distress.     Appearance: He is not toxic-appearing.      Comments: Frail, appears chronically ill   HENT:      Head: Normocephalic and atraumatic.   Cardiovascular:      Rate and Rhythm: Normal rate and regular rhythm.   Pulmonary:      Effort: Pulmonary effort is normal. No respiratory distress.      Breath sounds: Normal breath sounds.   Abdominal:      General: Bowel sounds are normal. There is no distension.      Palpations: Abdomen is soft.   Skin:     General: Skin is warm and dry.      Comments: Right hip dressing clean dry and intact.  Left heel -large unstageable area of eschar.   Neurological:      Mental Status: He is alert.           Significant Labs: All pertinent labs within the past 24 hours have been reviewed.  CBC:   Recent Labs   Lab 06/26/23 1017 06/27/23  0444   WBC 14.09* 11.01   HGB 12.7* 12.6*   HCT 38.8* 38.2*    150     CMP:   Recent Labs   Lab 06/26/23 1017 06/27/23  0444    145   K 3.6 3.6    107   CO2 30* 27   * 120*   BUN 27* 23   CREATININE 1.1 1.2   CALCIUM 8.7 8.5*   PROT 7.1 6.7   ALBUMIN 2.9* 2.7*    BILITOT 0.8 0.9   ALKPHOS 85 75   AST 22 21   ALT 19 15   ANIONGAP 9 11       Significant Imaging: I have reviewed all pertinent imaging results/findings within the past 24 hours.

## 2023-06-27 NOTE — PLAN OF CARE
Recommendations  1) Continue pureed diet and feed pt   2) Add Boost plus BID and Kyle BID   3) weigh weekly   4) Continue appetite stimulant    Goals: 1) PO Intakes > 50% of meals and supplements at f/u  Nutrition Goal Status: new  Communication of RD Recs: reviewed with RN (POC, sticky note)

## 2023-06-27 NOTE — PROGRESS NOTES
[facility]  Adult Nutrition  Progress Note    SUMMARY       Recommendations  1) Continue pureed diet and feed pt   2) Add Boost plus BID and Kyle BID   3) weigh weekly   4) Continue appetite stimulant    Goals: 1) PO Intakes > 50% of meals and supplements at f/u  Nutrition Goal Status: new  Communication of RD Recs: reviewed with RN (POC, sticky note)    Assessment and Plan    Inadequate energy/protein intake  R/t trouble chewing/swallowing, inability to feed self, altered mental status, increased needed  As evidenced by multiple PIs, mild-severe wasting as charted below  Intervention: prescription medication, feeding assistance, texture modified diet and nutrition supplement therapy  New    Possible moderate malnutrition of social/environmental circumstances- suspect PO intakes < 75% of needs x > 3 months     Malnutrition Assessment     Skin (Micronutrient):  (Jarek = 9, unstageable hip ulcer, heel ulcer)   Micronutrient Evaluation Summary: suspected deficiency  Micronutrient Evaluation Comments: check iron, zinc, vitamin C   Subcutaneous Fat (Malnutrition): mild depletion  Muscle Mass (Malnutrition): severe depletion   Orbital Region (Subcutaneous Fat Loss): mild depletion  Upper Arm Region (Subcutaneous Fat Loss): mild depletion  Thoracic and Lumbar Region: well nourished   [place] Region (Muscle Loss): mild depletion  Clavicle Bone Region (Muscle Loss): well nourished  Clavicle and Acromion Bone Region (Muscle Loss): well nourished  Scapular Bone Region (Muscle Loss): other (see comments) (unable to see)  Dorsal Hand (Muscle Loss): mild depletion  Patellar Region (Muscle Loss): moderate depletion  Anterior Thigh Region (Muscle Loss): mild depletion  Posterior Calf Region (Muscle Loss): severe depletion                 Reason for Assessment    Reason For Assessment: identified at risk by screening criteria  Diagnosis:  (hip ulcer unstageable)  Relevant Medical History: HTN, AFIB, TIA,  "dementia, nephrolithiasis, moderate malnutrition, nonverbal, [facility] resident  Interdisciplinary Rounds: did not attend    General Information Comments: 87 y/o male admitted with unstageable hip ulcer. Per RN, as pt nonverbal, he required feeding and ate 1 applesauce, 1 pudding and a couple bites of his sausage/other pureed items this morning. NFPE 23, mild-severe (severe @ legs only) wasting seen. Wt gain per chart review.    Nutrition Discharge Planning: To be determined- Pureed diet + Boost plus 2-3 x daily + Kyle BID    Nutrition Risk Screen    Nutrition Risk Screen: difficulty chewing/swallowing, large or nonhealing wound, burn or pressure injury    Nutrition/Diet History    Patient Reported Diet/Restrictions/Preferences: pureed  Spiritual, Cultural Beliefs, Hoahaoism Practices, Values that Affect Care: no  Food Allergies: NKFA  Factors Affecting Nutritional Intake: difficulty/impaired swallowing, inability to feed self, impaired cognitive status/motor control    Anthropometrics    Temp: 98.3 °F (36.8 °C)  Height Method: Stated  Height: 5' 8"  Height (inches): 68 in  Weight Method: Bed Scale  Weight: 78.9 kg (173 lb 15.1 oz)  Weight (lb): 173.94 lb  Ideal Body Weight (IBW), Male: 154 lb  % Ideal Body Weight, Male (lb): 112.95 %  BMI (Calculated): 26.5  BMI Grade: 25 - 29.9 - overweight  Usual Body Weight (UBW), k.4 kg (10/5/20)  Weight Change Amount:  (76.4 kg 21)  % Usual Body Weight: 113.93  % Weight Change From Usual Weight: 13.69 %       Lab/Procedures/Meds    Pertinent Labs Reviewed: reviewed  BMP  Lab Results   Component Value Date     2023    K 3.6 2023     2023    CO2 27 2023    BUN 23 2023    CREATININE 1.2 2023    CALCIUM 8.5 (L) 2023    ANIONGAP 11 2023    EGFRNORACEVR 59 (A) 2023     Lab Results   Component Value Date    ALBUMIN 2.7 (L) 2023       Pertinent Medications Reviewed: reviewed  Pertinent " Medications Comments: vitamin C, mirtazapine, KNaPhos, MVI, zofran, Kbicarb    Estimated/Assessed Needs    Weight Used For Calorie Calculations: 78.9 kg (173 lb 15.1 oz)  Energy Calorie Requirements (kcal): 25-30 kcal/kg ( bedbound and overweight vs. wound) = 1101-6165 kcal  Energy Need Method: [place]-St Jeor  Protein Requirements: 1.5 g protein/kg ( unstageable wound and wasting vs. age) = 118 g  Weight Used For Protein Calculations: 78.9 kg (173 lb 15.1 oz)  Fluid Requirements (mL): 4899-8409 ml  Estimated Fluid Requirement Method: RDA Method  CHO Requirement: N/A      Nutrition Prescription Ordered    Current Diet Order: Pureed    Evaluation of Received Nutrient/Fluid Intake    Energy Calories Required: not meeting needs  Protein Required: not meeting needs  Fluid Required: not meeting needs  Tolerance: tolerating     Intake/Output Summary (Last 24 hours) at 6/27/2023 1310  Last data filed at 6/26/2023 2250  Gross per 24 hour   Intake 275 ml   Output --   Net 275 ml       % Intake of Estimated Energy Needs: 25-50%  % Meal Intake: 25-50%    Nutrition Risk    Level of Risk/Frequency of Follow-up:  (2 x weekly)     Monitor and Evaluation    Food and Nutrient Intake: energy intake, food and beverage intake  Food and Nutrient Adminstration: diet order  Physical Activity and Function: nutrition-related ADLs and IADLs  Anthropometric Measurements: weight  Biochemical Data, Medical Tests and Procedures: electrolyte and renal panel  Nutrition-Focused Physical Findings: overall appearance, skin     Nutrition Follow-Up    RD Follow-up?: Yes

## 2023-06-28 LAB
ALBUMIN SERPL BCP-MCNC: 2.6 G/DL (ref 3.5–5.2)
ALP SERPL-CCNC: 69 U/L (ref 55–135)
ALT SERPL W/O P-5'-P-CCNC: 16 U/L (ref 10–44)
ANION GAP SERPL CALC-SCNC: 12 MMOL/L (ref 8–16)
AST SERPL-CCNC: 22 U/L (ref 10–40)
BACTERIA SPEC AEROBE CULT: ABNORMAL
BASOPHILS # BLD AUTO: 0.07 K/UL (ref 0–0.2)
BASOPHILS NFR BLD: 0.7 % (ref 0–1.9)
BILIRUB SERPL-MCNC: 1 MG/DL (ref 0.1–1)
BUN SERPL-MCNC: 21 MG/DL (ref 8–23)
CALCIUM SERPL-MCNC: 8.3 MG/DL (ref 8.7–10.5)
CHLORIDE SERPL-SCNC: 105 MMOL/L (ref 95–110)
CO2 SERPL-SCNC: 26 MMOL/L (ref 23–29)
CREAT SERPL-MCNC: 1.2 MG/DL (ref 0.5–1.4)
DIFFERENTIAL METHOD: ABNORMAL
EOSINOPHIL # BLD AUTO: 0.1 K/UL (ref 0–0.5)
EOSINOPHIL NFR BLD: 0.6 % (ref 0–8)
ERYTHROCYTE [DISTWIDTH] IN BLOOD BY AUTOMATED COUNT: 12.8 % (ref 11.5–14.5)
EST. GFR  (NO RACE VARIABLE): 59 ML/MIN/1.73 M^2
GLUCOSE SERPL-MCNC: 117 MG/DL (ref 70–110)
HCT VFR BLD AUTO: 37.7 % (ref 40–54)
HGB BLD-MCNC: 12.6 G/DL (ref 14–18)
IMM GRANULOCYTES # BLD AUTO: 0.03 K/UL (ref 0–0.04)
IMM GRANULOCYTES NFR BLD AUTO: 0.3 % (ref 0–0.5)
LYMPHOCYTES # BLD AUTO: 1.3 K/UL (ref 1–4.8)
LYMPHOCYTES NFR BLD: 12.3 % (ref 18–48)
MAGNESIUM SERPL-MCNC: 2 MG/DL (ref 1.6–2.6)
MCH RBC QN AUTO: 31 PG (ref 27–31)
MCHC RBC AUTO-ENTMCNC: 33.4 G/DL (ref 32–36)
MCV RBC AUTO: 93 FL (ref 82–98)
MONOCYTES # BLD AUTO: 0.8 K/UL (ref 0.3–1)
MONOCYTES NFR BLD: 7.6 % (ref 4–15)
NEUTROPHILS # BLD AUTO: 8.2 K/UL (ref 1.8–7.7)
NEUTROPHILS NFR BLD: 78.5 % (ref 38–73)
NRBC BLD-RTO: 0 /100 WBC
PLATELET # BLD AUTO: 171 K/UL (ref 150–450)
PMV BLD AUTO: 12.9 FL (ref 9.2–12.9)
POTASSIUM SERPL-SCNC: 3.5 MMOL/L (ref 3.5–5.1)
PROT SERPL-MCNC: 6.7 G/DL (ref 6–8.4)
RBC # BLD AUTO: 4.07 M/UL (ref 4.6–6.2)
SODIUM SERPL-SCNC: 143 MMOL/L (ref 136–145)
VANCOMYCIN SERPL-MCNC: 3.1 UG/ML
WBC # BLD AUTO: 10.45 K/UL (ref 3.9–12.7)

## 2023-06-28 PROCEDURE — 11043 DEBRIDEMENT: ICD-10-PCS | Mod: ,,, | Performed by: FAMILY MEDICINE

## 2023-06-28 PROCEDURE — 63600175 PHARM REV CODE 636 W HCPCS: Performed by: NURSE PRACTITIONER

## 2023-06-28 PROCEDURE — 25500020 PHARM REV CODE 255

## 2023-06-28 PROCEDURE — 99232 SBSQ HOSP IP/OBS MODERATE 35: CPT | Mod: 25,,, | Performed by: FAMILY MEDICINE

## 2023-06-28 PROCEDURE — 80202 ASSAY OF VANCOMYCIN: CPT | Performed by: STUDENT IN AN ORGANIZED HEALTH CARE EDUCATION/TRAINING PROGRAM

## 2023-06-28 PROCEDURE — 99232 PR SUBSEQUENT HOSPITAL CARE,LEVL II: ICD-10-PCS | Mod: 25,,, | Performed by: FAMILY MEDICINE

## 2023-06-28 PROCEDURE — 36415 COLL VENOUS BLD VENIPUNCTURE: CPT | Performed by: STUDENT IN AN ORGANIZED HEALTH CARE EDUCATION/TRAINING PROGRAM

## 2023-06-28 PROCEDURE — 87186 SC STD MICRODIL/AGAR DIL: CPT | Performed by: FAMILY MEDICINE

## 2023-06-28 PROCEDURE — 87075 CULTR BACTERIA EXCEPT BLOOD: CPT | Performed by: FAMILY MEDICINE

## 2023-06-28 PROCEDURE — 12000002 HC ACUTE/MED SURGE SEMI-PRIVATE ROOM

## 2023-06-28 PROCEDURE — 80053 COMPREHEN METABOLIC PANEL: CPT | Performed by: NURSE PRACTITIONER

## 2023-06-28 PROCEDURE — 25000003 PHARM REV CODE 250: Performed by: NURSE PRACTITIONER

## 2023-06-28 PROCEDURE — 96365 THER/PROPH/DIAG IV INF INIT: CPT | Mod: 59

## 2023-06-28 PROCEDURE — 11043 DBRDMT MUSC&/FSCA 1ST 20/<: CPT | Mod: ,,, | Performed by: FAMILY MEDICINE

## 2023-06-28 PROCEDURE — 87077 CULTURE AEROBIC IDENTIFY: CPT | Performed by: FAMILY MEDICINE

## 2023-06-28 PROCEDURE — 85025 COMPLETE CBC W/AUTO DIFF WBC: CPT | Performed by: NURSE PRACTITIONER

## 2023-06-28 PROCEDURE — 96366 THER/PROPH/DIAG IV INF ADDON: CPT

## 2023-06-28 PROCEDURE — 92526 ORAL FUNCTION THERAPY: CPT

## 2023-06-28 PROCEDURE — 87070 CULTURE OTHR SPECIMN AEROBIC: CPT | Performed by: FAMILY MEDICINE

## 2023-06-28 PROCEDURE — 25000003 PHARM REV CODE 250

## 2023-06-28 PROCEDURE — 83735 ASSAY OF MAGNESIUM: CPT | Performed by: NURSE PRACTITIONER

## 2023-06-28 RX ORDER — MUPIROCIN 20 MG/G
OINTMENT TOPICAL 2 TIMES DAILY
Status: DISCONTINUED | OUTPATIENT
Start: 2023-06-28 | End: 2023-06-29 | Stop reason: HOSPADM

## 2023-06-28 RX ORDER — LIDOCAINE HYDROCHLORIDE 20 MG/ML
JELLY TOPICAL
Status: DISCONTINUED | OUTPATIENT
Start: 2023-06-28 | End: 2023-06-28

## 2023-06-28 RX ORDER — LIDOCAINE 50 MG/G
OINTMENT TOPICAL
Status: DISCONTINUED | OUTPATIENT
Start: 2023-06-28 | End: 2023-06-29 | Stop reason: HOSPADM

## 2023-06-28 RX ADMIN — MIRTAZAPINE 30 MG: 15 TABLET, FILM COATED ORAL at 08:06

## 2023-06-28 RX ADMIN — IOHEXOL 75 ML: 350 INJECTION, SOLUTION INTRAVENOUS at 07:06

## 2023-06-28 RX ADMIN — OXYCODONE HYDROCHLORIDE AND ACETAMINOPHEN 500 MG: 500 TABLET ORAL at 10:06

## 2023-06-28 RX ADMIN — MEMANTINE 5 MG: 5 TABLET ORAL at 10:06

## 2023-06-28 RX ADMIN — PIPERACILLIN AND TAZOBACTAM 4.5 G: 4; .5 INJECTION, POWDER, LYOPHILIZED, FOR SOLUTION INTRAVENOUS; PARENTERAL at 01:06

## 2023-06-28 RX ADMIN — LEVOTHYROXINE SODIUM 100 MCG: 0.1 TABLET ORAL at 10:06

## 2023-06-28 RX ADMIN — COLLAGENASE SANTYL: 250 OINTMENT TOPICAL at 10:06

## 2023-06-28 RX ADMIN — THERA TABS 1 TABLET: TAB at 10:06

## 2023-06-28 RX ADMIN — SERTRALINE HYDROCHLORIDE 25 MG: 25 TABLET ORAL at 09:06

## 2023-06-28 RX ADMIN — PIPERACILLIN AND TAZOBACTAM 4.5 G: 4; .5 INJECTION, POWDER, LYOPHILIZED, FOR SOLUTION INTRAVENOUS; PARENTERAL at 06:06

## 2023-06-28 RX ADMIN — AMLODIPINE BESYLATE 5 MG: 5 TABLET ORAL at 10:06

## 2023-06-28 RX ADMIN — OLANZAPINE 10 MG: 2.5 TABLET, FILM COATED ORAL at 08:06

## 2023-06-28 RX ADMIN — MEMANTINE 5 MG: 5 TABLET ORAL at 08:06

## 2023-06-28 RX ADMIN — PIPERACILLIN AND TAZOBACTAM 4.5 G: 4; .5 INJECTION, POWDER, LYOPHILIZED, FOR SOLUTION INTRAVENOUS; PARENTERAL at 12:06

## 2023-06-28 RX ADMIN — MUPIROCIN: 20 OINTMENT TOPICAL at 08:06

## 2023-06-28 NOTE — PROCEDURES
"Debridement    Date/Time: 6/28/2023 5:01 PM  Performed by: Nelson Middleton DO  Authorized by: Nelson Middleton DO     Time out: Immediately prior to procedure a "time out" was called to verify the correct patient, procedure, equipment, support staff and site/side marked as required.    Consent Done?:  Yes (Written)    Preparation: Patient was prepped and draped with aseptic technique    Local anesthesia used?: Yes    Anesthesia:  Local infiltration  Local anesthetic:  Lidocaine 2% topical gel  Anesthetic total (ml):  10    Debridement - 1st Wound - General Location: right hip.    Type of Debridement:  Excisional       Length (cm):  4       Area (sq cm):  16       Width (cm):  4       Percent Debrided (%):  100       Depth (cm):  1       Total Area Debrided (sq cm):  16    Depth of debridement:  Muscle/fascia/tendon    Tissue debrided:  Adipose, Dermis, Epidermis, Subcutaneous and Muscle    Devitalized tissue debrided:  Biofilm, Exudate, Fibrin, Necrotic/Eschar and Slough    Instruments:  Curette and Blade  Bleeding:  Minimal  Hemostasis Achieved: Yes  Method Used:  Pressure  Patient tolerance:  Patient tolerated the procedure well with no immediate complications  "

## 2023-06-28 NOTE — PLAN OF CARE
Problem: Adult Inpatient Plan of Care  Goal: Plan of Care Review  Outcome: Ongoing, Progressing   Supine with HOB up 45. POC and medications reviewed with pt. SL in right fa with DDI. No redness or swelling at  site.  Dsg to right hip intact without drainage. Left foot with air boot in place. Foam dsg to right heel intact without drainage. SR up x 2. Call light in reach. Bed in lowest position with brakes locked. Will continue to monitor.  Problem: Adult Inpatient Plan of Care  Goal: Optimal Comfort and Wellbeing  Outcome: Ongoing, Progressing   Q 2 hourly rounds made through out shift and IV site, pain and position monitored.  Problem: Impaired Wound Healing  Goal: Optimal Wound Healing  Outcome: Ongoing, Progressing   Wound care TBC per MD

## 2023-06-28 NOTE — PROGRESS NOTES
Wound Care Progress Note    Subjective:       Patient ID: Adriano Borjas is a 88 y.o. male.    Chief Complaint: Wound Infection (Rt. Hip wound draining )    HPI  Pt seen at bedside for a FU visit. Pt is with his son, and has a right hip un-stagable pressure ulcer. Wound is necrotic and will need to be debrided. Pt also has a left heel unstagable pressure ulcer. No other complaints today.  Review of Systems   Skin:  Positive for wound.        Right hip, left heel pressure ulcers       Objective:        Physical Exam  Vitals and nursing note reviewed.   Skin:     General: Skin is warm and dry.      Findings: Lesion present.      Comments: Unstagable pressure ulcers of the right hip and left heel       Vitals:    06/28/23 1617   BP: 121/60   Pulse: 81   Resp: 16   Temp: 98.6 °F (37 °C)       Assessment:           ICD-10-CM ICD-9-CM   1. Pressure ulcer  L89.90 707.00     707.20   2. Infected wound  T14.8XXA 958.3    L08.9    3. Chest pain  R07.9 786.50            Altered Skin Integrity 06/26/23 1500 Right Hip Ulceration Full thickness tissue loss with exposed bone, tendon, or muscle. Often includes undermining and tunneling. May extend into muscle and/or supporting structures. (Active)   06/26/23 1500   Altered Skin Integrity Present on Admission - Did Patient arrive to the hospital with altered skin?: yes   Side: Right   Orientation:    Location: Hip   Wound Number:    Is this injury device related?:    Primary Wound Type: Ulceration   Description of Altered Skin Integrity: Full thickness tissue loss with exposed bone, tendon, or muscle. Often includes undermining and tunneling. May extend into muscle and/or supporting structures.   Ankle-Brachial Index:    Pulses:    Removal Indication and Assessment:    Wound Outcome:    (Retired) Wound Length (cm):    (Retired) Wound Width (cm):    (Retired) Depth (cm):    Wound Description (Comments):    Removal Indications:    Dressing Appearance Clean;Dry;Intact  06/27/23 2015   Drainage Amount None 06/27/23 2015   Drainage Characteristics/Odor Green;Creamy 06/27/23 1025   Appearance Dressing in place, unable to visualize 06/27/23 2015   Care Cleansed with:;Wound cleanser;Applied: 06/27/23 1025   Dressing Gauze 06/27/23 2015            Altered Skin Integrity 06/26/23 1813 Left Heel Ulceration Full thickness tissue loss. Base is covered by slough and/or eschar in the wound bed (Active)   06/26/23 1813   Altered Skin Integrity Present on Admission - Did Patient arrive to the hospital with altered skin?: yes   Side: Left   Orientation:    Location: Heel   Wound Number:    Is this injury device related?:    Primary Wound Type: Ulceration   Description of Altered Skin Integrity: Full thickness tissue loss. Base is covered by slough and/or eschar in the wound bed   Ankle-Brachial Index:    Pulses:    Removal Indication and Assessment:    Wound Outcome:    (Retired) Wound Length (cm):    (Retired) Wound Width (cm):    (Retired) Depth (cm):    Wound Description (Comments):    Removal Indications:    Dressing Appearance Open to air 06/27/23 2015   Drainage Amount None 06/27/23 2015   Appearance Black 06/27/23 2015           Plan:       Essential hypertension  Chronic, controlled.  Latest blood pressure and vitals reviewed-     Temp:  [98.4 °F (36.9 °C)]   Pulse:  [72-88]   Resp:  [18-20]   BP: (148-175)/(66-91)   SpO2:  [96 %-98 %] .   Home meds for hypertension were reviewed and noted below.   Hypertension Medications               amLODIPine (NORVASC) 5 MG tablet Take 5 mg by mouth once daily. MAR            While in the hospital, will manage blood pressure as follows; Continue home antihypertensive regimen    Will utilize p.r.n. blood pressure medication only if patient's blood pressure greater than 180/110 and he develops symptoms such as worsening chest pain or shortness of breath.        Frontotemporal dementia  Advanced stage  Cont home meds  asst with ADLs  Fall  precaution  Dementia precautions  Hospice d/w daughter at  and she is interested in this service - CM consulted for hospice informational visit        Hyperlipidemia  Chronic, stable  Cont home meds      Decubitus ulcer, hip, right, unstageable  Large unstageable decub ulcer present on admission with surrounding cellulitis  Wound care consulted  IV vanc and zosyn      Moderate malnutrition  Consult nutritionist  Monitor labs       Paroxysmal atrial fibrillation        Decubitus ulcer, hip, right, unstageable  Large unstageable decub ulcer present on admission with surrounding cellulitis  Wound care consulted  IV vanc and zosyn  Wound culture growing GNR - de escalate Vanc, Continue Zosyn      Essential hypertension  Chronic, controlled.  Latest blood pressure and vitals reviewed-     Temp:  [97.7 °F (36.5 °C)-98.9 °F (37.2 °C)]   Pulse:  [69-80]   Resp:  [15-20]   BP: (108-175)/(56-82)   SpO2:  [88 %-98 %] .   Home meds for hypertension were reviewed and noted below.   Hypertension Medications               amLODIPine (NORVASC) 5 MG tablet Take 5 mg by mouth once daily. MAR            While in the hospital, will manage blood pressure as follows; Continue home antihypertensive regimen    Will utilize p.r.n. blood pressure medication only if patient's blood pressure greater than 180/110 and he develops symptoms such as worsening chest pain or shortness of breath.        Decubitus ulcer, hip, right, unstageable  Large unstageable decub ulcer present on admission with surrounding cellulitis (stage 4 per wound care physician)  Wound care consulted: debridement planned for today.  Wound culture growing GNR - de escalate Vanc, Continue Zosyn: resulting as E. Coli.  Have asked for wound cultures after debridement.   -Obtain CT hip to evaluate for Osteomyelitis.      Frontotemporal dementia  Advanced stage  Cont home meds  asst with ADLs  Fall precaution  Dementia precautions   CM consulted for hospice informational  visit          Assessment and Recommendations         Diagnoses:    1. Stage 4 right hip pressure ulcer  2. Un-stagable pressure ulcer of the left heel     Plan:  1. Santyl to the right hip, cover with adaptic and ABD  2. Debrided wound today  3. Betadine to the left heel daily  4. Wound cultured

## 2023-06-28 NOTE — ASSESSMENT & PLAN NOTE
Advanced stage  Cont home meds  asst with ADLs  Fall precaution  Dementia precautions   CM consulted for hospice informational visit

## 2023-06-28 NOTE — PLAN OF CARE
Problem: Adult Inpatient Plan of Care  Goal: Plan of Care Review  Outcome: Ongoing, Progressing  Goal: Patient-Specific Goal (Individualized)  Outcome: Ongoing, Progressing  Goal: Absence of Hospital-Acquired Illness or Injury  Outcome: Ongoing, Progressing  Goal: Optimal Comfort and Wellbeing  Outcome: Ongoing, Progressing  Goal: Readiness for Transition of Care  Outcome: Ongoing, Progressing     Problem: Fall Injury Risk  Goal: Absence of Fall and Fall-Related Injury  Outcome: Ongoing, Progressing     Problem: Skin Injury Risk Increased  Goal: Skin Health and Integrity  Outcome: Ongoing, Progressing     Problem: Impaired Wound Healing  Goal: Optimal Wound Healing  Outcome: Ongoing, Progressing   Plan of care reviewed with patient,no evidence of understanding. IV antibiotics administered as per orders. .dressing to Right hip C/D/I. Heel boot placed on L leg. Turned during night for comfort. Remains free from falls, injury. Instructed to call for assistance as needed , frequent rounds made for safety. Bed in low & locked position. Call light in reach, bed alarm on .

## 2023-06-28 NOTE — PT/OT/SLP PROGRESS
"Speech Language Pathology Treatment    Patient Name:  Adriano Borjas   MRN:  [id number]  Admitting Diagnosis: Decubitus ulcer, hip, right, unstageable    Recommendations:     General Recommendations:   Dysphagia management  Diet recommendations:  Minced & Moist Diet - IDDSI Level 5, Liquid Diet Level: Thin   Aspiration Precautions:  allow for extended meal times, Alternating bites/sips, Assistance with meals, Feed only when awake/alert, Frequent oral care, HOB to 90 degrees, and Meds crushed in puree   General Precautions: Standard, aspiration, fall  Communication strategies:  none                 Assessment:   Adriano Borjas is a 88 y.o. male with an SLP diagnosis of cognitive based oral Dysphagia. [facility] with thin liquids. Will follow and advance as tolerated.     Subjective     "I don't know."  No c/o    Respiratory Status: Room air    Objective:   Pt awake, pleasantly confused and cooperative. Pt seen today to assess appropriateness for diet upgrade. He consumed thin liquids via straw, diced peaches and tenzin crackers with no overt s/s airway compromise. Prolonged oral prep noted with peaches and tenzin crackers (45 seconds - 1.5 minutes); likely 2° dementia. More timely oral prep noted with tenzin crackers. Liquids push effective in eliciting more timely swallow. Mild oral stasis which is cleared with liquid wash.     Has the patient been evaluated by SLP for swallowing?   Yes  Keep patient NPO?     Current Respiratory Status:          Goals:   Multidisciplinary Problems       SLP Goals          Problem: SLP    Goal Priority Disciplines Outcome   SLP Goal     SLP Ongoing, Progressing   Description: 1) Pt will consume Dental soft textures (IDDSI 6) and thin liquids with functional oral phase and no overt s/s penetration/aspiration.                           Plan:     Patient to be seen:  3 x/week   Plan of Care expires:  07/11/23  Plan of Care reviewed with:  patient   SLP " Follow-Up:  Yes       Discharge recommendations:  nursing facility, basic   Barriers to Discharge:  None    Time Tracking:     SLP Treatment Date:   06/28/23  Speech Start Time:  0913  Speech Stop Time:  0922     Speech Total Time (min):  9 min    Billable Minutes: Treatment Swallowing Dysfunction 9 and Total Time 9    06/28/2023

## 2023-06-28 NOTE — ASSESSMENT & PLAN NOTE
Large unstageable decub ulcer present on admission with surrounding cellulitis (stage 4 per wound care physician)  Wound care consulted: debridement planned for today.  Wound culture growing GNR - de escalate Vanc, Continue Zosyn: resulting as E. Coli.  Have asked for wound cultures after debridement.   -Obtain CT hip to evaluate for Osteomyelitis.

## 2023-06-28 NOTE — PLAN OF CARE
Problem: SLP  Goal: SLP Goal  Description: 1) Pt will consume Dental soft textures (IDDSI 6) and thin liquids with functional oral phase and no overt s/s penetration/aspiration.      Outcome: Ongoing, Progressing    Diet advanced to [facility] soft (IDDSI 5)/thin. Will continue to follow

## 2023-06-28 NOTE — SUBJECTIVE & OBJECTIVE
Interval History: Patient seen and examined.  Pleasantly confused.  Discussed case with radiology- can't do MRI to r/o osteo due to contractures.  Will obtain CT.  Discussed with wound care physician who will do debridement and repeat cultures today: growing E. Coli.    Review of Systems   Unable to perform ROS: Dementia   Objective:     Vital Signs (Most Recent):  Temp: 98.2 °F (36.8 °C) (06/28/23 1124)  Pulse: 74 (06/28/23 1124)  Resp: 18 (06/28/23 1124)  BP: (!) 140/63 (06/28/23 1124)  SpO2: 96 % (06/28/23 1124) Vital Signs (24h Range):  Temp:  [96.5 °F (35.8 °C)-99.5 °F (37.5 °C)] 98.2 °F (36.8 °C)  Pulse:  [62-79] 74  Resp:  [16-20] 18  SpO2:  [92 %-96 %] 96 %  BP: (128-152)/(60-78) 140/63     Weight: 78.9 kg (173 lb 15.1 oz)  Body mass index is 26.45 kg/m².    Intake/Output Summary (Last 24 hours) at 6/28/2023 1442  Last data filed at 6/28/2023 0845  Gross per 24 hour   Intake 320 ml   Output 150 ml   Net 170 ml         Physical Exam  Vitals and nursing note reviewed.   Constitutional:       Appearance: Normal appearance.   HENT:      Head: Normocephalic and atraumatic.   Eyes:      Extraocular Movements: Extraocular movements intact.      Conjunctiva/sclera: Conjunctivae normal.      Pupils: Pupils are equal, round, and reactive to light.   Cardiovascular:      Rate and Rhythm: Normal rate and regular rhythm.   Pulmonary:      Effort: Pulmonary effort is normal.      Breath sounds: Normal breath sounds.   Abdominal:      General: Abdomen is flat. Bowel sounds are normal.      Palpations: Abdomen is soft.   Musculoskeletal:         General: Normal range of motion.      Comments: Contracted lower extremities   Skin:     General: Skin is warm and dry.      Findings: Erythema present.      Comments: Patient with baseball sized decubitis with purulent drainage and surrounding area of induration   Neurological:      Mental Status: He is alert.           Significant Labs: All pertinent labs within the past 24 hours  have been reviewed.  CBC:   Recent Labs   Lab 06/27/23  0444 06/28/23  0450   WBC 11.01 10.45   HGB 12.6* 12.6*   HCT 38.2* 37.7*    171     CMP:   Recent Labs   Lab 06/27/23  0444 06/28/23  0450    143   K 3.6 3.5    105   CO2 27 26   * 117*   BUN 23 21   CREATININE 1.2 1.2   CALCIUM 8.5* 8.3*   PROT 6.7 6.7   ALBUMIN 2.7* 2.6*   BILITOT 0.9 1.0   ALKPHOS 75 69   AST 21 22   ALT 15 16   ANIONGAP 11 12

## 2023-06-28 NOTE — PROGRESS NOTES
[facility] Medicine  Progress Note    Patient Name: Adriano Borjas  MRN: [id number]  Patient Class: OP- Observation   Admission Date: 6/26/2023  Length of Stay: 0 days  Attending Physician: Macho Matt MD  Primary Care Provider: Primary Doctor No        Subjective:     Principal Problem:Decubitus ulcer, hip, right, unstageable        HPI:  Adriano Borjas is an 88 yr old male with PMHx significant for dementia, A-fib, HTN, HLD, and TIA presenting today for right hip wound. Pt resides at [facility]. Daughter present at . Pt unable to provide history secondary to advanced dementia and nonverbal. Per daughter pt developed a wound to his left heel approx 4 weeks ago. He developed the right hip wound shortly after this was discovered and has been receiving wound care at the facility. Daughter was notified today that pt was being sent to the ED for concern of infected wound to right hip. She is not aware of pt having any recent fevers, n/v or other complaints. Pt is currently at his baseline mental status. Daughter reports pt is bed bound but able to get into wheelchair some. He requires max asst with all ADLs including feedings. Pt will be admitted to hospital med services for further workup and management.         Overview/Hospital Course:  Patient was admitted with right hip wound infection.  Cultures were obtained.  Patient was initiated on broad-spectrum antibiotics.  Wound Care was consulted.  His labs and vital signs were trended closely.        Interval History: Patient seen and examined.  Pleasantly confused.  Discussed case with radiology- can't do MRI to r/o osteo due to contractures.  Will obtain CT.  Discussed with wound care physician who will do debridement and repeat cultures today: growing E. Coli.    Review of Systems   Unable to perform ROS: Dementia   Objective:     Vital Signs (Most Recent):  Temp: 98.2 °F (36.8 °C) (06/28/23 1124)  Pulse: 74 (06/28/23  1124)  Resp: 18 (06/28/23 1124)  BP: (!) 140/63 (06/28/23 1124)  SpO2: 96 % (06/28/23 1124) Vital Signs (24h Range):  Temp:  [96.5 °F (35.8 °C)-99.5 °F (37.5 °C)] 98.2 °F (36.8 °C)  Pulse:  [62-79] 74  Resp:  [16-20] 18  SpO2:  [92 %-96 %] 96 %  BP: (128-152)/(60-78) 140/63     Weight: 78.9 kg (173 lb 15.1 oz)  Body mass index is 26.45 kg/m².    Intake/Output Summary (Last 24 hours) at 6/28/2023 1442  Last data filed at 6/28/2023 0845  Gross per 24 hour   Intake 320 ml   Output 150 ml   Net 170 ml         Physical Exam  Vitals and nursing note reviewed.   Constitutional:       Appearance: Normal appearance.   HENT:      Head: Normocephalic and atraumatic.   Eyes:      Extraocular Movements: Extraocular movements intact.      Conjunctiva/sclera: Conjunctivae normal.      Pupils: Pupils are equal, round, and reactive to light.   Cardiovascular:      Rate and Rhythm: Normal rate and regular rhythm.   Pulmonary:      Effort: Pulmonary effort is normal.      Breath sounds: Normal breath sounds.   Abdominal:      General: Abdomen is flat. Bowel sounds are normal.      Palpations: Abdomen is soft.   Musculoskeletal:         General: Normal range of motion.      Comments: Contracted lower extremities   Skin:     General: Skin is warm and dry.      Findings: Erythema present.      Comments: Patient with baseball sized decubitis with purulent drainage and surrounding area of induration   Neurological:      Mental Status: He is alert.           Significant Labs: All pertinent labs within the past 24 hours have been reviewed.  CBC:   Recent Labs   Lab 06/27/23  0444 06/28/23  0450   WBC 11.01 10.45   HGB 12.6* 12.6*   HCT 38.2* 37.7*    171     CMP:   Recent Labs   Lab 06/27/23  0444 06/28/23  0450    143   K 3.6 3.5    105   CO2 27 26   * 117*   BUN 23 21   CREATININE 1.2 1.2   CALCIUM 8.5* 8.3*   PROT 6.7 6.7   ALBUMIN 2.7* 2.6*   BILITOT 0.9 1.0   ALKPHOS 75 69   AST 21 22   ALT 15 16   ANIONGAP 11  12           Assessment/Plan:      * Decubitus ulcer, hip, right, unstageable  Large unstageable decub ulcer present on admission with surrounding cellulitis (stage 4 per wound care physician)  Wound care consulted: debridement planned for today.  Wound culture growing GNR - de escalate Vanc, Continue Zosyn: resulting as E. Coli.  Have asked for wound cultures after debridement.   -Obtain CT hip to evaluate for Osteomyelitis.      Hyperlipidemia  Chronic, stable  Cont home meds      Moderate malnutrition  Consult nutritionist  Monitor labs       Frontotemporal dementia  Advanced stage  Cont home meds  asst with ADLs  Fall precaution  Dementia precautions   CM consulted for hospice informational visit        Essential hypertension  Chronic, controlled.  Latest blood pressure and vitals reviewed-     Temp:  [97.7 °F (36.5 °C)-98.9 °F (37.2 °C)]   Pulse:  [69-80]   Resp:  [15-20]   BP: (108-175)/(56-82)   SpO2:  [88 %-98 %] .   Home meds for hypertension were reviewed and noted below.   Hypertension Medications             amLODIPine (NORVASC) 5 MG tablet Take 5 mg by mouth once daily. MAR          While in the hospital, will manage blood pressure as follows; Continue home antihypertensive regimen    Will utilize p.r.n. blood pressure medication only if patient's blood pressure greater than 180/110 and he develops symptoms such as worsening chest pain or shortness of breath.          VTE Risk Mitigation (From admission, onward)         Ordered     IP VTE HIGH RISK PATIENT  Once         06/26/23 1418     Place sequential compression device  Until discontinued         06/26/23 1418                Discharge Planning   KAY: 6/29/2023     Code Status: Full Code   Is the patient medically ready for discharge?:     Reason for patient still in hospital (select all that apply): Patient trending condition, Treatment, Imaging and Consult recommendations  Discharge Plan A: Return to nursing home                  Florence Cortez  NP  Department of Hospital Medicine   [facility]

## 2023-06-29 VITALS
WEIGHT: 173.94 LBS | HEIGHT: 68 IN | SYSTOLIC BLOOD PRESSURE: 146 MMHG | HEART RATE: 74 BPM | RESPIRATION RATE: 17 BRPM | OXYGEN SATURATION: 97 % | DIASTOLIC BLOOD PRESSURE: 69 MMHG | TEMPERATURE: 98 F | BODY MASS INDEX: 26.36 KG/M2

## 2023-06-29 LAB
ALBUMIN SERPL BCP-MCNC: 2.6 G/DL (ref 3.5–5.2)
ALP SERPL-CCNC: 65 U/L (ref 55–135)
ALT SERPL W/O P-5'-P-CCNC: 18 U/L (ref 10–44)
ANION GAP SERPL CALC-SCNC: 11 MMOL/L (ref 8–16)
AST SERPL-CCNC: 25 U/L (ref 10–40)
BASOPHILS # BLD AUTO: 0.09 K/UL (ref 0–0.2)
BASOPHILS NFR BLD: 0.8 % (ref 0–1.9)
BILIRUB SERPL-MCNC: 0.7 MG/DL (ref 0.1–1)
BUN SERPL-MCNC: 24 MG/DL (ref 8–23)
CALCIUM SERPL-MCNC: 8.5 MG/DL (ref 8.7–10.5)
CHLORIDE SERPL-SCNC: 104 MMOL/L (ref 95–110)
CO2 SERPL-SCNC: 28 MMOL/L (ref 23–29)
CREAT SERPL-MCNC: 1.2 MG/DL (ref 0.5–1.4)
DIFFERENTIAL METHOD: ABNORMAL
EOSINOPHIL # BLD AUTO: 0.2 K/UL (ref 0–0.5)
EOSINOPHIL NFR BLD: 2.1 % (ref 0–8)
ERYTHROCYTE [DISTWIDTH] IN BLOOD BY AUTOMATED COUNT: 12.8 % (ref 11.5–14.5)
EST. GFR  (NO RACE VARIABLE): 59 ML/MIN/1.73 M^2
GLUCOSE SERPL-MCNC: 116 MG/DL (ref 70–110)
HCT VFR BLD AUTO: 40 % (ref 40–54)
HGB BLD-MCNC: 12.9 G/DL (ref 14–18)
IMM GRANULOCYTES # BLD AUTO: 0.04 K/UL (ref 0–0.04)
IMM GRANULOCYTES NFR BLD AUTO: 0.4 % (ref 0–0.5)
LYMPHOCYTES # BLD AUTO: 2 K/UL (ref 1–4.8)
LYMPHOCYTES NFR BLD: 18.6 % (ref 18–48)
MAGNESIUM SERPL-MCNC: 2.1 MG/DL (ref 1.6–2.6)
MCH RBC QN AUTO: 30.2 PG (ref 27–31)
MCHC RBC AUTO-ENTMCNC: 32.3 G/DL (ref 32–36)
MCV RBC AUTO: 94 FL (ref 82–98)
MONOCYTES # BLD AUTO: 1.3 K/UL (ref 0.3–1)
MONOCYTES NFR BLD: 11.7 % (ref 4–15)
NEUTROPHILS # BLD AUTO: 7.2 K/UL (ref 1.8–7.7)
NEUTROPHILS NFR BLD: 66.4 % (ref 38–73)
NRBC BLD-RTO: 0 /100 WBC
PLATELET # BLD AUTO: 180 K/UL (ref 150–450)
PMV BLD AUTO: 12.8 FL (ref 9.2–12.9)
POTASSIUM SERPL-SCNC: 3.8 MMOL/L (ref 3.5–5.1)
PROT SERPL-MCNC: 6.9 G/DL (ref 6–8.4)
RBC # BLD AUTO: 4.27 M/UL (ref 4.6–6.2)
SODIUM SERPL-SCNC: 143 MMOL/L (ref 136–145)
WBC # BLD AUTO: 10.84 K/UL (ref 3.9–12.7)

## 2023-06-29 PROCEDURE — 63600175 PHARM REV CODE 636 W HCPCS: Performed by: NURSE PRACTITIONER

## 2023-06-29 PROCEDURE — 36415 COLL VENOUS BLD VENIPUNCTURE: CPT | Performed by: NURSE PRACTITIONER

## 2023-06-29 PROCEDURE — 80053 COMPREHEN METABOLIC PANEL: CPT | Performed by: NURSE PRACTITIONER

## 2023-06-29 PROCEDURE — 83735 ASSAY OF MAGNESIUM: CPT | Performed by: NURSE PRACTITIONER

## 2023-06-29 PROCEDURE — 85025 COMPLETE CBC W/AUTO DIFF WBC: CPT | Performed by: NURSE PRACTITIONER

## 2023-06-29 PROCEDURE — 25000003 PHARM REV CODE 250

## 2023-06-29 PROCEDURE — 25000003 PHARM REV CODE 250: Performed by: NURSE PRACTITIONER

## 2023-06-29 RX ORDER — CEFAZOLIN SODIUM 2 G/50ML
2 SOLUTION INTRAVENOUS
Status: DISCONTINUED | OUTPATIENT
Start: 2023-06-29 | End: 2023-06-29 | Stop reason: HOSPADM

## 2023-06-29 RX ORDER — CEPHALEXIN 500 MG/1
500 CAPSULE ORAL EVERY 8 HOURS
Qty: 21 CAPSULE | Refills: 0
Start: 2023-06-29 | End: 2023-07-06

## 2023-06-29 RX ORDER — LEVOTHYROXINE SODIUM 100 UG/1
100 TABLET ORAL DAILY
Start: 2023-06-29

## 2023-06-29 RX ADMIN — CEFAZOLIN SODIUM 2 G: 2 SOLUTION INTRAVENOUS at 09:06

## 2023-06-29 RX ADMIN — PIPERACILLIN AND TAZOBACTAM 4.5 G: 4; .5 INJECTION, POWDER, LYOPHILIZED, FOR SOLUTION INTRAVENOUS; PARENTERAL at 12:06

## 2023-06-29 RX ADMIN — MEMANTINE 5 MG: 5 TABLET ORAL at 09:06

## 2023-06-29 RX ADMIN — AMLODIPINE BESYLATE 5 MG: 5 TABLET ORAL at 09:06

## 2023-06-29 RX ADMIN — MUPIROCIN: 20 OINTMENT TOPICAL at 09:06

## 2023-06-29 RX ADMIN — SERTRALINE HYDROCHLORIDE 25 MG: 25 TABLET ORAL at 09:06

## 2023-06-29 RX ADMIN — COLLAGENASE SANTYL: 250 OINTMENT TOPICAL at 01:06

## 2023-06-29 RX ADMIN — LEVOTHYROXINE SODIUM 100 MCG: 0.1 TABLET ORAL at 09:06

## 2023-06-29 RX ADMIN — THERA TABS 1 TABLET: TAB at 09:06

## 2023-06-29 RX ADMIN — PIPERACILLIN AND TAZOBACTAM 4.5 G: 4; .5 INJECTION, POWDER, LYOPHILIZED, FOR SOLUTION INTRAVENOUS; PARENTERAL at 05:06

## 2023-06-29 RX ADMIN — OXYCODONE HYDROCHLORIDE AND ACETAMINOPHEN 500 MG: 500 TABLET ORAL at 09:06

## 2023-06-29 NOTE — PLAN OF CARE
Report can be called to Yessenia at [phone] to Yadiel BOLAND. Pts nurse updated    06/29/23 1300   Post-Acute Status   Post-Acute Authorization Placement   Post-Acute Placement Status Set-up Complete/Auth obtained

## 2023-06-29 NOTE — PLAN OF CARE
Problem: Adult Inpatient Plan of Care  Goal: Plan of Care Review  Outcome: Ongoing, Progressing     Problem: Fall Injury Risk  Goal: Absence of Fall and Fall-Related Injury  Outcome: Ongoing, Progressing     Problem: Skin Injury Risk Increased  Goal: Skin Health and Integrity  Outcome: Ongoing, Progressing     Problem: Impaired Wound Healing  Goal: Optimal Wound Healing  Outcome: Ongoing, Progressing     Problem: Oral Intake Inadequate  Goal: Improved Oral Intake  Outcome: Ongoing, Progressing

## 2023-06-29 NOTE — PLAN OF CARE
[facility]    06/29/23 1417   Final Note   Assessment Type Final Discharge Note   Anticipated Discharge Disposition senior care Nu

## 2023-06-29 NOTE — DISCHARGE SUMMARY
[facility] Medicine  Discharge Summary      Patient Name: Adriano Borjas  MRN: [id number]  HALLIE: [id number]  Patient Class: IP- Inpatient  Admission Date: 6/26/2023  Hospital Length of Stay: 1 days  Discharge Date and Time:  06/29/2023 10:31 AM  Attending Physician: Macho Matt MD   Discharging Provider: Florence Cortez NP  Primary Care Provider: Primary Doctor Nusrat    Primary Care Team: Networked reference to record PCT     HPI:   Adriano Borjas is an 88 yr old male with PMHx significant for dementia, A-fib, HTN, HLD, and TIA presenting today for right hip wound. Pt resides at [facility]. Daughter present at . Pt unable to provide history secondary to advanced dementia and nonverbal. Per daughter pt developed a wound to his left heel approx 4 weeks ago. He developed the right hip wound shortly after this was discovered and has been receiving wound care at the facility. Daughter was notified today that pt was being sent to the ED for concern of infected wound to right hip. She is not aware of pt having any recent fevers, n/v or other complaints. Pt is currently at his baseline mental status. Daughter reports pt is bed bound but able to get into wheelchair some. He requires max asst with all ADLs including feedings. Pt will be admitted to hospital med services for further workup and management.         * No surgery found *      Hospital Course:   Patient was admitted with right hip wound infection.  Cultures were obtained.  Patient was initiated on broad-spectrum antibiotics.  Wound Care was consulted.  His labs and vital signs were trended closely.  Buttocks were de-escalated based on cultures-these were superficial cultures.  A debridement was done on 06/28 with Dr. Middleton.  He obtained cultures with debridement.  Pt remained afebrile.  He underwent CT of the hip to rule out any findings of osteomyelitis given his inability to tolerate MRI.  CT was not concerning for any bony  changes.  Results were discussed with the son, Prieto.  He was D/C on 06/29 to continue wound care and Abx x1 week at [facility] with follow-up to be arranged with Dr. Pereyra for ongoing wound care and decision making regarding need for ongoing antibiotic therapy.  Patient's family agreeable with discharge plan.    Pt was seen on day of discharge and deemed appropriate for discharge.       Goals of Care Treatment Preferences:  Code Status: Full Code      Consults:   Consults (From admission, onward)        Status Ordering Provider     Inpatient consult to Social Work/Case Management  Once        Provider:  (Not yet assigned)    Acknowledged GENEVA LU          No new Assessment & Plan notes have been filed under this hospital service since the last note was generated.  Service: Hospital Medicine    Final Active Diagnoses:    Diagnosis Date Noted POA    PRINCIPAL PROBLEM:  Decubitus ulcer, hip, right, unstageable [L89.210] 06/26/2023 Yes    Hyperlipidemia [E78.5] 10/23/2020 Yes    Moderate malnutrition [E44.0] 10/05/2020 Yes    Frontotemporal dementia [G31.09, F02.80] 08/23/2019 Yes    Essential hypertension [I10] 07/27/2018 Yes      Problems Resolved During this Admission:    Diagnosis Date Noted Date Resolved POA    History of cerebral hemorrhage [Z86.79] 09/05/2019 06/26/2023 Not Applicable    Paroxysmal atrial fibrillation [I48.0] 12/12/2018 06/26/2023 Yes       Discharged Condition: fair    Disposition: group home Nursing Home    Follow Up:   Follow-up Information     Emma Pereyra,  Follow up in 1 week(s).    Specialty: Wound Care  Contact information:  [address]  [facility] [address]  [phone]                       Patient Instructions:      Ambulatory referral/consult to Wound Clinic   Standing Status: Future   Referral Priority: Routine Referral Type: Consultation   Referral Reason: Specialty Services Required   Referred to Provider: EMMA PEREYRA Requested Specialty:  Wound Care   Number of Visits Requested: 1     Diet Dysphagia Mechanical Soft   Order Comments: Minced & Moist Diet - IDDSI Level 5, Liquid Diet Level: Thin   Aspiration Precautions:  allow for extended meal times, Alternating bites/sips, Assistance with meals, Feed only when awake/alert, Frequent oral care, HOB to 90 degrees, and Meds crushed in puree      Notify your health care provider if you experience any of the following:  temperature >100.4     Notify your health care provider if you experience any of the following:  severe uncontrolled pain     Notify your health care provider if you experience any of the following:  redness, tenderness, or signs of infection (pain, swelling, redness, odor or green/yellow discharge around incision site)     Notify your health care provider if you experience any of the following:  difficulty breathing or increased cough     Activity as tolerated       Significant Diagnostic Studies: Labs:   CMP   Recent Labs   Lab 06/28/23 0450 06/29/23  0434    143   K 3.5 3.8    104   CO2 26 28   * 116*   BUN 21 24*   CREATININE 1.2 1.2   CALCIUM 8.3* 8.5*   PROT 6.7 6.9   ALBUMIN 2.6* 2.6*   BILITOT 1.0 0.7   ALKPHOS 69 65   AST 22 25   ALT 16 18   ANIONGAP 12 11    and CBC   Recent Labs   Lab 06/28/23 0450 06/29/23  0434   WBC 10.45 10.84   HGB 12.6* 12.9*   HCT 37.7* 40.0    180     Microbiology Results (last 7 days)     Procedure Component Value Units Date/Time    Aerobic culture [[id number]] Collected: 06/28/23 1655    Order Status: Sent Specimen: Wound from Hip, Right Updated: 06/28/23 2052    Culture, Anaerobic [[id number]] Collected: 06/28/23 1655    Order Status: Sent Specimen: Wound from Hip, Right Updated: 06/28/23 2052    Aerobic culture (Specify Source) **CANNOT BE ORDERED AS STAT** [[id number]]  (Abnormal)  (Susceptibility) Collected: 06/26/23 1011    Order Status: Completed Specimen: Wound from Hip, Right Updated: 06/28/23 1056     Aerobic Bacterial  Culture ESCHERICHIA COLI  Many          Imaging Results          X-Ray Hip 2 or 3 views Right (with Pelvis when performed) (Final result)  Result time 06/26/23 11:07:20    Final result by Mayra Steel MD (06/26/23 11:07:20)                 Impression:      No acute fracture or dislocation right      Electronically signed by: Mayra Steel MD  Date:    06/26/2023  Time:    11:07             Narrative:    EXAMINATION:  XR HIP WITH PELVIS WHEN PERFORMED, 2 OR 3  VIEWS RIGHT    CLINICAL HISTORY:  Pressure ulcer of unspecified site, unspecified stage    TECHNIQUE:  AP view of the pelvis and frog leg lateral view of the right hip were performed.    COMPARISON:  None    FINDINGS:  Mild degenerative changes at the right hip.  Note is made that on the AP view the pelvis the left hip is in the frogleg lateral position.  Mild degenerative changes also evident left hip.    No acute fracture or dislocation right hip.  No findings indicate osteomyelitis although note is made that MRI is typically much more sensitive than CT for detection of such.                              CT Hip With Contrast Right [[id number]] Resulted: 06/29/23 0934   Order Status: Completed Updated: 06/29/23 0936   Narrative:     EXAMINATION:   CT HIP WITH CONTRAST RIGHT     CLINICAL HISTORY:   Osteomyelitis suspected, hip, xray done;     TECHNIQUE:   Axial scans of the right hip were obtained without IV contrast and sagittal and coronal reformatted images were obtained.     COMPARISON:   None     FINDINGS:   Prominent amount of stool in visualized portion of rectum and sigmoid colon.  Mild diffuse bladder wall thickening.  Fat containing inguinal hernias     Subcutaneous fat stranding and fluid right lateral hip and thigh suggesting cellulitis or edema.  Overlying open wound or incision.  Gluteus muscle appearing mildly enlarged with edema and punctate calcifications within it.  No drainable fluid collection seen.     There are degenerative changes  in the visualized lower lumbar spine, symphysis pubis, and at the right hip.  No findings indicate osteomyelitis at the right hip.  No acute fractures seen.  There is made that MRI is typically more sensitive than CT particular for detection of early osteomyelitis..    Impression:       No bone destruction suggesting osteomyelitis right hip.       Electronically signed by: Mayra Steel MD   Date: 06/29/2023   Time: 09:34         Pending Diagnostic Studies:     None         Medications:  Reconciled Home Medications:      Medication List      START taking these medications    cephALEXin 500 MG capsule  Commonly known as: KEFLEX  Take 1 capsule (500 mg total) by mouth every 8 (eight) hours. for 7 days     collagenase ointment  Commonly known as: SANTYL  Apply topically once daily. Clean right hip with wound cleanser and pat dry. Apply a nickel thick layer of santyl to the right hip, cover with adaptic (vaseline gauze) and ABD and secure with tape daily.        CHANGE how you take these medications    levothyroxine 100 MCG tablet  Commonly known as: SYNTHROID  Take 1 tablet (100 mcg total) by mouth once daily.  What changed:   · medication strength  · how much to take        CONTINUE taking these medications    acetaminophen 500 MG tablet  Commonly known as: TYLENOL  Take 500 mg by mouth every evening.     amLODIPine 5 MG tablet  Commonly known as: NORVASC  Take 5 mg by mouth once daily. MAR     ascorbic acid (vitamin C) 500 MG tablet  Commonly known as: VITAMIN C  Take 500 mg by mouth once daily.     atorvastatin 40 MG tablet  Commonly known as: LIPITOR  Take 1 tablet (40 mg total) by mouth once daily.     hydrocortisone 2.5 % cream  Thin film to AA onface daily PRN flare     hydrOXYzine pamoate 25 MG Cap  Commonly known as: VISTARIL  Take 1 capsule (25 mg total) by mouth every 8 (eight) hours as needed (Itching).     * ketoconazole 2 % cream  Commonly known as: NIZORAL  Thin film to red scaly rash on face twice  daily as needed for flare     * ketoconazole 2 % shampoo  Commonly known as: NIZORAL  Wash hair with medicated shampoo at least 2x/week - let sit on scalp at least 5 minutes prior to rinsing     memantine 5 MG Tab  Commonly known as: NAMENDA  Take 5 mg by mouth 2 (two) times daily.     mirtazapine 30 MG tablet  Commonly known as: REMERON  Take 1 tablet (30 mg total) by mouth every evening.     multivitamin tablet  Commonly known as: THERAGRAN  Take 1 tablet by mouth once daily.     OLANZapine 10 MG tablet  Commonly known as: ZyPREXA  Take 10 mg by mouth nightly.     propylene glycoL 0.6 % Drop  Place 2 drops into both eyes 3 (three) times daily. For dry eyes     pulse oximeter device  Commonly known as: pulse oximeter  by Apply Externally route 2 (two) times a day. Use twice daily at 8 AM and 3 PM and record the value in [employer] as directed.     sertraline 25 MG tablet  Commonly known as: ZOLOFT  Take 1 tablet (25 mg total) by mouth once daily.     UNABLE TO FIND  Admit to nursing home.         * This list has 2 medication(s) that are the same as other medications prescribed for you. Read the directions carefully, and ask your doctor or other care provider to review them with you.                Indwelling Lines/Drains at time of discharge:   Lines/Drains/Airways     None                 Time spent on the discharge of patient: 35 minutes         Florence Cortez NP  Department of Hospital Medicine  [facility]

## 2023-06-29 NOTE — PROGRESS NOTES
Discharge instructions printed and sent with pt. PIV removed. Double heel pillows on pt. Personal belongings in hand. Pt transported via stretcher to [facility] ambulance. Pt transporting to [facility].

## 2023-06-29 NOTE — PLAN OF CARE
Per Amy , pts nurse - wound care is finished and [facility] can be arranged. CM arranged [facility] ambulance due to pt postural instability and unstageable decubitus ulcers on left hip and heel. CM completed adt 30 order for transport.    06/29/23 1415   Discharge Assessment   Assessment Type Discharge Planning Reassessment

## 2023-06-29 NOTE — PLAN OF CARE
CM sent the pts packet and AVS to [facility] for review and updated Zoe at [facility] that the family wishes to follow up with Dr. Middleton and not use [facility] wound care. The family is aware that they may need to provide private transport to appointments scheduled for Dr. Middleton. Ambulatory referral to wound care placed for office to call pts family for scheduling.    06/29/23 1059   Post-Acute Status   Post-Acute Authorization Placement   Post-Acute Placement Status Referrals Sent

## 2023-06-29 NOTE — DISCHARGE INSTRUCTIONS
[facility] Transfer Orders        Admit to: [facility]    Diagnoses:   Active Hospital Problems    Diagnosis  POA    *Decubitus ulcer, hip, right, unstageable [L89.210]  Yes    Hyperlipidemia [E78.5]  Yes    Moderate malnutrition [E44.0]  Yes    Frontotemporal dementia [G31.09, F02.80]  Yes    Essential hypertension [I10]  Yes      Resolved Hospital Problems    Diagnosis Date Resolved POA    History of cerebral hemorrhage [Z86.79] 06/26/2023 Not Applicable    Paroxysmal atrial fibrillation [I48.0] 06/26/2023 Yes     Allergies:   Review of patient's allergies indicates:   Allergen Reactions    Ciprofloxacin Other (See Comments)     Shoulder and leg pain/cramping     Codeine Other (See Comments)     Pt cannot remember been a long time ago    Sulfa (sulfonamide antibiotics)      Allergic reaction to bactrim       Code Status: FULL    Vitals: Routine       Diet: Minced & Moist Diet - IDDSI Level 5, Liquid Diet Level: Thin   Aspiration Precautions:  allow for extended meal times, Alternating bites/sips, Assistance with meals, Feed only when awake/alert, Frequent oral care, HOB to 90 degrees, and Meds crushed in puree   Kyle 2x daily    Activity: Activity as tolerated    Nursing Precautions: Aspiration , Fall, and Pressure ulcer prevention    Bed/Surface: Pressure Redistribution    Consults: Wound Care    Oxygen: room air    Dialysis: Patient is not on dialysis.     Labs: {PT/INR:28242}              BMP cbc 1 week   Pending Diagnostic Studies:       None          Imaging: NA    Miscellaneous Care:   Wound Care: yes:      Paint left heel with betadine daily and leave open to air.    Clean right hip with wound cleanser and pat dry. Apply a nickel thick layer of santyl to the right hip, cover with adaptic (vaseline gauze) and ABD and secure with tape daily.       IV Access: NA     Medications: Discontinue all previous medication orders, if any. See new list below.  Current Discharge  Medication List        START taking these medications    Details   cephALEXin (KEFLEX) 500 MG capsule Take 1 capsule (500 mg total) by mouth every 8 (eight) hours. for 7 days  Qty: 21 capsule, Refills: 0      collagenase (SANTYL) ointment Apply topically once daily. Clean right hip with wound cleanser and pat dry. Apply a nickel thick layer of santyl to the right hip, cover with adaptic (vaseline gauze) and ABD and secure with tape daily.  Refills: 0           CONTINUE these medications which have CHANGED    Details   levothyroxine (SYNTHROID) 100 MCG tablet Take 1 tablet (100 mcg total) by mouth once daily.           CONTINUE these medications which have NOT CHANGED    Details   acetaminophen (TYLENOL) 500 MG tablet Take 500 mg by mouth every evening.      amLODIPine (NORVASC) 5 MG tablet Take 5 mg by mouth once daily. MAR      ascorbic acid, vitamin C, (VITAMIN C) 500 MG tablet Take 500 mg by mouth once daily.      memantine (NAMENDA) 5 MG Tab Take 5 mg by mouth 2 (two) times daily.      mirtazapine (REMERON) 30 MG tablet Take 1 tablet (30 mg total) by mouth every evening.  Qty: 30 tablet, Refills: 0      multivitamin (THERAGRAN) tablet Take 1 tablet by mouth once daily.      OLANZapine (ZYPREXA) 10 MG tablet Take 10 mg by mouth nightly.      propylene glycoL 0.6 % Drop Place 2 drops into both eyes 3 (three) times daily. For dry eyes      sertraline (ZOLOFT) 25 MG tablet Take 1 tablet (25 mg total) by mouth once daily.  Qty: 30 tablet, Refills: 11      atorvastatin (LIPITOR) 40 MG tablet Take 1 tablet (40 mg total) by mouth once daily.  Qty: 30 tablet, Refills: 0      hydrocortisone 2.5 % cream Thin film to AA onface daily PRN flare  Qty: 28 g, Refills: 1    Associated Diagnoses: Seborrheic dermatitis      hydrOXYzine pamoate (VISTARIL) 25 MG Cap Take 1 capsule (25 mg total) by mouth every 8 (eight) hours as needed (Itching).  Qty: 15 capsule, Refills: 0      ketoconazole (NIZORAL) 2 % cream Thin film to red scaly  rash on face twice daily as needed for flare  Qty: 60 g, Refills: 3    Associated Diagnoses: Seborrheic dermatitis      ketoconazole (NIZORAL) 2 % shampoo Wash hair with medicated shampoo at least 2x/week - let sit on scalp at least 5 minutes prior to rinsing  Qty: 120 mL, Refills: 5    Associated Diagnoses: Seborrheic dermatitis      pulse oximeter (PULSE OXIMETER) device by Apply Externally route 2 (two) times a day. Use twice daily at 8 AM and 3 PM and record the value in [employer] as directed.  Qty: 1 each, Refills: 0    Comments: This is a NO CHARGE item.  Please override price to zero.  DO NOT PRINT.  NORMAL MODE e-PRESCRIBE ONLY.      UNABLE TO FIND Admit to nursing home.  Qty: 1 Piece, Refills: 0           Follow up:    Follow-up Information       Nelson Middleton, DO Follow up in 1 week(s).    Specialty: Wound Care  Contact information:  [address] [facility] [address]  [facility] [address]  [phone]                               Immunizations Administered as of 6/29/2023       No immunizations on file.                Florence Cortez, DRU

## 2023-07-01 LAB — BACTERIA SPEC AEROBE CULT: ABNORMAL

## 2023-07-03 LAB — BACTERIA SPEC ANAEROBE CULT: NORMAL

## 2023-07-06 NOTE — PHYSICIAN QUERY
PT Name: Adriano Borjas  MR #: [id number]     Documentation Clarification      CDS:  Kelly RUVALCABA, RN   Contact information:  [email or web address]  This form is a permanent document in the medical record.     Query Date: July 6, 2023    By submitting this query, we are merely seeking further clarification of documentation.  Please utilize your independent clinical judgment when addressing the question(s) below.     The medical record contains the following:  Indicators Supporting Clinical Findings Location in Medical Record   x Chronic Illness Frontotemporal  dementia advanced stage,  A-fib, HTN, HLD, TIA,decubitus ulcer of right hip H&P 6/26/2023   x Total Care or Max Assist Daughter reports pt is bed bound but able to get into wheelchair some. 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)      Treatment      Other       Provider, please specify the diagnosis or diagnoses associated with above clinical findings:       [    ] Functional quadriplegia - lack of ability to use ones limbs due to extreme debility in the absence of damage or injury to the spinal cord, requiring total care with all activities of daily living; a permanent condition; not a true paralysis   [  x  ] General debility/deconditioning   [   ] Other diagnosis (please specify):____________________________                 Please document in your progress notes daily for the duration of treatment, until resolved, and include in your discharge summary.    Form No. [id number]

## 2023-07-13 ENCOUNTER — PATIENT MESSAGE (OUTPATIENT)
Dept: WOUND CARE | Facility: HOSPITAL | Age: 88
End: 2023-07-13
Payer: MEDICARE

## 2023-07-13 ENCOUNTER — OFFICE VISIT (OUTPATIENT)
Dept: WOUND CARE | Facility: HOSPITAL | Age: 88
End: 2023-07-13
Attending: FAMILY MEDICINE
Payer: MEDICARE

## 2023-07-13 VITALS
HEART RATE: 73 BPM | TEMPERATURE: 98 F | RESPIRATION RATE: 16 BRPM | DIASTOLIC BLOOD PRESSURE: 60 MMHG | SYSTOLIC BLOOD PRESSURE: 103 MMHG

## 2023-07-13 DIAGNOSIS — L89.620 PRESSURE INJURY OF LEFT HEEL, UNSTAGEABLE: Primary | ICD-10-CM

## 2023-07-13 DIAGNOSIS — L89.610 PRESSURE INJURY OF RIGHT HEEL, UNSTAGEABLE: ICD-10-CM

## 2023-07-13 DIAGNOSIS — L89.214 PRESSURE INJURY OF RIGHT HIP, STAGE 4: ICD-10-CM

## 2023-07-13 PROCEDURE — 99214 OFFICE O/P EST MOD 30 MIN: CPT | Mod: 25 | Performed by: FAMILY MEDICINE

## 2023-07-13 PROCEDURE — 11043 PR DEBRIDEMENT, SKIN, SUB-Q TISSUE,MUSCLE,=<20 SQ CM: ICD-10-PCS | Mod: S$GLB,,, | Performed by: FAMILY MEDICINE

## 2023-07-13 PROCEDURE — 99499 NO LOS: ICD-10-PCS | Mod: S$GLB,,, | Performed by: FAMILY MEDICINE

## 2023-07-13 PROCEDURE — 11043 DBRDMT MUSC&/FSCA 1ST 20/<: CPT | Performed by: FAMILY MEDICINE

## 2023-07-13 PROCEDURE — 99499 UNLISTED E&M SERVICE: CPT | Mod: S$GLB,,, | Performed by: FAMILY MEDICINE

## 2023-07-13 PROCEDURE — 11043 DBRDMT MUSC&/FSCA 1ST 20/<: CPT | Mod: S$GLB,,, | Performed by: FAMILY MEDICINE

## 2023-07-13 NOTE — PROGRESS NOTES
Wound Care Progress Note    Subjective:       Patient ID: Adriano Borjas is a 88 y.o. male.    Chief Complaint: No chief complaint on file.    HPI  Pt seen in clinic for a FU visit for a right hip and BL heel pressure ulcer. Pt is known to me from hospital, and is residing at [facility]. Pt has no new complaints today, and is non verbal and with his son. Pt had a Cx on 6/26 and was on keflex for infection from PCP.  Review of Systems   Skin:  Positive for wound.        BL heel and right hip pressure ulcers   All other systems reviewed and are negative.      Objective:        Physical Exam  Vitals and nursing note reviewed.   Constitutional:       Appearance: Normal appearance.   Skin:     General: Skin is warm and dry.      Findings: Lesion present.      Comments: BL heel unstagable pressure ulcers, right hip pressure ulcer, see wound care assessment documentation in chart review scanned under the media tab   Neurological:      Mental Status: He is alert.   Psychiatric:         Mood and Affect: Mood normal.         Behavior: Behavior normal.         Thought Content: Thought content normal.         Judgment: Judgment normal.       There were no vitals filed for this visit.    Assessment:           ICD-10-CM ICD-9-CM   1. Pressure injury of left heel, unstageable  L89.620 707.07     707.25   2. Pressure injury of right heel, unstageable  L89.610 707.07     707.25   3. Pressure injury of right hip, stage 4  L89.214 707.04     707.24                Plan:                  Diagnoses and all orders for this visit:    Pressure injury of left heel, unstageable    Pressure injury of right heel, unstageable    Pressure injury of right hip, stage 4        See wound care instructions provided separately

## 2023-07-20 ENCOUNTER — OFFICE VISIT (OUTPATIENT)
Dept: WOUND CARE | Facility: HOSPITAL | Age: 88
End: 2023-07-20
Attending: FAMILY MEDICINE
Payer: MEDICARE

## 2023-07-20 VITALS
SYSTOLIC BLOOD PRESSURE: 118 MMHG | DIASTOLIC BLOOD PRESSURE: 70 MMHG | RESPIRATION RATE: 10 BRPM | HEART RATE: 70 BPM | TEMPERATURE: 98 F

## 2023-07-20 DIAGNOSIS — L89.610 PRESSURE INJURY OF RIGHT HEEL, UNSTAGEABLE: ICD-10-CM

## 2023-07-20 DIAGNOSIS — L89.620 PRESSURE INJURY OF LEFT HEEL, UNSTAGEABLE: ICD-10-CM

## 2023-07-20 DIAGNOSIS — L89.214 PRESSURE INJURY OF RIGHT HIP, STAGE 4: Primary | ICD-10-CM

## 2023-07-20 PROCEDURE — 99499 NO LOS: ICD-10-PCS | Mod: ,,, | Performed by: FAMILY MEDICINE

## 2023-07-20 PROCEDURE — 11043 DBRDMT MUSC&/FSCA 1ST 20/<: CPT | Mod: ,,, | Performed by: FAMILY MEDICINE

## 2023-07-20 PROCEDURE — 99499 UNLISTED E&M SERVICE: CPT | Mod: ,,, | Performed by: FAMILY MEDICINE

## 2023-07-20 PROCEDURE — 11043 DBRDMT MUSC&/FSCA 1ST 20/<: CPT | Performed by: FAMILY MEDICINE

## 2023-07-20 PROCEDURE — 11043 PR DEBRIDEMENT, SKIN, SUB-Q TISSUE,MUSCLE,=<20 SQ CM: ICD-10-PCS | Mod: ,,, | Performed by: FAMILY MEDICINE

## 2023-07-20 NOTE — PROGRESS NOTES
Wound Care Progress Note    Subjective:       Patient ID: Adriano Borjas is a 88 y.o. male.    Chief Complaint: No chief complaint on file.    HPI  Pt seen in clinic for a FU visit for BL heel and right hip wounds. Pts heels are stable, unstagable, and right hip is a stage 4 pressure ulcer. No complaints, pt non-verbal  Review of Systems   Unable to perform ROS: Patient nonverbal       Objective:        Physical Exam  Vitals and nursing note reviewed. Exam conducted with a chaperone present.   Constitutional:       General: He is not in acute distress.  Skin:     General: Skin is warm and dry.      Findings: Lesion present.      Comments: Unstagable pressure ulcers of the BL heels, stage 4 sacral pressure ulcer, see wound care assessment documentation in chart review scanned under the media tab       There were no vitals filed for this visit.    Assessment:           ICD-10-CM ICD-9-CM   1. Pressure injury of right hip, stage 4  L89.214 707.04     707.24   2. Pressure injury of right heel, unstageable  L89.610 707.07     707.25   3. Pressure injury of left heel, unstageable  L89.620 707.07     707.25                Plan:                  Diagnoses and all orders for this visit:    Pressure injury of right hip, stage 4    Pressure injury of right heel, unstageable    Pressure injury of left heel, unstageable        Much of the documentation for this visit was completed in wound docs system. Please see the attached documentation for further details about the patients care. Scanned under the media tab.

## 2023-07-27 ENCOUNTER — PATIENT MESSAGE (OUTPATIENT)
Dept: WOUND CARE | Facility: HOSPITAL | Age: 88
End: 2023-07-27
Payer: MEDICARE

## 2023-07-27 ENCOUNTER — OFFICE VISIT (OUTPATIENT)
Dept: WOUND CARE | Facility: HOSPITAL | Age: 88
End: 2023-07-27
Attending: FAMILY MEDICINE
Payer: MEDICARE

## 2023-07-27 VITALS
SYSTOLIC BLOOD PRESSURE: 98 MMHG | DIASTOLIC BLOOD PRESSURE: 57 MMHG | HEART RATE: 79 BPM | TEMPERATURE: 98 F | RESPIRATION RATE: 18 BRPM

## 2023-07-27 DIAGNOSIS — L89.610 PRESSURE INJURY OF RIGHT HEEL, UNSTAGEABLE: ICD-10-CM

## 2023-07-27 DIAGNOSIS — L89.620 PRESSURE INJURY OF LEFT HEEL, UNSTAGEABLE: ICD-10-CM

## 2023-07-27 DIAGNOSIS — L89.214 PRESSURE INJURY OF RIGHT HIP, STAGE 4: Primary | ICD-10-CM

## 2023-07-27 PROCEDURE — 11043 DBRDMT MUSC&/FSCA 1ST 20/<: CPT | Performed by: FAMILY MEDICINE

## 2023-07-27 PROCEDURE — 99499 NO LOS: ICD-10-PCS | Mod: ,,, | Performed by: FAMILY MEDICINE

## 2023-07-27 PROCEDURE — 99499 UNLISTED E&M SERVICE: CPT | Mod: ,,, | Performed by: FAMILY MEDICINE

## 2023-07-27 PROCEDURE — 11043 PR DEBRIDEMENT, SKIN, SUB-Q TISSUE,MUSCLE,=<20 SQ CM: ICD-10-PCS | Mod: ,,, | Performed by: FAMILY MEDICINE

## 2023-07-27 PROCEDURE — 11043 DBRDMT MUSC&/FSCA 1ST 20/<: CPT | Mod: ,,, | Performed by: FAMILY MEDICINE

## 2023-07-27 NOTE — PROGRESS NOTES
Wound Care Progress Note    Subjective:       Patient ID: Adriano Borjas is a 88 y.o. male.    Chief Complaint: Wound Care    HPI  Pt seen in clinic for a FU visit for BL heels and right hip pressure ulcers. Wounds are doing well. Pt son had a lot of questions about placing his father on hospice, and the implications. NH is discussing hospice with him and he wanted to make sure that hospice isn't going to mean that they were just going to stop caring for him. They are in talks with the NH about the choices hospice has for his father. He has no new complaints today. Wounds are all improving, some granulation developing I the base of the hip wound.  Review of Systems   Skin:  Positive for wound.        Right hip and BL heels pressure ulcers   All other systems reviewed and are negative.      Objective:        Physical Exam  Vitals and nursing note reviewed.   Constitutional:       Appearance: Normal appearance.   Skin:     General: Skin is warm and dry.      Findings: Lesion present.      Comments: Right hip and BL heel pressure ulcers, see wound care assessment documentation in chart review scanned under the media tab   Neurological:      Mental Status: He is alert.       Vitals:    07/27/23 0947   BP: (!) 98/57   Pulse: 79   Resp: 18   Temp: 98.1 °F (36.7 °C)       Assessment:           ICD-10-CM ICD-9-CM   1. Pressure injury of right hip, stage 4  L89.214 707.04     707.24   2. Pressure injury of right heel, unstageable  L89.610 707.07     707.25   3. Pressure injury of left heel, unstageable  L89.620 707.07     707.25                Plan:                  Adriano was seen today for wound care.    Diagnoses and all orders for this visit:    Pressure injury of right hip, stage 4    Pressure injury of right heel, unstageable    Pressure injury of left heel, unstageable      Please see wound care instructions which have been provided separately

## 2023-08-03 ENCOUNTER — OFFICE VISIT (OUTPATIENT)
Dept: WOUND CARE | Facility: HOSPITAL | Age: 88
End: 2023-08-03
Attending: FAMILY MEDICINE
Payer: MEDICARE

## 2023-08-03 DIAGNOSIS — L89.214 PRESSURE INJURY OF RIGHT HIP, STAGE 4: Primary | ICD-10-CM

## 2023-08-03 DIAGNOSIS — L89.610 PRESSURE INJURY OF RIGHT HEEL, UNSTAGEABLE: ICD-10-CM

## 2023-08-03 DIAGNOSIS — L89.620 PRESSURE INJURY OF LEFT HEEL, UNSTAGEABLE: ICD-10-CM

## 2023-08-03 PROCEDURE — 11043 DBRDMT MUSC&/FSCA 1ST 20/<: CPT | Mod: ,,, | Performed by: FAMILY MEDICINE

## 2023-08-03 PROCEDURE — 99499 NO LOS: ICD-10-PCS | Mod: ,,, | Performed by: FAMILY MEDICINE

## 2023-08-03 PROCEDURE — 99499 UNLISTED E&M SERVICE: CPT | Mod: ,,, | Performed by: FAMILY MEDICINE

## 2023-08-03 PROCEDURE — 97605 NEG PRS WND THER DME<=50SQCM: CPT | Performed by: FAMILY MEDICINE

## 2023-08-03 PROCEDURE — 11043 PR DEBRIDEMENT, SKIN, SUB-Q TISSUE,MUSCLE,=<20 SQ CM: ICD-10-PCS | Mod: ,,, | Performed by: FAMILY MEDICINE

## 2023-08-03 PROCEDURE — 11043 DBRDMT MUSC&/FSCA 1ST 20/<: CPT | Performed by: FAMILY MEDICINE

## 2023-08-07 NOTE — PROGRESS NOTES
Wound Care Progress Note    Subjective:       Patient ID: Adriano Borjas is a 88 y.o. male.    Chief Complaint: Wound Care and Wound Consult    HPI  Pt seen in clinic for a FU visit for a right hip stage 4 pressure ulcer and BL heels unstagable pressure ulcers. Pt is non verbal, son present, no new complaints today.  Review of Systems   Skin:  Positive for wound.        BL heel and right hip pressure ulcer   All other systems reviewed and are negative.      Objective:        Physical Exam  Vitals and nursing note reviewed. Exam conducted with a chaperone present.   Skin:     General: Skin is warm and dry.      Findings: Lesion present.      Comments: BL heel and right hip pressure ulcer, see wound care assessment documentation in chart review scanned under the media tab       There were no vitals filed for this visit.    Assessment:           ICD-10-CM ICD-9-CM   1. Pressure injury of right hip, stage 4  L89.214 707.04     707.24   2. Pressure injury of right heel, unstageable  L89.610 707.07     707.25   3. Pressure injury of left heel, unstageable  L89.620 707.07     707.25                Plan:                  Adriano was seen today for wound care and wound consult.    Diagnoses and all orders for this visit:    Pressure injury of right hip, stage 4    Pressure injury of right heel, unstageable    Pressure injury of left heel, unstageable      See wound care instructions which have been provided separately.

## 2023-08-10 ENCOUNTER — OFFICE VISIT (OUTPATIENT)
Dept: WOUND CARE | Facility: HOSPITAL | Age: 88
End: 2023-08-10
Attending: FAMILY MEDICINE
Payer: MEDICARE

## 2023-08-10 VITALS
SYSTOLIC BLOOD PRESSURE: 118 MMHG | HEART RATE: 77 BPM | TEMPERATURE: 98 F | DIASTOLIC BLOOD PRESSURE: 71 MMHG | RESPIRATION RATE: 18 BRPM

## 2023-08-10 DIAGNOSIS — L89.610 PRESSURE INJURY OF RIGHT HEEL, UNSTAGEABLE: ICD-10-CM

## 2023-08-10 DIAGNOSIS — L89.214 PRESSURE INJURY OF RIGHT HIP, STAGE 4: Primary | ICD-10-CM

## 2023-08-10 DIAGNOSIS — L89.620 PRESSURE INJURY OF LEFT HEEL, UNSTAGEABLE: ICD-10-CM

## 2023-08-10 PROCEDURE — 99499 NO LOS: ICD-10-PCS | Mod: ,,, | Performed by: FAMILY MEDICINE

## 2023-08-10 PROCEDURE — 97605 NEG PRS WND THER DME<=50SQCM: CPT | Performed by: FAMILY MEDICINE

## 2023-08-10 PROCEDURE — 99499 UNLISTED E&M SERVICE: CPT | Mod: ,,, | Performed by: FAMILY MEDICINE

## 2023-08-10 PROCEDURE — 11043 DBRDMT MUSC&/FSCA 1ST 20/<: CPT | Performed by: FAMILY MEDICINE

## 2023-08-10 PROCEDURE — 11043 DBRDMT MUSC&/FSCA 1ST 20/<: CPT | Mod: ,,, | Performed by: FAMILY MEDICINE

## 2023-08-10 PROCEDURE — 11043 PR DEBRIDEMENT, SKIN, SUB-Q TISSUE,MUSCLE,=<20 SQ CM: ICD-10-PCS | Mod: ,,, | Performed by: FAMILY MEDICINE

## 2023-08-10 NOTE — PROGRESS NOTES
Wound Care Progress Note    Subjective:       Patient ID: Adriano Borjas is a 88 y.o. male.    Chief Complaint: Wound Care    HPI  Pt seen in clinic for a FU visit for BL heel and right hip pressure ulcer. Pt wounds heels are improved and the hip wound is developing granulation. No new complaints today.  Review of Systems   Skin:  Positive for wound.        Right hip and BL heel pressure ulcers   All other systems reviewed and are negative.      Objective:        Physical Exam  Vitals and nursing note reviewed. Exam conducted with a chaperone present.   Constitutional:       General: He is not in acute distress.     Appearance: Normal appearance. He is not toxic-appearing.   Skin:     General: Skin is warm and dry.      Findings: Lesion present.      Comments: BL heeland right hip pressure ulcers, see wound care assessment documentation in chart review scanned under the media tab   Neurological:      General: No focal deficit present.      Mental Status: He is alert.       Vitals:    08/10/23 1103   BP: 118/71   Pulse: 77   Resp: 18   Temp: 98.3 °F (36.8 °C)       Assessment:           ICD-10-CM ICD-9-CM   1. Pressure injury of right hip, stage 4  L89.214 707.04     707.24   2. Pressure injury of right heel, unstageable  L89.610 707.07     707.25   3. Pressure injury of left heel, unstageable  L89.620 707.07     707.25                Plan:                  Adriano was seen today for wound care.    Diagnoses and all orders for this visit:    Pressure injury of right hip, stage 4    Pressure injury of right heel, unstageable    Pressure injury of left heel, unstageable      See wound care instructions which have been provided separately.

## 2023-08-17 ENCOUNTER — OFFICE VISIT (OUTPATIENT)
Dept: WOUND CARE | Facility: HOSPITAL | Age: 88
End: 2023-08-17
Attending: FAMILY MEDICINE
Payer: MEDICARE

## 2023-08-17 VITALS
HEART RATE: 77 BPM | DIASTOLIC BLOOD PRESSURE: 70 MMHG | RESPIRATION RATE: 18 BRPM | TEMPERATURE: 98 F | SYSTOLIC BLOOD PRESSURE: 120 MMHG

## 2023-08-17 DIAGNOSIS — L89.214 PRESSURE INJURY OF RIGHT HIP, STAGE 4: Primary | ICD-10-CM

## 2023-08-17 DIAGNOSIS — L89.610 PRESSURE INJURY OF RIGHT HEEL, UNSTAGEABLE: ICD-10-CM

## 2023-08-17 DIAGNOSIS — L89.620 PRESSURE INJURY OF LEFT HEEL, UNSTAGEABLE: ICD-10-CM

## 2023-08-17 DIAGNOSIS — S31.829A OPEN WOUND OF LEFT BUTTOCK, INITIAL ENCOUNTER: ICD-10-CM

## 2023-08-17 DIAGNOSIS — T14.8XXA BLISTER: ICD-10-CM

## 2023-08-17 PROCEDURE — 11042 DBRDMT SUBQ TIS 1ST 20SQCM/<: CPT | Mod: 59,,, | Performed by: FAMILY MEDICINE

## 2023-08-17 PROCEDURE — 11042 PR DEBRIDEMENT, SKIN, SUB-Q TISSUE,=<20 SQ CM: ICD-10-PCS | Mod: 59,,, | Performed by: FAMILY MEDICINE

## 2023-08-17 PROCEDURE — 97605 NEG PRS WND THER DME<=50SQCM: CPT | Performed by: FAMILY MEDICINE

## 2023-08-17 PROCEDURE — 1160F RVW MEDS BY RX/DR IN RCRD: CPT | Mod: CPTII,,, | Performed by: FAMILY MEDICINE

## 2023-08-17 PROCEDURE — 11043 DBRDMT MUSC&/FSCA 1ST 20/<: CPT | Performed by: FAMILY MEDICINE

## 2023-08-17 PROCEDURE — 11043 PR DEBRIDEMENT, SKIN, SUB-Q TISSUE,MUSCLE,=<20 SQ CM: ICD-10-PCS | Mod: ,,, | Performed by: FAMILY MEDICINE

## 2023-08-17 PROCEDURE — 11043 DBRDMT MUSC&/FSCA 1ST 20/<: CPT | Mod: ,,, | Performed by: FAMILY MEDICINE

## 2023-08-17 PROCEDURE — 99213 OFFICE O/P EST LOW 20 MIN: CPT | Mod: 25,,, | Performed by: FAMILY MEDICINE

## 2023-08-17 PROCEDURE — 11042 DBRDMT SUBQ TIS 1ST 20SQCM/<: CPT | Performed by: FAMILY MEDICINE

## 2023-08-17 PROCEDURE — 1159F PR MEDICATION LIST DOCUMENTED IN MEDICAL RECORD: ICD-10-PCS | Mod: CPTII,,, | Performed by: FAMILY MEDICINE

## 2023-08-17 PROCEDURE — 1160F PR REVIEW ALL MEDS BY PRESCRIBER/CLIN PHARMACIST DOCUMENTED: ICD-10-PCS | Mod: CPTII,,, | Performed by: FAMILY MEDICINE

## 2023-08-17 PROCEDURE — 99213 PR OFFICE/OUTPT VISIT, EST, LEVL III, 20-29 MIN: ICD-10-PCS | Mod: 25,,, | Performed by: FAMILY MEDICINE

## 2023-08-17 PROCEDURE — 1159F MED LIST DOCD IN RCRD: CPT | Mod: CPTII,,, | Performed by: FAMILY MEDICINE

## 2023-08-17 NOTE — PROGRESS NOTES
Wound Care Progress Note    Subjective:       Patient ID: Adriano Borjas is a 88 y.o. male.    Chief Complaint: No chief complaint on file.    HPI  Pt seen in clinic for a FU visit for right hip stage 4 pressure ulcer, right heel unstagable pressure ulcer and a new wound today. Pt new wound is a blister that erupted on the left buttock. Pt is non-verbal but his son said it occurred this week and the NH said it was dressed. Pt other wounds are progressing, and the thick eschar on the heel softened today and will be debrided and removed. No other complaints today.  Review of Systems   Skin:  Positive for wound.        Open wounds of the right heel, right hip and left buttock   All other systems reviewed and are negative.      Objective:        Physical Exam  Vitals and nursing note reviewed. Exam conducted with a chaperone present.   Constitutional:       Appearance: Normal appearance.   Skin:     General: Skin is warm and dry.      Findings: Lesion present.      Comments: Open wounds of the right hip, left buttock new and right heel. See wound care assessment documentation in chart review scanned under the media tab       There were no vitals filed for this visit.    Assessment:           ICD-10-CM ICD-9-CM   1. Pressure injury of right hip, stage 4  L89.214 707.04     707.24   2. Pressure injury of right heel, unstageable  L89.610 707.07     707.25   3. Blister  T14.8XXA 919.2   4. Open wound of left buttock, initial encounter  S31.829A 877.0                Plan:                  Diagnoses and all orders for this visit:    Pressure injury of right hip, stage 4    Pressure injury of right heel, unstageable    Blister    Open wound of left buttock, initial encounter        Much of the documentation for this visit was completed in wound docs system. Please see the attached documentation for further details about the patients care. Scanned under the media tab.

## 2023-08-24 ENCOUNTER — OFFICE VISIT (OUTPATIENT)
Dept: WOUND CARE | Facility: HOSPITAL | Age: 88
End: 2023-08-24
Attending: FAMILY MEDICINE
Payer: MEDICARE

## 2023-08-24 VITALS
HEART RATE: 97 BPM | TEMPERATURE: 98 F | SYSTOLIC BLOOD PRESSURE: 142 MMHG | DIASTOLIC BLOOD PRESSURE: 98 MMHG | RESPIRATION RATE: 18 BRPM

## 2023-08-24 DIAGNOSIS — L89.610 PRESSURE INJURY OF RIGHT HEEL, UNSTAGEABLE: ICD-10-CM

## 2023-08-24 DIAGNOSIS — L89.620 PRESSURE INJURY OF LEFT HEEL, UNSTAGEABLE: ICD-10-CM

## 2023-08-24 DIAGNOSIS — L89.214 PRESSURE INJURY OF RIGHT HIP, STAGE 4: Primary | ICD-10-CM

## 2023-08-24 DIAGNOSIS — S31.829A OPEN WOUND OF LEFT BUTTOCK, INITIAL ENCOUNTER: ICD-10-CM

## 2023-08-24 PROCEDURE — 11043 DBRDMT MUSC&/FSCA 1ST 20/<: CPT | Mod: ,,, | Performed by: FAMILY MEDICINE

## 2023-08-24 PROCEDURE — 99499 UNLISTED E&M SERVICE: CPT | Mod: ,,, | Performed by: FAMILY MEDICINE

## 2023-08-24 PROCEDURE — 11042 DBRDMT SUBQ TIS 1ST 20SQCM/<: CPT | Mod: 59,,, | Performed by: FAMILY MEDICINE

## 2023-08-24 PROCEDURE — 11042 PR DEBRIDEMENT, SKIN, SUB-Q TISSUE,=<20 SQ CM: ICD-10-PCS | Mod: 59,,, | Performed by: FAMILY MEDICINE

## 2023-08-24 PROCEDURE — 99499 NO LOS: ICD-10-PCS | Mod: ,,, | Performed by: FAMILY MEDICINE

## 2023-08-24 PROCEDURE — 11042 DBRDMT SUBQ TIS 1ST 20SQCM/<: CPT | Mod: 59 | Performed by: FAMILY MEDICINE

## 2023-08-24 PROCEDURE — 11043 DBRDMT MUSC&/FSCA 1ST 20/<: CPT | Performed by: FAMILY MEDICINE

## 2023-08-24 PROCEDURE — 11043 PR DEBRIDEMENT, SKIN, SUB-Q TISSUE,MUSCLE,=<20 SQ CM: ICD-10-PCS | Mod: ,,, | Performed by: FAMILY MEDICINE

## 2023-08-27 NOTE — PROGRESS NOTES
Wound Care Progress Note    Subjective:       Patient ID: Adriano Borjas is a 88 y.o. male.    Chief Complaint: Wound Care    HPI  Pt seen in clinic for a FU visit for left hip and BL heels pressure ulcer. Wounds continue to slowly improve, pt has no new complaints today(his son).   Review of Systems   Skin:  Positive for wound.        Left hip and BL heels pressure ulcers   All other systems reviewed and are negative.      Objective:        Physical Exam  Vitals and nursing note reviewed. Exam conducted with a chaperone present.   Constitutional:       General: He is not in acute distress.     Appearance: Normal appearance.   Skin:     General: Skin is warm and dry.      Findings: Lesion present.      Comments: Left hip and BL heels pressure ulcers, see wound care assessment documentation in chart review scanned under the media tab   Neurological:      Mental Status: He is alert.       Vitals:    08/24/23 1112   BP: (!) 142/98   Pulse: 97   Resp: 18   Temp: 98.2 °F (36.8 °C)       Assessment:           ICD-10-CM ICD-9-CM   1. Pressure injury of right hip, stage 4  L89.214 707.04     707.24   2. Pressure injury of right heel, unstageable  L89.610 707.07     707.25   3. Pressure injury of left heel, unstageable  L89.620 707.07     707.25   4. Open wound of left buttock, initial encounter  S31.829A 877.0                Plan:                  Adriano was seen today for wound care.    Diagnoses and all orders for this visit:    Pressure injury of right hip, stage 4    Pressure injury of right heel, unstageable    Pressure injury of left heel, unstageable    Open wound of left buttock, initial encounter        Much of the documentation for this visit was completed in the Wound Docs system.  Please see the attached documentation for further details about the patient's care. Scanned under the Media tab

## 2023-08-31 ENCOUNTER — OFFICE VISIT (OUTPATIENT)
Dept: WOUND CARE | Facility: HOSPITAL | Age: 88
End: 2023-08-31
Attending: FAMILY MEDICINE
Payer: MEDICARE

## 2023-08-31 VITALS
RESPIRATION RATE: 18 BRPM | HEART RATE: 96 BPM | SYSTOLIC BLOOD PRESSURE: 103 MMHG | TEMPERATURE: 98 F | DIASTOLIC BLOOD PRESSURE: 78 MMHG

## 2023-08-31 DIAGNOSIS — L89.620 UNSTAGEABLE PRESSURE ULCER OF LEFT HEEL: Primary | ICD-10-CM

## 2023-08-31 DIAGNOSIS — L89.214 STAGE IV PRESSURE ULCER OF RIGHT HIP: ICD-10-CM

## 2023-08-31 DIAGNOSIS — L89.610 UNSTAGEABLE PRESSURE ULCER OF RIGHT HEEL: ICD-10-CM

## 2023-08-31 PROCEDURE — 11042 PR DEBRIDEMENT, SKIN, SUB-Q TISSUE,=<20 SQ CM: ICD-10-PCS | Mod: 59,,, | Performed by: FAMILY MEDICINE

## 2023-08-31 PROCEDURE — 11042 DBRDMT SUBQ TIS 1ST 20SQCM/<: CPT | Performed by: FAMILY MEDICINE

## 2023-08-31 PROCEDURE — 11042 DBRDMT SUBQ TIS 1ST 20SQCM/<: CPT | Mod: 59,,, | Performed by: FAMILY MEDICINE

## 2023-08-31 PROCEDURE — 11043 PR DEBRIDEMENT, SKIN, SUB-Q TISSUE,MUSCLE,=<20 SQ CM: ICD-10-PCS | Mod: ,,, | Performed by: FAMILY MEDICINE

## 2023-08-31 PROCEDURE — 99499 NO LOS: ICD-10-PCS | Mod: ,,, | Performed by: FAMILY MEDICINE

## 2023-08-31 PROCEDURE — 11043 DBRDMT MUSC&/FSCA 1ST 20/<: CPT | Performed by: FAMILY MEDICINE

## 2023-08-31 PROCEDURE — 11043 DBRDMT MUSC&/FSCA 1ST 20/<: CPT | Mod: ,,, | Performed by: FAMILY MEDICINE

## 2023-08-31 PROCEDURE — 99499 UNLISTED E&M SERVICE: CPT | Mod: ,,, | Performed by: FAMILY MEDICINE

## 2023-09-01 NOTE — PROGRESS NOTES
Wound Care Progress Note    Subjective:       Patient ID: Adriano Borjas is a 88 y.o. male.    Chief Complaint: Non-healing Wound (Hip, heels wounds)    HPI  Pt seen in clinic for a FU visit for BL heels and a right hip pressure ulcer. Wounds are stable, hip is showing significant improvement. Pt son is present and has no new concerns. We discussed hospice, as he is considering it for his father. He is going to speak with a hospice counselor in the coming weeks.    Review of Systems   Skin:  Positive for wound.        Right hip and BL heels pressure ulcers   All other systems reviewed and are negative.      Objective:        Physical Exam  Vitals and nursing note reviewed. Exam conducted with a chaperone present.   Constitutional:       Appearance: Normal appearance.   Skin:     General: Skin is warm and dry.      Findings: Lesion present.      Comments: BL heels and right hip pressure ulcers, see wound care assessment documentation in chart review scanned under the media tab       Vitals:    08/31/23 1100   BP: 103/78   Pulse: 96   Resp: 18   Temp: 98.2 °F (36.8 °C)       Assessment:           ICD-10-CM ICD-9-CM   1. Unstageable pressure ulcer of left heel  L89.620 707.07     707.25   2. Unstageable pressure ulcer of right heel  L89.610 707.07     707.25   3. Stage IV pressure ulcer of right hip  L89.214 707.04     707.24                Plan:                  Adriano was seen today for non-healing wound.    Diagnoses and all orders for this visit:    Unstageable pressure ulcer of left heel    Unstageable pressure ulcer of right heel    Stage IV pressure ulcer of right hip      Much of the documentation for this visit was completed in wound docs system. Please see the attached documentation for further details about the patients care. Scanned under the media tab.

## 2023-09-07 ENCOUNTER — OFFICE VISIT (OUTPATIENT)
Dept: WOUND CARE | Facility: HOSPITAL | Age: 88
End: 2023-09-07
Attending: FAMILY MEDICINE
Payer: MEDICARE

## 2023-09-07 VITALS — RESPIRATION RATE: 16 BRPM | TEMPERATURE: 98 F

## 2023-09-07 DIAGNOSIS — L89.214 STAGE IV PRESSURE ULCER OF RIGHT HIP: ICD-10-CM

## 2023-09-07 DIAGNOSIS — L89.620 UNSTAGEABLE PRESSURE ULCER OF LEFT HEEL: Primary | ICD-10-CM

## 2023-09-07 DIAGNOSIS — L89.610 UNSTAGEABLE PRESSURE ULCER OF RIGHT HEEL: ICD-10-CM

## 2023-09-07 PROCEDURE — 11043 DBRDMT MUSC&/FSCA 1ST 20/<: CPT | Mod: ,,, | Performed by: FAMILY MEDICINE

## 2023-09-07 PROCEDURE — 1159F MED LIST DOCD IN RCRD: CPT | Mod: CPTII,,, | Performed by: FAMILY MEDICINE

## 2023-09-07 PROCEDURE — 87186 SC STD MICRODIL/AGAR DIL: CPT | Performed by: FAMILY MEDICINE

## 2023-09-07 PROCEDURE — 11043 DBRDMT MUSC&/FSCA 1ST 20/<: CPT | Performed by: FAMILY MEDICINE

## 2023-09-07 PROCEDURE — 1126F PR PAIN SEVERITY QUANTIFIED, NO PAIN PRESENT: ICD-10-PCS | Mod: CPTII,,, | Performed by: FAMILY MEDICINE

## 2023-09-07 PROCEDURE — 99213 PR OFFICE/OUTPT VISIT, EST, LEVL III, 20-29 MIN: ICD-10-PCS | Mod: 25,,, | Performed by: FAMILY MEDICINE

## 2023-09-07 PROCEDURE — 87070 CULTURE OTHR SPECIMN AEROBIC: CPT | Performed by: FAMILY MEDICINE

## 2023-09-07 PROCEDURE — 99213 OFFICE O/P EST LOW 20 MIN: CPT | Mod: 25,,, | Performed by: FAMILY MEDICINE

## 2023-09-07 PROCEDURE — 1126F AMNT PAIN NOTED NONE PRSNT: CPT | Mod: CPTII,,, | Performed by: FAMILY MEDICINE

## 2023-09-07 PROCEDURE — 1160F RVW MEDS BY RX/DR IN RCRD: CPT | Mod: CPTII,,, | Performed by: FAMILY MEDICINE

## 2023-09-07 PROCEDURE — 1160F PR REVIEW ALL MEDS BY PRESCRIBER/CLIN PHARMACIST DOCUMENTED: ICD-10-PCS | Mod: CPTII,,, | Performed by: FAMILY MEDICINE

## 2023-09-07 PROCEDURE — 11042 DBRDMT SUBQ TIS 1ST 20SQCM/<: CPT | Mod: 59,,, | Performed by: FAMILY MEDICINE

## 2023-09-07 PROCEDURE — 11042 DBRDMT SUBQ TIS 1ST 20SQCM/<: CPT | Mod: 59 | Performed by: FAMILY MEDICINE

## 2023-09-07 PROCEDURE — 11043 PR DEBRIDEMENT, SKIN, SUB-Q TISSUE,MUSCLE,=<20 SQ CM: ICD-10-PCS | Mod: ,,, | Performed by: FAMILY MEDICINE

## 2023-09-07 PROCEDURE — 11042 PR DEBRIDEMENT, SKIN, SUB-Q TISSUE,=<20 SQ CM: ICD-10-PCS | Mod: 59,,, | Performed by: FAMILY MEDICINE

## 2023-09-07 PROCEDURE — 87077 CULTURE AEROBIC IDENTIFY: CPT | Performed by: FAMILY MEDICINE

## 2023-09-07 PROCEDURE — 97605 NEG PRS WND THER DME<=50SQCM: CPT | Performed by: FAMILY MEDICINE

## 2023-09-07 PROCEDURE — 1159F PR MEDICATION LIST DOCUMENTED IN MEDICAL RECORD: ICD-10-PCS | Mod: CPTII,,, | Performed by: FAMILY MEDICINE

## 2023-09-07 NOTE — PROGRESS NOTES
Wound Care Progress Note    Subjective:       Patient ID: Adriano Borjas is a 88 y.o. male.    Chief Complaint: Wound Care and Wound Consult    HPI  Pt seen in clinic for a FU visit for BL heel pressure ulcers and a right hip pressure ulcer. Wounds on both heels are deteriorated and his son who is with him stated he sits in his wheelchair and shuffles with his heels to move. We decided to pad the feet for protection. Pt right heel was necrotic and had an odor today so we decided to culture the wound. No other new complaints today.   Review of Systems   Skin:  Positive for wound.        BL heel and right hip pressure ulcers   All other systems reviewed and are negative.      Objective:        Physical Exam  Vitals and nursing note reviewed. Exam conducted with a chaperone present.   Constitutional:       General: He is not in acute distress.  Skin:     General: Skin is warm and dry.      Findings: Lesion present.      Comments: BL heel unstagable pressure ulcers, right hip pressure ulcer, see wound care assessment documentation in chart review scanned under [employer] media tab       Vitals:    09/07/23 1142   Resp: 16   Temp: 98.2 °F (36.8 °C)       Assessment:           ICD-10-CM ICD-9-CM   1. Unstageable pressure ulcer of left heel  L89.620 707.07     707.25   2. Unstageable pressure ulcer of right heel  L89.610 707.07     707.25   3. Stage IV pressure ulcer of right hip  L89.214 707.04     707.24                Plan:                  Adriano was seen today for wound care and wound consult.    Diagnoses and all orders for this visit:    Unstageable pressure ulcer of left heel    Unstageable pressure ulcer of right heel  -     Aerobic culture    Stage IV pressure ulcer of right hip      See wound care instructions which have been provided separately.

## 2023-09-10 LAB — BACTERIA SPEC AEROBE CULT: ABNORMAL

## 2023-09-14 ENCOUNTER — OFFICE VISIT (OUTPATIENT)
Dept: WOUND CARE | Facility: HOSPITAL | Age: 88
End: 2023-09-14
Attending: FAMILY MEDICINE
Payer: MEDICARE

## 2023-09-14 VITALS
SYSTOLIC BLOOD PRESSURE: 107 MMHG | TEMPERATURE: 99 F | DIASTOLIC BLOOD PRESSURE: 65 MMHG | HEART RATE: 89 BPM | RESPIRATION RATE: 18 BRPM

## 2023-09-14 DIAGNOSIS — L89.620 UNSTAGEABLE PRESSURE ULCER OF LEFT HEEL: Primary | ICD-10-CM

## 2023-09-14 DIAGNOSIS — L89.610 UNSTAGEABLE PRESSURE ULCER OF RIGHT HEEL: ICD-10-CM

## 2023-09-14 DIAGNOSIS — L89.214 STAGE IV PRESSURE ULCER OF RIGHT HIP: ICD-10-CM

## 2023-09-14 PROCEDURE — 11042 DBRDMT SUBQ TIS 1ST 20SQCM/<: CPT | Mod: 59,,, | Performed by: FAMILY MEDICINE

## 2023-09-14 PROCEDURE — 11043 PR DEBRIDEMENT, SKIN, SUB-Q TISSUE,MUSCLE,=<20 SQ CM: ICD-10-PCS | Mod: ,,, | Performed by: FAMILY MEDICINE

## 2023-09-14 PROCEDURE — 99499 UNLISTED E&M SERVICE: CPT | Mod: ,,, | Performed by: FAMILY MEDICINE

## 2023-09-14 PROCEDURE — 11042 PR DEBRIDEMENT, SKIN, SUB-Q TISSUE,=<20 SQ CM: ICD-10-PCS | Mod: 59,,, | Performed by: FAMILY MEDICINE

## 2023-09-14 PROCEDURE — 99499 NO LOS: ICD-10-PCS | Mod: ,,, | Performed by: FAMILY MEDICINE

## 2023-09-14 PROCEDURE — 11043 DBRDMT MUSC&/FSCA 1ST 20/<: CPT | Performed by: FAMILY MEDICINE

## 2023-09-14 PROCEDURE — 97605 NEG PRS WND THER DME<=50SQCM: CPT | Performed by: FAMILY MEDICINE

## 2023-09-14 PROCEDURE — 11043 DBRDMT MUSC&/FSCA 1ST 20/<: CPT | Mod: ,,, | Performed by: FAMILY MEDICINE

## 2023-09-14 NOTE — PROGRESS NOTES
Wound Care Progress Note    Subjective:       Patient ID: Adriano Borjas is a 88 y.o. male.    Chief Complaint: Wound Care    HPI  Pt seen in clinic for a FU visit for BL heel pressure ulcers, and a right hip pressure ulcer. Pt hip is improved, heels are covered in necrotic tissue, but too painful to debride. Caregiver has no new complaints today  Review of Systems   Skin:  Positive for wound.        Right hip and BL heel pressure ulcers   All other systems reviewed and are negative.        Objective:        Physical Exam  Vitals and nursing note reviewed. Exam conducted with a chaperone present.   Constitutional:       General: He is not in acute distress.     Appearance: Normal appearance.   Skin:     General: Skin is warm and dry.      Findings: Lesion present.      Comments: BL heel unstagable pressure ulcers, right hip stager 4 pressure ulcer, see wound care assessment documentation in chart review scanned under the media tab   Neurological:      Mental Status: He is alert.       Vitals:    09/14/23 1329   BP: 107/65   Pulse: 89   Resp: 18   Temp: 99 °F (37.2 °C)       Assessment:           ICD-10-CM ICD-9-CM   1. Unstageable pressure ulcer of left heel  L89.620 707.07     707.25   2. Unstageable pressure ulcer of right heel  L89.610 707.07     707.25   3. Stage IV pressure ulcer of right hip  L89.214 707.04     707.24                Plan:                  Adriano was seen today for wound care.    Diagnoses and all orders for this visit:    Unstageable pressure ulcer of left heel    Unstageable pressure ulcer of right heel    Stage IV pressure ulcer of right hip        Please see wound care instructions which have been provided separately.

## 2023-09-21 ENCOUNTER — HOSPITAL ENCOUNTER (INPATIENT)
Facility: HOSPITAL | Age: 88
LOS: 8 days | Discharge: NURSING, REHAB OR LONG-TERM CARE | DRG: 853 | End: 2023-09-29
Attending: EMERGENCY MEDICINE | Admitting: HOSPITALIST
Payer: MEDICARE

## 2023-09-21 DIAGNOSIS — A41.9 SEPSIS: ICD-10-CM

## 2023-09-21 DIAGNOSIS — N30.00 ACUTE CYSTITIS WITHOUT HEMATURIA: ICD-10-CM

## 2023-09-21 DIAGNOSIS — M79.671 RIGHT FOOT PAIN: ICD-10-CM

## 2023-09-21 DIAGNOSIS — R65.20 SEVERE SEPSIS: Primary | ICD-10-CM

## 2023-09-21 DIAGNOSIS — A41.9 SEVERE SEPSIS: Primary | ICD-10-CM

## 2023-09-21 DIAGNOSIS — I49.9 ABNORMAL HEART RHYTHM: ICD-10-CM

## 2023-09-21 DIAGNOSIS — R78.81 BACTEREMIA: ICD-10-CM

## 2023-09-21 DIAGNOSIS — L97.414 SKIN ULCER OF RIGHT HEEL WITH NECROSIS OF BONE: ICD-10-CM

## 2023-09-21 DIAGNOSIS — L97.424 SKIN ULCER OF LEFT HEEL WITH NECROSIS OF BONE: ICD-10-CM

## 2023-09-21 LAB
ALBUMIN SERPL BCP-MCNC: 2.3 G/DL (ref 3.5–5.2)
ALLENS TEST: ABNORMAL
ALP SERPL-CCNC: 73 U/L (ref 55–135)
ALT SERPL W/O P-5'-P-CCNC: 35 U/L (ref 10–44)
ANION GAP SERPL CALC-SCNC: 10 MMOL/L (ref 8–16)
AST SERPL-CCNC: 42 U/L (ref 10–40)
BACTERIA #/AREA URNS HPF: ABNORMAL /HPF
BASOPHILS # BLD AUTO: 0.05 K/UL (ref 0–0.2)
BASOPHILS NFR BLD: 0.4 % (ref 0–1.9)
BILIRUB SERPL-MCNC: 1 MG/DL (ref 0.1–1)
BILIRUB UR QL STRIP: NEGATIVE
BUN SERPL-MCNC: 28 MG/DL (ref 8–23)
CALCIUM SERPL-MCNC: 8.2 MG/DL (ref 8.7–10.5)
CHLORIDE SERPL-SCNC: 105 MMOL/L (ref 95–110)
CLARITY UR: ABNORMAL
CO2 SERPL-SCNC: 24 MMOL/L (ref 23–29)
COLOR UR: YELLOW
CREAT SERPL-MCNC: 1.2 MG/DL (ref 0.5–1.4)
CRP SERPL-MCNC: 175.1 MG/L (ref 0–8.2)
DELSYS: ABNORMAL
DIFFERENTIAL METHOD: ABNORMAL
EOSINOPHIL # BLD AUTO: 0 K/UL (ref 0–0.5)
EOSINOPHIL NFR BLD: 0 % (ref 0–8)
ERYTHROCYTE [DISTWIDTH] IN BLOOD BY AUTOMATED COUNT: 13 % (ref 11.5–14.5)
ERYTHROCYTE [SEDIMENTATION RATE] IN BLOOD BY WESTERGREN METHOD: 94 MM/HR (ref 0–10)
EST. GFR  (NO RACE VARIABLE): 59 ML/MIN/1.73 M^2
GLUCOSE SERPL-MCNC: 106 MG/DL (ref 70–110)
GLUCOSE SERPL-MCNC: 126 MG/DL (ref 70–110)
GLUCOSE UR QL STRIP: NEGATIVE
HCT VFR BLD AUTO: 33 % (ref 40–54)
HGB BLD-MCNC: 11 G/DL (ref 14–18)
HGB UR QL STRIP: NEGATIVE
HYALINE CASTS #/AREA URNS LPF: 1 /LPF
IMM GRANULOCYTES # BLD AUTO: 0.06 K/UL (ref 0–0.04)
IMM GRANULOCYTES NFR BLD AUTO: 0.4 % (ref 0–0.5)
INFLUENZA A, MOLECULAR: NEGATIVE
INFLUENZA B, MOLECULAR: NEGATIVE
KETONES UR QL STRIP: NEGATIVE
LACTATE SERPL-SCNC: 1.4 MMOL/L (ref 0.5–2.2)
LDH SERPL L TO P-CCNC: 2.89 MMOL/L (ref 0.5–2.2)
LEUKOCYTE ESTERASE UR QL STRIP: ABNORMAL
LYMPHOCYTES # BLD AUTO: 0.2 K/UL (ref 1–4.8)
LYMPHOCYTES NFR BLD: 1.5 % (ref 18–48)
MCH RBC QN AUTO: 30.8 PG (ref 27–31)
MCHC RBC AUTO-ENTMCNC: 33.3 G/DL (ref 32–36)
MCV RBC AUTO: 92 FL (ref 82–98)
MICROSCOPIC COMMENT: ABNORMAL
MODE: ABNORMAL
MONOCYTES # BLD AUTO: 0.4 K/UL (ref 0.3–1)
MONOCYTES NFR BLD: 2.7 % (ref 4–15)
NEUTROPHILS # BLD AUTO: 13.5 K/UL (ref 1.8–7.7)
NEUTROPHILS NFR BLD: 95 % (ref 38–73)
NITRITE UR QL STRIP: NEGATIVE
NRBC BLD-RTO: 0 /100 WBC
PH UR STRIP: 6 [PH] (ref 5–8)
PLATELET # BLD AUTO: 220 K/UL (ref 150–450)
PMV BLD AUTO: 11.8 FL (ref 9.2–12.9)
POCT GLUCOSE: 171 MG/DL (ref 70–110)
POTASSIUM SERPL-SCNC: 3.4 MMOL/L (ref 3.5–5.1)
PROCALCITONIN SERPL IA-MCNC: 5.53 NG/ML
PROT SERPL-MCNC: 6.4 G/DL (ref 6–8.4)
PROT UR QL STRIP: ABNORMAL
RBC # BLD AUTO: 3.57 M/UL (ref 4.6–6.2)
RBC #/AREA URNS HPF: 0 /HPF (ref 0–4)
SAMPLE: ABNORMAL
SARS-COV-2 RDRP RESP QL NAA+PROBE: NEGATIVE
SITE: ABNORMAL
SODIUM SERPL-SCNC: 139 MMOL/L (ref 136–145)
SP GR UR STRIP: 1.03 (ref 1–1.03)
SP02: 92
SPECIMEN SOURCE: NORMAL
URN SPEC COLLECT METH UR: ABNORMAL
UROBILINOGEN UR STRIP-ACNC: NEGATIVE EU/DL
WBC # BLD AUTO: 14.22 K/UL (ref 3.9–12.7)
WBC #/AREA URNS HPF: 30 /HPF (ref 0–5)

## 2023-09-21 PROCEDURE — 87077 CULTURE AEROBIC IDENTIFY: CPT | Mod: 59 | Performed by: EMERGENCY MEDICINE

## 2023-09-21 PROCEDURE — 96365 THER/PROPH/DIAG IV INF INIT: CPT

## 2023-09-21 PROCEDURE — 87040 BLOOD CULTURE FOR BACTERIA: CPT | Mod: 59 | Performed by: EMERGENCY MEDICINE

## 2023-09-21 PROCEDURE — 82962 GLUCOSE BLOOD TEST: CPT

## 2023-09-21 PROCEDURE — 83605 ASSAY OF LACTIC ACID: CPT

## 2023-09-21 PROCEDURE — 51701 INSERT BLADDER CATHETER: CPT

## 2023-09-21 PROCEDURE — 94761 N-INVAS EAR/PLS OXIMETRY MLT: CPT

## 2023-09-21 PROCEDURE — 93005 ELECTROCARDIOGRAM TRACING: CPT

## 2023-09-21 PROCEDURE — 25000003 PHARM REV CODE 250: Performed by: HOSPITALIST

## 2023-09-21 PROCEDURE — 99291 CRITICAL CARE FIRST HOUR: CPT

## 2023-09-21 PROCEDURE — 83605 ASSAY OF LACTIC ACID: CPT | Performed by: EMERGENCY MEDICINE

## 2023-09-21 PROCEDURE — 93010 EKG 12-LEAD: ICD-10-PCS | Mod: ,,, | Performed by: INTERNAL MEDICINE

## 2023-09-21 PROCEDURE — 85651 RBC SED RATE NONAUTOMATED: CPT | Performed by: HOSPITALIST

## 2023-09-21 PROCEDURE — 36415 COLL VENOUS BLD VENIPUNCTURE: CPT | Performed by: HOSPITALIST

## 2023-09-21 PROCEDURE — 99900035 HC TECH TIME PER 15 MIN (STAT)

## 2023-09-21 PROCEDURE — 87086 URINE CULTURE/COLONY COUNT: CPT | Performed by: EMERGENCY MEDICINE

## 2023-09-21 PROCEDURE — 25000003 PHARM REV CODE 250: Performed by: EMERGENCY MEDICINE

## 2023-09-21 PROCEDURE — 85025 COMPLETE CBC W/AUTO DIFF WBC: CPT | Performed by: EMERGENCY MEDICINE

## 2023-09-21 PROCEDURE — 87154 CUL TYP ID BLD PTHGN 6+ TRGT: CPT | Performed by: EMERGENCY MEDICINE

## 2023-09-21 PROCEDURE — 87186 SC STD MICRODIL/AGAR DIL: CPT | Mod: 59 | Performed by: EMERGENCY MEDICINE

## 2023-09-21 PROCEDURE — 25500020 PHARM REV CODE 255

## 2023-09-21 PROCEDURE — 81000 URINALYSIS NONAUTO W/SCOPE: CPT | Performed by: EMERGENCY MEDICINE

## 2023-09-21 PROCEDURE — 80053 COMPREHEN METABOLIC PANEL: CPT | Performed by: EMERGENCY MEDICINE

## 2023-09-21 PROCEDURE — 93010 ELECTROCARDIOGRAM REPORT: CPT | Mod: ,,, | Performed by: INTERNAL MEDICINE

## 2023-09-21 PROCEDURE — U0002 COVID-19 LAB TEST NON-CDC: HCPCS | Performed by: EMERGENCY MEDICINE

## 2023-09-21 PROCEDURE — 84145 PROCALCITONIN (PCT): CPT | Performed by: HOSPITALIST

## 2023-09-21 PROCEDURE — 21000000 HC CCU ICU ROOM CHARGE

## 2023-09-21 PROCEDURE — 87502 INFLUENZA DNA AMP PROBE: CPT | Performed by: EMERGENCY MEDICINE

## 2023-09-21 PROCEDURE — 63600175 PHARM REV CODE 636 W HCPCS: Performed by: HOSPITALIST

## 2023-09-21 PROCEDURE — 63600175 PHARM REV CODE 636 W HCPCS: Performed by: EMERGENCY MEDICINE

## 2023-09-21 PROCEDURE — 99222 PR INITIAL HOSPITAL CARE,LEVL II: ICD-10-PCS | Mod: ,,, | Performed by: FAMILY MEDICINE

## 2023-09-21 PROCEDURE — 87185 SC STD ENZYME DETCJ PER NZM: CPT | Performed by: EMERGENCY MEDICINE

## 2023-09-21 PROCEDURE — 36415 COLL VENOUS BLD VENIPUNCTURE: CPT | Performed by: EMERGENCY MEDICINE

## 2023-09-21 PROCEDURE — 99222 1ST HOSP IP/OBS MODERATE 55: CPT | Mod: ,,, | Performed by: FAMILY MEDICINE

## 2023-09-21 PROCEDURE — 86140 C-REACTIVE PROTEIN: CPT | Performed by: HOSPITALIST

## 2023-09-21 PROCEDURE — 96367 TX/PROPH/DG ADDL SEQ IV INF: CPT

## 2023-09-21 RX ORDER — HYDROCODONE BITARTRATE AND ACETAMINOPHEN 5; 325 MG/1; MG/1
1 TABLET ORAL EVERY 6 HOURS PRN
Status: ON HOLD | COMMUNITY
Start: 2023-08-21 | End: 2023-11-09 | Stop reason: HOSPADM

## 2023-09-21 RX ORDER — SODIUM,POTASSIUM PHOSPHATES 280-250MG
2 POWDER IN PACKET (EA) ORAL
Status: DISCONTINUED | OUTPATIENT
Start: 2023-09-21 | End: 2023-09-29 | Stop reason: HOSPADM

## 2023-09-21 RX ORDER — EYELID CLEANSER COMBINATION 3
1 PADS, MEDICATED (EA) TOPICAL DAILY
Status: ON HOLD | COMMUNITY
Start: 2023-08-29 | End: 2023-10-27 | Stop reason: HOSPADM

## 2023-09-21 RX ORDER — LANOLIN ALCOHOL/MO/W.PET/CERES
800 CREAM (GRAM) TOPICAL
Status: DISCONTINUED | OUTPATIENT
Start: 2023-09-21 | End: 2023-09-29 | Stop reason: HOSPADM

## 2023-09-21 RX ORDER — AMMONIUM LACTATE 12 G/100G
1 LOTION TOPICAL 2 TIMES DAILY
Status: ON HOLD | COMMUNITY
Start: 2023-08-16 | End: 2023-10-27 | Stop reason: HOSPADM

## 2023-09-21 RX ORDER — MEMANTINE HYDROCHLORIDE 5 MG/1
5 TABLET ORAL 2 TIMES DAILY
Status: DISCONTINUED | OUTPATIENT
Start: 2023-09-21 | End: 2023-09-29 | Stop reason: HOSPADM

## 2023-09-21 RX ORDER — IBUPROFEN 200 MG
24 TABLET ORAL
Status: DISCONTINUED | OUTPATIENT
Start: 2023-09-21 | End: 2023-09-23

## 2023-09-21 RX ORDER — ONDANSETRON 2 MG/ML
4 INJECTION INTRAMUSCULAR; INTRAVENOUS EVERY 8 HOURS PRN
Status: DISCONTINUED | OUTPATIENT
Start: 2023-09-21 | End: 2023-09-29 | Stop reason: HOSPADM

## 2023-09-21 RX ORDER — GLUCAGON 1 MG
1 KIT INJECTION
Status: DISCONTINUED | OUTPATIENT
Start: 2023-09-21 | End: 2023-09-23

## 2023-09-21 RX ORDER — ACETAMINOPHEN 500 MG
1000 TABLET ORAL
Status: ACTIVE | OUTPATIENT
Start: 2023-09-21 | End: 2023-09-21

## 2023-09-21 RX ORDER — LEVOTHYROXINE SODIUM 100 UG/1
100 TABLET ORAL
Status: DISCONTINUED | OUTPATIENT
Start: 2023-09-22 | End: 2023-09-29 | Stop reason: HOSPADM

## 2023-09-21 RX ORDER — ARGININE/GLUTAMINE/CALCIUM BMB 7G-7G-1.5G
1 POWDER IN PACKET (EA) ORAL 2 TIMES DAILY
Status: ON HOLD | COMMUNITY
End: 2023-09-29 | Stop reason: HOSPADM

## 2023-09-21 RX ORDER — DOCUSATE SODIUM 100 MG/1
100 CAPSULE, LIQUID FILLED ORAL DAILY
Status: ON HOLD | COMMUNITY
End: 2023-10-27 | Stop reason: HOSPADM

## 2023-09-21 RX ORDER — IBUPROFEN 200 MG
16 TABLET ORAL
Status: DISCONTINUED | OUTPATIENT
Start: 2023-09-21 | End: 2023-09-23

## 2023-09-21 RX ORDER — ENOXAPARIN SODIUM 100 MG/ML
40 INJECTION SUBCUTANEOUS EVERY 24 HOURS
Status: DISCONTINUED | OUTPATIENT
Start: 2023-09-21 | End: 2023-09-29 | Stop reason: HOSPADM

## 2023-09-21 RX ORDER — ACETAMINOPHEN 500 MG
1000 TABLET ORAL EVERY 8 HOURS PRN
Status: DISCONTINUED | OUTPATIENT
Start: 2023-09-21 | End: 2023-09-22

## 2023-09-21 RX ORDER — MUPIROCIN 20 MG/G
OINTMENT TOPICAL 2 TIMES DAILY
Status: DISPENSED | OUTPATIENT
Start: 2023-09-21 | End: 2023-09-26

## 2023-09-21 RX ADMIN — ENOXAPARIN SODIUM 40 MG: 40 INJECTION SUBCUTANEOUS at 04:09

## 2023-09-21 RX ADMIN — ACETAMINOPHEN 650 MG: 325 SUPPOSITORY RECTAL at 10:09

## 2023-09-21 RX ADMIN — MEMANTINE HYDROCHLORIDE 5 MG: 5 TABLET ORAL at 09:09

## 2023-09-21 RX ADMIN — VANCOMYCIN HYDROCHLORIDE 1500 MG: 1.5 INJECTION, POWDER, LYOPHILIZED, FOR SOLUTION INTRAVENOUS at 10:09

## 2023-09-21 RX ADMIN — PIPERACILLIN AND TAZOBACTAM 4.5 G: 4; .5 INJECTION, POWDER, LYOPHILIZED, FOR SOLUTION INTRAVENOUS; PARENTERAL at 09:09

## 2023-09-21 RX ADMIN — IOHEXOL 75 ML: 350 INJECTION, SOLUTION INTRAVENOUS at 11:09

## 2023-09-21 RX ADMIN — MUPIROCIN 1 G: 20 OINTMENT TOPICAL at 09:09

## 2023-09-21 RX ADMIN — SODIUM CHLORIDE 1000 ML: 9 INJECTION, SOLUTION INTRAVENOUS at 09:09

## 2023-09-21 NOTE — ASSESSMENT & PLAN NOTE
Continue wound VAC.  Consult with wound care.    CT of right hip with progression of patient's soft tissue wound.  No bony destruction visualized.  Consider MRI to evaluate for osteomyelitis however not sure if patient will be able to tolerate

## 2023-09-21 NOTE — SUBJECTIVE & OBJECTIVE
Past Medical History:   Diagnosis Date    A-fib     Dementia     Elevated cholesterol     Essential hypertension     History of cerebral hemorrhage 9/5/2019    Kidney stone     passed on his own    Memory loss     dememtia per family    Osteoarthritis     Paroxysmal atrial fibrillation 12/12/2018    Stroke     2012 TIA     Thyroid disease        Past Surgical History:   Procedure Laterality Date    CYSTOSCOPY      EYE SURGERY      MAGNETIC RESONANCE IMAGING N/A 8/23/2019    Procedure: MRI (Magnetic Resonance Imagine) needs anesthesia;  Surgeon: Panchito Montoya MD;  Location: [facility];  Service: Anesthesiology;  Laterality: N/A;       Review of patient's allergies indicates:   Allergen Reactions    Ciprofloxacin Other (See Comments)     Shoulder and leg pain/cramping     Codeine Other (See Comments)     Pt cannot remember been a long time ago    Sulfa (sulfonamide antibiotics)      Allergic reaction to bactrim       Current Facility-Administered Medications on File Prior to Encounter   Medication    lactated ringers infusion    lidocaine (PF) 10 mg/ml (1%) injection 10 mg    tuberculin injection 5 Units     Current Outpatient Medications on File Prior to Encounter   Medication Sig    acetaminophen (TYLENOL) 500 MG tablet Take 500 mg by mouth every evening.    amLODIPine (NORVASC) 5 MG tablet Take 5 mg by mouth once daily. MAR    ascorbic acid, vitamin C, (VITAMIN C) 500 MG tablet Take 500 mg by mouth once daily.    atorvastatin (LIPITOR) 40 MG tablet Take 1 tablet (40 mg total) by mouth once daily.    collagenase (SANTYL) ointment Apply topically once daily. Clean right hip with wound cleanser and pat dry. Apply a nickel thick layer of santyl to the right hip, cover with adaptic (vaseline gauze) and ABD and secure with tape daily.    hydrocortisone 2.5 % cream Thin film to AA onface daily PRN flare    hydrOXYzine pamoate (VISTARIL) 25 MG Cap Take 1 capsule (25 mg total) by mouth every 8 (eight) hours as needed  (Itching).    ketoconazole (NIZORAL) 2 % cream Thin film to red scaly rash on face twice daily as needed for flare    ketoconazole (NIZORAL) 2 % shampoo Wash hair with medicated shampoo at least 2x/week - let sit on scalp at least 5 minutes prior to rinsing    levothyroxine (SYNTHROID) 100 MCG tablet Take 1 tablet (100 mcg total) by mouth once daily.    memantine (NAMENDA) 5 MG Tab Take 5 mg by mouth 2 (two) times daily.    mirtazapine (REMERON) 30 MG tablet Take 1 tablet (30 mg total) by mouth every evening.    multivitamin (THERAGRAN) tablet Take 1 tablet by mouth once daily.    OLANZapine (ZYPREXA) 10 MG tablet Take 10 mg by mouth nightly.    propylene glycoL 0.6 % Drop Place 2 drops into both eyes 3 (three) times daily. For dry eyes    pulse oximeter (PULSE OXIMETER) device by Apply Externally route 2 (two) times a day. Use twice daily at 8 AM and 3 PM and record the value in MyChart as directed.    sertraline (ZOLOFT) 25 MG tablet Take 1 tablet (25 mg total) by mouth once daily.    UNABLE TO FIND Admit to nursing home.     Family History       Problem Relation (Age of Onset)    Cancer Mother    Dementia Sister, Brother    Diabetes Mother    Heart disease Father, Brother    Hyperlipidemia Mother, Father    Migraines Daughter    Tremor Daughter, Son          Tobacco Use    Smoking status: Former     Types: Cigarettes    Smokeless tobacco: Never    Tobacco comments:     quit 50 years ago   Substance and Sexual Activity    Alcohol use: No    Drug use: No    Sexual activity: Not on file     Review of Systems   Unable to perform ROS: Dementia     Objective:     Vital Signs (Most Recent):  Temp: (!) 102.2 °F (39 °C) (09/21/23 0918)  Pulse: 98 (09/21/23 1113)  Resp: 18 (09/21/23 1108)  BP: 128/64 (09/21/23 1108)  SpO2: 100 % (09/21/23 1108) Vital Signs (24h Range):  Temp:  [102.2 °F (39 °C)] 102.2 °F (39 °C)  Pulse:  [] 98  Resp:  [16-24] 18  SpO2:  [93 %-100 %] 100 %  BP: ()/(52-96) 128/64     Weight: 74.8  kg (165 lb)  Body mass index is 25.09 kg/m².     Physical Exam  Constitutional:       General: He is not in acute distress.     Appearance: He is well-developed. He is ill-appearing.   HENT:      Head: Normocephalic and atraumatic.   Eyes:      Pupils: Pupils are equal, round, and reactive to light.   Cardiovascular:      Rate and Rhythm: Normal rate and regular rhythm.      Heart sounds: No murmur heard.  Pulmonary:      Effort: Pulmonary effort is normal. No respiratory distress.      Breath sounds: Normal breath sounds. No wheezing or rales.   Abdominal:      General: Bowel sounds are normal. There is no distension.      Palpations: Abdomen is soft.      Tenderness: There is no abdominal tenderness.   Skin:     General: Skin is warm.      Capillary Refill: Capillary refill takes 2 to 3 seconds.      Comments: See pictures   Neurological:      Comments: Dementia                            CRANIAL NERVES     CN III, IV, VI   Pupils are equal, round, and reactive to light.       Significant Labs: All pertinent labs within the past 24 hours have been reviewed.    Significant Imaging: I have reviewed all pertinent imaging results/findings within the past 24 hours.

## 2023-09-21 NOTE — ED NOTES
Per [facility] staff -- pt's wound vac is pt's personal wound vac. If pt is admitted please be sure to contact facility to make sure pt's Wound Vac is either returned to facility or make sure stays with the pt.

## 2023-09-21 NOTE — ASSESSMENT & PLAN NOTE
Patient with dementia with likely etiology of fronto-temporal dementia. Dementia is moderate. The patient does not have signs of behavioral disturbance. Home dementia medications are Held or Continued: continued.. Continue non-pharmacologic interventions to prevent delirium (No VS between 11PM-5AM, activity during day, opening blinds, providing glasses/hearing aids, and up in chair during daytime). Will avoid narcotics and benzos unless absolutely necessary. PRN anti-psychotics are not prescribed to avoid self harm behaviors.

## 2023-09-21 NOTE — H&P
[facility]  History & Physical    Patient Name: Adriano Borjas  MRN: [id number]  Patient Class: IP- Inpatient  Admission Date: 9/21/2023  Attending Physician: Althea Marshall MD  Primary Care Provider: Nusrat, Primary Doctor         Patient information was obtained from patient, relative(s), past medical records and ER records.     Subjective:     Principal Problem:Severe sepsis    Chief Complaint:   Chief Complaint   Patient presents with    Fever     Per EMS, nursing home stated pt started having fever this morning. Pt has wound vac to right hip,wounds to bilateral feet, and left buttocks        HPI: 88-year-old male with past medical history of dementia, AFib, hypertension, hyperlipidemia, TIA who was sent from nursing home today for fever.  Patient has known bilateral heel wounds, wound VAC to right hip, and wound to left buttocks.  Son is at bedside and helps provide history as patient is unable to provide history other than telling me he is in pain at this time.  Son reports fever and decreased mental status occurred this morning.  He states that at baseline patient is more conversant than he is today.  Son reports he thinks that patient was on antibiotics recently for his bilateral heel wounds but does not think he is on antibiotics currently.  ED workup revealed elevated lactic acid at 2.9 an elevated white blood cell count.  Sepsis workup in progress.  Son reports patient is DNR but does want fluid and pressors if needed.  Patient to be admitted to step-down bed.      Past Medical History:   Diagnosis Date    A-fib     Dementia     Elevated cholesterol     Essential hypertension     History of cerebral hemorrhage 9/5/2019    Kidney stone     passed on his own    Memory loss     dememtia per family    Osteoarthritis     Paroxysmal atrial fibrillation 12/12/2018    Stroke     2012 TIA     Thyroid disease        Past Surgical History:   Procedure Laterality Date     CYSTOSCOPY      EYE SURGERY      MAGNETIC RESONANCE IMAGING N/A 8/23/2019    Procedure: MRI (Magnetic Resonance Imagine) needs anesthesia;  Surgeon: Panchito Montoya MD;  Location: [facility];  Service: Anesthesiology;  Laterality: N/A;       Review of patient's allergies indicates:   Allergen Reactions    Ciprofloxacin Other (See Comments)     Shoulder and leg pain/cramping     Codeine Other (See Comments)     Pt cannot remember been a long time ago    Sulfa (sulfonamide antibiotics)      Allergic reaction to bactrim       Current Facility-Administered Medications on File Prior to Encounter   Medication    lactated ringers infusion    lidocaine (PF) 10 mg/ml (1%) injection 10 mg    tuberculin injection 5 Units     Current Outpatient Medications on File Prior to Encounter   Medication Sig    acetaminophen (TYLENOL) 500 MG tablet Take 500 mg by mouth every evening.    amLODIPine (NORVASC) 5 MG tablet Take 5 mg by mouth once daily. MAR    ascorbic acid, vitamin C, (VITAMIN C) 500 MG tablet Take 500 mg by mouth once daily.    atorvastatin (LIPITOR) 40 MG tablet Take 1 tablet (40 mg total) by mouth once daily.    collagenase (SANTYL) ointment Apply topically once daily. Clean right hip with wound cleanser and pat dry. Apply a nickel thick layer of santyl to the right hip, cover with adaptic (vaseline gauze) and ABD and secure with tape daily.    hydrocortisone 2.5 % cream Thin film to AA onface daily PRN flare    hydrOXYzine pamoate (VISTARIL) 25 MG Cap Take 1 capsule (25 mg total) by mouth every 8 (eight) hours as needed (Itching).    ketoconazole (NIZORAL) 2 % cream Thin film to red scaly rash on face twice daily as needed for flare    ketoconazole (NIZORAL) 2 % shampoo Wash hair with medicated shampoo at least 2x/week - let sit on scalp at least 5 minutes prior to rinsing    levothyroxine (SYNTHROID) 100 MCG tablet Take 1 tablet (100 mcg total) by mouth once daily.    memantine (NAMENDA) 5 MG  Tab Take 5 mg by mouth 2 (two) times daily.    mirtazapine (REMERON) 30 MG tablet Take 1 tablet (30 mg total) by mouth every evening.    multivitamin (THERAGRAN) tablet Take 1 tablet by mouth once daily.    OLANZapine (ZYPREXA) 10 MG tablet Take 10 mg by mouth nightly.    propylene glycoL 0.6 % Drop Place 2 drops into both eyes 3 (three) times daily. For dry eyes    pulse oximeter (PULSE OXIMETER) device by Apply Externally route 2 (two) times a day. Use twice daily at 8 AM and 3 PM and record the value in MyChart as directed.    sertraline (ZOLOFT) 25 MG tablet Take 1 tablet (25 mg total) by mouth once daily.    UNABLE TO FIND Admit to nursing home.     Family History       Problem Relation (Age of Onset)    Cancer Mother    Dementia Sister, Brother    Diabetes Mother    Heart disease Father, Brother    Hyperlipidemia Mother, Father    Migraines Daughter    Tremor Daughter, Son          Tobacco Use    Smoking status: Former     Types: Cigarettes    Smokeless tobacco: Never    Tobacco comments:     quit 50 years ago   Substance and Sexual Activity    Alcohol use: No    Drug use: No    Sexual activity: Not on file     Review of Systems   Unable to perform ROS: Dementia     Objective:     Vital Signs (Most Recent):  Temp: (!) 102.2 °F (39 °C) (09/21/23 0918)  Pulse: 98 (09/21/23 1113)  Resp: 18 (09/21/23 1108)  BP: 128/64 (09/21/23 1108)  SpO2: 100 % (09/21/23 1108) Vital Signs (24h Range):  Temp:  [102.2 °F (39 °C)] 102.2 °F (39 °C)  Pulse:  [] 98  Resp:  [16-24] 18  SpO2:  [93 %-100 %] 100 %  BP: ()/(52-96) 128/64     Weight: 74.8 kg (165 lb)  Body mass index is 25.09 kg/m².     Physical Exam  Constitutional:       General: He is not in acute distress.     Appearance: He is well-developed. He is ill-appearing.   HENT:      Head: Normocephalic and atraumatic.   Eyes:      Pupils: Pupils are equal, round, and reactive to light.   Cardiovascular:      Rate and Rhythm: Normal rate and regular  "rhythm.      Heart sounds: No murmur heard.  Pulmonary:      Effort: Pulmonary effort is normal. No respiratory distress.      Breath sounds: Normal breath sounds. No wheezing or rales.   Abdominal:      General: Bowel sounds are normal. There is no distension.      Palpations: Abdomen is soft.      Tenderness: There is no abdominal tenderness.   Skin:     General: Skin is warm.      Capillary Refill: Capillary refill takes 2 to 3 seconds.      Comments: See pictures   Neurological:      Comments: Dementia                            CRANIAL NERVES     CN III, IV, VI   Pupils are equal, round, and reactive to light.       Significant Labs: All pertinent labs within the past 24 hours have been reviewed.    Significant Imaging: I have reviewed all pertinent imaging results/findings within the past 24 hours.    Assessment/Plan:     * Severe sepsis  This patient does have evidence of infective focus  My overall impression is sepsis.  Source: Skin and Soft Tissue (location Suspect from heel or hip wounds)  Antibiotics given-   Antibiotics (72h ago, onward)    Start     Stop Route Frequency Ordered    09/21/23 0930  vancomycin 1,500 mg in dextrose 5 % (D5W) 250 mL IVPB (Vial-Mate)         09/21/23 2129 IV ED 1 Time 09/21/23 0926        Latest lactate reviewed-  No results for input(s): "LACTATE" in the last 72 hours.  Organ dysfunction indicated by Encephalopathy        Initiate broad-spectrum IV antibiotics.  Check ESR, CRP, procalcitonin.  Consult with ID.    Stage IV pressure ulcer of right hip  Continue wound VAC.  Consult with wound care.    CT of right hip with progression of patient's soft tissue wound.  No bony destruction visualized.  Consider MRI to evaluate for osteomyelitis however not sure if patient will be able to tolerate      Unstageable pressure ulcer of right heel  Consult with Wound Care.      Unstageable pressure ulcer of left heel  Noted.  Consult with Wound Care.      Frontotemporal dementia  Patient " with dementia with likely etiology of fronto-temporal dementia. Dementia is moderate. The patient does not have signs of behavioral disturbance. Home dementia medications are Held or Continued: continued.. Continue non-pharmacologic interventions to prevent delirium (No VS between 11PM-5AM, activity during day, opening blinds, providing glasses/hearing aids, and up in chair during daytime). Will avoid narcotics and benzos unless absolutely necessary. PRN anti-psychotics are not prescribed to avoid self harm behaviors.        Essential hypertension  Hold antihypertensives given sepsis        VTE Risk Mitigation (From admission, onward)    None           Discussed with ED MD, house supervisors, on-call MD at [facility], patient will be admitted to 1st available step-down bed.  Will board in the ED until then.          Althea Marshall MD  Department of Hospital Medicine  [facility]

## 2023-09-21 NOTE — HPI
88-year-old male with past medical history of dementia, AFib, hypertension, hyperlipidemia, TIA who was sent from nursing home today for fever.  Patient has known bilateral heel wounds, wound VAC to right hip, and wound to left buttocks.  Son is at bedside and helps provide history as patient is unable to provide history other than telling me he is in pain at this time.  Son reports fever and decreased mental status occurred this morning.  He states that at baseline patient is more conversant than he is today.  Son reports he thinks that patient was on antibiotics recently for his bilateral heel wounds but does not think he is on antibiotics currently.  ED workup revealed elevated lactic acid at 2.9 an elevated white blood cell count.  Sepsis workup in progress.  Son reports patient is DNR but does want fluid and pressors if needed.  Patient to be admitted to step-down bed.

## 2023-09-21 NOTE — PROGRESS NOTES
Patient transferred to [facility], assuming care, patient seen and examined.  Admitted to hospital medicine this morning for severe sepsis related to infected pressure ulcers.  Treated with empiric antibiotics and cultures pending.  Vital signs are stable.  He appears in no distress and has no complaints currently.  Wound care consulted.  Discussed with RN.  Continue care as outlined in H&P.

## 2023-09-21 NOTE — PROGRESS NOTES
"Pharmacokinetic Initial Assessment: IV Vancomycin    Assessment/Plan:    Initiate intravenous vancomycin with vancomycin 1250mg IV every 24 hours  Desired empiric serum trough concentration is 15 to 20 mcg/mL  Draw vancomycin trough level 60 min prior to third dose on 9/23/23 at approximately 0930.  Pharmacy will continue to follow and monitor vancomycin.      Please contact pharmacy at extension [phone] with any questions regarding this assessment.     Thank you for the consult,   Ghassan Alvarengauyen       Patient brief summary:  Adriano Borjas is a 88 y.o. male initiated on antimicrobial therapy with IV Vancomycin for treatment of suspected sepsis    Drug Allergies:   Review of patient's allergies indicates:   Allergen Reactions    Ciprofloxacin Other (See Comments)     Shoulder and leg pain/cramping     Codeine Other (See Comments)     Pt cannot remember been a long time ago    Sulfa (sulfonamide antibiotics)      Allergic reaction to bactrim       Actual Body Weight:   74.8kg    Renal Function:   Estimated Creatinine Clearance: 42.8 mL/min (based on SCr of 1.2 mg/dL).,     Dialysis Method (if applicable):  N/A    CBC (last 72 hours):  Recent Labs   Lab Result Units 09/21/23  0937   WBC K/uL 14.22*   Hemoglobin g/dL 11.0*   Hematocrit % 33.0*   Platelets K/uL 220   Gran % % 95.0*   Lymph % % 1.5*   Mono % % 2.7*   Eosinophil % % 0.0   Basophil % % 0.4   Differential Method  Automated       Metabolic Panel (last 72 hours):  Recent Labs   Lab Result Units 09/21/23  0937 09/21/23  1136   Sodium mmol/L 139  --    Potassium mmol/L 3.4*  --    Chloride mmol/L 105  --    CO2 mmol/L 24  --    Glucose mg/dL 126*  --    Glucose, UA   --  Negative   BUN mg/dL 28*  --    Creatinine mg/dL 1.2  --    Albumin g/dL 2.3*  --    Total Bilirubin mg/dL 1.0  --    Alkaline Phosphatase U/L 73  --    AST U/L 42*  --    ALT U/L 35  --        Drug levels (last 3 results):  No results for input(s): "VANCOMYCINRA", "VANCORANDOM", "VANCOMYCINPE", " ""VANCOPEAK", "VANCOMYCINTR", "VANCOTROUGH" in the last 72 hours.    Microbiologic Results:  Microbiology Results (last 7 days)       Procedure Component Value Units Date/Time    Blood culture x two cultures. Draw prior to antibiotics. [[id number]] Collected: 09/21/23 0937    Order Status: Sent Specimen: Blood Updated: 09/21/23 1342    Blood culture x two cultures. Draw prior to antibiotics. [[id number]] Collected: 09/21/23 0937    Order Status: Sent Specimen: Blood Updated: 09/21/23 1342    Urine culture [[id number]] Collected: 09/21/23 1136    Order Status: No result Specimen: Urine Updated: 09/21/23 1213    Influenza A & B by Molecular [[id number]] Collected: 09/21/23 0948    Order Status: Completed Specimen: Nasal Swab Updated: 09/21/23 1037     Influenza A, Molecular Negative     Influenza B, Molecular Negative     Flu A & B Source Nasal swab            "

## 2023-09-21 NOTE — Clinical Note
Diagnosis: Severe sepsis [[id number]]   Admitting Provider:: JUJU SUTTON [[id number]]   Admit to which facility:: [facility] [[id number]]   Future Attending Provider: JUJU SUTTON [[id number]]   Reason for IP Medical Treatment  (Clinical interventions that can only be accomplished in the IP setting? ) :: sepsis   I certify that Inpatient services for greater than or equal to 2 midnights are medically necessary:: Yes   Plans for Post-Acute care--if anticipated (pick the single best option):: A. No post acute care anticipated at this time

## 2023-09-21 NOTE — CONSULTS
Chief complaint:  Fever (Per EMS, nursing home stated pt started having fever this morning. Pt has wound vac to right hip,wounds to bilateral feet, and left buttocks)      HPI:  Adriano Borjas is a 88 y.o. male presenting with fever today to the ED. Pt is known to me from the OP wound clinic. He has a history of BL heel unstagable pressure ulcers and a stage 4 right hip pressure ulcer. Hip ulcer has the VAC, and BL heels are getting santyl. Pt is with his son at the evaluation. Pt has a Hx of dementia, and us unable to provide any help with the evaluation.     PMH:  As per HPI and below:  Past Medical History:   Diagnosis Date    A-fib     Dementia     Elevated cholesterol     Essential hypertension     History of cerebral hemorrhage 9/5/2019    Kidney stone     passed on his own    Memory loss     dememtia per family    Osteoarthritis     Paroxysmal atrial fibrillation 12/12/2018    Stroke     2012 TIA     Thyroid disease        Social History     Socioeconomic History    Marital status:    Tobacco Use    Smoking status: Former     Types: Cigarettes    Smokeless tobacco: Never    Tobacco comments:     quit 50 years ago   Substance and Sexual Activity    Alcohol use: No    Drug use: No     Social Determinants of Health     Financial Resource Strain: Unknown (6/26/2023)    Overall Financial Resource Strain (CARDIA)     Difficulty of Paying Living Expenses: Patient refused   Food Insecurity: Unknown (6/26/2023)    Hunger Vital Sign     Worried About Running Out of Food in the Last Year: Patient refused     Ran Out of Food in the Last Year: Patient refused   Transportation Needs: Unknown (6/26/2023)    PRAPARE - Transportation     Lack of Transportation (Medical): Patient refused     Lack of Transportation (Non-Medical): Patient refused   Physical Activity: Unknown (6/26/2023)    Exercise Vital Sign     Days of Exercise per Week: Patient refused     Minutes of Exercise per Session: Patient refused   Stress:  Unknown (6/26/2023)    [employer] [employer] - Occupational Stress Questionnaire     Feeling of Stress : Patient refused   Social Connections: Unknown (6/26/2023)    Social Connection and Isolation Panel [NHANES]     Frequency of Communication with Friends and Family: Patient refused     Frequency of Social Gatherings with Friends and Family: Patient refused     Attends Jehovah's witness Services: Patient refused     Active Member of Clubs or Organizations: Patient refused     Attends Club or Organization Meetings: Patient refused     Marital Status: Patient refused   Housing Stability: Unknown (6/26/2023)    Housing Stability Vital Sign     Unable to Pay for Housing in the Last Year: Patient refused     Unstable Housing in the Last Year: Patient refused       Past Surgical History:   Procedure Laterality Date    CYSTOSCOPY      EYE SURGERY      MAGNETIC RESONANCE IMAGING N/A 8/23/2019    Procedure: MRI (Magnetic Resonance Imagine) needs anesthesia;  Surgeon: Panchito Montoya MD;  Location: [facility];  Service: Anesthesiology;  Laterality: N/A;       Family History   Problem Relation Age of Onset    Cancer Mother     Diabetes Mother     Hyperlipidemia Mother     Heart disease Father     Hyperlipidemia Father     Dementia Sister     Dementia Brother     Heart disease Brother     Migraines Daughter     Tremor Daughter     Tremor Son     Urolithiasis Neg Hx     Prostate cancer Neg Hx     Kidney cancer Neg Hx        Review of patient's allergies indicates:   Allergen Reactions    Ciprofloxacin Other (See Comments)     Shoulder and leg pain/cramping     Codeine Other (See Comments)     Pt cannot remember been a long time ago    Sulfa (sulfonamide antibiotics)      Allergic reaction to bactrim       Current Facility-Administered Medications on File Prior to Encounter   Medication Dose Route Frequency Provider Last Rate Last Admin    lactated ringers infusion   Intravenous Continuous Panchito Montoya MD    New Bag at 08/23/19 1150    lidocaine (PF) 10 mg/ml (1%) injection 10 mg  1 mL Intradermal Once Panchito Montoya MD        tuberculin injection 5 Units  5 Units Intradermal 1 time in Clinic/HOD Panfilo Meier DNP         Current Outpatient Medications on File Prior to Encounter   Medication Sig Dispense Refill    acetaminophen (TYLENOL) 500 MG tablet Take 500 mg by mouth every evening.      amino acids/protein hydrolys (PRO-STAT AWC ORAL) Take 30 mLs by mouth 2 (two) times a day.      amLODIPine (NORVASC) 5 MG tablet Take 5 mg by mouth once daily. MAR      ammonium lactate (LAC-HYDRIN) 12 % lotion Apply 1 Application topically 2 (two) times a day.      arginine-glutamine-calcium HMB (GUILLERMO) 7-7-1.5 gram PwPk Take 1 packet by mouth 2 (two) times a day.      ascorbic acid, vitamin C, (VITAMIN C) 500 MG tablet Take 500 mg by mouth once daily.      atorvastatin (LIPITOR) 40 MG tablet Take 1 tablet (40 mg total) by mouth once daily. 30 tablet 0    docusate sodium (COLACE) 100 MG capsule Take 100 mg by mouth once daily.      HYDROcodone-acetaminophen (NORCO) 5-325 mg per tablet Take 1 tablet by mouth every 6 (six) hours as needed for Pain.      ketoconazole (NIZORAL) 2 % shampoo Wash hair with medicated shampoo at least 2x/week - let sit on scalp at least 5 minutes prior to rinsing (Patient taking differently: Apply 1 application  topically twice a week. Wash hair with medicated shampoo at least 2x/week - let sit on scalp at least 5 minutes prior to rinsing) 120 mL 5    levothyroxine (SYNTHROID) 100 MCG tablet Take 1 tablet (100 mcg total) by mouth once daily.      memantine (NAMENDA) 5 MG Tab Take 5 mg by mouth 2 (two) times daily.      mirtazapine (REMERON) 30 MG tablet Take 1 tablet (30 mg total) by mouth every evening. 30 tablet 0    multivitamin (THERAGRAN) tablet Take 1 tablet by mouth once daily.      OCUSOFT LID SCRUB PLUS PadM Place 1 Pad into both eyes once daily. Apply to bilateral eyes topically  "every morning      OLANZapine (ZYPREXA) 10 MG tablet Take 10 mg by mouth nightly.      propylene glycoL 0.6 % Drop Place 2 drops into both eyes 3 (three) times daily. For dry eyes      sertraline (ZOLOFT) 25 MG tablet Take 1 tablet (25 mg total) by mouth once daily. 30 tablet 11    collagenase (SANTYL) ointment Apply topically once daily. Clean right hip with wound cleanser and pat dry. Apply a nickel thick layer of santyl to the right hip, cover with adaptic (vaseline gauze) and ABD and secure with tape daily.  0    hydrocortisone 2.5 % cream Thin film to AA onface daily PRN flare 28 g 1    hydrOXYzine pamoate (VISTARIL) 25 MG Cap Take 1 capsule (25 mg total) by mouth every 8 (eight) hours as needed (Itching). 15 capsule 0    ketoconazole (NIZORAL) 2 % cream Thin film to red scaly rash on face twice daily as needed for flare 60 g 3    pulse oximeter (PULSE OXIMETER) device by Apply Externally route 2 (two) times a day. Use twice daily at 8 AM and 3 PM and record the value in [employer] as directed. 1 each 0    [DISCONTINUED] UNABLE TO FIND Admit to nursing home. 1 Piece 0       ROS: As per HPI and below:  Review of systems not obtained due to patient factors.      Physical Exam:     Vitals:    23 1430 23 1500 23 1530 23 1600   BP: (!) 113/59 (!) 122/55 (!) 124/58 126/61   Pulse: 81 76 74 72   Resp: 14 16 20    Temp: 100 °F (37.8 °C)      TempSrc: Axillary      SpO2: 97% 98% 98% 98%   Weight:       Height:           BP  Min: 97/52  Max: 142/62  Temp  Av °F (38.3 °C)  Min: 100 °F (37.8 °C)  Max: 102.2 °F (39 °C)  Pulse  Av.9  Min: 72  Max: 112  Resp  Av.8  Min: 14  Max: 24  SpO2  Av.4 %  Min: 93 %  Max: 100 %  Height  Av' 8" (172.7 cm)  Min: 5' 8" (172.7 cm)  Max: 5' 8" (172.7 cm)  Weight  Av.8 kg (165 lb)  Min: 74.8 kg (165 lb)  Max: 74.8 kg (165 lb)    Body mass index is 25.09 kg/m².          General:             Well developed, well nourished, no apparent " distress, dementia, resting quietly  HEENT:              NCAT, no JVD, mucous membranes moist, EOM intact  Cardiovascular:  Regular rate and rhythm, normal S1, normal S2, No murmurs, rubs, or gallops  Respiratory:        Normal breath sounds, no wheezes, no rales, no rhonchi  Abdomen:           Bowel sounds present, non tender, non distended, no masses, no hepatojugular reflux  Extremities:        No clubbing, no cyanosis, no edema  Neurological:      No focal deficits  Skin:                   No obvious rashes or erythema, stage 4 right hip pressure ulcer, unstagable pressure ulcers of the BL heels                      Lab Results   Component Value Date    WBC 14.22 (H) 09/21/2023    HGB 11.0 (L) 09/21/2023    HCT 33.0 (L) 09/21/2023    MCV 92 09/21/2023     09/21/2023     Lab Results   Component Value Date    CHOL 166 10/04/2020    CHOL 203 (H) 09/13/2017    CHOL 221 (H) 09/02/2014     Lab Results   Component Value Date    HDL 32 (L) 10/04/2020    HDL 36 (L) 09/13/2017    HDL 33 (L) 09/02/2014     Lab Results   Component Value Date    LDLCALC 114.4 10/04/2020    LDLCALC 143.8 09/13/2017    LDLCALC 151.2 09/02/2014     Lab Results   Component Value Date    TRIG 98 10/04/2020    TRIG 116 09/13/2017    TRIG 184 (H) 09/02/2014     Lab Results   Component Value Date    CHOLHDL 19.3 (L) 10/04/2020    CHOLHDL 17.7 (L) 09/13/2017    CHOLHDL 14.9 (L) 09/02/2014     CMP  Recent Labs   Lab 09/21/23  0937   *   CALCIUM 8.2*   ALBUMIN 2.3*   PROT 6.4      K 3.4*   CO2 24      BUN 28*   CREATININE 1.2   ALKPHOS 73   ALT 35   AST 42*   BILITOT 1.0      Lab Results   Component Value Date    TSH 1.010 03/13/2021         Assessment and Recommendations       Diagnoses:    1. Right hip stage 4 pressure ulcer  2. BL heel unstagable pressure ulcers    Plan:  1. Santyl to the SL heel pressure ulcers  2. Wound VAC to the right hip pressure ulcer  3. Follow in the hospital    Complexity:    high

## 2023-09-21 NOTE — ED PROVIDER NOTES
Encounter Date: 9/21/2023       History     Chief Complaint   Patient presents with    Fever     Per EMS, nursing home stated pt started having fever this morning. Pt has wound vac to right hip,wounds to bilateral feet, and left buttocks     Patient is an 88-year-old male with a past medical history of dementia prior osteomyelitis of the right hip currently on a wound VAC under the care of Dr. Morales prior stroke paroxysmal atrial fibrillation bedbound who is DNR but family wants everything done aside from intubation or CPR.  They want fluids antibiotics and pressors as needed.  He presents the emergency room for evaluation of fever and declined mental status this morning.  It was noted by the nurse at the nursing home.  Patient is limited and poor historian secondary to dementia and chronic deconditioning.  Patient has had debridement in the past by wound care physician.  I spoke to the patient's son who would want him in the ICU if his condition declines.  Patient is not currently on antibiotics according to the skilled nursing facility documentation.      Review of patient's allergies indicates:   Allergen Reactions    Ciprofloxacin Other (See Comments)     Shoulder and leg pain/cramping     Codeine Other (See Comments)     Pt cannot remember been a long time ago    Sulfa (sulfonamide antibiotics)      Allergic reaction to bactrim     Past Medical History:   Diagnosis Date    A-fib     Dementia     Elevated cholesterol     Essential hypertension     History of cerebral hemorrhage 9/5/2019    Kidney stone     passed on his own    Memory loss     dememtia per family    Osteoarthritis     Paroxysmal atrial fibrillation 12/12/2018    Stroke     2012 TIA     Thyroid disease      Past Surgical History:   Procedure Laterality Date    CYSTOSCOPY      EYE SURGERY      MAGNETIC RESONANCE IMAGING N/A 8/23/2019    Procedure: MRI (Magnetic Resonance Imagine) needs anesthesia;  Surgeon: Panchito Montoya MD;  Location: [facility]  CATH;  Service: Anesthesiology;  Laterality: N/A;     Family History   Problem Relation Age of Onset    Cancer Mother     Diabetes Mother     Hyperlipidemia Mother     Heart disease Father     Hyperlipidemia Father     Dementia Sister     Dementia Brother     Heart disease Brother     Migraines Daughter     Tremor Daughter     Tremor Son     Urolithiasis Neg Hx     Prostate cancer Neg Hx     Kidney cancer Neg Hx      Social History     Tobacco Use    Smoking status: Former     Types: Cigarettes    Smokeless tobacco: Never    Tobacco comments:     quit 50 years ago   Substance Use Topics    Alcohol use: No    Drug use: No     Review of Systems   Unable to perform ROS: Dementia   Constitutional:  Positive for fatigue and fever.   Gastrointestinal:  Negative for diarrhea and vomiting.   Skin:  Positive for wound.   Psychiatric/Behavioral:  Positive for confusion.        Physical Exam     Initial Vitals   BP Pulse Resp Temp SpO2   09/21/23 0918 09/21/23 0918 09/21/23 0918 09/21/23 0918 09/21/23 0930   (!) 130/96 (!) 112 (!) 24 (!) 102.2 °F (39 °C) (!) 94 %      MAP       --                Physical Exam    Nursing note and vitals reviewed.  Constitutional: He is not diaphoretic.   Elderly male lying in bed with no significant distress.  Appears chronically deconditioned   HENT:   Head: Normocephalic and atraumatic.   Right Ear: External ear normal.   Left Ear: External ear normal.   Dry mucous membranes   Eyes: Conjunctivae and EOM are normal. Pupils are equal, round, and reactive to light.   Neck: Neck supple.   No significant meningismus   Cardiovascular:  Intact distal pulses.           Tachycardic   Pulmonary/Chest: He has no wheezes. He has no rhonchi. He has no rales.   Abdominal: Abdomen is soft. There is no abdominal tenderness.   Musculoskeletal:         General: No edema.      Cervical back: Neck supple.      Comments: Mild contracture of bilateral lower extremities.     Neurological: He is alert.   Skin: Skin  is warm.   Two separate decubitus ulcers over the heel.  The wound on the right is about the size of a half-dollar and has a black eschar.  The wound on the left has a pink base is draining and is approximately the size of a quarter.    Wound VAC in place over the right hip with a strip of packing.  No surrounding erythema warmth.   Psychiatric:   Pleasantly demented.         ED Course   Critical Care    Date/Time: 9/21/2023 9:05 AM    Performed by: Christofer Odom MD  Authorized by: Christofer Odom MD  Direct patient critical care time: 20 minutes  Additional history critical care time: 10 minutes  Ordering / reviewing critical care time: 10 minutes  Documentation critical care time: 5 minutes  Consulting other physicians critical care time: 15 minutes  Consult with family critical care time: 10 minutes  Total critical care time (exclusive of procedural time) : 70 minutes  Critical care was necessary to treat or prevent imminent or life-threatening deterioration of the following conditions: sepsis.  Critical care was time spent personally by me on the following activities: discussions with consultants, evaluation of patient's response to treatment, examination of patient, obtaining history from patient or surrogate, ordering and performing treatments and interventions, ordering and review of laboratory studies, ordering and review of radiographic studies, pulse oximetry, re-evaluation of patient's condition and review of old charts.        Labs Reviewed   CBC W/ AUTO DIFFERENTIAL - Abnormal; Notable for the following components:       Result Value    WBC 14.22 (*)     RBC 3.57 (*)     Hemoglobin 11.0 (*)     Hematocrit 33.0 (*)     Gran # (ANC) 13.5 (*)     Immature Grans (Abs) 0.06 (*)     Lymph # 0.2 (*)     Gran % 95.0 (*)     Lymph % 1.5 (*)     Mono % 2.7 (*)     All other components within normal limits   COMPREHENSIVE METABOLIC PANEL - Abnormal; Notable for the following components:    Potassium 3.4 (*)      Glucose 126 (*)     BUN 28 (*)     Calcium 8.2 (*)     Albumin 2.3 (*)     AST 42 (*)     eGFR 59 (*)     All other components within normal limits   URINALYSIS, REFLEX TO URINE CULTURE - Abnormal; Notable for the following components:    Appearance, UA Hazy (*)     Protein, UA 1+ (*)     Leukocytes, UA 3+ (*)     All other components within normal limits    Narrative:     Specimen Source->Urine   PROCALCITONIN - Abnormal; Notable for the following components:    Procalcitonin 5.53 (*)     All other components within normal limits   SEDIMENTATION RATE - Abnormal; Notable for the following components:    Sed Rate 94 (*)     All other components within normal limits   C-REACTIVE PROTEIN - Abnormal; Notable for the following components:    .1 (*)     All other components within normal limits   URINALYSIS MICROSCOPIC - Abnormal; Notable for the following components:    WBC, UA 30 (*)     Bacteria Many (*)     All other components within normal limits    Narrative:     Specimen Source->Urine   ISTAT LACTATE - Abnormal; Notable for the following components:    POC Lactate 2.89 (*)     All other components within normal limits   POCT GLUCOSE - Abnormal; Notable for the following components:    POCT Glucose 171 (*)     All other components within normal limits   INFLUENZA A & B BY MOLECULAR   CULTURE, BLOOD   CULTURE, BLOOD   CULTURE, URINE   SARS-COV-2 RNA AMPLIFICATION, QUAL   LACTIC ACID, PLASMA   POCT GLUCOSE MONITORING CONTINUOUS        ECG Results              EKG 12-lead (Final result)  Result time 09/21/23 17:16:02      Final result by Interface, Lab In [facility] (09/21/23 17:16:02)                   Narrative:    Test Reason : A41.9,    Vent. Rate : 102 BPM     Atrial Rate : 102 BPM     P-R Int : 152 ms          QRS Dur : 088 ms      QT Int : 322 ms       P-R-T Axes : 055 -31 -09 degrees     QTc Int : 419 ms    Sinus tachycardia with Premature atrial complexes  Left axis deviation  Abnormal ECG  Confirmed  by Jimmy Enrique MD ([id number]) on 9/21/2023 5:15:56 PM    Referred By: [email or web address]   SELF           Confirmed By:Jimmy Enrique MD                                  Imaging Results              CT Hip With Contrast Right (Final result)  Result time 09/21/23 11:43:02      Final result by Margot Simeon MD (09/21/23 11:43:02)                   Impression:      There is been progression of the patient's soft tissue wound overlying the right hip greater trochanter.  No bony destruction is visualized on today's study.      Electronically signed by: Margot Simeon MD  Date:    09/21/2023  Time:    11:43               Narrative:    EXAMINATION:  CT HIP WITH CONTRAST RIGHT    CLINICAL HISTORY:  Osteomyelitis suspected, hip, xray done;    TECHNIQUE:  Spiral CT images of the right hip were acquired.    COMPARISON:  06/28/2023    FINDINGS:  There are degenerative changes of the inferior lumbar spine.  The right hip is located without evidence fracture.  There is no evidence bony erosion.  There is a skin wound overlying the greater trochanter with soft tissue stranding extending to the underlying gluteus musculature.  Compared with what was seen on 06/28/2023 the size of the soft tissue defect is larger.  No discrete, drainable fluid collection is visualized.  There is a left fat containing inguinal hernia.                                       X-Ray Foot Complete Right (Final result)  Result time 09/21/23 11:22:19      Final result by Margot Simeon MD (09/21/23 11:22:19)                   Impression:      There are degenerative changes of the right foot in soft tissue irregularity at the right heel.      Electronically signed by: Margot Simeon MD  Date:    09/21/2023  Time:    11:22               Narrative:    EXAMINATION:  XR FOOT COMPLETE 3 VIEW RIGHT    CLINICAL HISTORY:  . Pain in right foot    TECHNIQUE:  AP, lateral, and oblique views of the right foot were performed.    COMPARISON:  None    FINDINGS:  There are  degenerative changes of the right foot is witnessed by joint space narrowing with adjacent sclerosis.  The adjacent soft tissues are unremarkable.  There is no evidence fracture or dislocation.                                       X-Ray Foot Complete Left (Final result)  Result time 09/21/23 11:25:22      Final result by Margot Simeon MD (09/21/23 11:25:22)                   Impression:      There are degenerative changes of the left foot was soft tissue irregularity overlying the left calcaneus.      Electronically signed by: Margot Simeon MD  Date:    09/21/2023  Time:    11:25               Narrative:    EXAMINATION:  XR FOOT COMPLETE 3 VIEW LEFT    CLINICAL HISTORY:  .  Pain in right foot    TECHNIQUE:  AP, lateral and oblique views of the left foot were performed.    COMPARISON:  None    FINDINGS:  There is soft tissue irregularity overlying the right calcaneus.  There is no evidence fracture or malalignment.  There are degenerative changes of the foot.                                       X-Ray Chest AP Portable (Final result)  Result time 09/21/23 10:27:34      Final result by Margot Simeon MD (09/21/23 10:27:34)                   Impression:      There are low lung volumes patchy opacification of the pulmonary parenchyma possibly representing edema versus pneumonia.      Electronically signed by: Margot Simeon MD  Date:    09/21/2023  Time:    10:27               Narrative:    EXAMINATION:  XR CHEST AP PORTABLE    CLINICAL HISTORY:  Sepsis;    TECHNIQUE:  Single frontal view of the chest was performed.    COMPARISON:  02/05/2021    FINDINGS:  There are low lung volumes with hazy opacification of the pulmonary parenchyma new from prior exam.  There is no pneumothorax or pleural effusion.                                       Medications   acetaminophen tablet 1,000 mg ( Oral Canceled Entry 9/21/23 0930)   levothyroxine tablet 100 mcg (has no administration in time range)   memantine tablet 5  mg (has no administration in time range)   glucose chewable tablet 16 g (has no administration in time range)   glucose chewable tablet 24 g (has no administration in time range)   dextrose 50% injection 12.5 g (has no administration in time range)   dextrose 50% injection 25 g (has no administration in time range)   glucagon (human recombinant) injection 1 mg (has no administration in time range)   enoxaparin injection 40 mg (40 mg Subcutaneous Given 9/21/23 1628)   potassium bicarbonate disintegrating tablet 50 mEq (has no administration in time range)   potassium bicarbonate disintegrating tablet 35 mEq (has no administration in time range)   potassium bicarbonate disintegrating tablet 60 mEq (has no administration in time range)   magnesium oxide tablet 800 mg (has no administration in time range)   magnesium oxide tablet 800 mg (has no administration in time range)   potassium, sodium phosphates 280-160-250 mg packet 2 packet (has no administration in time range)   potassium, sodium phosphates 280-160-250 mg packet 2 packet (has no administration in time range)   potassium, sodium phosphates 280-160-250 mg packet 2 packet (has no administration in time range)   vancomycin - pharmacy to dose (has no administration in time range)   acetaminophen tablet 1,000 mg (has no administration in time range)   ondansetron injection 4 mg (has no administration in time range)   vancomycin 1,250 mg in dextrose 5 % (D5W) 250 mL IVPB (Vial-Mate) (has no administration in time range)   collagenase ointment (has no administration in time range)   piperacillin-tazobactam 4.5 g in dextrose 5 % 100 mL IVPB (ready to mix) (has no administration in time range)   mupirocin 2 % ointment (has no administration in time range)   vancomycin 1,500 mg in dextrose 5 % (D5W) 250 mL IVPB (Vial-Mate) (0 mg Intravenous Stopped 9/21/23 1155)   piperacillin-tazobactam (ZOSYN) 4.5 g in dextrose 5 % in water (D5W) 100 mL IVPB (MB+) (0 g Intravenous  Stopped 9/21/23 1013)   sodium chloride 0.9% bolus 1,000 mL 1,000 mL (0 mLs Intravenous Stopped 9/21/23 1042)   acetaminophen suppository 650 mg (650 mg Rectal Given 9/21/23 1057)   iohexoL (OMNIPAQUE 350) 350 mg iodine/mL injection (75 mLs Intravenous Given 9/21/23 1131)     Medical Decision Making  Patient was septic on arrival.  He is a DNR but family wants fluids antibiotics and pressors as needed.  Source of infection is likely osteomyelitis from the heel of the right hip.  However, I am getting urinalysis and labs.  Patient's family initially refused transfer to [facility] because they live closer to [facility] however once I explained to the patient's son that we do not have ICU level of care or step-down beds available he request transfer to [facility].  Broad-spectrum antibiotics have been given.  Lactic acid initially is 2.8.  Lowest blood pressure is 99/54 will monitor that closely.  Unclear the patient has a history of heart failure but will give fluids judiciously.  1 L given and will monitor blood pressure closely.    Amount and/or Complexity of Data Reviewed  Labs: ordered.  Radiology: ordered.    Risk  OTC drugs.  Decision regarding hospitalization.      Additional MDM:   Sepsis:   This patient does have evidence of infective focus  My overall impression is sepsis.  Source: bone  Antibiotics given- Antibiotics     Patient Encounter Information Not Found      Latest lactate reviewed- 2.8  Organ dysfunction indicated by Encephalopathy    Fluid challenge Other- Patient to receive other volume other than 30cc/kg due to Congestive Heart Failure     Post- resuscitation assessment Yes Perfusion exam was performed within 6 hours of septic shock presentation after bolus shows Adequate tissue perfusion assessed by non-invasive monitoring       Will Not start Pressors- Levophed for MAP of 65  Source control achieved by: abx                ED Course as of 09/21/23 1901   Thu Sep  21, 2023   0937 Lactic 2.89.  Patient will get IV fluids.  Patient's family member agrees that he is DNR DNI however if he is septic and he needs a higher level of care you would like him in the ICU. [JS]   1020 I do not appreciate any significant pneumonia on chest x-ray. [JS]   1053 We have no beds at [facility] or [facility] at this time.  Patient is awaiting transfer.  Will get Hospital Medicine consultation. [JS]   1100 EKG independently interpreted by me as rate 102 sinus tachycardia with left axis deviation no acute ST segment elevation or depression. [JS]   1145 Blood pressure remained stable.  Awaiting available bed. [JS]   1146 Chest x-ray read as edema versus pneumonia.  Hip has expanding wound but no osteomyelitis.  Right left heel showed no signs of osteomyelitis but may need further imaging. [JS]   1503 Lactic acid is decreasing 1.4.  Patient has white blood cell count of 30 with many bacteria.  He may have urinary tract infection as well.  Temperature is 100°.  Getting IV fluids.  Awaiting transfer to [facility].  He has been accepted [JS]      ED Course User Index  [JS] Christofer Odom MD                    Clinical Impression:   Final diagnoses:  [A41.9] Sepsis  [M79.671] Right foot pain  [M79.671] Right foot pain - r/o osteo  [M79.671] Right foot pain - osteomyelitis r/o  [A41.9, R65.20] Severe sepsis (Primary)  [N30.00] Acute cystitis without hematuria        ED Disposition Condition    Admit                 Christofer Odom MD  09/21/23 1902

## 2023-09-21 NOTE — ASSESSMENT & PLAN NOTE
"This patient does have evidence of infective focus  My overall impression is sepsis.  Source: Skin and Soft Tissue (location Suspect from heel or hip wounds)  Antibiotics given-   Antibiotics (72h ago, onward)    Start     Stop Route Frequency Ordered    09/21/23 0930  vancomycin 1,500 mg in dextrose 5 % (D5W) 250 mL IVPB (Vial-Mate)         09/21/23 2129 IV ED 1 Time 09/21/23 0926        Latest lactate reviewed-  No results for input(s): "LACTATE" in the last 72 hours.  Organ dysfunction indicated by Encephalopathy        Initiate broad-spectrum IV antibiotics.  Check ESR, CRP, procalcitonin.  Consult with ID.  "

## 2023-09-21 NOTE — PHARMACY MED REC
"Admission Medication History     The home medication history was taken by Jacoby Tucker.    You may go to "Admission" then "Reconcile Home Medications" tabs to review and/or act upon these items.     The home medication list has been updated by the Pharmacy department.   Please read ALL comments highlighted in yellow.   Please address this information as you see fit.    Feel free to contact us if you have any questions or require assistance.        Medications listed below were obtained from: Patient/family, [employer]- [employer], and Nursing home  Current Facility-Administered Medications on File Prior to Encounter   Medication Dose Route Frequency Provider Last Rate Last Admin    lactated ringers infusion   Intravenous Continuous Panchito Montoya MD   New Bag at 08/23/19 1150    lidocaine (PF) 10 mg/ml (1%) injection 10 mg  1 mL Intradermal Once Panchito Montoya MD        tuberculin injection 5 Units  5 Units Intradermal 1 time in Clinic/HOD Panfilo Meier DNP         Current Outpatient Medications on File Prior to Encounter   Medication Sig Dispense Refill    acetaminophen (TYLENOL) 500 MG tablet Take 500 mg by mouth every evening.      amino acids/protein hydrolys (PRO-STAT AWC ORAL) Take 30 mLs by mouth 2 (two) times a day.      amLODIPine (NORVASC) 5 MG tablet Take 5 mg by mouth once daily. MAR      ammonium lactate (LAC-HYDRIN) 12 % lotion Apply 1 Application topically 2 (two) times a day.      arginine-glutamine-calcium HMB (GUILLERMO) 7-7-1.5 gram PwPk Take 1 packet by mouth 2 (two) times a day.      ascorbic acid, vitamin C, (VITAMIN C) 500 MG tablet Take 500 mg by mouth once daily.      atorvastatin (LIPITOR) 40 MG tablet Take 1 tablet (40 mg total) by mouth once daily. 30 tablet 0    docusate sodium (COLACE) 100 MG capsule Take 100 mg by mouth once daily.      HYDROcodone-acetaminophen (NORCO) 5-325 mg per tablet Take 1 tablet by mouth every 6 (six) hours as needed for Pain.      " ketoconazole (NIZORAL) 2 % shampoo Wash hair with medicated shampoo at least 2x/week - let sit on scalp at least 5 minutes prior to rinsing (Patient taking differently: Apply 1 application  topically twice a week. Wash hair with medicated shampoo at least 2x/week - let sit on scalp at least 5 minutes prior to rinsing) 120 mL 5    levothyroxine (SYNTHROID) 100 MCG tablet Take 1 tablet (100 mcg total) by mouth once daily.      memantine (NAMENDA) 5 MG Tab Take 5 mg by mouth 2 (two) times daily.      mirtazapine (REMERON) 30 MG tablet Take 1 tablet (30 mg total) by mouth every evening. 30 tablet 0    multivitamin (THERAGRAN) tablet Take 1 tablet by mouth once daily.      OCUSOFT LID SCRUB PLUS PadM Place 1 Pad into both eyes once daily. Apply to bilateral eyes topically every morning      OLANZapine (ZYPREXA) 10 MG tablet Take 10 mg by mouth nightly.      propylene glycoL 0.6 % Drop Place 2 drops into both eyes 3 (three) times daily. For dry eyes      sertraline (ZOLOFT) 25 MG tablet Take 1 tablet (25 mg total) by mouth once daily. 30 tablet 11    collagenase (SANTYL) ointment Apply topically once daily. Clean right hip with wound cleanser and pat dry. Apply a nickel thick layer of santyl to the right hip, cover with adaptic (vaseline gauze) and ABD and secure with tape daily.  0    hydrocortisone 2.5 % cream Thin film to AA onface daily PRN flare 28 g 1    hydrOXYzine pamoate (VISTARIL) 25 MG Cap Take 1 capsule (25 mg total) by mouth every 8 (eight) hours as needed (Itching). 15 capsule 0    ketoconazole (NIZORAL) 2 % cream Thin film to red scaly rash on face twice daily as needed for flare 60 g 3    pulse oximeter (PULSE OXIMETER) device by Apply Externally route 2 (two) times a day. Use twice daily at 8 AM and 3 PM and record the value in [employer] as directed. 1 each 0    [DISCONTINUED] UNABLE TO FIND Admit to nursing home. 1 Piece 0         Jacoby Tucker  EXT 1924                 .

## 2023-09-22 PROBLEM — F02.80 OTHER FRONTOTEMPORAL DEMENTIA: Status: RESOLVED | Noted: 2019-07-03 | Resolved: 2023-09-22

## 2023-09-22 PROBLEM — I48.91 ATRIAL FIBRILLATION WITH RVR: Status: RESOLVED | Noted: 2018-07-17 | Resolved: 2023-09-22

## 2023-09-22 PROBLEM — I48.19 PERSISTENT ATRIAL FIBRILLATION: Chronic | Status: ACTIVE | Noted: 2023-09-22

## 2023-09-22 PROBLEM — G31.09 OTHER FRONTOTEMPORAL DEMENTIA: Status: RESOLVED | Noted: 2019-07-03 | Resolved: 2023-09-22

## 2023-09-22 LAB
ALBUMIN SERPL BCP-MCNC: 2.8 G/DL (ref 3.5–5.2)
ALP SERPL-CCNC: 64 U/L (ref 55–135)
ALT SERPL W/O P-5'-P-CCNC: 36 U/L (ref 10–44)
ANION GAP SERPL CALC-SCNC: 7 MMOL/L (ref 8–16)
AST SERPL-CCNC: 56 U/L (ref 10–40)
BASOPHILS # BLD AUTO: 0.05 K/UL (ref 0–0.2)
BASOPHILS NFR BLD: 0.4 % (ref 0–1.9)
BILIRUB SERPL-MCNC: 0.7 MG/DL (ref 0.1–1)
BUN SERPL-MCNC: 30 MG/DL (ref 8–23)
CALCIUM SERPL-MCNC: 8.2 MG/DL (ref 8.7–10.5)
CHLORIDE SERPL-SCNC: 107 MMOL/L (ref 95–110)
CO2 SERPL-SCNC: 26 MMOL/L (ref 23–29)
CREAT SERPL-MCNC: 1.1 MG/DL (ref 0.5–1.4)
DIFFERENTIAL METHOD: ABNORMAL
EOSINOPHIL # BLD AUTO: 0 K/UL (ref 0–0.5)
EOSINOPHIL NFR BLD: 0 % (ref 0–8)
ERYTHROCYTE [DISTWIDTH] IN BLOOD BY AUTOMATED COUNT: 13.3 % (ref 11.5–14.5)
EST. GFR  (NO RACE VARIABLE): >60 ML/MIN/1.73 M^2
GLUCOSE SERPL-MCNC: 120 MG/DL (ref 70–110)
GLUCOSE SERPL-MCNC: 129 MG/DL (ref 70–110)
HCT VFR BLD AUTO: 31.7 % (ref 40–54)
HGB BLD-MCNC: 10.2 G/DL (ref 14–18)
IMM GRANULOCYTES # BLD AUTO: 0.08 K/UL (ref 0–0.04)
IMM GRANULOCYTES NFR BLD AUTO: 0.6 % (ref 0–0.5)
LYMPHOCYTES # BLD AUTO: 1.1 K/UL (ref 1–4.8)
LYMPHOCYTES NFR BLD: 8.2 % (ref 18–48)
MAGNESIUM SERPL-MCNC: 1.9 MG/DL (ref 1.6–2.6)
MCH RBC QN AUTO: 30.2 PG (ref 27–31)
MCHC RBC AUTO-ENTMCNC: 32.2 G/DL (ref 32–36)
MCV RBC AUTO: 94 FL (ref 82–98)
MONOCYTES # BLD AUTO: 1.2 K/UL (ref 0.3–1)
MONOCYTES NFR BLD: 9.4 % (ref 4–15)
MRSA SCREEN BY PCR: NEGATIVE
NEUTROPHILS # BLD AUTO: 10.5 K/UL (ref 1.8–7.7)
NEUTROPHILS NFR BLD: 81.4 % (ref 38–73)
NRBC BLD-RTO: 0 /100 WBC
PLATELET # BLD AUTO: 201 K/UL (ref 150–450)
PMV BLD AUTO: 12.6 FL (ref 9.2–12.9)
POTASSIUM SERPL-SCNC: 3.8 MMOL/L (ref 3.5–5.1)
PROT SERPL-MCNC: 6.6 G/DL (ref 6–8.4)
RBC # BLD AUTO: 3.38 M/UL (ref 4.6–6.2)
SODIUM SERPL-SCNC: 140 MMOL/L (ref 136–145)
WBC # BLD AUTO: 12.93 K/UL (ref 3.9–12.7)

## 2023-09-22 PROCEDURE — 87186 SC STD MICRODIL/AGAR DIL: CPT | Performed by: NURSE PRACTITIONER

## 2023-09-22 PROCEDURE — 99223 PR INITIAL HOSPITAL CARE,LEVL III: ICD-10-PCS | Mod: ,,, | Performed by: STUDENT IN AN ORGANIZED HEALTH CARE EDUCATION/TRAINING PROGRAM

## 2023-09-22 PROCEDURE — 87076 CULTURE ANAEROBE IDENT EACH: CPT | Performed by: NURSE PRACTITIONER

## 2023-09-22 PROCEDURE — 25000003 PHARM REV CODE 250: Performed by: HOSPITALIST

## 2023-09-22 PROCEDURE — 85025 COMPLETE CBC W/AUTO DIFF WBC: CPT | Performed by: STUDENT IN AN ORGANIZED HEALTH CARE EDUCATION/TRAINING PROGRAM

## 2023-09-22 PROCEDURE — 87641 MR-STAPH DNA AMP PROBE: CPT | Performed by: STUDENT IN AN ORGANIZED HEALTH CARE EDUCATION/TRAINING PROGRAM

## 2023-09-22 PROCEDURE — 93005 ELECTROCARDIOGRAM TRACING: CPT | Performed by: INTERNAL MEDICINE

## 2023-09-22 PROCEDURE — 87077 CULTURE AEROBIC IDENTIFY: CPT | Performed by: NURSE PRACTITIONER

## 2023-09-22 PROCEDURE — 87075 CULTR BACTERIA EXCEPT BLOOD: CPT | Performed by: NURSE PRACTITIONER

## 2023-09-22 PROCEDURE — 27000221 HC OXYGEN, UP TO 24 HOURS

## 2023-09-22 PROCEDURE — 99223 1ST HOSP IP/OBS HIGH 75: CPT | Mod: ,,, | Performed by: STUDENT IN AN ORGANIZED HEALTH CARE EDUCATION/TRAINING PROGRAM

## 2023-09-22 PROCEDURE — 99900031 HC PATIENT EDUCATION (STAT)

## 2023-09-22 PROCEDURE — 83735 ASSAY OF MAGNESIUM: CPT | Performed by: STUDENT IN AN ORGANIZED HEALTH CARE EDUCATION/TRAINING PROGRAM

## 2023-09-22 PROCEDURE — 21000000 HC CCU ICU ROOM CHARGE

## 2023-09-22 PROCEDURE — 87070 CULTURE OTHR SPECIMN AEROBIC: CPT | Performed by: NURSE PRACTITIONER

## 2023-09-22 PROCEDURE — 93010 ELECTROCARDIOGRAM REPORT: CPT | Mod: ,,, | Performed by: INTERNAL MEDICINE

## 2023-09-22 PROCEDURE — 94761 N-INVAS EAR/PLS OXIMETRY MLT: CPT

## 2023-09-22 PROCEDURE — 87185 SC STD ENZYME DETCJ PER NZM: CPT | Performed by: NURSE PRACTITIONER

## 2023-09-22 PROCEDURE — 25000003 PHARM REV CODE 250: Performed by: STUDENT IN AN ORGANIZED HEALTH CARE EDUCATION/TRAINING PROGRAM

## 2023-09-22 PROCEDURE — 99900035 HC TECH TIME PER 15 MIN (STAT)

## 2023-09-22 PROCEDURE — 97605 NEG PRS WND THER DME<=50SQCM: CPT

## 2023-09-22 PROCEDURE — 80053 COMPREHEN METABOLIC PANEL: CPT | Performed by: STUDENT IN AN ORGANIZED HEALTH CARE EDUCATION/TRAINING PROGRAM

## 2023-09-22 PROCEDURE — 25000003 PHARM REV CODE 250: Performed by: FAMILY MEDICINE

## 2023-09-22 PROCEDURE — 93010 EKG 12-LEAD: ICD-10-PCS | Mod: ,,, | Performed by: INTERNAL MEDICINE

## 2023-09-22 PROCEDURE — 63600175 PHARM REV CODE 636 W HCPCS: Performed by: STUDENT IN AN ORGANIZED HEALTH CARE EDUCATION/TRAINING PROGRAM

## 2023-09-22 PROCEDURE — 36415 COLL VENOUS BLD VENIPUNCTURE: CPT | Performed by: STUDENT IN AN ORGANIZED HEALTH CARE EDUCATION/TRAINING PROGRAM

## 2023-09-22 PROCEDURE — 63600175 PHARM REV CODE 636 W HCPCS: Performed by: HOSPITALIST

## 2023-09-22 RX ORDER — HYDROCODONE BITARTRATE AND ACETAMINOPHEN 5; 325 MG/1; MG/1
1 TABLET ORAL EVERY 4 HOURS PRN
Status: DISCONTINUED | OUTPATIENT
Start: 2023-09-22 | End: 2023-09-29 | Stop reason: HOSPADM

## 2023-09-22 RX ORDER — SERTRALINE HYDROCHLORIDE 25 MG/1
25 TABLET, FILM COATED ORAL DAILY
Status: DISCONTINUED | OUTPATIENT
Start: 2023-09-22 | End: 2023-09-29 | Stop reason: HOSPADM

## 2023-09-22 RX ORDER — HYDROCODONE BITARTRATE AND ACETAMINOPHEN 5; 325 MG/1; MG/1
1 TABLET ORAL EVERY 6 HOURS PRN
Status: DISCONTINUED | OUTPATIENT
Start: 2023-09-22 | End: 2023-09-22

## 2023-09-22 RX ORDER — ACETAMINOPHEN 500 MG
1000 TABLET ORAL EVERY 8 HOURS PRN
Status: DISCONTINUED | OUTPATIENT
Start: 2023-09-22 | End: 2023-09-22

## 2023-09-22 RX ORDER — OLANZAPINE 2.5 MG/1
10 TABLET ORAL NIGHTLY
Status: DISCONTINUED | OUTPATIENT
Start: 2023-09-22 | End: 2023-09-29 | Stop reason: HOSPADM

## 2023-09-22 RX ORDER — MIRTAZAPINE 15 MG/1
30 TABLET, FILM COATED ORAL NIGHTLY
Status: DISCONTINUED | OUTPATIENT
Start: 2023-09-22 | End: 2023-09-29 | Stop reason: HOSPADM

## 2023-09-22 RX ORDER — LIDOCAINE HYDROCHLORIDE 20 MG/ML
JELLY TOPICAL
Status: DISCONTINUED | OUTPATIENT
Start: 2023-09-22 | End: 2023-09-22

## 2023-09-22 RX ORDER — ACETAMINOPHEN 325 MG/1
650 TABLET ORAL EVERY 8 HOURS PRN
Status: DISCONTINUED | OUTPATIENT
Start: 2023-09-22 | End: 2023-09-29 | Stop reason: HOSPADM

## 2023-09-22 RX ORDER — METRONIDAZOLE 500 MG/100ML
500 INJECTION, SOLUTION INTRAVENOUS
Status: DISCONTINUED | OUTPATIENT
Start: 2023-09-22 | End: 2023-09-28

## 2023-09-22 RX ORDER — IBUPROFEN 400 MG/1
400 TABLET ORAL EVERY 6 HOURS PRN
Status: DISCONTINUED | OUTPATIENT
Start: 2023-09-22 | End: 2023-09-29 | Stop reason: HOSPADM

## 2023-09-22 RX ORDER — LIDOCAINE HYDROCHLORIDE 20 MG/ML
JELLY TOPICAL
Status: DISCONTINUED | OUTPATIENT
Start: 2023-09-22 | End: 2023-09-29 | Stop reason: HOSPADM

## 2023-09-22 RX ADMIN — PIPERACILLIN SODIUM AND TAZOBACTAM SODIUM 4.5 G: 4; .5 INJECTION, POWDER, LYOPHILIZED, FOR SOLUTION INTRAVENOUS at 02:09

## 2023-09-22 RX ADMIN — MEMANTINE HYDROCHLORIDE 5 MG: 5 TABLET ORAL at 09:09

## 2023-09-22 RX ADMIN — HYDROCODONE BITARTRATE AND ACETAMINOPHEN 1 TABLET: 5; 325 TABLET ORAL at 08:09

## 2023-09-22 RX ADMIN — MUPIROCIN 1 G: 20 OINTMENT TOPICAL at 08:09

## 2023-09-22 RX ADMIN — METRONIDAZOLE 500 MG: 5 INJECTION, SOLUTION INTRAVENOUS at 09:09

## 2023-09-22 RX ADMIN — HYDROCODONE BITARTRATE AND ACETAMINOPHEN 1 TABLET: 5; 325 TABLET ORAL at 12:09

## 2023-09-22 RX ADMIN — OLANZAPINE 10 MG: 5 TABLET, FILM COATED ORAL at 10:09

## 2023-09-22 RX ADMIN — MIRTAZAPINE 30 MG: 15 TABLET, FILM COATED ORAL at 08:09

## 2023-09-22 RX ADMIN — COLLAGENASE SANTYL: 250 OINTMENT TOPICAL at 09:09

## 2023-09-22 RX ADMIN — MUPIROCIN 1 G: 20 OINTMENT TOPICAL at 09:09

## 2023-09-22 RX ADMIN — Medication 800 MG: at 03:09

## 2023-09-22 RX ADMIN — ENOXAPARIN SODIUM 40 MG: 40 INJECTION SUBCUTANEOUS at 03:09

## 2023-09-22 RX ADMIN — CEFEPIME HYDROCHLORIDE 2 G: 2 INJECTION, POWDER, FOR SOLUTION INTRAVENOUS at 09:09

## 2023-09-22 RX ADMIN — LIDOCAINE HYDROCHLORIDE 6 ML: 20 JELLY TOPICAL at 02:09

## 2023-09-22 RX ADMIN — HYDROCODONE BITARTRATE AND ACETAMINOPHEN 1 TABLET: 5; 325 TABLET ORAL at 04:09

## 2023-09-22 RX ADMIN — POTASSIUM BICARBONATE 50 MEQ: 977.5 TABLET, EFFERVESCENT ORAL at 02:09

## 2023-09-22 RX ADMIN — LEVOTHYROXINE SODIUM 100 MCG: 0.1 TABLET ORAL at 05:09

## 2023-09-22 RX ADMIN — MEMANTINE HYDROCHLORIDE 5 MG: 5 TABLET ORAL at 08:09

## 2023-09-22 RX ADMIN — SERTRALINE HYDROCHLORIDE 25 MG: 25 TABLET ORAL at 10:09

## 2023-09-22 RX ADMIN — METRONIDAZOLE 500 MG: 5 INJECTION, SOLUTION INTRAVENOUS at 04:09

## 2023-09-22 RX ADMIN — ACETAMINOPHEN 650 MG: 325 TABLET ORAL at 03:09

## 2023-09-22 RX ADMIN — OLANZAPINE 10 MG: 5 TABLET, FILM COATED ORAL at 08:09

## 2023-09-22 NOTE — ASSESSMENT & PLAN NOTE
With suspected cellulitic component  XR reviewed  - continue abx per sepsis section  - consult wound care and ID  - f/u MRI and US art

## 2023-09-22 NOTE — PROGRESS NOTES
VANCOMYCIN PHARMACOKINETIC NOTE:  Vancomycin Day # 1    Objective/Assessment:    Diagnosis/Indication for Vancomycin: Sepsis   Transfer patient   88 y.o., male; Actual Body Weight = 74.6 kg (164 lb 7.4 oz).    The patient has the following labs:  9/21/2023 Estimated Creatinine Clearance: 42.8 mL/min (based on SCr of 1.2 mg/dL). Lab Results   Component Value Date    BUN 28 (H) 09/21/2023     Lab Results   Component Value Date    WBC 14.22 (H) 09/21/2023          Plan:  Adjust vancomycin dose and/or frequency based on the patient's actual weight and renal function:  Initiate Vancomycin 1500 mg IV once, then 1250 mg every 24 hours.  Orders have been entered into patient's chart.    Vancomycin dose = 16.7 mg/kg actual body weight    Vancomycin trough level has been re-ordered for 9/24 at 09:30..    Pharmacy will manage vancomycin therapy, monitor serum vancomycin levels, monitor renal function and adjust regimen as necessary.    Thank you for allowing us to participate in this patient's care.     Yessy Storm 9/21/2023 8:08 PM  Department of Pharmacy  Ext [phone]

## 2023-09-22 NOTE — NURSING
[facility]  Wound Care    Patient Name:  Adriano Borjas   MRN:  [id number]  Date: 9/22/2023  Diagnosis: Severe sepsis    History:     Past Medical History:   Diagnosis Date    A-fib     Dementia     Elevated cholesterol     Essential hypertension     History of cerebral hemorrhage 9/5/2019    Kidney stone     passed on his own    Memory loss     dememtia per family    Osteoarthritis     Paroxysmal atrial fibrillation 12/12/2018    Stroke     2012 TIA     Thyroid disease        Social History     Socioeconomic History    Marital status:    Tobacco Use    Smoking status: Former     Types: Cigarettes    Smokeless tobacco: Never    Tobacco comments:     quit 50 years ago   Substance and Sexual Activity    Alcohol use: No    Drug use: No     Social Determinants of Health     Financial Resource Strain: Unknown (6/26/2023)    Overall Financial Resource Strain (CARDIA)     Difficulty of Paying Living Expenses: Patient refused   Food Insecurity: Unknown (6/26/2023)    Hunger Vital Sign     Worried About Running Out of Food in the Last Year: Patient refused     Ran Out of Food in the Last Year: Patient refused   Transportation Needs: Unknown (6/26/2023)    PRAPARE - Transportation     Lack of Transportation (Medical): Patient refused     Lack of Transportation (Non-Medical): Patient refused   Physical Activity: Unknown (6/26/2023)    Exercise Vital Sign     Days of Exercise per Week: Patient refused     Minutes of Exercise per Session: Patient refused   Stress: Unknown (6/26/2023)    [employer] - Occupational Stress Questionnaire     Feeling of Stress : Patient refused   Social Connections: Unknown (6/26/2023)    Social Connection and Isolation Panel [NHANES]     Frequency of Communication with Friends and Family: Patient refused     Frequency of Social Gatherings with Friends and Family: Patient refused     Attends Faith Services: Patient refused     Active Member of  Clubs or Organizations: Patient refused     Attends Club or Organization Meetings: Patient refused     Marital Status: Patient refused   Housing Stability: Unknown (6/26/2023)    Housing Stability Vital Sign     Unable to Pay for Housing in the Last Year: Patient refused     Unstable Housing in the Last Year: Patient refused       Precautions:     Allergies as of 09/21/2023 - Reviewed 09/21/2023   Allergen Reaction Noted    Ciprofloxacin Other (See Comments) 02/07/2014    Codeine Other (See Comments) 01/21/2014    Sulfa (sulfonamide antibiotics)  02/10/2015       WOC Assessment Details/Treatment   88 yr old male   NPWT dressing change   Right hip dressing removed and area cleaned with chlorhexidine/NS .   Wound irrigated with ns.   Pat dry.  Apply benzion to the periwound   green foam  cut to fit times 2 pieces  Foam placed within wound.  Apply drape.  Bridge to upper right hip    Apply topper foam. Apply drape and dome.  To suction at 125 mmHg continuous, and holding.     3x3x1  tunnel 4oclock 2.5 cm                     Right heel black soft eschar 5x5x?    Left heel 5x5x1                    Has orders for santyl and aquacel ag for bilat heels         09/22/2023

## 2023-09-22 NOTE — PLAN OF CARE
Problem: Skin Injury Risk Increased  Goal: Skin Health and Integrity  Outcome: Ongoing, Progressing  Intervention: Promote and Optimize Oral Intake  Flowsheets (Taken 9/22/2023 1130)  Oral Nutrition Promotion: calorie-dense liquids provided

## 2023-09-22 NOTE — ASSESSMENT & PLAN NOTE
This patient does have evidence of infective focus  My overall impression is sepsis.  Source: Skin and Soft Tissue and urinary  Antibiotics given-   Antibiotics (72h ago, onward)    Start     Stop Route Frequency Ordered    09/22/23 0945  cefepime 2 g in dextrose 5% 100 mL IVPB (ready to mix)         -- IV Every 12 hours (non-standard times) 09/22/23 0833    09/22/23 0945  metronidazole IVPB 500 mg         -- IV Every 8 hours (non-standard times) 09/22/23 0833    09/21/23 2100  mupirocin 2 % ointment         09/26/23 2059 Nasl 2 times daily 09/21/23 1758        Latest lactate reviewed-  Recent Labs   Lab 09/21/23  1333   LACTATE 1.4     Organ dysfunction indicated by Encephalopathy  No pressor needed  - continue abx  - f/u ID and wound care recs

## 2023-09-22 NOTE — SUBJECTIVE & OBJECTIVE
Interval History: Patient seen and examined. NAEON. Patient shaking not interacting much with provider. Son bedside gives most history/information. Son requesting pain med for patient.      Objective:     Vital Signs (Most Recent):  Temp: 98.6 °F (37 °C) (09/22/23 1100)  Pulse: (!) 128 (09/22/23 1100)  Resp: (!) 31 (09/22/23 1213)  BP: 114/65 (09/22/23 1100)  SpO2: 95 % (09/22/23 1100) Vital Signs (24h Range):  Temp:  [97.8 °F (36.6 °C)-100 °F (37.8 °C)] 98.6 °F (37 °C)  Pulse:  [] 128  Resp:  [14-31] 31  SpO2:  [83 %-100 %] 95 %  BP: ()/(49-72) 114/65     Weight: 74.8 kg (164 lb 14.5 oz)  Body mass index is 25.07 kg/m².    Intake/Output Summary (Last 24 hours) at 9/22/2023 1427  Last data filed at 9/22/2023 1402  Gross per 24 hour   Intake 280 ml   Output 900 ml   Net -620 ml         Physical Exam  Vitals reviewed.   Constitutional:       General: He is not in acute distress.     Appearance: He is ill-appearing.   HENT:      Head: Normocephalic and atraumatic.   Cardiovascular:      Rate and Rhythm: Normal rate. Rhythm irregular.   Pulmonary:      Effort: Pulmonary effort is normal. No respiratory distress.   Musculoskeletal:      Comments: R hip wound vac in place  B/l unstageable heel ulcers  Buttock ulcer stage 1-2   Skin:     General: Skin is warm and dry.   Neurological:      General: No focal deficit present.      Mental Status: He is alert. He is disoriented.   Psychiatric:         Mood and Affect: Affect is blunt.         Cognition and Memory: Cognition is impaired.             Significant Labs: All pertinent labs within the past 24 hours have been reviewed.    Significant Imaging: I have reviewed all pertinent imaging results/findings within the past 24 hours.

## 2023-09-22 NOTE — CONSULTS
[facility]  Adult Nutrition   Consult Note (Initial Assessment)     SUMMARY     Recommendations  1) Continue current regular diet as tolerated and add Ensure HP to meal trays to assist in wound healing.   2)  to obtain meal food selections daily.    Goals:   Pt to meet his nutritional needs and to have complete healing of his wounds.    Nutrition Goal Status: progressing towards goal    Communication of RD Recs: reviewed with RN    Dietitian Rounds Brief  Consult for Wounds: Pt is an 88 yowm admitted with severe sepsis and a pmh of dementia, afib, HTN, HLD, TIA and wounds. He is being fed a regular diet with good intake per RN. Ensure HP has been added to increase the protein and calories to assist in wound healing. LBM was yesterday. Will continue to follow prn.    Principal Problem:Severe sepsis     Chief Complaint:        Chief Complaint   Patient presents with    Fever       Per EMS, nursing home stated pt started having fever this morning. Pt has wound vac to right hip,wounds to bilateral feet, and left buttocks         HPI: 88-year-old male with past medical history of dementia, AFib, hypertension, hyperlipidemia, TIA who was sent from nursing home today for fever.  Patient has known bilateral heel wounds, wound VAC to right hip, and wound to left buttocks.  Son is at bedside and helps provide history as patient is unable to provide history other than telling me he is in pain at this time.  Son reports fever and decreased mental status occurred this morning.  He states that at baseline patient is more conversant than he is today.  Son reports he thinks that patient was on antibiotics recently for his bilateral heel wounds but does not think he is on antibiotics currently.  ED workup revealed elevated lactic acid at 2.9 an elevated white blood cell count.  Sepsis workup in progress.  Son reports patient is DNR but does want fluid and pressors if needed.  Patient to be admitted to step-down  "bed.          Diet order:   Current Diet Order: Regular      Evaluation of Received Nutrient/Fluid Intake  Energy Calories Required: meeting needs  Protein Required: meeting needs  Fluid Required: meeting needs  Tolerance: tolerating     % Intake of Estimated Energy Needs: 50 - 75 %  % Meal Intake: 75 - 100 %      Intake/Output Summary (Last 24 hours) at 9/22/2023 1125  Last data filed at 9/22/2023 0831  Gross per 24 hour   Intake 280 ml   Output 950 ml   Net -670 ml        Anthropometrics  Temp: 98.2 °F (36.8 °C)  Height Method: Stated  Height: 5' 8" (172.7 cm)  Height (inches): 68 in  Weight Method: Bed Scale  Weight: 74.8 kg (164 lb 14.5 oz)  Weight (lb): 164.91 lb  Ideal Body Weight (IBW), Male: 154 lb  % Ideal Body Weight, Male (lb): 106.79 %  BMI (Calculated): 25.1  BMI Grade: 25 - 29.9 - overweight       Estimated/Assessed Needs  Weight Used For Calorie Calculations: 74.8 kg (164 lb 14.5 oz)  Energy Calorie Requirements (kcal): 0435-0950 kcals/day (20-25 kcals/kg ABW)  Energy Need Method: Kcal/kg  Protein Requirements: 70-84 g/day (1.0-1.2 g/kg IBW)  Weight Used For Protein Calculations: 70 kg (154 lb 5.2 oz)     Estimated Fluid Requirement Method: RDA Method  RDA Method (mL): 1496       Reason for Assessment  Reason For Assessment: consult  Diagnosis: infection/sepsis  Relevant Medical History: Thyroid disease, Kidney stone, A-fib, Essential hypertension, Elevated cholesterol, Osteoarthritis, Stroke, TIA, Memory loss, Dementia, History of cerebral hemorrhage, Paroxysmal atrial fibrillation    Nutrition/Diet History  Food Allergies: NKFA  Factors Affecting Nutritional Intake: None identified at this time    Nutrition Risk Screen  Nutrition Risk Screen: difficulty chewing/swallowing       Altered Skin Integrity 09/21/23 1109 Right Heel Full thickness tissue loss. Base is covered by slough and/or eschar in the wound bed-Wound Image: Images linked       Altered Skin Integrity 09/21/23 1110 Right Buttocks Partial " thickness tissue loss. Shallow open ulcer with a red or pink wound bed, without slough. Intact or Open/Ruptured Serum-filled blister.-Wound Image: (S) Images linked (Correction to image. R buttock)       Altered Skin Integrity 06/26/23 1500 Right Hip Ulceration Full thickness tissue loss with exposed bone, tendon, or muscle. Often includes undermining and tunneling. May extend into muscle and/or supporting structures.-Wound Image: (S) Images linked       Altered Skin Integrity 06/26/23 1813 Left Heel Ulceration Full thickness tissue loss. Base is covered by slough and/or eschar in the wound bed-Wound Image: (S) Images linked  MST Score: 0  Have you recently lost weight without trying?: No  Weight loss score: 0  Have you been eating poorly because of a decreased appetite?: No  Appetite score: 0       Weight History:  Wt Readings from Last 5 Encounters:   09/22/23 74.8 kg (164 lb 14.5 oz)   06/27/23 78.9 kg (173 lb 15.1 oz)   09/21/21 81.6 kg (179 lb 14.3 oz)   03/13/21 81.6 kg (180 lb)   03/13/21 81.6 kg (179 lb 14.3 oz)        Lab/Procedures/Meds: Pertinent Labs/Meds Reviewed    Medications:Pertinent Medications Reviewed  Scheduled Meds:   ceFEPime (MAXIPIME) IVPB  2 g Intravenous Q12H    collagenase   Topical (Top) Daily    enoxparin  40 mg Subcutaneous Daily    levothyroxine  100 mcg Oral Before breakfast    memantine  5 mg Oral BID    metronidazole  500 mg Intravenous Q8H    mirtazapine  30 mg Oral QHS    mupirocin   Nasal BID    OLANZapine  10 mg Oral Nightly    sertraline  25 mg Oral Daily     Continuous Infusions:  PRN Meds:.acetaminophen, dextrose 50%, dextrose 50%, glucagon (human recombinant), glucose, glucose, magnesium oxide, magnesium oxide, ondansetron, potassium bicarbonate, potassium bicarbonate, potassium bicarbonate, potassium, sodium phosphates, potassium, sodium phosphates, potassium, sodium phosphates    Labs: Pertinent Labs Reviewed  Clinical Chemistry:  Recent Labs   Lab 09/21/23  0937       K 3.4*      CO2 24   *   BUN 28*   CREATININE 1.2   CALCIUM 8.2*   PROT 6.4   ALBUMIN 2.3*   BILITOT 1.0   ALKPHOS 73   AST 42*   ALT 35   ANIONGAP 10     CBC:   Recent Labs   Lab 09/21/23  0937   WBC 14.22*   RBC 3.57*   HGB 11.0*   HCT 33.0*      MCV 92   MCH 30.8   MCHC 33.3     Inflammatory Labs:  Recent Labs   Lab 09/21/23  0937   .1*     Diabetes:  Recent Labs   Lab 09/21/23  1232   POCTGLUCOSE 171*       Monitor and Evaluation  Food and Nutrient Intake: food and beverage intake, energy intake  Food and Nutrient Adminstration: diet order  Knowledge/Beliefs/Attitudes: food and nutrition knowledge/skill, beliefs and attitudes  Physical Activity and Function: nutrition-related ADLs and IADLs, factors affecting access to physical activity  Anthropometric Measurements: weight, weight change, body mass index  Biochemical Data, Medical Tests and Procedures: lipid profile, inflammatory profile, glucose/endocrine profile, gastrointestinal profile, electrolyte and renal panel     Nutrition Risk  Level of Risk/Frequency of Follow-up: moderate     Nutrition Follow-Up  RD Follow-up?: Yes      Yessy Chapin, YOHANNES 09/22/2023 11:25 AM

## 2023-09-22 NOTE — ASSESSMENT & PLAN NOTE
Chronic, rate-controlled  Not on rate or rhythm control meds  Not on anticoagulation  - monitor on tele

## 2023-09-22 NOTE — PROGRESS NOTES
[facility] Medicine  Progress Note    Patient Name: Adriano Borjas  MRN: [id number]  Patient Class: IP- Inpatient   Admission Date: 9/21/2023  Length of Stay: 1 days  Attending Physician: Weston Johnson MD  Primary Care Provider: Nusrat, Primary Doctor        Subjective:     Principal Problem:Severe sepsis        HPI:  88-year-old male with past medical history of dementia, AFib, hypertension, hyperlipidemia, TIA who was sent from nursing home today for fever.  Patient has known bilateral heel wounds, wound VAC to right hip, and wound to left buttocks.  Son is at bedside and helps provide history as patient is unable to provide history other than telling me he is in pain at this time.  Son reports fever and decreased mental status occurred this morning.  He states that at baseline patient is more conversant than he is today.  Son reports he thinks that patient was on antibiotics recently for his bilateral heel wounds but does not think he is on antibiotics currently.  ED workup revealed elevated lactic acid at 2.9 an elevated white blood cell count.  Sepsis workup in progress.  Son reports patient is DNR but does want fluid and pressors if needed.  Patient to be admitted to step-down bed.      Overview/Hospital Course:  88M with PMH HTN, HLD, TIA, pAfib, hypothyroid, and dementia is admitted from nursing home due to fever and altered mental status found to be in sepsis attributed to cellulitis from bilateral heel ulcers and right hip wound with wound vac as well as a UTI. Confirmed as DNR. He was started on broad spectrum antibiotics. Wound care and infectious disease were consulted. CT R hip showed no bony destruction. Xrays of the both heels showed degenerative changes. MRIs of both feet are pending. US arterial BLE is pending. Urine culture growing GNR.      Interval History: Patient seen and examined. NAEON. Patient shaking not interacting much with provider. Son bedside gives most  history/information. Son requesting pain med for patient.      Objective:     Vital Signs (Most Recent):  Temp: 98.6 °F (37 °C) (09/22/23 1100)  Pulse: (!) 128 (09/22/23 1100)  Resp: (!) 31 (09/22/23 1213)  BP: 114/65 (09/22/23 1100)  SpO2: 95 % (09/22/23 1100) Vital Signs (24h Range):  Temp:  [97.8 °F (36.6 °C)-100 °F (37.8 °C)] 98.6 °F (37 °C)  Pulse:  [] 128  Resp:  [14-31] 31  SpO2:  [83 %-100 %] 95 %  BP: ()/(49-72) 114/65     Weight: 74.8 kg (164 lb 14.5 oz)  Body mass index is 25.07 kg/m².    Intake/Output Summary (Last 24 hours) at 9/22/2023 1427  Last data filed at 9/22/2023 1402  Gross per 24 hour   Intake 280 ml   Output 900 ml   Net -620 ml         Physical Exam  Vitals reviewed.   Constitutional:       General: He is not in acute distress.     Appearance: He is ill-appearing.   HENT:      Head: Normocephalic and atraumatic.   Cardiovascular:      Rate and Rhythm: Normal rate. Rhythm irregular.   Pulmonary:      Effort: Pulmonary effort is normal. No respiratory distress.   Musculoskeletal:      Comments: R hip wound vac in place  B/l unstageable heel ulcers  Buttock ulcer stage 1-2   Skin:     General: Skin is warm and dry.   Neurological:      General: No focal deficit present.      Mental Status: He is alert. He is disoriented.   Psychiatric:         Mood and Affect: Affect is blunt.         Cognition and Memory: Cognition is impaired.             Significant Labs: All pertinent labs within the past 24 hours have been reviewed.    Significant Imaging: I have reviewed all pertinent imaging results/findings within the past 24 hours.      Assessment/Plan:      * Severe sepsis  This patient does have evidence of infective focus  My overall impression is sepsis.  Source: Skin and Soft Tissue and urinary  Antibiotics given-   Antibiotics (72h ago, onward)    Start     Stop Route Frequency Ordered    09/22/23 0929  cefepime 2 g in dextrose 5% 100 mL IVPB (ready to mix)         -- IV Every 12  hours (non-standard times) 09/22/23 0833    09/22/23 0945  metronidazole IVPB 500 mg         -- IV Every 8 hours (non-standard times) 09/22/23 0833    09/21/23 2100  mupirocin 2 % ointment         09/26/23 2059 Nasl 2 times daily 09/21/23 1758        Latest lactate reviewed-  Recent Labs   Lab 09/21/23  1333   LACTATE 1.4     Organ dysfunction indicated by Encephalopathy  No pressor needed  - continue abx  - f/u ID and wound care recs    Persistent atrial fibrillation  Chronic, rate-controlled  Not on rate or rhythm control meds  Not on anticoagulation  - monitor on tele    Acute cystitis  Ucx GNR  - continue abx per sepsis section  - f/u ucx final    Stage IV pressure ulcer of right hip  CT reviewed  - Continue wound VAC.    - Consult with wound care.      Unstageable pressure ulcer of right heel  With suspected cellulitic component  XR reviewed  - continue abx per sepsis section  - consult wound care and ID  - f/u MRI and US art    Unstageable pressure ulcer of left heel  With suspected cellulitic component  XR reviewed  - continue abx per sepsis section  - consult wound care and ID  - f/u MRI and US art    Frontotemporal dementia  Noted hx   - delirium precautions  - redirect as able    Essential hypertension  Chronic, low-normal  - hold amlodipine      VTE Risk Mitigation (From admission, onward)         Ordered     IP VTE HIGH RISK PATIENT  Once         09/21/23 1759     Place sequential compression device  Until discontinued         09/21/23 1759     enoxaparin injection 40 mg  Daily         09/21/23 1545                Discharge Planning   KAY:  unclear    Code Status: DNR   Is the patient medically ready for discharge?:     Reason for patient still in hospital (select all that apply): Patient trending condition, Laboratory test, Treatment, Imaging and Consult recommendations                     Weston Johnson MD  Department of Hospital Medicine   [facility]

## 2023-09-22 NOTE — HOSPITAL COURSE
Adriano Borjas is an 88 year old male with a past medical history of HTN, HLD, TIA, Afib, hypothyroidism, sacral ulcer, and dementia who presented with sepsis secondary to UTI and bilateral heel ulcer infection with concern for osteomyelitis. ID has been following. Urine culture shows E coli, blood culture shows Bacteroides and wound culture of the left heel shows E coli, bacteroides. Bone biopsy from left foot positive for enterococcus. He was initially treated with cefepime and Flagyl which has been deescalated to zosyn. Unable to complete MRI or CTA of LE extremities as patient unable to straighten legs and stay still. Decision was made to empirically treat for osteo as patient had visible bone on exam. Wound Care and Podiatry were consulted. Left foot was debrided and wound vac was placed. He was discharged to LTAC to continue abx for total 6 weeks (EOT Nov 6) and for wound care.     Micro Data:  9/21: Uclx - ecoli   9/21: blood clx - bacteroides   9/22: L foot wound - ecoli and bacteroides   9/25: L foot debridement/bone biopsy - bacteroides   9/25: repeat blood cultures negative

## 2023-09-22 NOTE — PLAN OF CARE
[facility]  Initial Discharge Assessment       Primary Care Provider: No, Primary Doctor    Admission Diagnosis: Severe sepsis [A41.9, R65.20]    Admission Date: 9/21/2023  Expected Discharge Date:     Transition of Care Barriers: None    Payor: [employer] / Plan: HALFPOPS 65 / Product Type: Medicare Advantage /     Extended Emergency Contact Information  Primary Emergency Contact: Prieto Borjas  Address: [address] [place] [address] [facility]  Home Phone: [phone]  Work Phone: [phone]  Mobile Phone: [phone]  Relation: Son  Secondary Emergency Contact: Chris Monae  Address: [address] [place] [address] [facility]  Home Phone: [phone]  Mobile Phone: [phone]  Relation: Daughter    Discharge Plan A: Skilled Nursing Facility, Return to nursing home  Discharge Plan B: Return to Nursing Home      [id number] - [place], [place] - [address]  [address] [address]  Phone: [phone] Fax: [phone]      DC assessment completed over the phone with pt's son, Prieto. Pt not oriented at baseline and cannot verify information. Prieto verified that pt is halfway at [place]. States listed address is pt's daughter address- states all mail goes to her regarding pt. Reports that himself and Chris are pt's POA. Pt uses WC and lift device at NH. Currently has wound vac on and sees Dr. Middleton weekly. [facility]. Verified insurance on file. Denies recent inpt stay in last 30 days. DC plan is to return to .     Initial Assessment (most recent)       Adult Discharge Assessment - 09/22/23 1537          Discharge Assessment    Assessment Type Discharge Planning Assessment     Confirmed/corrected address, phone number and insurance Yes     Confirmed Demographics Correct on Facesheet     Source of Information family     If unable to respond/provide  information was family/caregiver contacted? Yes     Contact Name/Number Prieto [phone]     Communicated KAY with patient/caregiver Yes     Reason For Admission sepsis     People in Home facility resident     Facility Arrived From: [facility]     Do you expect to return to your current living situation? Yes     Do you have help at home or someone to help you manage your care at home? Yes     Who are your caregiver(s) and their phone number(s)? staff @ NH     Prior to hospitilization cognitive status: Not Oriented to Person;Not Oriented to Place;Not Oriented to Time     Current cognitive status: Not Oriented to Person;Not Oriented to Place;Not Oriented to Time     Walking or Climbing Stairs ambulation difficulty, dependent     Dressing/Bathing bathing difficulty, dependent     Equipment Currently Used at Home wheelchair;lift device;wound care supplies     Readmission within 30 days? No     Patient currently being followed by outpatient case management? No     Do you currently have service(s) that help you manage your care at home? No     Do you take prescription medications? Yes     Do you have prescription coverage? Yes     Do you have any problems affording any of your prescribed medications? No     Is the patient taking medications as prescribed? yes     Who is going to help you get home at discharge? GB     How do you get to doctors appointments? other (see comments)   NH van    Are you on dialysis? No     Do you take coumadin? No     DME Needed Upon Discharge  none     Discharge Plan discussed with: Adult children     Transition of Care Barriers None     Discharge Plan A Skilled Nursing Facility;Return to nursing home     Discharge Plan B Return to Nursing Home

## 2023-09-22 NOTE — CONSULTS
[facility]   Department of Infectious Disease  Consult Note        PATIENT NAME: Adriano Borjas  MRN: [id number]  TODAY'S DATE: 09/22/2023  ADMIT DATE: 9/21/2023    CHIEF COMPLAINT: Fever (Per EMS, nursing home stated pt started having fever this morning. Pt has wound vac to right hip,wounds to bilateral feet, and left buttocks)    PRINCIPLE PROBLEM: Severe sepsis    REASON FOR CONSULT:  Sepsis-heal wounds and hip wound    ASSESSMENT and PLAN     Severe sepsis multifactorial; UTI and bilateral calcaneal osteomyelitis, foul-smelling drainage from left heel, and R hip sacral decub, the latter with granulation tissue  Procal 5.5, positive  ESR 94, , done at [facility], high  Urine culture with GNR, lactose , pending final   Blood cultures x2 sets 9/21 no growth to date, pending final  Last right foot wound culture 9/7 & R hip 6/28 & 6/26 with pansensitive E coli    PMHx: advanced dementia, AFib, hypertension, hyperlipidemia, prior TIA    Recommendations:    Adequate source control  Podiatry for I&D of both heels, please send deep tissue and bone for Gram stain, cultures, including AFB and fungal, bone to pathology   Unable to perform arterial lower extremity ultrasound   CT angio runoff ordered   Follow bilateral feet MRIs  MRSA nasal swab  Stop vancomycin IV   Cefepime 2 g IV q.12  Start Flagyl 500 mg IV q.8 for anaerobic coverage  Follow cultures including L heel taken at bedside   Wound care to all affected areas   Aspiration precautions    D/w son at bedside, Dr Elizabeth Hill will follow over the weekend    Thank you for this consult. Please send [employer] secure chat with any questions.    HPI      Adriano Borjas is a 88 y.o. male bed-bound, resident of [facility], with past medical history of advanced dementia, AFib, hypertension, hyperlipidemia, prior TIA who was sent from nursing home for fever.  Patient has known history of bilateral left heel chronic ulcers and right hip,  follows with wound care outpatient/Dr. Middleton.  He has had debridements performed, cultures from June from right hip and right heel with pansensitive E coli.     Patient seen and examined at bedside, advanced dementia noted, he is having intentional tremors of upper extremities, labile BP, T-max 102.2, currently 101.3.    Unfortunately, he is not a very good historian due to underlying dementia.  Son at bedside, reports he was having fever and chills at facility.  Denies cough or headache, no shortness of breath, no chest pain, no nausea or vomiting, no abdominal pain, no dysuria or increased urinary frequency, no change in bowel movements.      He was transferred from [facility] to [facility] 9/21.   In the ER febrile to 102.2, stable BP   Labs on admission with leukocytosis 14.2, left shift 95%, H&H 11/33, platelet count 220   ESR 94, , labs done at [facility]  Creatinine 1.2, minimally elevated LFTs AST 42/ALT 35   Albumin 2.3   Procalcitonin 5.5, positive   UA with pyuria of 30, many bacteria, culture with GNR, lactose , pending final   Chest x-ray with low lung volumes pulmonary edema versus pneumonia   X-ray right foot with degenerative changes of the right foot and soft tissue irregularity at the right heel.  X-ray left foot with degenerative changes and soft tissue irregularity over the left calcaneus   CT hip right with progression of the patient's soft tissue wound overlying the right hip greater trochanter.  No bony destruction    Empirically started on vancomycin and Zosyn IV.    Outdoor activities:  Resident of [facility], former smoker.  Used to work as a .  Travel:  None  Implants:  None  Antibiotic history:  See HPI    Past Medical History:   Diagnosis Date    A-fib     Dementia     Elevated cholesterol     Essential hypertension     History of cerebral hemorrhage 9/5/2019    Kidney stone     passed on his own    Memory loss     dememtia per family    Osteoarthritis     Paroxysmal  atrial fibrillation 12/12/2018    Stroke     2012 TIA     Thyroid disease        Past Surgical History:   Procedure Laterality Date    CYSTOSCOPY      EYE SURGERY      MAGNETIC RESONANCE IMAGING N/A 8/23/2019    Procedure: MRI (Magnetic Resonance Imagine) needs anesthesia;  Surgeon: Panchito Montoya MD;  Location: [facility];  Service: Anesthesiology;  Laterality: N/A;       Family History   Problem Relation Age of Onset    Cancer Mother     Diabetes Mother     Hyperlipidemia Mother     Heart disease Father     Hyperlipidemia Father     Dementia Sister     Dementia Brother     Heart disease Brother     Migraines Daughter     Tremor Daughter     Tremor Son     Urolithiasis Neg Hx     Prostate cancer Neg Hx     Kidney cancer Neg Hx        Social History     Socioeconomic History    Marital status:    Tobacco Use    Smoking status: Former     Types: Cigarettes    Smokeless tobacco: Never    Tobacco comments:     quit 50 years ago   Substance and Sexual Activity    Alcohol use: No    Drug use: No     Social Determinants of Health     Financial Resource Strain: Unknown (6/26/2023)    Overall Financial Resource Strain (CARDIA)     Difficulty of Paying Living Expenses: Patient refused   Food Insecurity: Unknown (6/26/2023)    Hunger Vital Sign     Worried About Running Out of Food in the Last Year: Patient refused     Ran Out of Food in the Last Year: Patient refused   Transportation Needs: Unknown (6/26/2023)    PRAPARE - Transportation     Lack of Transportation (Medical): Patient refused     Lack of Transportation (Non-Medical): Patient refused   Physical Activity: Unknown (6/26/2023)    Exercise Vital Sign     Days of Exercise per Week: Patient refused     Minutes of Exercise per Session: Patient refused   Stress: Unknown (6/26/2023)    [place] [place] [employer] - Occupational Stress Questionnaire     Feeling of Stress : Patient refused   Social Connections: Unknown (6/26/2023)    Social  Connection and Isolation Panel [NHANES]     Frequency of Communication with Friends and Family: Patient refused     Frequency of Social Gatherings with Friends and Family: Patient refused     Attends [employer] Services: Patient refused     Active Member of Clubs or Organizations: Patient refused     Attends Club or Organization Meetings: Patient refused     Marital Status: Patient refused   Housing Stability: Unknown (6/26/2023)    Housing Stability Vital Sign     Unable to Pay for Housing in the Last Year: Patient refused     Unstable Housing in the Last Year: Patient refused       Review of patient's allergies indicates:   Allergen Reactions    Ciprofloxacin Other (See Comments)     Shoulder and leg pain/cramping     Codeine Other (See Comments)     Pt cannot remember been a long time ago    Sulfa (sulfonamide antibiotics)      Allergic reaction to bactrim       Current Outpatient Medications   Medication Instructions    acetaminophen (TYLENOL) 500 mg, Oral, Nightly    amino acids/protein hydrolys (PRO-STAT AWC ORAL) 30 mLs, Oral, 2 times daily    amLODIPine (NORVASC) 5 mg, Oral, Daily, MAR     ammonium lactate (LAC-HYDRIN) 12 % lotion 1 Application, Topical (Top), 2 times daily    arginine-glutamine-calcium HMB (GUILLERMO) 7-7-1.5 gram PwPk 1 packet, Oral, 2 times daily    ascorbic acid (vitamin C) (VITAMIN C) 500 mg, Oral, Daily    atorvastatin (LIPITOR) 40 mg, Oral, Daily    collagenase (SANTYL) ointment Topical (Top), Daily, Clean right hip with wound cleanser and pat dry. Apply a nickel thick layer of santyl to the right hip, cover with adaptic (vaseline gauze) and ABD and secure with tape daily.    docusate sodium (COLACE) 100 mg, Oral, Daily    HYDROcodone-acetaminophen (NORCO) 5-325 mg per tablet 1 tablet, Oral, Every 6 hours PRN    hydrocortisone 2.5 % cream Thin film to AA onface daily PRN flare    hydrOXYzine pamoate (VISTARIL) 25 mg, Oral, Every 8 hours PRN    ketoconazole (NIZORAL) 2 % cream Thin film to  red scaly rash on face twice daily as needed for flare    ketoconazole (NIZORAL) 2 % shampoo Wash hair with medicated shampoo at least 2x/week - let sit on scalp at least 5 minutes prior to rinsing    levothyroxine (SYNTHROID) 100 mcg, Oral, Daily    memantine (NAMENDA) 5 mg, Oral, 2 times daily    mirtazapine (REMERON) 30 mg, Oral, Nightly    multivitamin (THERAGRAN) tablet 1 tablet, Oral, Daily    OCUSOFT LID SCRUB PLUS PadM 1 Pad, Both Eyes, Daily, Apply to bilateral eyes topically every morning    OLANZapine (ZYPREXA) 10 mg, Oral, Nightly    propylene glycoL 0.6 % Drop 2 drops, Both Eyes, 3 times daily, For dry eyes     pulse oximeter (PULSE OXIMETER) device Apply Externally, 2 times daily, Use twice daily at 8 AM and 3 PM and record the value in MyChart as directed.    sertraline (ZOLOFT) 25 mg, Oral, Daily         Review of Systems   Reason unable to perform ROS: Limited, underlying dementia.           OBJECTIVE     Temp:  [97.8 °F (36.6 °C)-101.3 °F (38.5 °C)] 101.3 °F (38.5 °C)  Pulse:  [] 87  Resp:  [19-31] 22  SpO2:  [83 %-100 %] 100 %  BP: ()/(49-72) 102/55         Physical Exam  Constitutional:       Appearance: He is normal weight. He is ill-appearing.   HENT:      Mouth/Throat:      Mouth: Mucous membranes are dry.   Eyes:      Extraocular Movements: Extraocular movements intact.      Pupils: Pupils are equal, round, and reactive to light.   Cardiovascular:      Rate and Rhythm: Normal rate. Rhythm irregular.      Pulses: Normal pulses.      Heart sounds: Normal heart sounds.   Pulmonary:      Breath sounds: Rales present.      Comments: Inspiratory effort, minimal bilateral rales  Abdominal:      General: Bowel sounds are normal.      Palpations: Abdomen is soft.      Tenderness: There is no abdominal tenderness. There is no rebound.   Genitourinary:     Comments: PureWick in place, draining cloudy urine  Musculoskeletal:      Cervical back: Normal range of motion and neck supple.       "Right lower leg: No edema.      Left lower leg: No edema.      Comments: Generalized muscle wasting, lower extremities contracted  Unintentional tremor of upper extremities right more than left   Skin:     General: Skin is warm.      Findings: Erythema and lesion present.      Comments: Bilateral heels with necrotic nonhealing wounds, bone palpated, left heel with foul-smelling drainage, cultures taken at bedside   Neurological:      Mental Status: He is alert. Mental status is at baseline.      Comments: Underlying dementia   Psychiatric:         Behavior: Behavior normal.           WOUNDS:        Right heel   Left heel      R hip:  Granulation tissue noted    VAD: peripheral IVs  ISOLATION: None    CBC LAST 7  Recent Labs   Lab 09/21/23  0937 09/22/23  1039   WBC 14.22* 12.93*   RBC 3.57* 3.38*   HGB 11.0* 10.2*   HCT 33.0* 31.7*   MCV 92 94   MCH 30.8 30.2   MCHC 33.3 32.2   RDW 13.0 13.3    201   MPV 11.8 12.6   GRAN 95.0*  13.5* 81.4*  10.5*   LYMPH 1.5*  0.2* 8.2*  1.1   MONO 2.7*  0.4 9.4  1.2*   BASO 0.05 0.05   NRBC 0 0       CHEMISTRY LAST 7  Recent Labs   Lab 09/21/23  0937 09/22/23  1039    140   K 3.4* 3.8    107   CO2 24 26   ANIONGAP 10 7*   BUN 28* 30*   CREATININE 1.2 1.1   * 120*   CALCIUM 8.2* 8.2*   MG  --  1.9   ALBUMIN 2.3* 2.8*   PROT 6.4 6.6   ALKPHOS 73 64   ALT 35 36   AST 42* 56*   BILITOT 1.0 0.7       Estimated Creatinine Clearance: 46.6 mL/min (based on SCr of 1.1 mg/dL).    INFLAMMATORY/PROCAL  LAST 7  Recent Labs   Lab 09/21/23  0937   PROCAL 5.53*   .1*     No results found for: "ESR"  CRP   Date Value Ref Range Status   09/21/2023 175.1 (H) 0.0 - 8.2 mg/L Final       PRIOR  MICROBIOLOGY:      LAST 7 DAYS MICROBIOLOGY   Microbiology Results (last 7 days)       Procedure Component Value Units Date/Time    Blood culture x two cultures. Draw prior to antibiotics. [[id number]] Collected: 09/21/23 0937    Order Status: Completed Specimen: Blood " Updated: 09/22/23 1432     Blood Culture, Routine No Growth to date      No Growth to date    Narrative:      Aerobic and anaerobic    Blood culture x two cultures. Draw prior to antibiotics. [[id number]] Collected: 09/21/23 0937    Order Status: Completed Specimen: Blood Updated: 09/22/23 1432     Blood Culture, Routine No Growth to date      No Growth to date    Narrative:      Aerobic and anaerobic    Culture, Anaerobe [[id number]] Collected: 09/22/23 1042    Order Status: Sent Specimen: Wound from Foot, Left Updated: 09/22/23 1132    Aerobic culture [[id number]] Collected: 09/22/23 1042    Order Status: Sent Specimen: Wound from Foot, Left Updated: 09/22/23 1132    Urine culture [[id number]]  (Abnormal) Collected: 09/21/23 1136    Order Status: Completed Specimen: Urine Updated: 09/22/23 1012     Urine Culture, Routine GRAM NEGATIVE BRITTNEY, LACTOSE   >100,000 cfu/ml  Identification and susceptibility pending      Narrative:      Specimen Source->Urine    Influenza A & B by Molecular [[id number]] Collected: 09/21/23 0948    Order Status: Completed Specimen: Nasal Swab Updated: 09/21/23 1037     Influenza A, Molecular Negative     Influenza B, Molecular Negative     Flu A & B Source Nasal swab            PRIOR MICROBIOLOGY:     Susceptibility data from last 90 days.  Collected Specimen Info Organism Amox/K Clav'ate Amp/Sulbactam Ampicillin Cefazolin Cefepime Ceftriaxone Ciprofloxacin Ertapenem Gentamicin Levofloxacin Meropenem PIPERACILLIN/TAZO SUSCEPTIBILITY Tetracycline Tobramycin   09/21/23 Urine Lactose Fermenting                 09/07/23 Wound from Foot, Right Escherichia coli  S  S  S  S  S  S  S  S  S  S  S  S  S  S   06/28/23 Wound from Hip, Right Escherichia coli  S  S  S  S  S  S  S  S  S  S  S  S  S  S   06/26/23 Wound from Hip, Right Escherichia coli  S  S  S  S  S  S  S  S  S  S  S  S  S  S     Collected Specimen Info Organism Trimeth/Sulfa   09/21/23 Urine Lactose Fermenting    09/07/23 Wound  from Foot, Right Escherichia coli  S   06/28/23 Wound from Hip, Right Escherichia coli  S   06/26/23 Wound from Hip, Right Escherichia coli  S       CURRENT/PREVIOUS VISIT EKG  Results for orders placed or performed during the hospital encounter of 09/21/23   EKG 12-lead    Collection Time: 09/21/23 10:14 AM    Narrative    Test Reason : A41.9,    Vent. Rate : 102 BPM     Atrial Rate : 102 BPM     P-R Int : 152 ms          QRS Dur : 088 ms      QT Int : 322 ms       P-R-T Axes : 055 -31 -09 degrees     QTc Int : 419 ms    Sinus tachycardia with Premature atrial complexes  Left axis deviation  Abnormal ECG  Confirmed by Jimmy Enrique MD ([id number]) on 9/21/2023 5:15:56 PM    Referred By: SUSAN   SELF           Confirmed By:Jimmy Enrique MD       Significant Labs: All pertinent labs within the past 24 hours have been reviewed.     Significant Imaging: I have reviewed all relevant and available imaging results/findings within the past 24 hours.  CXR  X-rays R and L foot  CT R hip: There is been progression of the patient's soft tissue wound overlying the right hip greater trochanter.  No bony destruction is visualized on today's study      Kelly Echols MD  Date of Service: 09/22/2023  3:15 PM

## 2023-09-22 NOTE — CARE UPDATE
09/22/23 0801   Patient Assessment/Suction   Level of Consciousness (AVPU) alert   Respiratory Effort Normal;Unlabored   Skin Integrity   $ Wound Care Tech Time 15 min   Area Observed Left;Right;Behind ear;Cheek;Upper lip;Nares   Skin Appearance without discoloration   PRE-TX-O2   Device (Oxygen Therapy) nasal cannula   $ Is the patient on Low Flow Oxygen? Yes   Flow (L/min) 2   Pulse Oximetry Type Continuous   $ Pulse Oximetry - Multiple Charge Pulse Oximetry - Multiple   Education   $ Education Other (see comment);15 min

## 2023-09-23 PROBLEM — R65.20 SEVERE SEPSIS: Status: RESOLVED | Noted: 2023-09-21 | Resolved: 2023-09-23

## 2023-09-23 PROBLEM — A41.9 SEVERE SEPSIS: Status: RESOLVED | Noted: 2023-09-21 | Resolved: 2023-09-23

## 2023-09-23 PROBLEM — R78.81 BACTEREMIA: Status: ACTIVE | Noted: 2023-09-23

## 2023-09-23 PROBLEM — D64.9 ANEMIA: Status: ACTIVE | Noted: 2023-09-23

## 2023-09-23 PROBLEM — E03.9 HYPOTHYROID: Status: ACTIVE | Noted: 2023-09-23

## 2023-09-23 LAB
ACINETOBACTER CALCOACETICUS/BAUMANNII COMPLEX: NOT DETECTED
ANION GAP SERPL CALC-SCNC: 5 MMOL/L (ref 8–16)
BACTERIA UR CULT: ABNORMAL
BACTEROIDES FRAGILIS: DETECTED
BUN SERPL-MCNC: 26 MG/DL (ref 8–23)
CALCIUM SERPL-MCNC: 8 MG/DL (ref 8.7–10.5)
CANDIDA ALBICANS: NOT DETECTED
CANDIDA AURIS: NOT DETECTED
CANDIDA GLABRATA: NOT DETECTED
CANDIDA KRUSEI: NOT DETECTED
CANDIDA PARAPSILOSIS: NOT DETECTED
CANDIDA TROPICALIS: NOT DETECTED
CHLORIDE SERPL-SCNC: 103 MMOL/L (ref 95–110)
CO2 SERPL-SCNC: 28 MMOL/L (ref 23–29)
CREAT SERPL-MCNC: 0.9 MG/DL (ref 0.5–1.4)
CRP SERPL-MCNC: >16 MG/DL
CRYPTOCOCCUS NEOFORMANS/GATTII: NOT DETECTED
CTX-M GENE: ABNORMAL
ENTEROBACTER CLOACAE COMPLEX: NOT DETECTED
ENTEROBACTERALES: NOT DETECTED
ENTEROCOCCUS FAECALIS: NOT DETECTED
ENTEROCOCCUS FAECIUM: NOT DETECTED
ERYTHROCYTE [DISTWIDTH] IN BLOOD BY AUTOMATED COUNT: 13.2 % (ref 11.5–14.5)
ESCHERICHIA COLI: NOT DETECTED
EST. GFR  (NO RACE VARIABLE): >60 ML/MIN/1.73 M^2
GLUCOSE SERPL-MCNC: 117 MG/DL (ref 70–110)
HAEMOPHILUS INFLUENZAE: NOT DETECTED
HCT VFR BLD AUTO: 32.1 % (ref 40–54)
HGB BLD-MCNC: 10.5 G/DL (ref 14–18)
IMP GENE: ABNORMAL
KLEBSIELLA AEROGENES: NOT DETECTED
KLEBSIELLA OXYTOCA: NOT DETECTED
KLEBSIELLA PNEUMONIAE GROUP: NOT DETECTED
KPC: ABNORMAL
LISTERIA MONOCYTOGENES: NOT DETECTED
MAGNESIUM SERPL-MCNC: 2 MG/DL (ref 1.6–2.6)
MCH RBC QN AUTO: 30.7 PG (ref 27–31)
MCHC RBC AUTO-ENTMCNC: 32.7 G/DL (ref 32–36)
MCR-1: ABNORMAL
MCV RBC AUTO: 94 FL (ref 82–98)
MEC A/C AND MREJ (MRSA): ABNORMAL
MEC A/C: ABNORMAL
NDM GENE: ABNORMAL
NEISSERIA MENINGITIDIS: NOT DETECTED
OXA-48-LIKE: ABNORMAL
PLATELET # BLD AUTO: 175 K/UL (ref 150–450)
PMV BLD AUTO: 12.5 FL (ref 9.2–12.9)
POTASSIUM SERPL-SCNC: 3.6 MMOL/L (ref 3.5–5.1)
POTASSIUM SERPL-SCNC: 4.7 MMOL/L (ref 3.5–5.1)
PROTEUS SPECIES: NOT DETECTED
PSEUDOMONAS AERUGINOSA: NOT DETECTED
RBC # BLD AUTO: 3.42 M/UL (ref 4.6–6.2)
SALMONELLA SP: NOT DETECTED
SERRATIA MARCESCENS: NOT DETECTED
SODIUM SERPL-SCNC: 136 MMOL/L (ref 136–145)
STAPHYLOCOCCUS AUREUS: NOT DETECTED
STAPHYLOCOCCUS EPIDERMIDIS: NOT DETECTED
STAPHYLOCOCCUS LUGDUNESIS: NOT DETECTED
STAPHYLOCOCCUS SPECIES: NOT DETECTED
STENOTROPHOMONAS MALTOPHILIA: NOT DETECTED
STREPTOCOCCUS AGALACTIAE: NOT DETECTED
STREPTOCOCCUS PNEUMONIAE: NOT DETECTED
STREPTOCOCCUS PYOGENES: NOT DETECTED
STREPTOCOCCUS SPECIES: NOT DETECTED
VAN A/B: ABNORMAL
VIM GENE: ABNORMAL
WBC # BLD AUTO: 12.62 K/UL (ref 3.9–12.7)

## 2023-09-23 PROCEDURE — 99231 PR SUBSEQUENT HOSPITAL CARE,LEVL I: ICD-10-PCS | Mod: ,,, | Performed by: INTERNAL MEDICINE

## 2023-09-23 PROCEDURE — 21000000 HC CCU ICU ROOM CHARGE

## 2023-09-23 PROCEDURE — 99900035 HC TECH TIME PER 15 MIN (STAT)

## 2023-09-23 PROCEDURE — 99231 SBSQ HOSP IP/OBS SF/LOW 25: CPT | Mod: ,,, | Performed by: INTERNAL MEDICINE

## 2023-09-23 PROCEDURE — 27000221 HC OXYGEN, UP TO 24 HOURS

## 2023-09-23 PROCEDURE — 25000003 PHARM REV CODE 250: Performed by: STUDENT IN AN ORGANIZED HEALTH CARE EDUCATION/TRAINING PROGRAM

## 2023-09-23 PROCEDURE — 83735 ASSAY OF MAGNESIUM: CPT | Performed by: STUDENT IN AN ORGANIZED HEALTH CARE EDUCATION/TRAINING PROGRAM

## 2023-09-23 PROCEDURE — 87040 BLOOD CULTURE FOR BACTERIA: CPT | Performed by: INTERNAL MEDICINE

## 2023-09-23 PROCEDURE — 36415 COLL VENOUS BLD VENIPUNCTURE: CPT | Performed by: STUDENT IN AN ORGANIZED HEALTH CARE EDUCATION/TRAINING PROGRAM

## 2023-09-23 PROCEDURE — 85027 COMPLETE CBC AUTOMATED: CPT | Performed by: STUDENT IN AN ORGANIZED HEALTH CARE EDUCATION/TRAINING PROGRAM

## 2023-09-23 PROCEDURE — 94761 N-INVAS EAR/PLS OXIMETRY MLT: CPT

## 2023-09-23 PROCEDURE — 63600175 PHARM REV CODE 636 W HCPCS: Performed by: STUDENT IN AN ORGANIZED HEALTH CARE EDUCATION/TRAINING PROGRAM

## 2023-09-23 PROCEDURE — 80048 BASIC METABOLIC PNL TOTAL CA: CPT | Performed by: STUDENT IN AN ORGANIZED HEALTH CARE EDUCATION/TRAINING PROGRAM

## 2023-09-23 PROCEDURE — 63600175 PHARM REV CODE 636 W HCPCS: Performed by: HOSPITALIST

## 2023-09-23 PROCEDURE — 84132 ASSAY OF SERUM POTASSIUM: CPT | Performed by: STUDENT IN AN ORGANIZED HEALTH CARE EDUCATION/TRAINING PROGRAM

## 2023-09-23 PROCEDURE — 25000003 PHARM REV CODE 250: Performed by: HOSPITALIST

## 2023-09-23 PROCEDURE — 86140 C-REACTIVE PROTEIN: CPT | Performed by: STUDENT IN AN ORGANIZED HEALTH CARE EDUCATION/TRAINING PROGRAM

## 2023-09-23 RX ORDER — NALOXONE HCL 0.4 MG/ML
0.4 VIAL (ML) INJECTION
Status: DISCONTINUED | OUTPATIENT
Start: 2023-09-23 | End: 2023-09-29 | Stop reason: HOSPADM

## 2023-09-23 RX ORDER — ATORVASTATIN CALCIUM 40 MG/1
40 TABLET, FILM COATED ORAL NIGHTLY
Status: DISCONTINUED | OUTPATIENT
Start: 2023-09-23 | End: 2023-09-29 | Stop reason: HOSPADM

## 2023-09-23 RX ORDER — LIDOCAINE HYDROCHLORIDE 20 MG/ML
JELLY TOPICAL
Status: DISCONTINUED | OUTPATIENT
Start: 2023-09-23 | End: 2023-09-23

## 2023-09-23 RX ADMIN — POTASSIUM BICARBONATE 50 MEQ: 977.5 TABLET, EFFERVESCENT ORAL at 05:09

## 2023-09-23 RX ADMIN — MIRTAZAPINE 30 MG: 15 TABLET, FILM COATED ORAL at 08:09

## 2023-09-23 RX ADMIN — MEMANTINE HYDROCHLORIDE 5 MG: 5 TABLET ORAL at 08:09

## 2023-09-23 RX ADMIN — METRONIDAZOLE 500 MG: 5 INJECTION, SOLUTION INTRAVENOUS at 10:09

## 2023-09-23 RX ADMIN — CEFEPIME HYDROCHLORIDE 2 G: 2 INJECTION, POWDER, FOR SOLUTION INTRAVENOUS at 09:09

## 2023-09-23 RX ADMIN — ACETAMINOPHEN 650 MG: 325 TABLET ORAL at 06:09

## 2023-09-23 RX ADMIN — METRONIDAZOLE 500 MG: 5 INJECTION, SOLUTION INTRAVENOUS at 05:09

## 2023-09-23 RX ADMIN — MUPIROCIN 1 G: 20 OINTMENT TOPICAL at 08:09

## 2023-09-23 RX ADMIN — LEVOTHYROXINE SODIUM 100 MCG: 0.1 TABLET ORAL at 05:09

## 2023-09-23 RX ADMIN — HYDROCODONE BITARTRATE AND ACETAMINOPHEN 1 TABLET: 5; 325 TABLET ORAL at 10:09

## 2023-09-23 RX ADMIN — HYDROCODONE BITARTRATE AND ACETAMINOPHEN 1 TABLET: 5; 325 TABLET ORAL at 06:09

## 2023-09-23 RX ADMIN — MEMANTINE HYDROCHLORIDE 5 MG: 5 TABLET ORAL at 09:09

## 2023-09-23 RX ADMIN — CEFEPIME HYDROCHLORIDE 2 G: 2 INJECTION, POWDER, FOR SOLUTION INTRAVENOUS at 10:09

## 2023-09-23 RX ADMIN — ENOXAPARIN SODIUM 40 MG: 40 INJECTION SUBCUTANEOUS at 05:09

## 2023-09-23 RX ADMIN — HYDROCODONE BITARTRATE AND ACETAMINOPHEN 1 TABLET: 5; 325 TABLET ORAL at 03:09

## 2023-09-23 RX ADMIN — SERTRALINE HYDROCHLORIDE 25 MG: 25 TABLET ORAL at 09:09

## 2023-09-23 RX ADMIN — METRONIDAZOLE 500 MG: 5 INJECTION, SOLUTION INTRAVENOUS at 12:09

## 2023-09-23 RX ADMIN — ATORVASTATIN CALCIUM 40 MG: 40 TABLET, FILM COATED ORAL at 08:09

## 2023-09-23 RX ADMIN — ACETAMINOPHEN 650 MG: 325 TABLET ORAL at 03:09

## 2023-09-23 RX ADMIN — OLANZAPINE 10 MG: 5 TABLET, FILM COATED ORAL at 08:09

## 2023-09-23 NOTE — PROGRESS NOTES
RN reported that Bcx are positive for GNR (1 bottle of one set) from 9/21 . Pt is on cefepime already. Will rpt Bcx to ensure clearance. ID/S pending.

## 2023-09-23 NOTE — PROGRESS NOTES
"[facility]  Progress Note    Patient Name: Adriano Borjas  MRN: [id number]  Patient Class: IP- Inpatient   Admission Date: 9/21/2023  Length of Stay: 2 days  Attending Physician: Macho Matt MD  Primary Care Provider: Nusrat, Primary Doctor        Subjective:     Principal Problem:<principal problem not specified>        HPI:  88-year-old male with past medical history of dementia, AFib, hypertension, hyperlipidemia, TIA who was sent from nursing home today for fever.  Patient has known bilateral heel wounds, wound VAC to right hip, and wound to left buttocks.  Son is at bedside and helps provide history as patient is unable to provide history other than telling me he is in pain at this time.  Son reports fever and decreased mental status occurred this morning.  He states that at baseline patient is more conversant than he is today.  Son reports he thinks that patient was on antibiotics recently for his bilateral heel wounds but does not think he is on antibiotics currently.  ED workup revealed elevated lactic acid at 2.9 an elevated white blood cell count.  Sepsis workup in progress.  Son reports patient is DNR but does want fluid and pressors if needed.  Patient to be admitted to step-down bed.      Overview/Hospital Course:  Adriano Borjas is an 88 year old male with a past medical history of HTN, HLD, TIA, Afib, hypothyroidism, sacral ulcer, and dementia who presented with sepsis secondary to UTI and bilateral heel ulcer infection with concern for osteomyelitis. ID has been consulted. Urine culture shows E coli, blood culture shows Bacteroides and wound culture of the left heel shows GNRs. He is on cefepime and Flagyl. MRI of the heels and CTA of the lower extremities are pending. Wound Care and Podiatry have been consulted. He is DNR.      Interval History: see "Hospital Course"    Review of Systems   Unable to perform ROS: Dementia     Objective:     Vital Signs (Most " Recent):  Temp: 100.2 °F (37.9 °C) (09/23/23 1700)  Pulse: 89 (09/23/23 1615)  Resp: (!) 23 (09/23/23 1615)  BP: (!) 120/56 (09/23/23 1615)  SpO2: 99 % (09/23/23 1615) Vital Signs (24h Range):  Temp:  [98.2 °F (36.8 °C)-100.7 °F (38.2 °C)] 100.2 °F (37.9 °C)  Pulse:  [65-96] 89  Resp:  [15-31] 23  SpO2:  [97 %-100 %] 99 %  BP: ()/(53-81) 120/56     Weight: 73.5 kg (162 lb 0.6 oz)  Body mass index is 24.64 kg/m².    Intake/Output Summary (Last 24 hours) at 9/23/2023 1752  Last data filed at 9/23/2023 1514  Gross per 24 hour   Intake 960 ml   Output 1475 ml   Net -515 ml         Physical Exam  Vitals and nursing note reviewed.   Constitutional:       Appearance: He is ill-appearing.   HENT:      Head: Normocephalic and atraumatic.      Right Ear: External ear normal.      Left Ear: External ear normal.      Nose: Nose normal.      Mouth/Throat:      Mouth: Mucous membranes are moist.      Pharynx: Oropharynx is clear.   Eyes:      Extraocular Movements: Extraocular movements intact.      Conjunctiva/sclera: Conjunctivae normal.   Cardiovascular:      Rate and Rhythm: Normal rate and regular rhythm.      Pulses: Normal pulses.      Heart sounds: Normal heart sounds.   Pulmonary:      Effort: Pulmonary effort is normal.      Breath sounds: Normal breath sounds.   Abdominal:      General: Bowel sounds are normal.      Palpations: Abdomen is soft.   Musculoskeletal:         General: Normal range of motion.      Cervical back: Normal range of motion and neck supple.      Right lower leg: No edema.      Left lower leg: No edema.   Skin:     General: Skin is warm and dry.      Comments: See Wound Care pictures regarding wounds.   Neurological:      Mental Status: Mental status is at baseline.             Significant Labs: All pertinent labs within the past 24 hours have been reviewed.    Significant Imaging: I have reviewed all pertinent imaging results/findings within the past 24 hours.      Assessment/Plan:       Bacteremia  Bacteroides on rapid PCR. Possible from foot ulcers.  -Repeat blood culture pending  -Continue cefepime and Flagyl.      Acute cystitis  E coli.  -Continue cefepime    Hypothyroid  -Continue Synthroid      Anemia  Stable.  -Trend Hgb with CBC      Persistent atrial fibrillation  Not on anticoagulation or rate/rhythm control.  -Telemetry    Stage IV pressure ulcer of right hip  -Wound Care following      Unstageable pressure ulcer of right heel  Concern for osteomyelitis.  -Wound Care  -Podiatry  -Follow up MRI and CTA  -Continue cefepime and Flagyl    Unstageable pressure ulcer of left heel  Concern for osteomyelitis.  -Wound Care  -Podiatry  -Follow up MRI and CTA  -Continue cefepime and Flagyl    Frontotemporal dementia  Chronic. Stable.  -Fall, aspiration and delirium precautions    Essential hypertension  -Hold home medications  -Continue to monitor    TIA (transient ischemic attack)  Prior history.  -Continue statin          VTE Risk Mitigation (From admission, onward)         Ordered     IP VTE HIGH RISK PATIENT  Once         09/21/23 1759     Place sequential compression device  Until discontinued         09/21/23 1759     enoxaparin injection 40 mg  Daily         09/21/23 1545                Discharge Planning   KAY:      Code Status: DNR   Is the patient medically ready for discharge?:     Reason for patient still in hospital (select all that apply): Patient trending condition, Laboratory test, Treatment, Imaging, Consult recommendations, PT / OT recommendations and Pending disposition  Discharge Plan A: Skilled Nursing Facility, Return to nursing home                  Macho Matt MD  Department of Hospital Medicine   [facility]

## 2023-09-23 NOTE — ASSESSMENT & PLAN NOTE
Bacteroides on rapid PCR. Possible from foot ulcers.  -Repeat blood culture pending  -Continue cefepime and Flagyl.

## 2023-09-23 NOTE — PROGRESS NOTES
[facility]   Department of Infectious Disease  Consult Note  PATIENT NAME: Adriano Borjas  MRN: [id number]  TODAY'S DATE: 09/23/2023  ADMIT DATE: 9/21/2023    CHIEF COMPLAINT: Fever (Per EMS, nursing home stated pt started having fever this morning. Pt has wound vac to right hip,wounds to bilateral feet, and left buttocks)    PRINCIPLE PROBLEM: Severe sepsis    REASON FOR CONSULT:  Sepsis-heal wounds and hip wound    ASSESSMENT and PLAN     Severe sepsis multifactorial; UTI and bilateral calcaneal osteomyelitis, foul-smelling drainage from left heel, possibly the right heel, and R hip sacral decub, the latter with granulation tissue  Procal 5.5, positive  ESR 94, , done at [facility].    CRP more than 16.   Urine culture 09/21 Ecoli   Blood cultures  9/21- B.  Fragilis  on RApid ID, and GNR in progress  Right foot wound culture 9/7 & R hip 6/28 & 6/26 with pansensitive E coli  MRSA swab neg    PMHx: advanced dementia, AFib, hypertension, hyperlipidemia, prior TIA    Recommendations:    Podiatry for I&D of both heels, please send deep tissue and bone for Gram stain, cultures, including AFB and fungal, bone to pathology     Follow CT angio runoff   Follow bilateral feet MRIs    Continue Cefepime 2 g IV q.12  Continue Flagyl 500 mg IV q.8 for anaerobic coverage  Follow cultures including L heel taken at bedside   Wound care to all affected areas   Aspiration precautions          HPI      Adriano Borjas is a 88 y.o. male bed-bound, resident of [place], with past medical history of advanced dementia, AFib, hypertension, hyperlipidemia, prior TIA who was sent from nursing home for fever.  Patient has known history of bilateral left heel chronic ulcers and right hip, follows with wound care outpatient/Dr. Middleton.  He has had debridements performed, cultures from June from right hip and right heel with pansensitive E coli.     Patient seen and examined at bedside, advanced dementia noted, he  is having intentional tremors of upper extremities, labile BP, T-max 102.2, currently 101.3.    Unfortunately, he is not a very good historian due to underlying dementia.  Son at bedside, reports he was having fever and chills at facility.  Denies cough or headache, no shortness of breath, no chest pain, no nausea or vomiting, no abdominal pain, no dysuria or increased urinary frequency, no change in bowel movements.      He was transferred from [facility] to [facility] 9/21.   In the ER febrile to 102.2, stable BP   Labs on admission with leukocytosis 14.2, left shift 95%, H&H 11/33, platelet count 220   ESR 94, , labs done at [facility]  Creatinine 1.2, minimally elevated LFTs AST 42/ALT 35   Albumin 2.3   Procalcitonin 5.5, positive   UA with pyuria of 30, many bacteria, culture with GNR, lactose , pending final   Chest x-ray with low lung volumes pulmonary edema versus pneumonia   X-ray right foot with degenerative changes of the right foot and soft tissue irregularity at the right heel.  X-ray left foot with degenerative changes and soft tissue irregularity over the left calcaneus   CT hip right with progression of the patient's soft tissue wound overlying the right hip greater trochanter.  No bony destruction    Empirically started on vancomycin and Zosyn IV.    Outdoor activities:  Resident of [facility], former smoker.  Used to work as a .  Travel:  None  Implants:  None  Antibiotic history:  See HPI  ------------------  09/23/2023--denies complaints.  Pleasant, appreciative.  Tmax improved.  WBC 10.   CTA and MRI are not performed yet.      Past Medical History:   Diagnosis Date    A-fib     Dementia     Elevated cholesterol     Essential hypertension     History of cerebral hemorrhage 9/5/2019    Kidney stone     passed on his own    Memory loss     dememtia per family    Osteoarthritis     Paroxysmal atrial fibrillation 12/12/2018    Stroke     2012 TIA     Thyroid disease        Past  Surgical History:   Procedure Laterality Date    CYSTOSCOPY      EYE SURGERY      MAGNETIC RESONANCE IMAGING N/A 8/23/2019    Procedure: MRI (Magnetic Resonance Imagine) needs anesthesia;  Surgeon: Panchito Montoya MD;  Location: [facility];  Service: Anesthesiology;  Laterality: N/A;       Family History   Problem Relation Age of Onset    Cancer Mother     Diabetes Mother     Hyperlipidemia Mother     Heart disease Father     Hyperlipidemia Father     Dementia Sister     Dementia Brother     Heart disease Brother     Migraines Daughter     Tremor Daughter     Tremor Son     Urolithiasis Neg Hx     Prostate cancer Neg Hx     Kidney cancer Neg Hx        Social History     Socioeconomic History    Marital status:    Tobacco Use    Smoking status: Former     Types: Cigarettes    Smokeless tobacco: Never    Tobacco comments:     quit 50 years ago   Substance and Sexual Activity    Alcohol use: No    Drug use: No     Social Determinants of Health     Financial Resource Strain: Unknown (6/26/2023)    Overall Financial Resource Strain (CARDIA)     Difficulty of Paying Living Expenses: Patient refused   Food Insecurity: Unknown (6/26/2023)    Hunger Vital Sign     Worried About Running Out of Food in the Last Year: Patient refused     Ran Out of Food in the Last Year: Patient refused   Transportation Needs: Unknown (6/26/2023)    PRAPARE - Transportation     Lack of Transportation (Medical): Patient refused     Lack of Transportation (Non-Medical): Patient refused   Physical Activity: Unknown (6/26/2023)    Exercise Vital Sign     Days of Exercise per Week: Patient refused     Minutes of Exercise per Session: Patient refused   Stress: Unknown (6/26/2023)    [employer] - Occupational Stress Questionnaire     Feeling of Stress : Patient refused   Social Connections: Unknown (6/26/2023)    Social Connection and Isolation Panel [NHANES]     Frequency of Communication with Friends and  Family: Patient refused     Frequency of Social Gatherings with Friends and Family: Patient refused     Attends Voodoo Services: Patient refused     Active Member of Clubs or Organizations: Patient refused     Attends Club or Organization Meetings: Patient refused     Marital Status: Patient refused   Housing Stability: Unknown (6/26/2023)    Housing Stability Vital Sign     Unable to Pay for Housing in the Last Year: Patient refused     Unstable Housing in the Last Year: Patient refused       Review of patient's allergies indicates:   Allergen Reactions    Ciprofloxacin Other (See Comments)     Shoulder and leg pain/cramping     Codeine Other (See Comments)     Pt cannot remember been a long time ago    Sulfa (sulfonamide antibiotics)      Allergic reaction to bactrim       Current Outpatient Medications   Medication Instructions    acetaminophen (TYLENOL) 500 mg, Oral, Nightly    amino acids/protein hydrolys (PRO-STAT AWC ORAL) 30 mLs, Oral, 2 times daily    amLODIPine (NORVASC) 5 mg, Oral, Daily, MAR     ammonium lactate (LAC-HYDRIN) 12 % lotion 1 Application, Topical (Top), 2 times daily    arginine-glutamine-calcium HMB (GUILLERMO) 7-7-1.5 gram PwPk 1 packet, Oral, 2 times daily    ascorbic acid (vitamin C) (VITAMIN C) 500 mg, Oral, Daily    atorvastatin (LIPITOR) 40 mg, Oral, Daily    collagenase (SANTYL) ointment Topical (Top), Daily, Clean right hip with wound cleanser and pat dry. Apply a nickel thick layer of santyl to the right hip, cover with adaptic (vaseline gauze) and ABD and secure with tape daily.    docusate sodium (COLACE) 100 mg, Oral, Daily    HYDROcodone-acetaminophen (NORCO) 5-325 mg per tablet 1 tablet, Oral, Every 6 hours PRN    hydrocortisone 2.5 % cream Thin film to AA onface daily PRN flare    hydrOXYzine pamoate (VISTARIL) 25 mg, Oral, Every 8 hours PRN    ketoconazole (NIZORAL) 2 % cream Thin film to red scaly rash on face twice daily as needed for flare    ketoconazole (NIZORAL) 2 %  shampoo Wash hair with medicated shampoo at least 2x/week - let sit on scalp at least 5 minutes prior to rinsing    levothyroxine (SYNTHROID) 100 mcg, Oral, Daily    memantine (NAMENDA) 5 mg, Oral, 2 times daily    mirtazapine (REMERON) 30 mg, Oral, Nightly    multivitamin (THERAGRAN) tablet 1 tablet, Oral, Daily    OCUSOFT LID SCRUB PLUS PadM 1 Pad, Both Eyes, Daily, Apply to bilateral eyes topically every morning    OLANZapine (ZYPREXA) 10 mg, Oral, Nightly    propylene glycoL 0.6 % Drop 2 drops, Both Eyes, 3 times daily, For dry eyes     pulse oximeter (PULSE OXIMETER) device Apply Externally, 2 times daily, Use twice daily at 8 AM and 3 PM and record the value in MyChart as directed.    sertraline (ZOLOFT) 25 mg, Oral, Daily         Review of Systems   Reason unable to perform ROS: Limited, underlying dementia.           OBJECTIVE     Temp:  [98.2 °F (36.8 °C)-101.3 °F (38.5 °C)] 98.7 °F (37.1 °C)  Pulse:  [] 70  Resp:  [15-32] 18  SpO2:  [95 %-100 %] 100 %  BP: ()/(51-80) 103/53         Physical Exam  Constitutional:       Appearance: He is normal weight. He is ill-appearing.   HENT:      Mouth/Throat:      Mouth: Mucous membranes are dry.   Eyes:      Extraocular Movements: Extraocular movements intact.      Pupils: Pupils are equal, round, and reactive to light.   Cardiovascular:      Rate and Rhythm: Normal rate. Rhythm irregular.      Pulses: Normal pulses.      Heart sounds: Normal heart sounds.   Pulmonary:      Breath sounds: Rales present.      Comments: Inspiratory effort, minimal bilateral rales  Abdominal:      General: Bowel sounds are normal.      Palpations: Abdomen is soft.      Tenderness: There is no abdominal tenderness. There is no rebound.   Genitourinary:     Comments: PureWick in place, draining cloudy urine  Musculoskeletal:      Cervical back: Normal range of motion and neck supple.      Right lower leg: No edema.      Left lower leg: No edema.      Comments: Generalized  "muscle wasting, lower extremities contracted  Unintentional tremor of upper extremities right more than left   Skin:     General: Skin is warm.      Findings: Erythema and lesion present.      Comments: Bilateral heels with necrotic nonhealing wounds, bone palpated, left heel with foul-smelling drainage, cultures taken at bedside   Neurological:      Mental Status: He is alert. Mental status is at baseline.      Comments: Underlying dementia   Psychiatric:         Behavior: Behavior normal.           WOUNDS:   09/23/2023.  Bilateral heel wounds are about the same.  Bilateral legs are elevated.    Prior pictures       Right heel   Left heel      R hip:  Granulation tissue noted    VAD: peripheral IVs  ISOLATION: None    CBC LAST 7  Recent Labs   Lab 09/21/23  0937 09/22/23  1039 09/23/23  0333   WBC 14.22* 12.93* 12.62   RBC 3.57* 3.38* 3.42*   HGB 11.0* 10.2* 10.5*   HCT 33.0* 31.7* 32.1*   MCV 92 94 94   MCH 30.8 30.2 30.7   MCHC 33.3 32.2 32.7   RDW 13.0 13.3 13.2    201 175   MPV 11.8 12.6 12.5   GRAN 95.0*  13.5* 81.4*  10.5*  --    LYMPH 1.5*  0.2* 8.2*  1.1  --    MONO 2.7*  0.4 9.4  1.2*  --    BASO 0.05 0.05  --    NRBC 0 0  --          CHEMISTRY LAST 7  Recent Labs   Lab 09/21/23  0937 09/22/23  1039 09/23/23  0333    140 136   K 3.4* 3.8 3.6    107 103   CO2 24 26 28   ANIONGAP 10 7* 5*   BUN 28* 30* 26*   CREATININE 1.2 1.1 0.9   * 120* 117*   CALCIUM 8.2* 8.2* 8.0*   MG  --  1.9  --    ALBUMIN 2.3* 2.8*  --    PROT 6.4 6.6  --    ALKPHOS 73 64  --    ALT 35 36  --    AST 42* 56*  --    BILITOT 1.0 0.7  --          Estimated Creatinine Clearance: 57 mL/min (based on SCr of 0.9 mg/dL).    INFLAMMATORY/PROCAL  LAST 7  Recent Labs   Lab 09/21/23  0937 09/23/23  0333   PROCAL 5.53*  --    .1* >16.00*       No results found for: "ESR"  CRP   Date Value Ref Range Status   09/23/2023 >16.00 (H) <0.76 mg/dL Final   09/21/2023 175.1 (H) 0.0 - 8.2 mg/L Final       PRIOR  " MICROBIOLOGY:      LAST 7 DAYS MICROBIOLOGY   Microbiology Results (last 7 days)       Procedure Component Value Units Date/Time    Urine culture [[id number]]  (Abnormal)  (Susceptibility) Collected: 09/21/23 1136    Order Status: Completed Specimen: Urine Updated: 09/23/23 0621     Urine Culture, Routine ESCHERICHIA COLI  >100,000 cfu/ml      Narrative:      Specimen Source->Urine    Blood culture [[id number]] Collected: 09/23/23 0333    Order Status: Sent Specimen: Blood Updated: 09/23/23 0409    Rapid Organism ID by PCR (from Blood culture) [[id number]]  (Abnormal) Collected: 09/21/23 0937    Order Status: Completed Updated: 09/23/23 0308     Enterococcus faecalis Not Detected     Enterococcus faecium Not Detected     Listeria monocytogenes Not Detected     Staphylococcus spp. Not Detected     Staphylococcus aureus Not Detected     Staphylococcus epidermidis Not Detected     Staphylococcus lugdunensis Not Detected     Streptococcus species Not Detected     Streptococcus agalactiae Not Detected     Streptococcus pneumoniae Not Detected     Streptococcus pyogenes Not Detected     Acinetobacter calcoaceticus/baumannii complex Not Detected     Bacteroides fragilis Detected     Enterobacterales Not Detected     Enterobacter cloacae complex Not Detected     Escherichia coli Not Detected     Klebsiella aerogenes Not Detected     Klebsiella oxytoca Not Detected     Klebsiella pneumoniae group Not Detected     Proteus Not Detected     Salmonella sp Not Detected     Serratia marcescens Not Detected     Haemophilus influenzae Not Detected     Neisseria meningtidis Not Detected     Pseudomonas aeruginosa Not Detected     Stenotrophomonas maltophilia Not Detected     Candida albicans Not Detected     Candida auris Not Detected     Candida glabrata Not Detected     Candida krusei Not Detected     Candida parapsilosis Not Detected     Candida tropicalis Not Detected     Cryptococcus neoformans/gattii Not Detected     CTX-M  (ESBL ) Test not applicable     IMP (Carbapenem resistant) Test not applicable     KPC resistance gene (Carbapenem resistant) Test not applicable     mcr-1  Test not applicable     mec A/C  Test not applicable     mec A/C and MREJ (MRSA) gene Test not applicable     NDM (Carbapenem resistant) Test not applicable     OXA-48-like (Carbapenem resistant) Test not applicable     van A/B (VRE gene) Test not applicable     VIM (Carbapenem resistant) Test not applicable    Narrative:      Aerobic and anaerobic    Blood culture x two cultures. Draw prior to antibiotics. [[id number]] Collected: 09/21/23 0937    Order Status: Completed Specimen: Blood Updated: 09/23/23 0306     Blood Culture, Routine Gram stain adin bottle: Gram negative rods      Results called to and read back by: Madison Otoole RN ([facility]) @  03:05 by          Narrative:      Aerobic and anaerobic    MRSA Screen by PCR [[id number]] Collected: 09/22/23 1647    Order Status: Completed Specimen: Nasopharyngeal Swab from Nasal Updated: 09/22/23 1809     MRSA SCREEN BY PCR Negative    Blood culture x two cultures. Draw prior to antibiotics. [[id number]] Collected: 09/21/23 0937    Order Status: Completed Specimen: Blood Updated: 09/22/23 1432     Blood Culture, Routine No Growth to date      No Growth to date    Narrative:      Aerobic and anaerobic    Culture, Anaerobe [[id number]] Collected: 09/22/23 1042    Order Status: Sent Specimen: Wound from Foot, Left Updated: 09/22/23 1132    Aerobic culture [[id number]] Collected: 09/22/23 1042    Order Status: Sent Specimen: Wound from Foot, Left Updated: 09/22/23 1132    Influenza A & B by Molecular [[id number]] Collected: 09/21/23 0948    Order Status: Completed Specimen: Nasal Swab Updated: 09/21/23 1037     Influenza A, Molecular Negative     Influenza B, Molecular Negative     Flu A & B Source Nasal swab            PRIOR MICROBIOLOGY:     Susceptibility data from last 90 days.  Collected Specimen Info Organism  Amox/K Clav'ate Amp/Sulbactam Ampicillin Cefazolin Cefepime Ceftriaxone Ciprofloxacin Ertapenem Gentamicin Levofloxacin Meropenem Nitrofurantoin PIPERACILLIN/TAZO SUSCEPTIBILITY Tetracycline   09/21/23 Urine Escherichia coli   S  S  S  S  S  S  S  S  S  S  S  S  S   09/07/23 Wound from Foot, Right Escherichia coli  S  S  S  S  S  S  S  S  S  S  S   S  S   06/28/23 Wound from Hip, Right Escherichia coli  S  S  S  S  S  S  S  S  S  S  S   S  S   06/26/23 Wound from Hip, Right Escherichia coli  S  S  S  S  S  S  S  S  S  S  S   S  S     Collected Specimen Info Organism Tobramycin Trimeth/Sulfa   09/21/23 Urine Escherichia coli  S  S   09/07/23 Wound from Foot, Right Escherichia coli  S  S   06/28/23 Wound from Hip, Right Escherichia coli  S  S   06/26/23 Wound from Hip, Right Escherichia coli  S  S         CURRENT/PREVIOUS VISIT EKG  Results for orders placed or performed during the hospital encounter of 09/21/23   EKG 12-lead    Collection Time: 09/21/23 10:14 AM    Narrative    Test Reason : A41.9,    Vent. Rate : 102 BPM     Atrial Rate : 102 BPM     P-R Int : 152 ms          QRS Dur : 088 ms      QT Int : 322 ms       P-R-T Axes : 055 -31 -09 degrees     QTc Int : 419 ms    Sinus tachycardia with Premature atrial complexes  Left axis deviation  Abnormal ECG  Confirmed by Jimmy Enrique MD ([id number]) on 9/21/2023 5:15:56 PM    Referred By: [email or web address]   SELF           Confirmed By:Jimmy Enrique MD       Significant Labs: All pertinent labs within the past 24 hours have been reviewed.     Significant Imaging: I have reviewed all relevant and available imaging results/findings within the past 24 hours.  CXR  X-rays R and L foot  CT R hip: There is been progression of the patient's soft tissue wound overlying the right hip greater trochanter.  No bony destruction is visualized on today's study      Suzy Hill MD  Date of Service: 09/23/2023  3:15 PM

## 2023-09-23 NOTE — CARE UPDATE
09/23/23 0753   PRE-TX-O2   Device (Oxygen Therapy) nasal cannula   $ Is the patient on Low Flow Oxygen? Yes   Flow (L/min) 2   SpO2 100 %   Pulse Oximetry Type Continuous   $ Pulse Oximetry - Multiple Charge Pulse Oximetry - Multiple   Pulse 67   Resp 16   Respiratory Evaluation   $ Care Plan Tech Time 15 min

## 2023-09-23 NOTE — ASSESSMENT & PLAN NOTE
Concern for osteomyelitis.  -Wound Care  -Podiatry  -Follow up MRI and CTA  -Continue cefepime and Flagyl

## 2023-09-23 NOTE — SUBJECTIVE & OBJECTIVE
"Interval History: see "Hospital Course"    Review of Systems   Unable to perform ROS: Dementia     Objective:     Vital Signs (Most Recent):  Temp: 100.2 °F (37.9 °C) (09/23/23 1700)  Pulse: 89 (09/23/23 1615)  Resp: (!) 23 (09/23/23 1615)  BP: (!) 120/56 (09/23/23 1615)  SpO2: 99 % (09/23/23 1615) Vital Signs (24h Range):  Temp:  [98.2 °F (36.8 °C)-100.7 °F (38.2 °C)] 100.2 °F (37.9 °C)  Pulse:  [65-96] 89  Resp:  [15-31] 23  SpO2:  [97 %-100 %] 99 %  BP: ()/(53-81) 120/56     Weight: 73.5 kg (162 lb 0.6 oz)  Body mass index is 24.64 kg/m².    Intake/Output Summary (Last 24 hours) at 9/23/2023 1752  Last data filed at 9/23/2023 1514  Gross per 24 hour   Intake 960 ml   Output 1475 ml   Net -515 ml         Physical Exam  Vitals and nursing note reviewed.   Constitutional:       Appearance: He is ill-appearing.   HENT:      Head: Normocephalic and atraumatic.      Right Ear: External ear normal.      Left Ear: External ear normal.      Nose: Nose normal.      Mouth/Throat:      Mouth: Mucous membranes are moist.      Pharynx: Oropharynx is clear.   Eyes:      Extraocular Movements: Extraocular movements intact.      Conjunctiva/sclera: Conjunctivae normal.   Cardiovascular:      Rate and Rhythm: Normal rate and regular rhythm.      Pulses: Normal pulses.      Heart sounds: Normal heart sounds.   Pulmonary:      Effort: Pulmonary effort is normal.      Breath sounds: Normal breath sounds.   Abdominal:      General: Bowel sounds are normal.      Palpations: Abdomen is soft.   Musculoskeletal:         General: Normal range of motion.      Cervical back: Normal range of motion and neck supple.      Right lower leg: No edema.      Left lower leg: No edema.   Skin:     General: Skin is warm and dry.      Comments: See Wound Care pictures regarding wounds.   Neurological:      Mental Status: Mental status is at baseline.             Significant Labs: All pertinent labs within the past 24 hours have been " reviewed.    Significant Imaging: I have reviewed all pertinent imaging results/findings within the past 24 hours.

## 2023-09-24 LAB
ACINETOBACTER CALCOACETICUS/BAUMANNII COMPLEX: NOT DETECTED
ANION GAP SERPL CALC-SCNC: 5 MMOL/L (ref 8–16)
BACTEROIDES FRAGILIS: NOT DETECTED
BUN SERPL-MCNC: 19 MG/DL (ref 8–23)
CALCIUM SERPL-MCNC: 7.9 MG/DL (ref 8.7–10.5)
CANDIDA ALBICANS: NOT DETECTED
CANDIDA AURIS: NOT DETECTED
CANDIDA GLABRATA: NOT DETECTED
CANDIDA KRUSEI: NOT DETECTED
CANDIDA PARAPSILOSIS: NOT DETECTED
CANDIDA TROPICALIS: NOT DETECTED
CHLORIDE SERPL-SCNC: 102 MMOL/L (ref 95–110)
CO2 SERPL-SCNC: 29 MMOL/L (ref 23–29)
CREAT SERPL-MCNC: 0.8 MG/DL (ref 0.5–1.4)
CRYPTOCOCCUS NEOFORMANS/GATTII: NOT DETECTED
CTX-M GENE: NORMAL
ENTEROBACTER CLOACAE COMPLEX: NOT DETECTED
ENTEROBACTERALES: NOT DETECTED
ENTEROCOCCUS FAECALIS: NOT DETECTED
ENTEROCOCCUS FAECIUM: NOT DETECTED
ERYTHROCYTE [DISTWIDTH] IN BLOOD BY AUTOMATED COUNT: 13 % (ref 11.5–14.5)
ESCHERICHIA COLI: NOT DETECTED
EST. GFR  (NO RACE VARIABLE): >60 ML/MIN/1.73 M^2
GLUCOSE SERPL-MCNC: 113 MG/DL (ref 70–110)
HAEMOPHILUS INFLUENZAE: NOT DETECTED
HCT VFR BLD AUTO: 29.5 % (ref 40–54)
HGB BLD-MCNC: 9.8 G/DL (ref 14–18)
IMP GENE: NORMAL
KLEBSIELLA AEROGENES: NOT DETECTED
KLEBSIELLA OXYTOCA: NOT DETECTED
KLEBSIELLA PNEUMONIAE GROUP: NOT DETECTED
KPC: NORMAL
LISTERIA MONOCYTOGENES: NOT DETECTED
MAGNESIUM SERPL-MCNC: 1.8 MG/DL (ref 1.6–2.6)
MAGNESIUM SERPL-MCNC: 1.9 MG/DL (ref 1.6–2.6)
MCH RBC QN AUTO: 30.9 PG (ref 27–31)
MCHC RBC AUTO-ENTMCNC: 33.2 G/DL (ref 32–36)
MCR-1: NORMAL
MCV RBC AUTO: 93 FL (ref 82–98)
MEC A/C AND MREJ (MRSA): NORMAL
MEC A/C: NORMAL
NDM GENE: NORMAL
NEISSERIA MENINGITIDIS: NOT DETECTED
OXA-48-LIKE: NORMAL
PLATELET # BLD AUTO: 181 K/UL (ref 150–450)
PMV BLD AUTO: 12 FL (ref 9.2–12.9)
POTASSIUM SERPL-SCNC: 4 MMOL/L (ref 3.5–5.1)
PROTEUS SPECIES: NOT DETECTED
PSEUDOMONAS AERUGINOSA: NOT DETECTED
RBC # BLD AUTO: 3.17 M/UL (ref 4.6–6.2)
SALMONELLA SP: NOT DETECTED
SERRATIA MARCESCENS: NOT DETECTED
SODIUM SERPL-SCNC: 136 MMOL/L (ref 136–145)
STAPHYLOCOCCUS AUREUS: NOT DETECTED
STAPHYLOCOCCUS EPIDERMIDIS: NOT DETECTED
STAPHYLOCOCCUS LUGDUNESIS: NOT DETECTED
STAPHYLOCOCCUS SPECIES: NOT DETECTED
STENOTROPHOMONAS MALTOPHILIA: NOT DETECTED
STREPTOCOCCUS AGALACTIAE: NOT DETECTED
STREPTOCOCCUS PNEUMONIAE: NOT DETECTED
STREPTOCOCCUS PYOGENES: NOT DETECTED
STREPTOCOCCUS SPECIES: NOT DETECTED
VAN A/B: NORMAL
VIM GENE: NORMAL
WBC # BLD AUTO: 10.69 K/UL (ref 3.9–12.7)

## 2023-09-24 PROCEDURE — 97161 PT EVAL LOW COMPLEX 20 MIN: CPT

## 2023-09-24 PROCEDURE — 36415 COLL VENOUS BLD VENIPUNCTURE: CPT | Performed by: STUDENT IN AN ORGANIZED HEALTH CARE EDUCATION/TRAINING PROGRAM

## 2023-09-24 PROCEDURE — 99231 SBSQ HOSP IP/OBS SF/LOW 25: CPT | Mod: ,,, | Performed by: INTERNAL MEDICINE

## 2023-09-24 PROCEDURE — 97167 OT EVAL HIGH COMPLEX 60 MIN: CPT

## 2023-09-24 PROCEDURE — 99900035 HC TECH TIME PER 15 MIN (STAT)

## 2023-09-24 PROCEDURE — 63600175 PHARM REV CODE 636 W HCPCS: Performed by: STUDENT IN AN ORGANIZED HEALTH CARE EDUCATION/TRAINING PROGRAM

## 2023-09-24 PROCEDURE — 94761 N-INVAS EAR/PLS OXIMETRY MLT: CPT

## 2023-09-24 PROCEDURE — 97166 OT EVAL MOD COMPLEX 45 MIN: CPT

## 2023-09-24 PROCEDURE — 87154 CUL TYP ID BLD PTHGN 6+ TRGT: CPT | Performed by: EMERGENCY MEDICINE

## 2023-09-24 PROCEDURE — 21000000 HC CCU ICU ROOM CHARGE

## 2023-09-24 PROCEDURE — 83735 ASSAY OF MAGNESIUM: CPT | Performed by: STUDENT IN AN ORGANIZED HEALTH CARE EDUCATION/TRAINING PROGRAM

## 2023-09-24 PROCEDURE — 63600175 PHARM REV CODE 636 W HCPCS: Performed by: HOSPITALIST

## 2023-09-24 PROCEDURE — 27000221 HC OXYGEN, UP TO 24 HOURS

## 2023-09-24 PROCEDURE — 99231 PR SUBSEQUENT HOSPITAL CARE,LEVL I: ICD-10-PCS | Mod: ,,, | Performed by: INTERNAL MEDICINE

## 2023-09-24 PROCEDURE — 25000003 PHARM REV CODE 250: Performed by: STUDENT IN AN ORGANIZED HEALTH CARE EDUCATION/TRAINING PROGRAM

## 2023-09-24 PROCEDURE — 25000003 PHARM REV CODE 250: Performed by: HOSPITALIST

## 2023-09-24 PROCEDURE — 12000002 HC ACUTE/MED SURGE SEMI-PRIVATE ROOM

## 2023-09-24 PROCEDURE — 80048 BASIC METABOLIC PNL TOTAL CA: CPT | Performed by: STUDENT IN AN ORGANIZED HEALTH CARE EDUCATION/TRAINING PROGRAM

## 2023-09-24 PROCEDURE — 85027 COMPLETE CBC AUTOMATED: CPT | Performed by: STUDENT IN AN ORGANIZED HEALTH CARE EDUCATION/TRAINING PROGRAM

## 2023-09-24 RX ADMIN — MUPIROCIN 1 G: 20 OINTMENT TOPICAL at 09:09

## 2023-09-24 RX ADMIN — METRONIDAZOLE 500 MG: 5 INJECTION, SOLUTION INTRAVENOUS at 05:09

## 2023-09-24 RX ADMIN — VANCOMYCIN HYDROCHLORIDE 1750 MG: 500 INJECTION, POWDER, LYOPHILIZED, FOR SOLUTION INTRAVENOUS at 06:09

## 2023-09-24 RX ADMIN — HYDROCODONE BITARTRATE AND ACETAMINOPHEN 1 TABLET: 5; 325 TABLET ORAL at 03:09

## 2023-09-24 RX ADMIN — CEFEPIME HYDROCHLORIDE 2 G: 2 INJECTION, POWDER, FOR SOLUTION INTRAVENOUS at 09:09

## 2023-09-24 RX ADMIN — METRONIDAZOLE 500 MG: 5 INJECTION, SOLUTION INTRAVENOUS at 09:09

## 2023-09-24 RX ADMIN — Medication 800 MG: at 05:09

## 2023-09-24 RX ADMIN — SERTRALINE HYDROCHLORIDE 25 MG: 25 TABLET ORAL at 09:09

## 2023-09-24 RX ADMIN — HYDROCODONE BITARTRATE AND ACETAMINOPHEN 1 TABLET: 5; 325 TABLET ORAL at 11:09

## 2023-09-24 RX ADMIN — COLLAGENASE SANTYL: 250 OINTMENT TOPICAL at 09:09

## 2023-09-24 RX ADMIN — METRONIDAZOLE 500 MG: 5 INJECTION, SOLUTION INTRAVENOUS at 12:09

## 2023-09-24 RX ADMIN — MIRTAZAPINE 30 MG: 15 TABLET, FILM COATED ORAL at 09:09

## 2023-09-24 RX ADMIN — OLANZAPINE 10 MG: 5 TABLET, FILM COATED ORAL at 09:09

## 2023-09-24 RX ADMIN — MEMANTINE HYDROCHLORIDE 5 MG: 5 TABLET ORAL at 09:09

## 2023-09-24 RX ADMIN — ATORVASTATIN CALCIUM 40 MG: 40 TABLET, FILM COATED ORAL at 09:09

## 2023-09-24 RX ADMIN — CEFEPIME HYDROCHLORIDE 2 G: 2 INJECTION, POWDER, FOR SOLUTION INTRAVENOUS at 04:09

## 2023-09-24 RX ADMIN — MULTIVITAMIN TABLET 1 TABLET: TABLET at 09:09

## 2023-09-24 RX ADMIN — LEVOTHYROXINE SODIUM 100 MCG: 0.1 TABLET ORAL at 05:09

## 2023-09-24 RX ADMIN — ENOXAPARIN SODIUM 40 MG: 40 INJECTION SUBCUTANEOUS at 04:09

## 2023-09-24 NOTE — SUBJECTIVE & OBJECTIVE
"Interval History: see "Hospital Course"    Review of Systems   Unable to perform ROS: Dementia     Objective:     Vital Signs (Most Recent):  Temp: 98.7 °F (37.1 °C) (09/24/23 1100)  Pulse: 84 (09/24/23 1100)  Resp: 18 (09/24/23 1105)  BP: 119/66 (09/24/23 1100)  SpO2: 100 % (09/24/23 1100) Vital Signs (24h Range):  Temp:  [98.6 °F (37 °C)-100.2 °F (37.9 °C)] 98.7 °F (37.1 °C)  Pulse:  [73-89] 84  Resp:  [10-28] 18  SpO2:  [96 %-100 %] 100 %  BP: ()/() 119/66     Weight: 73.8 kg (162 lb 11.2 oz)  Body mass index is 24.74 kg/m².    Intake/Output Summary (Last 24 hours) at 9/24/2023 1146  Last data filed at 9/24/2023 1039  Gross per 24 hour   Intake 800 ml   Output 975 ml   Net -175 ml         Physical Exam  Vitals and nursing note reviewed.   Constitutional:       Appearance: He is ill-appearing.   HENT:      Head: Normocephalic and atraumatic.      Right Ear: External ear normal.      Left Ear: External ear normal.      Nose: Nose normal.      Mouth/Throat:      Mouth: Mucous membranes are moist.      Pharynx: Oropharynx is clear.   Eyes:      Extraocular Movements: Extraocular movements intact.      Conjunctiva/sclera: Conjunctivae normal.   Cardiovascular:      Rate and Rhythm: Normal rate and regular rhythm.      Pulses: Normal pulses.      Heart sounds: Normal heart sounds.   Pulmonary:      Effort: Pulmonary effort is normal.      Breath sounds: Normal breath sounds.   Abdominal:      General: Bowel sounds are normal.      Palpations: Abdomen is soft.   Musculoskeletal:         General: Normal range of motion.      Cervical back: Normal range of motion and neck supple.      Right lower leg: No edema.      Left lower leg: No edema.   Skin:     General: Skin is warm and dry.      Comments: See Wound Care pictures regarding wounds.   Neurological:      Mental Status: Mental status is at baseline.             Significant Labs: All pertinent labs within the past 24 hours have been " reviewed.    Significant Imaging: I have reviewed all pertinent imaging results/findings within the past 24 hours.

## 2023-09-24 NOTE — PT/OT/SLP EVAL
Occupational Therapy   Evaluation and Discharge Note    Name: Adriano Borjas  MRN: [id number]  Admitting Diagnosis: <principal problem not specified>  Recent Surgery: * No surgery found *      Recommendations:     Discharge Recommendations: nursing facility, basic  Discharge Equipment Recommendations: none  Barriers to discharge:  None    Assessment:     Adriano Borjas is a 88 y.o. male with a medical diagnosis of <principal problem not specified>. At this time, patient is functioning at their prior level of function and does not require further acute OT services.     Plan:     During this hospitalization, patient does not require further acute OT services.  Please re-consult if situation changes.    Plan of Care Reviewed with: patient    Subjective     Chief Complaint: none stated  Patient/Family Comments/goals: none stated    Occupational Profile:  Living Environment: Pt is a NH resident  Previous level of function: Total/Max A with all ADLs and mobility; lift device to get OOB  Roles and Routines: father  Equipment Used at home: wheelchair, lift device  Assistance upon Discharge: yes, from facility    Pain/Comfort:  Pain Rating 1: 0/10    Patients cultural, spiritual, Jain conflicts given the current situation:      Objective:     Communicated with: nursing prior to session.  Patient found HOB elevated with bed alarm, telemetry, peripheral IV, pulse ox (continuous), blood pressure cuff, wound vac upon OT entry to room.    General Precautions: Standard, fall, contact  Orthopedic Precautions: N/A  Braces: N/A  Respiratory Status: Nasal cannula, flow 2 L/min     Occupational Performance:    Activities of Daily Living:  Grooming: total assistance to wash face bed level  Lower Body Dressing: total assistance    Toileting: total assistance      Cognitive/Visual Perceptual:  Cognitive/Psychosocial Skills:     -       Oriented to: Person   -       Follows Commands/attention:Follows one-step commands and  inconsistently   -       Communication: clear/fluent  -       Memory: deficits  -       Safety awareness/insight to disability: impaired   -       Mood/Affect/Coping skills/emotional control: Pleasant    Physical Exam:  Limited ROM in all joints bilaterally due to contractures; L hand fully contracted; Pain in all joints with PROM movement.    AMPAC 6 Click ADL:  AMPAC Total Score: 9    Treatment & Education:  Role of OT    Patient left HOB elevated with all lines intact and call button in reach and bed alarm on    GOALS:   Multidisciplinary Problems       Occupational Therapy Goals       Not on file                    History:     Past Medical History:   Diagnosis Date    A-fib     Dementia     Elevated cholesterol     Essential hypertension     History of cerebral hemorrhage 9/5/2019    Kidney stone     passed on his own    Memory loss     dememtia per family    Osteoarthritis     Paroxysmal atrial fibrillation 12/12/2018    Stroke     2012 TIA     Thyroid disease          Past Surgical History:   Procedure Laterality Date    CYSTOSCOPY      EYE SURGERY      MAGNETIC RESONANCE IMAGING N/A 8/23/2019    Procedure: MRI (Magnetic Resonance Imagine) needs anesthesia;  Surgeon: Panchito Montoya MD;  Location: [facility];  Service: Anesthesiology;  Laterality: N/A;       Time Tracking:     OT Date of Treatment: 09/24/23  OT Start Time: 0953  OT Stop Time: 0959  OT Total Time (min): 6 min    Billable Minutes:Evaluation 6    9/24/2023

## 2023-09-24 NOTE — PROGRESS NOTES
"9/24/2023 Pharmacokinetic Assessment: IV Vancomycin  Vanco DAY 1 - Adriano Borjas is a 88 y.o. male being treated for bacteremia. Goal 15 to 20 mcg/mL.     Dialysis Method (if applicable):N/A     Vancomycin serum concentration assessment(s) (last 3 results):  No results for input(s): "VANCOMYCINRA", "VANCOMYCINPE", "VANCOMYCINTR", "VANCOTROUGH" in the last 72 hours.    Vancomycin Regimen Plan: 1750mg x1 then 2000 mg Q24H. Trough/Random before 4th dose on 9/27 @ 17:00.    Day of Thx Date Current Weight (kg) Laboratory  Doses    Vancomycin Level      Time SCr CrCl Scheduled Time Time Dose Dosage (mcg/ml) Peak,  Random,  Trough?         1750mg x1 then 2000mg q24h     1 9/24 73.8  0.9 64.1 18:00  1 1750     2 9/25        18:00  2 2000     3 9/26     18:00   3 2000       4 9/27     17:00  - -  Trough     Rationale for Plan: per protocol ([facility]culations [facility])    Labs:  Estimated Creatinine Clearance: 64.1 mL/min (based on SCr of 0.8 mg/dL).  Recent Labs   Lab Result Units 09/22/23  1039 09/23/23  0333 09/24/23  0405   WBC K/uL 12.93* 12.62 10.69   Creatinine mg/dL 1.1 0.9 0.8       Cxs:   Microbiology Results (last 7 days)       Procedure Component Value Units Date/Time    Blood culture [[id number]] Collected: 09/23/23 0333    Order Status: Completed Specimen: Blood Updated: 09/24/23 1032     Blood Culture, Routine No Growth to date      No Growth to date    Rapid Organism ID by PCR (from Blood culture) [[id number]] Collected: 09/24/23 0620    Order Status: Completed Updated: 09/24/23 0621     Enterococcus faecalis Not Detected     Enterococcus faecium Not Detected     Listeria monocytogenes Not Detected     Staphylococcus spp. Not Detected     Staphylococcus aureus Not Detected     Staphylococcus epidermidis Not Detected     Staphylococcus lugdunensis Not Detected     Streptococcus species Not Detected     Streptococcus agalactiae Not Detected     Streptococcus pneumoniae Not Detected     Streptococcus " pyogenes Not Detected     Acinetobacter calcoaceticus/baumannii complex Not Detected     Bacteroides fragilis Not Detected     Enterobacterales Not Detected     Enterobacter cloacae complex Not Detected     Escherichia coli Not Detected     Klebsiella aerogenes Not Detected     Klebsiella oxytoca Not Detected     Klebsiella pneumoniae group Not Detected     Proteus Not Detected     Salmonella sp Not Detected     Serratia marcescens Not Detected     Haemophilus influenzae Not Detected     Neisseria meningtidis Not Detected     Pseudomonas aeruginosa Not Detected     Stenotrophomonas maltophilia Not Detected     Candida albicans Not Detected     Candida auris Not Detected     Candida glabrata Not Detected     Candida krusei Not Detected     Candida parapsilosis Not Detected     Candida tropicalis Not Detected     Cryptococcus neoformans/gattii Not Detected     CTX-M (ESBL ) Test not applicable     IMP (Carbapenem resistant) Test not applicable     KPC resistance gene (Carbapenem resistant) Test not applicable     mcr-1  Test not applicable     mec A/C  Test not applicable     mec A/C and MREJ (MRSA) gene Test not applicable     NDM (Carbapenem resistant) Test not applicable     OXA-48-like (Carbapenem resistant) Test not applicable     van A/B (VRE gene) Test not applicable     VIM (Carbapenem resistant) Test not applicable    Narrative:      Aerobic and anaerobic    Aerobic culture [[id number]]  (Abnormal)  (Susceptibility) Collected: 09/22/23 1042    Order Status: Completed Specimen: Wound from Foot, Left Updated: 09/24/23 0618     Aerobic Bacterial Culture ESCHERICHIA COLI  Moderate      Narrative:      Left calcaneous decubitus    Blood culture x two cultures. Draw prior to antibiotics. [[id number]] Collected: 09/21/23 0937    Order Status: Completed Specimen: Blood Updated: 09/24/23 0617     Blood Culture, Routine Gram stain adin bottle: Gram positive cocci      Results called to and read back by: JOSE  DALTON TOMAS.      09/24/2023  06:16 TGC    Narrative:      Aerobic and anaerobic    Urine culture [[id number]]  (Abnormal)  (Susceptibility) Collected: 09/21/23 1136    Order Status: Completed Specimen: Urine Updated: 09/23/23 0621     Urine Culture, Routine ESCHERICHIA COLI  >100,000 cfu/ml      Narrative:      Specimen Source->Urine    Rapid Organism ID by PCR (from Blood culture) [[id number]]  (Abnormal) Collected: 09/21/23 0937    Order Status: Completed Updated: 09/23/23 0308     Enterococcus faecalis Not Detected     Enterococcus faecium Not Detected     Listeria monocytogenes Not Detected     Staphylococcus spp. Not Detected     Staphylococcus aureus Not Detected     Staphylococcus epidermidis Not Detected     Staphylococcus lugdunensis Not Detected     Streptococcus species Not Detected     Streptococcus agalactiae Not Detected     Streptococcus pneumoniae Not Detected     Streptococcus pyogenes Not Detected     Acinetobacter calcoaceticus/baumannii complex Not Detected     Bacteroides fragilis Detected     Enterobacterales Not Detected     Enterobacter cloacae complex Not Detected     Escherichia coli Not Detected     Klebsiella aerogenes Not Detected     Klebsiella oxytoca Not Detected     Klebsiella pneumoniae group Not Detected     Proteus Not Detected     Salmonella sp Not Detected     Serratia marcescens Not Detected     Haemophilus influenzae Not Detected     Neisseria meningtidis Not Detected     Pseudomonas aeruginosa Not Detected     Stenotrophomonas maltophilia Not Detected     Candida albicans Not Detected     Candida auris Not Detected     Candida glabrata Not Detected     Candida krusei Not Detected     Candida parapsilosis Not Detected     Candida tropicalis Not Detected     Cryptococcus neoformans/gattii Not Detected     CTX-M (ESBL ) Test not applicable     IMP (Carbapenem resistant) Test not applicable     KPC resistance gene (Carbapenem resistant) Test not applicable      mcr-1  Test not applicable     mec A/C  Test not applicable     mec A/C and MREJ (MRSA) gene Test not applicable     NDM (Carbapenem resistant) Test not applicable     OXA-48-like (Carbapenem resistant) Test not applicable     van A/B (VRE gene) Test not applicable     VIM (Carbapenem resistant) Test not applicable    Narrative:      Aerobic and anaerobic    Blood culture x two cultures. Draw prior to antibiotics. [[id number]] Collected: 09/21/23 0937    Order Status: Completed Specimen: Blood Updated: 09/23/23 0306     Blood Culture, Routine Gram stain adin bottle: Gram negative rods      Results called to and read back by: Madison Otoole RN ([facility]) @  03:05 by          Narrative:      Aerobic and anaerobic    MRSA Screen by PCR [[id number]] Collected: 09/22/23 1647    Order Status: Completed Specimen: Nasopharyngeal Swab from Nasal Updated: 09/22/23 1809     MRSA SCREEN BY PCR Negative    Culture, Anaerobe [[id number]] Collected: 09/22/23 1042    Order Status: Sent Specimen: Wound from Foot, Left Updated: 09/22/23 1132    Influenza A & B by Molecular [[id number]] Collected: 09/21/23 0948    Order Status: Completed Specimen: Nasal Swab Updated: 09/21/23 1037     Influenza A, Molecular Negative     Influenza B, Molecular Negative     Flu A & B Source Nasal swab            Pharmacy will continue to follow and monitor vancomycin.   Please contact pharmacy at extension [phone] with any questions regarding this assessment.     Thank you for the consult,   Bill Melchor

## 2023-09-24 NOTE — CARE UPDATE
09/24/23 0810   PRE-TX-O2   Device (Oxygen Therapy) nasal cannula   $ Is the patient on Low Flow Oxygen? Yes   Flow (L/min) 2   SpO2 100 %   Pulse Oximetry Type Continuous   $ Pulse Oximetry - Multiple Charge Pulse Oximetry - Multiple   Pulse 79   Resp 20   Respiratory Evaluation   $ Care Plan Tech Time 15 min

## 2023-09-24 NOTE — PROGRESS NOTES
Pharmacist Renal Dose Adjustment Note    Adriano Borjas is a 88 y.o. male being treated with the medication Cefepime     Patient Data:    Vital Signs (Most Recent):  Temp: 99.3 °F (37.4 °C) (09/24/23 0701)  Pulse: 79 (09/24/23 0810)  Resp: 20 (09/24/23 0810)  BP: 92/72 (09/24/23 0701)  SpO2: 100 % (09/24/23 0810) Vital Signs (72h Range):  Temp:  [97.8 °F (36.6 °C)-102.2 °F (39 °C)]   Pulse:  []   Resp:  [6-32]   BP: ()/()   SpO2:  [83 %-100 %]      Recent Labs   Lab 09/22/23  1039 09/23/23  0333 09/24/23  0405   CREATININE 1.1 0.9 0.8     Serum creatinine: 0.8 mg/dL 09/24/23 0405  Estimated creatinine clearance: 64.1 mL/min    Medication: Cefepime 2 g Q12H changed to Cefepime 2 g Q8H     Pharmacist's Name: Jazmyn Peguero  Pharmacist's Extension: [phone]

## 2023-09-24 NOTE — PROGRESS NOTES
[facility]   Department of Infectious Disease  Consult Note  PATIENT NAME: Adriano Borjas  MRN: [id number]  TODAY'S DATE: 09/24/2023  ADMIT DATE: 9/21/2023    CHIEF COMPLAINT: Fever (Per EMS, nursing home stated pt started having fever this morning. Pt has wound vac to right hip,wounds to bilateral feet, and left buttocks)    PRINCIPLE PROBLEM: Bacteremia    REASON FOR CONSULT:  Sepsis-heal wounds and hip wound    ASSESSMENT and PLAN     Severe sepsis multifactorial; E coli UTI and bilateral calcaneal osteomyelitis, foul-smelling drainage from left heel(E. Coli), possibly the right heel, and R hip sacral decub, the latter with granulation tissue  Bcx GPC  Procal 5.5, positive  ESR 94, , done at [facility].    CRP more than 16.   Urine culture 09/21 Ecoli   Blood cultures  9/21- B.  Fragilis  on RApid ID, and GNR in progress  Right foot wound culture 9/7 & R hip 6/28 & 6/26 with pansensitive E coli  MRSA swab neg    PMHx: advanced dementia, AFib, hypertension, hyperlipidemia, prior TIA    Recommendations:    Podiatry for I&D of both heels, please send deep tissue and bone for Gram stain, cultures, including AFB and fungal, bone to pathology     Follow CT angio runoff   Follow bilateral feet MRIs    Continue Cefepime 2 g IV q.12  Continue Flagyl 500 mg IV q.8 for anaerobic coverage  Follow cultures. GPC is in blood cultures especially.?  Will give vancomycin  till this finalizes    Wound care to all affected areas   Aspiration precautions          HPI      Adriano Borjas is a 88 y.o. male bed-bound, resident of [facility], with past medical history of advanced dementia, AFib, hypertension, hyperlipidemia, prior TIA who was sent from nursing home for fever.  Patient has known history of bilateral left heel chronic ulcers and right hip, follows with wound care outpatient/Dr. Middleton.  He has had debridements performed, cultures from June from right hip and right heel with pansensitive E  coli.     Patient seen and examined at bedside, advanced dementia noted, he is having intentional tremors of upper extremities, labile BP, T-max 102.2, currently 101.3.    Unfortunately, he is not a very good historian due to underlying dementia.  Son at bedside, reports he was having fever and chills at facility.  Denies cough or headache, no shortness of breath, no chest pain, no nausea or vomiting, no abdominal pain, no dysuria or increased urinary frequency, no change in bowel movements.      He was transferred from [facility] to [facility] 9/21.   In the ER febrile to 102.2, stable BP   Labs on admission with leukocytosis 14.2, left shift 95%, H&H 11/33, platelet count 220   ESR 94, , labs done at [facility]  Creatinine 1.2, minimally elevated LFTs AST 42/ALT 35   Albumin 2.3   Procalcitonin 5.5, positive   UA with pyuria of 30, many bacteria, culture with GNR, lactose , pending final   Chest x-ray with low lung volumes pulmonary edema versus pneumonia   X-ray right foot with degenerative changes of the right foot and soft tissue irregularity at the right heel.  X-ray left foot with degenerative changes and soft tissue irregularity over the left calcaneus   CT hip right with progression of the patient's soft tissue wound overlying the right hip greater trochanter.  No bony destruction    Empirically started on vancomycin and Zosyn IV.    Outdoor activities:  Resident of [facility], former smoker.  Used to work as a .  Travel:  None  Implants:  None  Antibiotic history:  See HPI  ------------------  09/23/2023--denies complaints.  Pleasant, appreciative.  Tmax improved.  WBC 10.   CTA and MRI are not performed yet.      Past Medical History:   Diagnosis Date    A-fib     Dementia     Elevated cholesterol     Essential hypertension     History of cerebral hemorrhage 9/5/2019    Kidney stone     passed on his own    Memory loss     dememtia per family    Osteoarthritis     Paroxysmal atrial  fibrillation 12/12/2018    Stroke     2012 TIA     Thyroid disease        Past Surgical History:   Procedure Laterality Date    CYSTOSCOPY      EYE SURGERY      MAGNETIC RESONANCE IMAGING N/A 8/23/2019    Procedure: MRI (Magnetic Resonance Imagine) needs anesthesia;  Surgeon: Panchito Montoya MD;  Location: [facility];  Service: Anesthesiology;  Laterality: N/A;       Family History   Problem Relation Age of Onset    Cancer Mother     Diabetes Mother     Hyperlipidemia Mother     Heart disease Father     Hyperlipidemia Father     Dementia Sister     Dementia Brother     Heart disease Brother     Migraines Daughter     Tremor Daughter     Tremor Son     Urolithiasis Neg Hx     Prostate cancer Neg Hx     Kidney cancer Neg Hx        Social History     Socioeconomic History    Marital status:    Tobacco Use    Smoking status: Former     Types: Cigarettes    Smokeless tobacco: Never    Tobacco comments:     quit 50 years ago   Substance and Sexual Activity    Alcohol use: No    Drug use: No     Social Determinants of Health     Financial Resource Strain: Unknown (6/26/2023)    Overall Financial Resource Strain (CARDIA)     Difficulty of Paying Living Expenses: Patient refused   Food Insecurity: Unknown (6/26/2023)    Hunger Vital Sign     Worried About Running Out of Food in the Last Year: Patient refused     Ran Out of Food in the Last Year: Patient refused   Transportation Needs: Unknown (6/26/2023)    PRAPARE - Transportation     Lack of Transportation (Medical): Patient refused     Lack of Transportation (Non-Medical): Patient refused   Physical Activity: Unknown (6/26/2023)    Exercise Vital Sign     Days of Exercise per Week: Patient refused     Minutes of Exercise per Session: Patient refused   Stress: Unknown (6/26/2023)    [employer] [place] [employer] - Occupational Stress Questionnaire     Feeling of Stress : Patient refused   Social Connections: Unknown (6/26/2023)    Social Connection  and Isolation Panel [NHANES]     Frequency of Communication with Friends and Family: Patient refused     Frequency of Social Gatherings with Friends and Family: Patient refused     Attends Muslim Services: Patient refused     Active Member of Clubs or Organizations: Patient refused     Attends Club or Organization Meetings: Patient refused     Marital Status: Patient refused   Housing Stability: Unknown (6/26/2023)    Housing Stability Vital Sign     Unable to Pay for Housing in the Last Year: Patient refused     Unstable Housing in the Last Year: Patient refused       Review of patient's allergies indicates:   Allergen Reactions    Ciprofloxacin Other (See Comments)     Shoulder and leg pain/cramping     Codeine Other (See Comments)     Pt cannot remember been a long time ago    Sulfa (sulfonamide antibiotics)      Allergic reaction to bactrim       Current Outpatient Medications   Medication Instructions    acetaminophen (TYLENOL) 500 mg, Oral, Nightly    amino acids/protein hydrolys (PRO-STAT AWC ORAL) 30 mLs, Oral, 2 times daily    amLODIPine (NORVASC) 5 mg, Oral, Daily, MAR     ammonium lactate (LAC-HYDRIN) 12 % lotion 1 Application, Topical (Top), 2 times daily    arginine-glutamine-calcium HMB (GUILLERMO) 7-7-1.5 gram PwPk 1 packet, Oral, 2 times daily    ascorbic acid (vitamin C) (VITAMIN C) 500 mg, Oral, Daily    atorvastatin (LIPITOR) 40 mg, Oral, Daily    collagenase (SANTYL) ointment Topical (Top), Daily, Clean right hip with wound cleanser and pat dry. Apply a nickel thick layer of santyl to the right hip, cover with adaptic (vaseline gauze) and ABD and secure with tape daily.    docusate sodium (COLACE) 100 mg, Oral, Daily    HYDROcodone-acetaminophen (NORCO) 5-325 mg per tablet 1 tablet, Oral, Every 6 hours PRN    hydrocortisone 2.5 % cream Thin film to AA onface daily PRN flare    hydrOXYzine pamoate (VISTARIL) 25 mg, Oral, Every 8 hours PRN    ketoconazole (NIZORAL) 2 % cream Thin film to red scaly  rash on face twice daily as needed for flare    ketoconazole (NIZORAL) 2 % shampoo Wash hair with medicated shampoo at least 2x/week - let sit on scalp at least 5 minutes prior to rinsing    levothyroxine (SYNTHROID) 100 mcg, Oral, Daily    memantine (NAMENDA) 5 mg, Oral, 2 times daily    mirtazapine (REMERON) 30 mg, Oral, Nightly    multivitamin (THERAGRAN) tablet 1 tablet, Oral, Daily    OCUSOFT LID SCRUB PLUS PadM 1 Pad, Both Eyes, Daily, Apply to bilateral eyes topically every morning    OLANZapine (ZYPREXA) 10 mg, Oral, Nightly    propylene glycoL 0.6 % Drop 2 drops, Both Eyes, 3 times daily, For dry eyes     pulse oximeter (PULSE OXIMETER) device Apply Externally, 2 times daily, Use twice daily at 8 AM and 3 PM and record the value in MyChart as directed.    sertraline (ZOLOFT) 25 mg, Oral, Daily         Review of Systems   Reason unable to perform ROS: Limited, underlying dementia.           OBJECTIVE     Temp:  [98.4 °F (36.9 °C)-100.2 °F (37.9 °C)] 98.4 °F (36.9 °C)  Pulse:  [73-88] 82  Resp:  [10-27] 19  SpO2:  [96 %-100 %] 100 %  BP: ()/(54-80) 104/72         Physical Exam  Constitutional:       Appearance: He is normal weight. He is ill-appearing.   HENT:      Mouth/Throat:      Mouth: Mucous membranes are dry.   Eyes:      Extraocular Movements: Extraocular movements intact.      Pupils: Pupils are equal, round, and reactive to light.   Cardiovascular:      Rate and Rhythm: Normal rate. Rhythm irregular.      Pulses: Normal pulses.      Heart sounds: Normal heart sounds.   Pulmonary:      Breath sounds: Rales present.      Comments: Inspiratory effort, minimal bilateral rales  Abdominal:      General: Bowel sounds are normal.      Palpations: Abdomen is soft.      Tenderness: There is no abdominal tenderness. There is no rebound.   Genitourinary:     Comments: PureWick in place, draining cloudy urine  Musculoskeletal:      Cervical back: Normal range of motion and neck supple.      Right lower  leg: No edema.      Left lower leg: No edema.      Comments: Generalized muscle wasting, lower extremities contracted  Unintentional tremor of upper extremities right more than left   Skin:     General: Skin is warm.      Findings: Erythema and lesion present.      Comments: Bilateral heels with necrotic nonhealing wounds, bone palpated, left heel with foul-smelling drainage, cultures taken at bedside   Neurological:      Mental Status: He is alert. Mental status is at baseline.      Comments: Underlying dementia   Psychiatric:         Behavior: Behavior normal.           WOUNDS:   09/23/2023.  Bilateral heel wounds are about the same.  They still have a malodorous smell  Bilateral legs are elevated.  Discussed with nurse to clean and covered and wrapped them up again.  Prior pictures       Right heel   Left heel      R hip:  Granulation tissue noted    VAD: peripheral IVs  ISOLATION: None    CBC LAST 7  Recent Labs   Lab 09/21/23 0937 09/22/23 1039 09/23/23 0333 09/24/23  0405   WBC 14.22* 12.93* 12.62 10.69   RBC 3.57* 3.38* 3.42* 3.17*   HGB 11.0* 10.2* 10.5* 9.8*   HCT 33.0* 31.7* 32.1* 29.5*   MCV 92 94 94 93   MCH 30.8 30.2 30.7 30.9   MCHC 33.3 32.2 32.7 33.2   RDW 13.0 13.3 13.2 13.0    201 175 181   MPV 11.8 12.6 12.5 12.0   GRAN 95.0*  13.5* 81.4*  10.5*  --   --    LYMPH 1.5*  0.2* 8.2*  1.1  --   --    MONO 2.7*  0.4 9.4  1.2*  --   --    BASO 0.05 0.05  --   --    NRBC 0 0  --   --          CHEMISTRY LAST 7  Recent Labs   Lab 09/21/23 0937 09/22/23  1039 09/23/23  0333 09/23/23  0856 09/24/23  0405 09/24/23  1314    140 136  --  136  --    K 3.4* 3.8 3.6 4.7 4.0  --     107 103  --  102  --    CO2 24 26 28  --  29  --    ANIONGAP 10 7* 5*  --  5*  --    BUN 28* 30* 26*  --  19  --    CREATININE 1.2 1.1 0.9  --  0.8  --    * 120* 117*  --  113*  --    CALCIUM 8.2* 8.2* 8.0*  --  7.9*  --    MG  --  1.9  --  2.0 1.8 1.9   ALBUMIN 2.3* 2.8*  --   --   --   --    PROT 6.4  "6.6  --   --   --   --    ALKPHOS 73 64  --   --   --   --    ALT 35 36  --   --   --   --    AST 42* 56*  --   --   --   --    BILITOT 1.0 0.7  --   --   --   --          Estimated Creatinine Clearance: 64.1 mL/min (based on SCr of 0.8 mg/dL).    INFLAMMATORY/PROCAL  LAST 7  Recent Labs   Lab 09/21/23  0937 09/23/23 0333   PROCAL 5.53*  --    .1* >16.00*       No results found for: "ESR"  CRP   Date Value Ref Range Status   09/23/2023 >16.00 (H) <0.76 mg/dL Final   09/21/2023 175.1 (H) 0.0 - 8.2 mg/L Final       PRIOR  MICROBIOLOGY:      LAST 7 DAYS MICROBIOLOGY   Microbiology Results (last 7 days)       Procedure Component Value Units Date/Time    Blood culture [[id number]] Collected: 09/23/23 0333    Order Status: Completed Specimen: Blood Updated: 09/24/23 1032     Blood Culture, Routine No Growth to date      No Growth to date    Rapid Organism ID by PCR (from Blood culture) [[id number]] Collected: 09/24/23 0620    Order Status: Completed Updated: 09/24/23 0621     Enterococcus faecalis Not Detected     Enterococcus faecium Not Detected     Listeria monocytogenes Not Detected     Staphylococcus spp. Not Detected     Staphylococcus aureus Not Detected     Staphylococcus epidermidis Not Detected     Staphylococcus lugdunensis Not Detected     Streptococcus species Not Detected     Streptococcus agalactiae Not Detected     Streptococcus pneumoniae Not Detected     Streptococcus pyogenes Not Detected     Acinetobacter calcoaceticus/baumannii complex Not Detected     Bacteroides fragilis Not Detected     Enterobacterales Not Detected     Enterobacter cloacae complex Not Detected     Escherichia coli Not Detected     Klebsiella aerogenes Not Detected     Klebsiella oxytoca Not Detected     Klebsiella pneumoniae group Not Detected     Proteus Not Detected     Salmonella sp Not Detected     Serratia marcescens Not Detected     Haemophilus influenzae Not Detected     Neisseria meningtidis Not Detected     " Pseudomonas aeruginosa Not Detected     Stenotrophomonas maltophilia Not Detected     Candida albicans Not Detected     Candida auris Not Detected     Candida glabrata Not Detected     Candida krusei Not Detected     Candida parapsilosis Not Detected     Candida tropicalis Not Detected     Cryptococcus neoformans/gattii Not Detected     CTX-M (ESBL ) Test not applicable     IMP (Carbapenem resistant) Test not applicable     KPC resistance gene (Carbapenem resistant) Test not applicable     mcr-1  Test not applicable     mec A/C  Test not applicable     mec A/C and MREJ (MRSA) gene Test not applicable     NDM (Carbapenem resistant) Test not applicable     OXA-48-like (Carbapenem resistant) Test not applicable     van A/B (VRE gene) Test not applicable     VIM (Carbapenem resistant) Test not applicable    Narrative:      Aerobic and anaerobic    Aerobic culture [[id number]]  (Abnormal)  (Susceptibility) Collected: 09/22/23 1042    Order Status: Completed Specimen: Wound from Foot, Left Updated: 09/24/23 0618     Aerobic Bacterial Culture ESCHERICHIA COLI  Moderate      Narrative:      Left calcaneous decubitus    Blood culture x two cultures. Draw prior to antibiotics. [[id number]] Collected: 09/21/23 0937    Order Status: Completed Specimen: Blood Updated: 09/24/23 0617     Blood Culture, Routine Gram stain adin bottle: Gram positive cocci      Results called to and read back by: DALTON MORGAN.      09/24/2023  06:16 TGC    Narrative:      Aerobic and anaerobic    Urine culture [[id number]]  (Abnormal)  (Susceptibility) Collected: 09/21/23 1136    Order Status: Completed Specimen: Urine Updated: 09/23/23 0621     Urine Culture, Routine ESCHERICHIA COLI  >100,000 cfu/ml      Narrative:      Specimen Source->Urine    Rapid Organism ID by PCR (from Blood culture) [[id number]]  (Abnormal) Collected: 09/21/23 0937    Order Status: Completed Updated: 09/23/23 0308     Enterococcus faecalis Not Detected      Enterococcus faecium Not Detected     Listeria monocytogenes Not Detected     Staphylococcus spp. Not Detected     Staphylococcus aureus Not Detected     Staphylococcus epidermidis Not Detected     Staphylococcus lugdunensis Not Detected     Streptococcus species Not Detected     Streptococcus agalactiae Not Detected     Streptococcus pneumoniae Not Detected     Streptococcus pyogenes Not Detected     Acinetobacter calcoaceticus/baumannii complex Not Detected     Bacteroides fragilis Detected     Enterobacterales Not Detected     Enterobacter cloacae complex Not Detected     Escherichia coli Not Detected     Klebsiella aerogenes Not Detected     Klebsiella oxytoca Not Detected     Klebsiella pneumoniae group Not Detected     Proteus Not Detected     Salmonella sp Not Detected     Serratia marcescens Not Detected     Haemophilus influenzae Not Detected     Neisseria meningtidis Not Detected     Pseudomonas aeruginosa Not Detected     Stenotrophomonas maltophilia Not Detected     Candida albicans Not Detected     Candida auris Not Detected     Candida glabrata Not Detected     Candida krusei Not Detected     Candida parapsilosis Not Detected     Candida tropicalis Not Detected     Cryptococcus neoformans/gattii Not Detected     CTX-M (ESBL ) Test not applicable     IMP (Carbapenem resistant) Test not applicable     KPC resistance gene (Carbapenem resistant) Test not applicable     mcr-1  Test not applicable     mec A/C  Test not applicable     mec A/C and MREJ (MRSA) gene Test not applicable     NDM (Carbapenem resistant) Test not applicable     OXA-48-like (Carbapenem resistant) Test not applicable     van A/B (VRE gene) Test not applicable     VIM (Carbapenem resistant) Test not applicable    Narrative:      Aerobic and anaerobic    Blood culture x two cultures. Draw prior to antibiotics. [[id number]] Collected: 09/21/23 0978    Order Status: Completed Specimen: Blood Updated: 09/23/23 0306     Blood Culture,  Routine Gram stain adin bottle: Gram negative rods      Results called to and read back by: Madison Otoole RN ([facility]) @  03:05 by          Narrative:      Aerobic and anaerobic    MRSA Screen by PCR [[id number]] Collected: 09/22/23 1647    Order Status: Completed Specimen: Nasopharyngeal Swab from Nasal Updated: 09/22/23 1809     MRSA SCREEN BY PCR Negative    Culture, Anaerobe [[id number]] Collected: 09/22/23 1042    Order Status: Sent Specimen: Wound from Foot, Left Updated: 09/22/23 1132    Influenza A & B by Molecular [[id number]] Collected: 09/21/23 0948    Order Status: Completed Specimen: Nasal Swab Updated: 09/21/23 1037     Influenza A, Molecular Negative     Influenza B, Molecular Negative     Flu A & B Source Nasal swab            PRIOR MICROBIOLOGY:     Susceptibility data from last 90 days.  Collected Specimen Info Organism Amox/K Clav'ate Amp/Sulbactam Ampicillin Cefazolin Cefepime Ceftriaxone Ciprofloxacin Ertapenem Gentamicin Levofloxacin Meropenem Nitrofurantoin PIPERACILLIN/TAZO SUSCEPTIBILITY Tetracycline   09/22/23 Wound from Foot, Left Escherichia coli   S  S  S  S  S  S  S  S  S  S   S  S   09/21/23 Urine Escherichia coli   S  S  S  S  S  S  S  S  S  S  S  S  S   09/07/23 Wound from Foot, Right Escherichia coli  S  S  S  S  S  S  S  S  S  S  S   S  S   06/28/23 Wound from Hip, Right Escherichia coli  S  S  S  S  S  S  S  S  S  S  S   S  S   06/26/23 Wound from Hip, Right Escherichia coli  S  S  S  S  S  S  S  S  S  S  S   S  S     Collected Specimen Info Organism Tobramycin Trimeth/Sulfa   09/22/23 Wound from Foot, Left Escherichia coli  S  S   09/21/23 Urine Escherichia coli  S  S   09/07/23 Wound from Foot, Right Escherichia coli  S  S   06/28/23 Wound from Hip, Right Escherichia coli  S  S   06/26/23 Wound from Hip, Right Escherichia coli  S  S         CURRENT/PREVIOUS VISIT EKG  Results for orders placed or performed during the hospital encounter of 09/21/23   EKG 12-lead    Collection Time:  09/22/23 10:29 AM    Narrative    Test Reason : I49.9,    Vent. Rate : 095 BPM     Atrial Rate : 000 BPM     P-R Int : 000 ms          QRS Dur : 080 ms      QT Int : 336 ms       P-R-T Axes : 000 -35 009 degrees     QTc Int : 422 ms    Atrial fibrillation with premature ventricular or aberrantly conducted  complexes  Left axis deviation  Abnormal ECG  When compared with ECG of 22-SEP-2023 10:28,  No significant change was found  Confirmed by Carlos Enrique MD ([id number]) on 9/23/2023 4:21:33 PM    Referred By: SUSAN   SELF           Confirmed By:Carlos Enrique MD       Significant Labs: All pertinent labs within the past 24 hours have been reviewed.     Significant Imaging: I have reviewed all relevant and available imaging results/findings within the past 24 hours.  CXR  X-rays R and L foot  CT R hip: There is been progression of the patient's soft tissue wound overlying the right hip greater trochanter.  No bony destruction is visualized on today's study      Suzy Hill MD  Date of Service: 09/24/2023  3:15 PM

## 2023-09-24 NOTE — PLAN OF CARE
Problem: Adult Inpatient Plan of Care  Goal: Plan of Care Review  Outcome: Ongoing, Progressing  Flowsheets (Taken 9/24/2023 1557)  Plan of Care Reviewed With:   patient   son     Problem: Adjustment to Illness (Sepsis/Septic Shock)  Goal: Optimal Coping  Outcome: Ongoing, Progressing  Intervention: Optimize Psychosocial Adjustment to Illness  Flowsheets (Taken 9/24/2023 1557)  Supportive Measures: active listening utilized  Family/Support System Care:   involvement promoted   support provided     Problem: Infection Progression (Sepsis/Septic Shock)  Goal: Absence of Infection Signs and Symptoms  Outcome: Ongoing, Progressing     Problem: Fall Injury Risk  Goal: Absence of Fall and Fall-Related Injury  Intervention: Promote Injury-Free Environment  Flowsheets (Taken 9/24/2023 1557)  Safety Promotion/Fall Prevention: bed alarm set     Problem: Impaired Wound Healing  Goal: Optimal Wound Healing  Outcome: Ongoing, Progressing     Problem: Infection  Goal: Absence of Infection Signs and Symptoms  Outcome: Ongoing, Progressing  Intervention: Prevent or Manage Infection  Flowsheets (Taken 9/24/2023 1557)  Infection Management: aseptic technique maintained  Isolation Precautions: precautions maintained

## 2023-09-24 NOTE — PROGRESS NOTES
"[facility] Medicine  Progress Note    Patient Name: Adriano Borjas  MRN: [id number]  Patient Class: IP- Inpatient   Admission Date: 9/21/2023  Length of Stay: 3 days  Attending Physician: Macho Matt MD  Primary Care Provider: Nusrat, Primary Doctor        Subjective:     Principal Problem:<principal problem not specified>        HPI:  88-year-old male with past medical history of dementia, AFib, hypertension, hyperlipidemia, TIA who was sent from nursing home today for fever.  Patient has known bilateral heel wounds, wound VAC to right hip, and wound to left buttocks.  Son is at bedside and helps provide history as patient is unable to provide history other than telling me he is in pain at this time.  Son reports fever and decreased mental status occurred this morning.  He states that at baseline patient is more conversant than he is today.  Son reports he thinks that patient was on antibiotics recently for his bilateral heel wounds but does not think he is on antibiotics currently.  ED workup revealed elevated lactic acid at 2.9 an elevated white blood cell count.  Sepsis workup in progress.  Son reports patient is DNR but does want fluid and pressors if needed.  Patient to be admitted to step-down bed.      Overview/Hospital Course:  Adriano Borjas is an 88 year old male with a past medical history of HTN, HLD, TIA, Afib, hypothyroidism, sacral ulcer, and dementia who presented with sepsis secondary to UTI and bilateral heel ulcer infection with concern for osteomyelitis. ID has been consulted. Urine culture shows E coli, blood culture shows Bacteroides and wound culture of the left heel shows  E coli. He is on cefepime and Flagyl. MRI of the heels and CTA of the lower extremities are pending. Wound Care and Podiatry have been consulted as patient may need bone biopsies and tissue debridement of left heels. He is DNR.      Interval History: see "Hospital Course"    Review of Systems "   Unable to perform ROS: Dementia     Objective:     Vital Signs (Most Recent):  Temp: 98.7 °F (37.1 °C) (09/24/23 1100)  Pulse: 84 (09/24/23 1100)  Resp: 18 (09/24/23 1105)  BP: 119/66 (09/24/23 1100)  SpO2: 100 % (09/24/23 1100) Vital Signs (24h Range):  Temp:  [98.6 °F (37 °C)-100.2 °F (37.9 °C)] 98.7 °F (37.1 °C)  Pulse:  [73-89] 84  Resp:  [10-28] 18  SpO2:  [96 %-100 %] 100 %  BP: ()/() 119/66     Weight: 73.8 kg (162 lb 11.2 oz)  Body mass index is 24.74 kg/m².    Intake/Output Summary (Last 24 hours) at 9/24/2023 1146  Last data filed at 9/24/2023 1039  Gross per 24 hour   Intake 800 ml   Output 975 ml   Net -175 ml         Physical Exam  Vitals and nursing note reviewed.   Constitutional:       Appearance: He is ill-appearing.   HENT:      Head: Normocephalic and atraumatic.      Right Ear: External ear normal.      Left Ear: External ear normal.      Nose: Nose normal.      Mouth/Throat:      Mouth: Mucous membranes are moist.      Pharynx: Oropharynx is clear.   Eyes:      Extraocular Movements: Extraocular movements intact.      Conjunctiva/sclera: Conjunctivae normal.   Cardiovascular:      Rate and Rhythm: Normal rate and regular rhythm.      Pulses: Normal pulses.      Heart sounds: Normal heart sounds.   Pulmonary:      Effort: Pulmonary effort is normal.      Breath sounds: Normal breath sounds.   Abdominal:      General: Bowel sounds are normal.      Palpations: Abdomen is soft.   Musculoskeletal:         General: Normal range of motion.      Cervical back: Normal range of motion and neck supple.      Right lower leg: No edema.      Left lower leg: No edema.   Skin:     General: Skin is warm and dry.      Comments: See Wound Care pictures regarding wounds.   Neurological:      Mental Status: Mental status is at baseline.             Significant Labs: All pertinent labs within the past 24 hours have been reviewed.    Significant Imaging: I have reviewed all pertinent imaging  results/findings within the past 24 hours.      Assessment/Plan:      Bacteremia  Bacteroides on rapid PCR. Possible from foot ulcers.  -Repeat blood culture pending  -Continue cefepime and Flagyl.      Acute cystitis  E coli.  -Continue cefepime    Unstageable pressure ulcer of right heel  Concern for osteomyelitis.  -Wound Care  -Podiatry  -Follow up MRI and CTA  -Continue cefepime and Flagyl    Unstageable pressure ulcer of left heel  Concern for osteomyelitis.  -Wound Care  -Podiatry  -Follow up MRI and CTA  -Continue cefepime and Flagyl    Hypothyroid  -Continue Synthroid      Anemia  Stable.  -Trend Hgb with CBC      Persistent atrial fibrillation  Not on anticoagulation or rate/rhythm control.  -Telemetry    Stage IV pressure ulcer of right hip  -Wound Care following      Frontotemporal dementia  Chronic. Stable.  -Fall, aspiration and delirium precautions    Essential hypertension  -Hold home medications  -Continue to monitor    TIA (transient ischemic attack)  Prior history.  -Continue statin          VTE Risk Mitigation (From admission, onward)         Ordered     IP VTE HIGH RISK PATIENT  Once         09/21/23 1759     Place sequential compression device  Until discontinued         09/21/23 1759     enoxaparin injection 40 mg  Daily         09/21/23 1545                Discharge Planning   KAY:      Code Status: DNR   Is the patient medically ready for discharge?:     Reason for patient still in hospital (select all that apply): Patient trending condition, Laboratory test, Treatment, Imaging, Consult recommendations and Pending disposition  Discharge Plan A: Skilled Nursing Facility, Return to nursing home                  Macho Matt MD  Department of Hospital Medicine   [facility]

## 2023-09-24 NOTE — PT/OT/SLP EVAL
Physical Therapy Evaluation and Discharge Note    Patient Name:  Adriano Borjas   MRN:  [id number]    Recommendations:     Discharge Recommendations: nursing facility, basic  Discharge Equipment Recommendations: none   Barriers to discharge: None and PT recommends pt return to total care at NH    Assessment:     Adriano Borjas is a 88 y.o. male admitted with a medical diagnosis of <principal problem not specified>. .  At this time, patient is functioning at their prior level of function (total assistance with mobility due to joint contractures and cognitive impairment/difficulty follow commands for active participation in skilled therapy) and will not benefit from further acute PT services.     Recent Surgery: * No surgery found *      Plan:     During this hospitalization, patient does not require further acute PT services.  Please re-consult if situation changes.      Subjective     Chief Complaint: pt was pleasantly confused and denied pain until PT attempted to assess LE ROM  Patient/Family Comments/goals: return to nursing facility   Pain/Comfort:  Pain Rating 1:  (pt denied pain)    Patients cultural, spiritual, Roman Catholic conflicts given the current situation:      Living Environment:  Pt is a [facility]  Prior to admission, patients level of function was bed bound (based on multiple joint contractures and wounds).  Equipment used at home: wheelchair, lift device.  DME owned (not currently used): none.  Upon discharge, patient will have assistance from Nursing Facility Staff.    Objective:     Communicated with DALTON Chawla prior to session.  Patient found HOB elevated with bed alarm, blood pressure cuff, peripheral IV, telemetry, pulse ox (continuous), wound vac, Condom Catheter, oxygen upon PT entry to room.    General Precautions: Standard, contact, fall    Orthopedic Precautions:N/A   Braces: N/A  Respiratory Status: Nasal cannula, flow 2 L/min    Exams:  Cognitive  Exam:  Patient is oriented to his name only (did not know birthdate, place, time or situation).  RLE ROM: pt with hip and knee flexion contractures at approximately 90 degrees with c/o pain with attempts to move  LLE ROM: pt with hip and knee flexion contractures at approximately 90 degrees with c/o pain with attempts to move    Functional Mobility:  N/A due to contractures and pt c/o pain with attempts to straighten his LEs.    AM-PAC 6 CLICK MOBILITY  Total Score:7       Treatment and Education:  Pt was educated on the following: call light use, importance of OOB activity and functional mobility to negate the negative effects of prolonged bed rest during this hospitalization, safe transfers/ambulation and discharge planning recommendations/options.      AM-PAC 6 CLICK MOBILITY  Total Score:7     Patient left HOB elevated with all lines intact, call button in reach, bed alarm on, and RN present.    GOALS:   Multidisciplinary Problems       Physical Therapy Goals       Not on file                    History:     Past Medical History:   Diagnosis Date    A-fib     Dementia     Elevated cholesterol     Essential hypertension     History of cerebral hemorrhage 9/5/2019    Kidney stone     passed on his own    Memory loss     dememtia per family    Osteoarthritis     Paroxysmal atrial fibrillation 12/12/2018    Stroke     2012 TIA     Thyroid disease        Past Surgical History:   Procedure Laterality Date    CYSTOSCOPY      EYE SURGERY      MAGNETIC RESONANCE IMAGING N/A 8/23/2019    Procedure: MRI (Magnetic Resonance Imagine) needs anesthesia;  Surgeon: Panchito Montoya MD;  Location: [facility], [facility];  Service: Anesthesiology;  Laterality: N/A;       Time Tracking:     PT Received On: 09/24/23  PT Start Time: 0927     PT Stop Time: 0935  PT Total Time (min): 8 min     Billable Minutes: Evaluation 8      09/24/2023

## 2023-09-25 ENCOUNTER — PATIENT MESSAGE (OUTPATIENT)
Dept: WOUND CARE | Facility: HOSPITAL | Age: 88
End: 2023-09-25
Payer: MEDICARE

## 2023-09-25 PROBLEM — L97.424: Status: ACTIVE | Noted: 2023-09-25

## 2023-09-25 PROBLEM — L97.414: Status: ACTIVE | Noted: 2023-09-25

## 2023-09-25 LAB
ANION GAP SERPL CALC-SCNC: 4 MMOL/L (ref 8–16)
BACTERIA BLD CULT: ABNORMAL
BACTERIA SPEC AEROBE CULT: ABNORMAL
BUN SERPL-MCNC: 15 MG/DL (ref 8–23)
CALCIUM SERPL-MCNC: 8.1 MG/DL (ref 8.7–10.5)
CHLORIDE SERPL-SCNC: 100 MMOL/L (ref 95–110)
CO2 SERPL-SCNC: 31 MMOL/L (ref 23–29)
CREAT SERPL-MCNC: 0.8 MG/DL (ref 0.5–1.4)
ERYTHROCYTE [DISTWIDTH] IN BLOOD BY AUTOMATED COUNT: 12.8 % (ref 11.5–14.5)
EST. GFR  (NO RACE VARIABLE): >60 ML/MIN/1.73 M^2
GLUCOSE SERPL-MCNC: 115 MG/DL (ref 70–110)
HCT VFR BLD AUTO: 31.4 % (ref 40–54)
HGB BLD-MCNC: 10.7 G/DL (ref 14–18)
MAGNESIUM SERPL-MCNC: 1.8 MG/DL (ref 1.6–2.6)
MCH RBC QN AUTO: 31.3 PG (ref 27–31)
MCHC RBC AUTO-ENTMCNC: 34.1 G/DL (ref 32–36)
MCV RBC AUTO: 92 FL (ref 82–98)
PLATELET # BLD AUTO: 198 K/UL (ref 150–450)
PMV BLD AUTO: 11.7 FL (ref 9.2–12.9)
POTASSIUM SERPL-SCNC: 3.9 MMOL/L (ref 3.5–5.1)
RBC # BLD AUTO: 3.42 M/UL (ref 4.6–6.2)
SODIUM SERPL-SCNC: 135 MMOL/L (ref 136–145)
WBC # BLD AUTO: 11.84 K/UL (ref 3.9–12.7)

## 2023-09-25 PROCEDURE — 99232 SBSQ HOSP IP/OBS MODERATE 35: CPT | Mod: 25,,, | Performed by: FAMILY MEDICINE

## 2023-09-25 PROCEDURE — 25000003 PHARM REV CODE 250: Performed by: STUDENT IN AN ORGANIZED HEALTH CARE EDUCATION/TRAINING PROGRAM

## 2023-09-25 PROCEDURE — 11043 DEBRIDEMENT: ICD-10-PCS | Mod: 59,,, | Performed by: FAMILY MEDICINE

## 2023-09-25 PROCEDURE — 87070 CULTURE OTHR SPECIMN AEROBIC: CPT | Performed by: FAMILY MEDICINE

## 2023-09-25 PROCEDURE — 63600175 PHARM REV CODE 636 W HCPCS: Performed by: HOSPITALIST

## 2023-09-25 PROCEDURE — 11047 DEBRIDEMENT: ICD-10-PCS | Mod: ,,, | Performed by: FAMILY MEDICINE

## 2023-09-25 PROCEDURE — 12000002 HC ACUTE/MED SURGE SEMI-PRIVATE ROOM

## 2023-09-25 PROCEDURE — 11047 DBRDMT BONE EACH ADDL: CPT | Mod: ,,, | Performed by: FAMILY MEDICINE

## 2023-09-25 PROCEDURE — 27000221 HC OXYGEN, UP TO 24 HOURS

## 2023-09-25 PROCEDURE — 87077 CULTURE AEROBIC IDENTIFY: CPT | Performed by: FAMILY MEDICINE

## 2023-09-25 PROCEDURE — 99222 PR INITIAL HOSPITAL CARE,LEVL II: ICD-10-PCS | Mod: ,,, | Performed by: PODIATRIST

## 2023-09-25 PROCEDURE — 87075 CULTR BACTERIA EXCEPT BLOOD: CPT | Performed by: FAMILY MEDICINE

## 2023-09-25 PROCEDURE — 87186 SC STD MICRODIL/AGAR DIL: CPT | Performed by: FAMILY MEDICINE

## 2023-09-25 PROCEDURE — 80048 BASIC METABOLIC PNL TOTAL CA: CPT | Performed by: STUDENT IN AN ORGANIZED HEALTH CARE EDUCATION/TRAINING PROGRAM

## 2023-09-25 PROCEDURE — 87040 BLOOD CULTURE FOR BACTERIA: CPT | Mod: 59 | Performed by: INTERNAL MEDICINE

## 2023-09-25 PROCEDURE — 87040 BLOOD CULTURE FOR BACTERIA: CPT | Performed by: STUDENT IN AN ORGANIZED HEALTH CARE EDUCATION/TRAINING PROGRAM

## 2023-09-25 PROCEDURE — 11044 DBRDMT BONE 1ST 20 SQ CM/<: CPT | Mod: ,,, | Performed by: FAMILY MEDICINE

## 2023-09-25 PROCEDURE — 94760 N-INVAS EAR/PLS OXIMETRY 1: CPT

## 2023-09-25 PROCEDURE — 63600175 PHARM REV CODE 636 W HCPCS: Performed by: STUDENT IN AN ORGANIZED HEALTH CARE EDUCATION/TRAINING PROGRAM

## 2023-09-25 PROCEDURE — 11043 DBRDMT MUSC&/FSCA 1ST 20/<: CPT | Mod: 59,,, | Performed by: FAMILY MEDICINE

## 2023-09-25 PROCEDURE — 99232 PR SUBSEQUENT HOSPITAL CARE,LEVL II: ICD-10-PCS | Mod: 25,,, | Performed by: FAMILY MEDICINE

## 2023-09-25 PROCEDURE — 83735 ASSAY OF MAGNESIUM: CPT | Performed by: STUDENT IN AN ORGANIZED HEALTH CARE EDUCATION/TRAINING PROGRAM

## 2023-09-25 PROCEDURE — 25000003 PHARM REV CODE 250: Performed by: HOSPITALIST

## 2023-09-25 PROCEDURE — 99222 1ST HOSP IP/OBS MODERATE 55: CPT | Mod: ,,, | Performed by: PODIATRIST

## 2023-09-25 PROCEDURE — 99233 SBSQ HOSP IP/OBS HIGH 50: CPT | Mod: FS,,, | Performed by: NURSE PRACTITIONER

## 2023-09-25 PROCEDURE — 85027 COMPLETE CBC AUTOMATED: CPT | Performed by: STUDENT IN AN ORGANIZED HEALTH CARE EDUCATION/TRAINING PROGRAM

## 2023-09-25 PROCEDURE — 11044 DEBRIDEMENT: ICD-10-PCS | Mod: ,,, | Performed by: FAMILY MEDICINE

## 2023-09-25 PROCEDURE — 36415 COLL VENOUS BLD VENIPUNCTURE: CPT | Performed by: STUDENT IN AN ORGANIZED HEALTH CARE EDUCATION/TRAINING PROGRAM

## 2023-09-25 PROCEDURE — 99233 PR SUBSEQUENT HOSPITAL CARE,LEVL III: ICD-10-PCS | Mod: FS,,, | Performed by: NURSE PRACTITIONER

## 2023-09-25 RX ORDER — LORAZEPAM 2 MG/ML
0.5 INJECTION INTRAMUSCULAR ONCE AS NEEDED
Status: COMPLETED | OUTPATIENT
Start: 2023-09-25 | End: 2023-09-25

## 2023-09-25 RX ORDER — LORAZEPAM 0.5 MG/1
1 TABLET ORAL EVERY 6 HOURS PRN
Status: DISCONTINUED | OUTPATIENT
Start: 2023-09-25 | End: 2023-09-25

## 2023-09-25 RX ADMIN — CEFEPIME HYDROCHLORIDE 2 G: 2 INJECTION, POWDER, FOR SOLUTION INTRAVENOUS at 09:09

## 2023-09-25 RX ADMIN — ATORVASTATIN CALCIUM 40 MG: 40 TABLET, FILM COATED ORAL at 08:09

## 2023-09-25 RX ADMIN — LORAZEPAM 0.5 MG: 2 INJECTION INTRAMUSCULAR; INTRAVENOUS at 11:09

## 2023-09-25 RX ADMIN — LEVOTHYROXINE SODIUM 100 MCG: 0.1 TABLET ORAL at 06:09

## 2023-09-25 RX ADMIN — MEMANTINE HYDROCHLORIDE 5 MG: 5 TABLET ORAL at 09:09

## 2023-09-25 RX ADMIN — MUPIROCIN 1 G: 20 OINTMENT TOPICAL at 08:09

## 2023-09-25 RX ADMIN — ENOXAPARIN SODIUM 40 MG: 40 INJECTION SUBCUTANEOUS at 04:09

## 2023-09-25 RX ADMIN — SERTRALINE HYDROCHLORIDE 25 MG: 25 TABLET ORAL at 09:09

## 2023-09-25 RX ADMIN — CEFEPIME HYDROCHLORIDE 2 G: 2 INJECTION, POWDER, FOR SOLUTION INTRAVENOUS at 04:09

## 2023-09-25 RX ADMIN — OLANZAPINE 10 MG: 5 TABLET, FILM COATED ORAL at 08:09

## 2023-09-25 RX ADMIN — METRONIDAZOLE 500 MG: 5 INJECTION, SOLUTION INTRAVENOUS at 01:09

## 2023-09-25 RX ADMIN — METRONIDAZOLE 500 MG: 5 INJECTION, SOLUTION INTRAVENOUS at 09:09

## 2023-09-25 RX ADMIN — MIRTAZAPINE 30 MG: 15 TABLET, FILM COATED ORAL at 08:09

## 2023-09-25 RX ADMIN — MEMANTINE HYDROCHLORIDE 5 MG: 5 TABLET ORAL at 08:09

## 2023-09-25 RX ADMIN — MUPIROCIN 1 G: 20 OINTMENT TOPICAL at 09:09

## 2023-09-25 RX ADMIN — METRONIDAZOLE 500 MG: 5 INJECTION, SOLUTION INTRAVENOUS at 05:09

## 2023-09-25 RX ADMIN — CEFEPIME HYDROCHLORIDE 2 G: 2 INJECTION, POWDER, FOR SOLUTION INTRAVENOUS at 01:09

## 2023-09-25 RX ADMIN — COLLAGENASE SANTYL: 250 OINTMENT TOPICAL at 09:09

## 2023-09-25 RX ADMIN — MULTIVITAMIN TABLET 1 TABLET: TABLET at 09:09

## 2023-09-25 NOTE — SUBJECTIVE & OBJECTIVE
"Interval History:  No complaints.  No fever or chills.  No wound pain.  Bowel movement today      Objective:     Vital Signs (Most Recent):  Temp: 98.6 °F (37 °C) (09/25/23 0725)  Pulse: 73 (09/25/23 0725)  Resp: 16 (09/25/23 0725)  BP: 134/81 (09/25/23 0725)  SpO2: 100 % (09/25/23 0725) Vital Signs (24h Range):  Temp:  [97.9 °F (36.6 °C)-100.1 °F (37.8 °C)] 98.6 °F (37 °C)  Pulse:  [73-90] 73  Resp:  [16-20] 16  SpO2:  [94 %-100 %] 100 %  BP: (104-134)/(54-81) 134/81     Weight: 73.8 kg (162 lb 11.2 oz)  Body mass index is 24.74 kg/m².    Intake/Output Summary (Last 24 hours) at 9/25/2023 1141  Last data filed at 9/25/2023 1008  Gross per 24 hour   Intake 1220 ml   Output 900 ml   Net 320 ml         Physical Exam  GENERAL:  Alert   HEENT:  EOMI. Conjunctivae intact.   NECK:  Supple   LUNGS:  Bilateral rales, good air movement  CARDIAC:  Irregular  ABDOMEN:  Soft,  Nontender and nondistended, no rebound or guarding, bowel sounds present   EXTREMITIES:  Peripheral pulses are 2+. Hands and feet are warm. Good capillary refill in fingers (< 2 seconds). No clubbing, cyanosis or edema  SKIN:  Bilateral necrotic heel wounds dressed  NEUROLOGIC:  Dementia  :  PureWick in place        Significant Labs: All pertinent labs within the past 24 hours have been reviewed.  Blood Culture: No results for input(s): "LABBLOO" in the last 48 hours.  BMP:   Recent Labs   Lab 09/25/23  0638   *   *   K 3.9      CO2 31*   BUN 15   CREATININE 0.8   CALCIUM 8.1*   MG 1.8     CBC:   Recent Labs   Lab 09/24/23  0405 09/25/23  0638   WBC 10.69 11.84   HGB 9.8* 10.7*   HCT 29.5* 31.4*    198     CMP:   Recent Labs   Lab 09/24/23 0405 09/25/23  0638    135*   K 4.0 3.9    100   CO2 29 31*   * 115*   BUN 19 15   CREATININE 0.8 0.8   CALCIUM 7.9* 8.1*   ANIONGAP 5* 4*       Significant Imaging: I have reviewed all pertinent imaging results/findings within the past 24 hours.  "

## 2023-09-25 NOTE — SUBJECTIVE & OBJECTIVE
Scheduled Meds:   atorvastatin  40 mg Oral QHS    ceFEPime (MAXIPIME) IVPB  2 g Intravenous Q8H    collagenase   Topical (Top) Daily    enoxparin  40 mg Subcutaneous Daily    levothyroxine  100 mcg Oral Before breakfast    memantine  5 mg Oral BID    metronidazole  500 mg Intravenous Q8H    mirtazapine  30 mg Oral QHS    multivitamin  1 tablet Oral Daily    mupirocin   Nasal BID    OLANZapine  10 mg Oral Nightly    sertraline  25 mg Oral Daily     Continuous Infusions:  PRN Meds:acetaminophen, HYDROcodone-acetaminophen, ibuprofen, LIDOcaine HCl 2%, magnesium oxide, magnesium oxide, naloxone, ondansetron, potassium bicarbonate, potassium bicarbonate, potassium bicarbonate, potassium, sodium phosphates, potassium, sodium phosphates, potassium, sodium phosphates    Review of patient's allergies indicates:   Allergen Reactions    Ciprofloxacin Other (See Comments)     Shoulder and leg pain/cramping     Codeine Other (See Comments)     Pt cannot remember been a long time ago    Sulfa (sulfonamide antibiotics)      Allergic reaction to bactrim        Past Medical History:   Diagnosis Date    A-fib     Dementia     Elevated cholesterol     Essential hypertension     History of cerebral hemorrhage 9/5/2019    Kidney stone     passed on his own    Memory loss     dememtia per family    Osteoarthritis     Paroxysmal atrial fibrillation 12/12/2018    Stroke     2012 TIA     Thyroid disease      Past Surgical History:   Procedure Laterality Date    CYSTOSCOPY      EYE SURGERY      MAGNETIC RESONANCE IMAGING N/A 8/23/2019    Procedure: MRI (Magnetic Resonance Imagine) needs anesthesia;  Surgeon: Panchito Montoya MD;  Location: [facility];  Service: Anesthesiology;  Laterality: N/A;       Family History       Problem Relation (Age of Onset)    Cancer Mother    Dementia Sister, Brother    Diabetes Mother    Heart disease Father, Brother    Hyperlipidemia Mother, Father    Migraines Daughter    Tremor Daughter, Son           Tobacco Use    Smoking status: Former     Types: Cigarettes    Smokeless tobacco: Never    Tobacco comments:     quit 50 years ago   Substance and Sexual Activity    Alcohol use: No    Drug use: No    Sexual activity: Not on file     Review of Systems  Objective:     Vital Signs (Most Recent):  Temp: 97.7 °F (36.5 °C) (09/25/23 1141)  Pulse: 78 (09/25/23 1141)  Resp: 18 (09/25/23 1141)  BP: 130/69 (09/25/23 1141)  SpO2: 100 % (09/25/23 1301) Vital Signs (24h Range):  Temp:  [97.7 °F (36.5 °C)-100.1 °F (37.8 °C)] 97.7 °F (36.5 °C)  Pulse:  [73-90] 78  Resp:  [16-20] 18  SpO2:  [94 %-100 %] 100 %  BP: (126-134)/(62-81) 130/69     Weight: 73.8 kg (162 lb 11.2 oz)  Body mass index is 24.74 kg/m².    Foot Exam    Right Foot/Ankle     Neurovascular  Dorsalis pedis: 1+  Posterior tibial: 1+  Saphenous nerve sensation: absent  Tibial nerve sensation: absent  Superficial peroneal nerve sensation: absent  Deep peroneal nerve sensation: absent  Sural nerve sensation: absent      Left Foot/Ankle      Neurovascular  Dorsalis pedis: 1+  Posterior tibial: 1+  Saphenous nerve sensation: absent  Tibial nerve sensation: absent  Superficial peroneal nerve sensation: absent  Deep peroneal nerve sensation: absent  Sural nerve sensation: absent                                Laboratory:  All pertinent labs reviewed within the last 24 hours.    Diagnostic Results:  I have reviewed all pertinent imaging results/findings within the past 24 hours.

## 2023-09-25 NOTE — HPI
88-year-old male with past medical history of dementia, AFib, hypertension, hyperlipidemia, TIA who was sent from nursing home for fever.      Patient has bilateral heel wounds, according to his chart the wounds have been worsening. Patient is being treated outpatient for heel wounds by Dr. Middleton.     BONE SCAN  and CTA pending    Podiatry was consulted for bilateral heel wounds.     No family bedside at time of visit.

## 2023-09-25 NOTE — PROGRESS NOTES
Mri attempted. Pt given meds to help hold still while in magnet. Pt pulling his leg up during attempt. Unable to scan due to motion. Secure chat sent with ordering physician

## 2023-09-25 NOTE — ASSESSMENT & PLAN NOTE
Bone scan pending and CTA pending (Patient had palpable pedal pulses present)    Recommend debridements of both heel wounds, bone cultures, with wound vac placement to both wounds,  floating of the heels at all times, in addition to optimal nutrition.     lesvia and glucerna ordered for nutrition    Patient has been under care of Dr. Middleton outpatient for active treatment of bilateral heel wounds. Dr. Middleton is also continuing to treat patient for his heel wounds while he is inpatient. Therefore, I will defer treatment recommendations/debridements/bone cultures/etc. to Dr. Middleton, since patient is under his care currently, both outpatient and inpatient.    Podiatry will sign off, in order to avoid 2 different wound care physicians treating/ debriding the same wounds.

## 2023-09-25 NOTE — PROGRESS NOTES
Chart reviewed.  Podiatry consulted for management of heel wounds.    I will round on patient this afternoon- patient will likely need debridement with wound VAC placement considering the chart states there is palpable bone      Recommend optimal nutrition.  Kyle and Glucerna ordered.      Awaiting MRI of both heel wounds.  Awaiting CTA runoff as well.

## 2023-09-25 NOTE — PROGRESS NOTES
[facility] Medicine  Progress Note    Patient Name: Adriano Borjas  MRN: [id number]  Patient Class: IP- Inpatient   Admission Date: 9/21/2023  Length of Stay: 4 days  Attending Physician: Blessing Anderson MD  Primary Care Provider: Nusrat, Primary Doctor        Subjective:     Principal Problem:Bacteremia        HPI:  88-year-old male with past medical history of dementia, AFib, hypertension, hyperlipidemia, TIA who was sent from nursing home today for fever.  Patient has known bilateral heel wounds, wound VAC to right hip, and wound to left buttocks.  Son is at bedside and helps provide history as patient is unable to provide history other than telling me he is in pain at this time.  Son reports fever and decreased mental status occurred this morning.  He states that at baseline patient is more conversant than he is today.  Son reports he thinks that patient was on antibiotics recently for his bilateral heel wounds but does not think he is on antibiotics currently.  ED workup revealed elevated lactic acid at 2.9 an elevated white blood cell count.  Sepsis workup in progress.  Son reports patient is DNR but does want fluid and pressors if needed.  Patient to be admitted to step-down bed.      Overview/Hospital Course:  Adriano Borjas is an 88 year old male with a past medical history of HTN, HLD, TIA, Afib, hypothyroidism, sacral ulcer, and dementia who presented with sepsis secondary to UTI and bilateral heel ulcer infection with concern for osteomyelitis. ID has been consulted. Urine culture shows E coli, blood culture shows Bacteroides and wound culture of the left heel shows  E coli. He is on cefepime and Flagyl. MRI of the heels and CTA of the lower extremities are pending. Wound Care and Podiatry have been consulted as patient may need bone biopsies and tissue debridement of left heels. He is DNR.      Interval History:  No complaints.  No fever or chills.  No wound pain.  Bowel movement  "today      Objective:     Vital Signs (Most Recent):  Temp: 98.6 °F (37 °C) (09/25/23 0725)  Pulse: 73 (09/25/23 0725)  Resp: 16 (09/25/23 0725)  BP: 134/81 (09/25/23 0725)  SpO2: 100 % (09/25/23 0725) Vital Signs (24h Range):  Temp:  [97.9 °F (36.6 °C)-100.1 °F (37.8 °C)] 98.6 °F (37 °C)  Pulse:  [73-90] 73  Resp:  [16-20] 16  SpO2:  [94 %-100 %] 100 %  BP: (104-134)/(54-81) 134/81     Weight: 73.8 kg (162 lb 11.2 oz)  Body mass index is 24.74 kg/m².    Intake/Output Summary (Last 24 hours) at 9/25/2023 1141  Last data filed at 9/25/2023 1008  Gross per 24 hour   Intake 1220 ml   Output 900 ml   Net 320 ml         Physical Exam  GENERAL:  Alert   HEENT:  EOMI. Conjunctivae intact.   NECK:  Supple   LUNGS:  Bilateral rales, good air movement  CARDIAC:  Irregular  ABDOMEN:  Soft,  Nontender and nondistended, no rebound or guarding, bowel sounds present   EXTREMITIES:  Peripheral pulses are 2+. Hands and feet are warm. Good capillary refill in fingers (< 2 seconds). No clubbing, cyanosis or edema  SKIN:  Bilateral necrotic heel wounds dressed  NEUROLOGIC:  Dementia  :  PureWick in place        Significant Labs: All pertinent labs within the past 24 hours have been reviewed.  Blood Culture: No results for input(s): "LABBLOO" in the last 48 hours.  BMP:   Recent Labs   Lab 09/25/23  0638   *   *   K 3.9      CO2 31*   BUN 15   CREATININE 0.8   CALCIUM 8.1*   MG 1.8     CBC:   Recent Labs   Lab 09/24/23 0405 09/25/23 0638   WBC 10.69 11.84   HGB 9.8* 10.7*   HCT 29.5* 31.4*    198     CMP:   Recent Labs   Lab 09/24/23 0405 09/25/23 0638    135*   K 4.0 3.9    100   CO2 29 31*   * 115*   BUN 19 15   CREATININE 0.8 0.8   CALCIUM 7.9* 8.1*   ANIONGAP 5* 4*       Significant Imaging: I have reviewed all pertinent imaging results/findings within the past 24 hours.      Assessment/Plan:           Unstageable pressure ulcer of bilateral heels with bacteriodies " bacteremia  Concern for osteomyelitis.  -Repeat blood culture 9/25 pending  -Wound Care  -Podiatry  -Follow up MRI and CTA today.  Order IV Ativan for prior to MRI  -Continue cefepime and Flagyl     Acute cystitis  E coli.  -Continue cefepime    Hypothyroid  -Continue Synthroid        Anemia  Stable.  -Trend CBC        Persistent atrial fibrillation  Not on anticoagulation or rate/rhythm control.  -Telemetry     Stage IV pressure ulcer of right hip  -Wound Care following       Frontotemporal dementia  Chronic. Stable.  -Fall, aspiration and delirium precautions     Essential hypertension  -Hold home medications  -Continue to monitor  Stable     TIA (transient ischemic attack)  Prior history.  -Continue statin       VTE Risk Mitigation (From admission, onward)         Ordered     IP VTE HIGH RISK PATIENT  Once         09/21/23 1759     Place sequential compression device  Until discontinued         09/21/23 1759     enoxaparin injection 40 mg  Daily         09/21/23 1545                Discharge Planning   KAY: 9/29/2023     Code Status: DNR   Is the patient medically ready for discharge?:     Reason for patient still in hospital (select all that apply): Treatment, Imaging and Consult recommendations  Discharge Plan A: Skilled Nursing Facility, Return to nursing home                  Blessing Anderson MD  Department of Hospital Medicine   [facility]

## 2023-09-25 NOTE — CONSULTS
[facility]  Podiatry  Consult Note    Patient Name: Adriano Borjas  MRN: [id number]  Admission Date: 9/21/2023  Hospital Length of Stay: 4 days  Attending Physician: Blessing Anderson MD  Primary Care Provider: Nusrat, Primary Doctor     Consults  Subjective:     History of Present Illness:  88-year-old male with past medical history of dementia, AFib, hypertension, hyperlipidemia, TIA who was sent from nursing home for fever.      Patient has bilateral heel wounds, according to his chart the wounds have been worsening. Patient is being treated outpatient for heel wounds by Dr. Middleton.     BONE SCAN  and CTA pending    Podiatry was consulted for bilateral heel wounds.     No family bedside at time of visit.            Scheduled Meds:   atorvastatin  40 mg Oral QHS    ceFEPime (MAXIPIME) IVPB  2 g Intravenous Q8H    collagenase   Topical (Top) Daily    enoxparin  40 mg Subcutaneous Daily    levothyroxine  100 mcg Oral Before breakfast    memantine  5 mg Oral BID    metronidazole  500 mg Intravenous Q8H    mirtazapine  30 mg Oral QHS    multivitamin  1 tablet Oral Daily    mupirocin   Nasal BID    OLANZapine  10 mg Oral Nightly    sertraline  25 mg Oral Daily     Continuous Infusions:  PRN Meds:acetaminophen, HYDROcodone-acetaminophen, ibuprofen, LIDOcaine HCl 2%, magnesium oxide, magnesium oxide, naloxone, ondansetron, potassium bicarbonate, potassium bicarbonate, potassium bicarbonate, potassium, sodium phosphates, potassium, sodium phosphates, potassium, sodium phosphates    Review of patient's allergies indicates:   Allergen Reactions    Ciprofloxacin Other (See Comments)     Shoulder and leg pain/cramping     Codeine Other (See Comments)     Pt cannot remember been a long time ago    Sulfa (sulfonamide antibiotics)      Allergic reaction to bactrim        Past Medical History:   Diagnosis Date    A-fib     Dementia     Elevated cholesterol     Essential hypertension     History of  cerebral hemorrhage 9/5/2019    Kidney stone     passed on his own    Memory loss     dememtia per family    Osteoarthritis     Paroxysmal atrial fibrillation 12/12/2018    Stroke     2012 TIA     Thyroid disease      Past Surgical History:   Procedure Laterality Date    CYSTOSCOPY      EYE SURGERY      MAGNETIC RESONANCE IMAGING N/A 8/23/2019    Procedure: MRI (Magnetic Resonance Imagine) needs anesthesia;  Surgeon: Panchito Montoya MD;  Location: [facility];  Service: Anesthesiology;  Laterality: N/A;       Family History       Problem Relation (Age of Onset)    Cancer Mother    Dementia Sister, Brother    Diabetes Mother    Heart disease Father, Brother    Hyperlipidemia Mother, Father    Migraines Daughter    Tremor Daughter, Son          Tobacco Use    Smoking status: Former     Types: Cigarettes    Smokeless tobacco: Never    Tobacco comments:     quit 50 years ago   Substance and Sexual Activity    Alcohol use: No    Drug use: No    Sexual activity: Not on file     Review of Systems  Objective:     Vital Signs (Most Recent):  Temp: 97.7 °F (36.5 °C) (09/25/23 1141)  Pulse: 78 (09/25/23 1141)  Resp: 18 (09/25/23 1141)  BP: 130/69 (09/25/23 1141)  SpO2: 100 % (09/25/23 1301) Vital Signs (24h Range):  Temp:  [97.7 °F (36.5 °C)-100.1 °F (37.8 °C)] 97.7 °F (36.5 °C)  Pulse:  [73-90] 78  Resp:  [16-20] 18  SpO2:  [94 %-100 %] 100 %  BP: (126-134)/(62-81) 130/69     Weight: 73.8 kg (162 lb 11.2 oz)  Body mass index is 24.74 kg/m².    Foot Exam    Right Foot/Ankle     Neurovascular  Dorsalis pedis: 1+  Posterior tibial: 1+  Saphenous nerve sensation: absent  Tibial nerve sensation: absent  Superficial peroneal nerve sensation: absent  Deep peroneal nerve sensation: absent  Sural nerve sensation: absent      Left Foot/Ankle      Neurovascular  Dorsalis pedis: 1+  Posterior tibial: 1+  Saphenous nerve sensation: absent  Tibial nerve sensation: absent  Superficial peroneal nerve sensation: absent  Deep  peroneal nerve sensation: absent  Sural nerve sensation: absent                                Laboratory:  All pertinent labs reviewed within the last 24 hours.    Diagnostic Results:  I have reviewed all pertinent imaging results/findings within the past 24 hours.    Assessment/Plan:     Orthopedic  Skin ulcer of left heel with necrosis of bone  Bone scan pending and CTA pending (Patient had palpable pedal pulses present)    Recommend debridements of both heel wounds, bone cultures, with wound vac placement to both wounds,  floating of the heels at all times, in addition to optimal nutrition.     lesvia and glucerna ordered for nutrition    Patient has been under care of Dr. Middleton outpatient for active treatment of bilateral heel wounds. Dr. Middleton is also continuing to treat patient for his heel wounds while he is inpatient. Therefore, I will defer treatment recommendations/debridements/bone cultures/etc. to Dr. Middleton, since patient is under his care currently, both outpatient and inpatient.    Podiatry will sign off, in order to avoid 2 different wound care physicians treating/ debriding the same wounds.          Thank you for your consult. I will sign off. Please contact us if you have any additional questions.    Alba Sauer DPM  Podiatry  [facility]

## 2023-09-25 NOTE — PROGRESS NOTES
Wound Care Progress Note    Subjective:       Patient ID: Adriano Borjas is a 88 y.o. male.    Chief Complaint: Fever (Per EMS, nursing home stated pt started having fever this morning. Pt has wound vac to right hip,wounds to bilateral feet, and left buttocks)    HPI  Pt seen at bedside today, family not present. Pt has BL heel pressure ulcers secondary to using hes heels to propel his wheelchair at the nursing facility. Pt family is aware of the wounds and their origin. Pt also has a right hip wound, which is continuing to heal with the wound vac. Pt will be getting MRI today, had xrays of the BL heels which were negative for osteomyelitis . No other complaints today, pt has dementia  Review of Systems      Objective:        Physical Exam    Vitals:    09/25/23 1141   BP: 130/69   Pulse: 78   Resp: 18   Temp: 97.7 °F (36.5 °C)       Assessment:           ICD-10-CM ICD-9-CM   1. Severe sepsis  A41.9 038.9    R65.20 995.92   2. Sepsis  A41.9 038.9     995.91   3. Right foot pain  M79.671 729.5   4. Right foot pain  M79.671 729.5   5. Right foot pain  M79.671 729.5   6. Acute cystitis without hematuria  N30.00 595.0   7. Abnormal heart rhythm  I49.9 427.9   8. Bacteremia  R78.81 790.7            Negative Pressure Wound Therapy  09/22/23 1900 Right anterior (Active)   09/22/23 1900   Side: Right   Orientation: anterior   Location: Hip   Additional Comments: 125 mmHG   Removal Indication and Assessment:    Location:    SDO Location:    Therapy Setting NPWT Continuous therapy 09/25/23 0032   Pressure Setting NPWT 125 mmHg 09/25/23 0032   Therapy Interventions NPWT Seal intact 09/25/23 0032   Sponges Inserted NPWT Other 09/24/23 0701   General Output (mL) 0 09/24/23 1702            Altered Skin Integrity 06/26/23 1500 Right Hip Ulceration Full thickness tissue loss with exposed bone, tendon, or muscle. Often includes undermining and tunneling. May extend into muscle and/or supporting structures. (Active)    06/26/23 1500   Altered Skin Integrity Present on Admission - Did Patient arrive to the hospital with altered skin?: yes   Side: Right   Orientation:    Location: Hip   Wound Number:    Is this injury device related?:    Primary Wound Type: Ulceration   Description of Altered Skin Integrity: Full thickness tissue loss with exposed bone, tendon, or muscle. Often includes undermining and tunneling. May extend into muscle and/or supporting structures.   Ankle-Brachial Index:    Pulses:    Removal Indication and Assessment:    Wound Outcome:    (Retired) Wound Length (cm):    (Retired) Wound Width (cm):    (Retired) Depth (cm):    Wound Description (Comments):    Removal Indications:    Wound Image   09/21/23 1111   Dressing Appearance Clean;Dry;Intact 09/25/23 0032   Appearance Dressing in place, unable to visualize 09/25/23 0032   Dressing Changed 09/25/23 0032            Altered Skin Integrity 06/26/23 1813 Left Heel Ulceration Full thickness tissue loss. Base is covered by slough and/or eschar in the wound bed (Active)   06/26/23 1813   Altered Skin Integrity Present on Admission - Did Patient arrive to the hospital with altered skin?: yes   Side: Left   Orientation:    Location: Heel   Wound Number:    Is this injury device related?:    Primary Wound Type: Ulceration   Description of Altered Skin Integrity: Full thickness tissue loss. Base is covered by slough and/or eschar in the wound bed   Ankle-Brachial Index:    Pulses:    Removal Indication and Assessment:    Wound Outcome:    (Retired) Wound Length (cm):    (Retired) Wound Width (cm):    (Retired) Depth (cm):    Wound Description (Comments):    Removal Indications:    Wound Image   09/21/23 1111   Description of Altered Skin Integrity Full thickness tissue loss with exposed bone, tendon, or muscle. Often includes undermining and tunneling. May extend into muscle and/or supporting structures. 09/22/23 1402   Dressing Appearance Clean;Dry;Intact 09/25/23  0032   Appearance Dressing in place, unable to visualize 09/24/23 0701   Care Cleansed with:;Sterile normal saline 09/22/23 1402   Dressing Changed 09/25/23 0032            Altered Skin Integrity 09/21/23 1109 Right Heel Full thickness tissue loss. Base is covered by slough and/or eschar in the wound bed (Active)   09/21/23 1109   Altered Skin Integrity Present on Admission - Did Patient arrive to the hospital with altered skin?: yes   Side: Right   Orientation:    Location: Heel   Wound Number:    Is this injury device related?:    Primary Wound Type:    Description of Altered Skin Integrity: Full thickness tissue loss. Base is covered by slough and/or eschar in the wound bed   Ankle-Brachial Index:    Pulses:    Removal Indication and Assessment:    Wound Outcome:    (Retired) Wound Length (cm):    (Retired) Wound Width (cm):    (Retired) Depth (cm):    Wound Description (Comments):    Removal Indications:    Wound Image   09/21/23 1112   Description of Altered Skin Integrity Full thickness tissue loss. Base is covered by slough and/or eschar in the wound bed 09/22/23 1402   Dressing Appearance Clean;Dry;Intact 09/25/23 0032   Drainage Amount Small 09/25/23 0032   Appearance Dressing in place, unable to visualize 09/24/23 0701   Care Cleansed with:;Sterile normal saline;Applied: 09/24/23 0500   Dressing Foam 09/24/23 0701            Altered Skin Integrity 09/21/23 1110 Right Buttocks Partial thickness tissue loss. Shallow open ulcer with a red or pink wound bed, without slough. Intact or Open/Ruptured Serum-filled blister. (Active)   09/21/23 1110   Altered Skin Integrity Present on Admission - Did Patient arrive to the hospital with altered skin?: yes   Side: Right   Orientation:    Location: Buttocks   Wound Number:    Is this injury device related?:    Primary Wound Type:    Description of Altered Skin Integrity: Partial thickness tissue loss. Shallow open ulcer with a red or pink wound bed, without slough.  Intact or Open/Ruptured Serum-filled blister.   Ankle-Brachial Index:    Pulses:    Removal Indication and Assessment:    Wound Outcome:    (Retired) Wound Length (cm):    (Retired) Wound Width (cm):    (Retired) Depth (cm):    Wound Description (Comments):    Removal Indications:    Wound Image   09/21/23 1112   Description of Altered Skin Integrity Partial thickness tissue loss. Shallow open ulcer with a red or pink wound bed, without slough. Intact or Open/Ruptured Serum-filled blister. 09/22/23 1402   Dressing Appearance Clean;Dry;Intact 09/25/23 0032   Drainage Amount None 09/25/23 0032   Appearance Dressing in place, unable to visualize 09/25/23 0032   Care Cleansed with:;Sterile normal saline 09/22/23 1402   Dressing Foam 09/24/23 0701           Plan:       Essential hypertension  Hold antihypertensives given sepsis      Frontotemporal dementia  Patient with dementia with likely etiology of fronto-temporal dementia. Dementia is moderate. The patient does not have signs of behavioral disturbance. Home dementia medications are Held or Continued: continued.. Continue non-pharmacologic interventions to prevent delirium (No VS between 11PM-5AM, activity during day, opening blinds, providing glasses/hearing aids, and up in chair during daytime). Will avoid narcotics and benzos unless absolutely necessary. PRN anti-psychotics are not prescribed to avoid self harm behaviors.        Unstageable pressure ulcer of left heel  Noted.  Consult with Wound Care.      Unstageable pressure ulcer of right heel  Consult with Wound Care.      Stage IV pressure ulcer of right hip  Continue wound VAC.  Consult with wound care.    CT of right hip with progression of patient's soft tissue wound.  No bony destruction visualized.  Consider MRI to evaluate for osteomyelitis however not sure if patient will be able to tolerate      Severe sepsis  This patient does have evidence of infective focus  My overall impression is  "sepsis.  Source: Skin and Soft Tissue (location Suspect from heel or hip wounds)  Antibiotics given-   Antibiotics (72h ago, onward)      Start     Stop Route Frequency Ordered    09/21/23 0930  vancomycin 1,500 mg in dextrose 5 % (D5W) 250 mL IVPB (Vial-Mate)         09/21/23 2129 IV ED 1 Time 09/21/23 0926          Latest lactate reviewed-  No results for input(s): "LACTATE" in the last 72 hours.  Organ dysfunction indicated by Encephalopathy        Initiate broad-spectrum IV antibiotics.  Check ESR, CRP, procalcitonin.  Consult with ID.    Acute cystitis  Ucx GNR  - continue abx per sepsis section  - f/u ucx final    Essential hypertension  Chronic, low-normal  - hold amlodipine    Frontotemporal dementia  Noted hx   - delirium precautions  - redirect as able    Severe sepsis  This patient does have evidence of infective focus  My overall impression is sepsis.  Source: Skin and Soft Tissue and urinary  Antibiotics given-   Antibiotics (72h ago, onward)      Start     Stop Route Frequency Ordered    09/22/23 0945  cefepime 2 g in dextrose 5% 100 mL IVPB (ready to mix)         -- IV Every 12 hours (non-standard times) 09/22/23 0833    09/22/23 0945  metronidazole IVPB 500 mg         -- IV Every 8 hours (non-standard times) 09/22/23 0833    09/21/23 2100  mupirocin 2 % ointment         09/26/23 2059 Nasl 2 times daily 09/21/23 1758          Latest lactate reviewed-  Recent Labs   Lab 09/21/23  1333   LACTATE 1.4     Organ dysfunction indicated by Encephalopathy  No pressor needed  - continue abx  - f/u ID and wound care recs    Stage IV pressure ulcer of right hip  CT reviewed  - Continue wound VAC.    - Consult with wound care.      Unstageable pressure ulcer of left heel  With suspected cellulitic component  XR reviewed  - continue abx per sepsis section  - consult wound care and ID  - f/u MRI and US art    Unstageable pressure ulcer of right heel  With suspected cellulitic component  XR reviewed  - continue abx " per sepsis section  - consult wound care and ID  - f/u MRI and US art    Persistent atrial fibrillation  Chronic, rate-controlled  Not on rate or rhythm control meds  Not on anticoagulation  - monitor on tele      Not on anticoagulation or rate/rhythm control.  -Telemetry    Essential hypertension  -Hold home medications  -Continue to monitor    Frontotemporal dementia  Chronic. Stable.  -Fall, aspiration and delirium precautions    Unstageable pressure ulcer of left heel  Concern for osteomyelitis.  -Wound Care  -Podiatry  -Follow up MRI and CTA  -Continue cefepime and Flagyl    Unstageable pressure ulcer of right heel  Concern for osteomyelitis.  -Wound Care  -Podiatry  -Follow up MRI and CTA  -Continue cefepime and Flagyl    Stage IV pressure ulcer of right hip  -Wound Care following      Severe sepsis  This patient does have evidence of infective focus  My overall impression is sepsis.  Source: Skin and Soft Tissue and urinary  Antibiotics given-   Antibiotics (72h ago, onward)      Start     Stop Route Frequency Ordered    09/22/23 0945  cefepime 2 g in dextrose 5% 100 mL IVPB (ready to mix)         -- IV Every 12 hours (non-standard times) 09/22/23 0833    09/22/23 0945  metronidazole IVPB 500 mg         -- IV Every 8 hours (non-standard times) 09/22/23 0833    09/21/23 2100  mupirocin 2 % ointment         09/26/23 2059 Nasl 2 times daily 09/21/23 1758          Latest lactate reviewed-  Recent Labs   Lab 09/21/23  1333   LACTATE 1.4     Organ dysfunction indicated by Encephalopathy  No pressor needed  - continue abx  - f/u ID and wound care recs    Acute cystitis  E coli.  -Continue cefepime    Anemia  Stable.  -Trend Hgb with CBC      Bacteremia  Bacteroides on rapid PCR. Possible from foot ulcers.  -Repeat blood culture pending  -Continue cefepime and Flagyl.      Hypothyroid  -Continue Synthroid      TIA (transient ischemic attack)  Prior history.  -Continue statin        Persistent atrial fibrillation  Not  on anticoagulation or rate/rhythm control.  -Telemetry    Acute cystitis  E coli.  -Continue cefepime    Bacteremia  Bacteroides on rapid PCR. Possible from foot ulcers.  -Repeat blood culture pending  -Continue cefepime and Flagyl.      TIA (transient ischemic attack)  Prior history.  -Continue statin        Essential hypertension  -Hold home medications  -Continue to monitor    Frontotemporal dementia  Chronic. Stable.  -Fall, aspiration and delirium precautions    Unstageable pressure ulcer of left heel  Concern for osteomyelitis.  -Wound Care  -Podiatry  -Follow up MRI and CTA  -Continue cefepime and Flagyl    Unstageable pressure ulcer of right heel  Concern for osteomyelitis.  -Wound Care  -Podiatry  -Follow up MRI and CTA  -Continue cefepime and Flagyl  EXAMINATION:  XR FOOT COMPLETE 3 VIEW LEFT     CLINICAL HISTORY:  .  Pain in right foot     TECHNIQUE:  AP, lateral and oblique views of the left foot were performed.     COMPARISON:  None     FINDINGS:  There is soft tissue irregularity overlying the right calcaneus.  There is no evidence fracture or malalignment.  There are degenerative changes of the foot.     Impression:     There are degenerative changes of the left foot was soft tissue irregularity overlying the left calcaneus.        Electronically signed by: Margot Simeon MD  Date:                                            09/21/2023  Time:                                           11:25    EXAMINATION:  XR FOOT COMPLETE 3 VIEW RIGHT     CLINICAL HISTORY:  . Pain in right foot     TECHNIQUE:  AP, lateral, and oblique views of the right foot were performed.     COMPARISON:  None     FINDINGS:  There are degenerative changes of the right foot is witnessed by joint space narrowing with adjacent sclerosis.  The adjacent soft tissues are unremarkable.  There is no evidence fracture or dislocation.     Impression:     There are degenerative changes of the right foot in soft tissue irregularity at the  right heel.        Electronically signed by: Margot Simeon MD  Date:                                            09/21/2023  Time:                                           11:22      Assessment and Recommendations         Diagnoses:    1. Right hip stage 4 pressure ulcer  2. BL heel unstagable pressure ulcers     Plan:  1. Santyl to the BL heel pressure ulcers  2. Wound VAC to the right hip pressure ulcer  3. Waiting on the MRI results ordered Friday at which point the heel wounds can be debrided and wound vac placed  4. Lidocaine ordered for debridement.

## 2023-09-25 NOTE — PROGRESS NOTES
[facility]  Department of Infectious Disease  Progress Note        PATIENT NAME: Adriano Borjas  MRN: [id number]  TODAY'S DATE: 09/25/2023  ADMIT DATE: 9/21/2023    PRINCIPLE PROBLEM: Bacteremia    REASON FOR CONSULT:  Sepsis-heal wounds and hip wound    INTERVAL HISTORY      09/25 @ 1127:  Patient seen and examined in his room.  He is lying in bed and is awake and alert though confused about where he is.  He has some difficulty following commands.  Bilateral heel wounds are dressed and patient also has a hip wound in his being followed by wound care.  He was unable to tolerate MRI.  T-max 100.1° and last 24 hours.  WBC 11.84, platelet 198, BUN/CR 15/0.8.  Last CRP on 09/23 was greater than 16.  9/21 blood cultures with Bacteroides fragilis, urine culture with pansensitive E coli.  9/22 wound culture with E coli pansensitive.  MRSA screen  negative, 9/23 blood culture negative, 9/24 PCR from blood culture negative in 9/25 blood cultures x2 drawn this morning.  He continues on cefepime and metronidazole, vancomycin was stopped this morning.    09/23/2023--denies complaints.  Pleasant, appreciative.  Tmax improved.  WBC 10.   CTA and MRI are not performed yet.    Antibiotics (From admission, onward)      Start     Stop Route Frequency Ordered    09/24/23 0900  cefepime 2 g in dextrose 5% 100 mL IVPB (ready to mix)         -- IV Every 8 hours (non-standard times) 09/24/23 0854    09/22/23 0945  metronidazole IVPB 500 mg         -- IV Every 8 hours (non-standard times) 09/22/23 0833    09/21/23 2100  mupirocin 2 % ointment         09/26/23 2059 Nasl 2 times daily 09/21/23 1756            ASSESSMENT and PLAN     Severe sepsis multifactorial; E coli UTI and bilateral calcaneal osteomyelitis, foul-smelling drainage from left heel(E. Coli), possibly the right heel, and R hip sacral decub, the latter with granulation tissue  Procal 5.5, positive  ESR 94, , done at [facility].    CRP > 16.   9/21  urine culture with pansensitive E coli  9/21 aerobic culture from left foot with pansensitive E coli  9/21 blood cultures x2 sets with 1 of 2 bottles positive for Bacteroides fragilis in 2nd set 1 of 2 bottles positive with PCR showing Bacteroides fragilis and g stain with Gram-positive cocci.  Previous cultures from Right foot wound culture 9/7 & R hip 6/28 & 6/26 with pansensitive E coli  MRSA swab neg  9/24 blood culture PCR negative  9/25 unable to get MRI feet, x-rays with no obvious osteo  9/21 CT right hip with no bony destruction with progression of soft tissue wound.  Bilateral foot x-rays left with soft tissue irregularity overlying the left calcaneus no mention of osteo myelitis.  Right foot with no mention of osteomyelitis.  9/25 blood cultures x2 sets pending  CTA with runoff abdominal pelvis bilateral lower extremities is pending    PMHx: advanced dementia, AFib, hypertension, hyperlipidemia, prior TIA    3. Anaerobic Bacteremia with Bacteroides fragilis   9/25 blood cultures x2 drawn this morning        RECOMMENDATIONS    Podiatry for I&D of both heels, please send deep tissue and bone for Gram stain, cultures, including AFB and fungal, bone to pathology   Follow CT angio runoff   Obtain nuclear medicine WBC scan limited  Continue Cefepime 2 g IV q.8 hours  Continue Flagyl 500 mg IV q.8 for anaerobic coverage  Follow cultures.   GPC in blood cultures??  hold vancomycin for now  Wound care to all affected areas   Monitor renal function and dose antibiotics as appropriate  Follow CRP  Aspiration precautions    Thank you for this consult. Please send Zaarly secure chat with any questions.      SUBJECTIVE    Review of Systems    Patient is a poor historian, he has no complaints but has dementia.    OBJECTIVE     Temp:  [97.7 °F (36.5 °C)-100.1 °F (37.8 °C)] 97.7 °F (36.5 °C)  Pulse:  [73-90] 78  Resp:  [16-20] 18  SpO2:  [94 %-100 %] 100 %  BP: (104-134)/(54-81) 130/69  Physical Exam    General:  Frail,  elderly male, propped up in bed, awake and alert, pleasant and talkative but confused.  Eyes: Eyes with no icterus or injection. Vision grossly normal  Ears:  Hard of hearing.  Nose: Nares patent  Mouth: Moist mucous membranes, no ulcerations or erythema.  Neck: Supple, no tenderness to palpation.  Cardiovascular:  Irregular rhythm, controlled rate, no edema.    Respiratory:  Clear to auscultation bilaterally, no tachypnea or increased work of breathing.  On 2 L nasal cannula O2.  Genitourinary:  External catheter in place  Gastrointestinal:  Soft with active bowel sounds, no tenderness to palpation, no distention.  Musculoskeletal:  Generally weak.  Dressings intact to lower extremities.  Skin:  Warm and dry, no obvious rashes.  See photos below.  Neuro:  Awake and alert, disoriented, has difficulty following commands.  Psych:  Good Mood, no agitation.    Wounds:    9/22            VAD:PIV  Isolation: Contact    CBC LAST 7 DAYS  Recent Labs   Lab 09/21/23  0937 09/22/23  1039 09/23/23  0333 09/24/23  0405 09/25/23  0638   WBC 14.22* 12.93* 12.62 10.69 11.84   RBC 3.57* 3.38* 3.42* 3.17* 3.42*   HGB 11.0* 10.2* 10.5* 9.8* 10.7*   HCT 33.0* 31.7* 32.1* 29.5* 31.4*   MCV 92 94 94 93 92   MCH 30.8 30.2 30.7 30.9 31.3*   MCHC 33.3 32.2 32.7 33.2 34.1   RDW 13.0 13.3 13.2 13.0 12.8    201 175 181 198   MPV 11.8 12.6 12.5 12.0 11.7   GRAN 95.0*  13.5* 81.4*  10.5*  --   --   --    LYMPH 1.5*  0.2* 8.2*  1.1  --   --   --    MONO 2.7*  0.4 9.4  1.2*  --   --   --    BASO 0.05 0.05  --   --   --    NRBC 0 0  --   --   --        CHEMISTRY LAST 7 DAYS  Recent Labs   Lab 09/21/23  0937 09/22/23  1039 09/23/23  0333 09/23/23  0856 09/24/23  0405 09/24/23  1314 09/25/23  0638    140 136  --  136  --  135*   K 3.4* 3.8 3.6 4.7 4.0  --  3.9    107 103  --  102  --  100   CO2 24 26 28  --  29  --  31*   ANIONGAP 10 7* 5*  --  5*  --  4*   BUN 28* 30* 26*  --  19  --  15   CREATININE 1.2 1.1 0.9  --  0.8  --  " 0.8   * 120* 117*  --  113*  --  115*   CALCIUM 8.2* 8.2* 8.0*  --  7.9*  --  8.1*   MG  --  1.9  --  2.0 1.8 1.9 1.8   ALBUMIN 2.3* 2.8*  --   --   --   --   --    PROT 6.4 6.6  --   --   --   --   --    ALKPHOS 73 64  --   --   --   --   --    ALT 35 36  --   --   --   --   --    AST 42* 56*  --   --   --   --   --    BILITOT 1.0 0.7  --   --   --   --   --        Estimated Creatinine Clearance: 64.1 mL/min (based on SCr of 0.8 mg/dL).    INFLAMMATORY/PROCAL  LAST 7 DAYS  Recent Labs   Lab 09/21/23  0937 09/23/23  0333   PROCAL 5.53*  --    .1* >16.00*     No results found for: "ESR"  CRP   Date Value Ref Range Status   09/23/2023 >16.00 (H) <0.76 mg/dL Final   09/21/2023 175.1 (H) 0.0 - 8.2 mg/L Final       PRIOR  MICROBIOLOGY:    Susceptibility data from last 90 days.  Collected Specimen Info Organism Amox/K Clav'ate Amp/Sulbactam Ampicillin Cefazolin Cefepime Ceftriaxone Ciprofloxacin Ertapenem Gentamicin Levofloxacin Meropenem Nitrofurantoin PIPERACILLIN/TAZO SUSCEPTIBILITY Tetracycline   09/22/23 Wound from Foot, Left Escherichia coli   S  S  S  S  S  S  S  S  S  S   S  S   09/21/23 Urine Escherichia coli   S  S  S  S  S  S  S  S  S  S  S  S  S   09/21/23 Blood Bacteroides fragilis                 09/07/23 Wound from Foot, Right Escherichia coli  S  S  S  S  S  S  S  S  S  S  S   S  S   06/28/23 Wound from Hip, Right Escherichia coli  S  S  S  S  S  S  S  S  S  S  S   S  S     Collected Specimen Info Organism Tobramycin Trimeth/Sulfa   09/22/23 Wound from Foot, Left Escherichia coli  S  S   09/21/23 Urine Escherichia coli  S  S   09/21/23 Blood Bacteroides fragilis     09/07/23 Wound from Foot, Right Escherichia coli  S  S   06/28/23 Wound from Hip, Right Escherichia coli  S  S         LAST 7 DAYS MICROBIOLOGY   Microbiology Results (last 7 days)       Procedure Component Value Units Date/Time    Blood culture [[id number]] Collected: 09/23/23 0333    Order Status: Completed Specimen: Blood " Updated: 09/25/23 1032     Blood Culture, Routine No Growth to date      No Growth to date      No Growth to date    Blood culture [[id number]] Collected: 09/25/23 0922    Order Status: Sent Specimen: Blood Updated: 09/25/23 0946    Aerobic culture [[id number]]  (Abnormal)  (Susceptibility) Collected: 09/22/23 1042    Order Status: Completed Specimen: Wound from Foot, Left Updated: 09/25/23 0830     Aerobic Bacterial Culture ESCHERICHIA COLI  Moderate      Narrative:      Left calcaneous decubitus    Blood culture [[id number]] Collected: 09/25/23 0638    Order Status: Sent Specimen: Blood Updated: 09/25/23 0712    Blood culture x two cultures. Draw prior to antibiotics. [[id number]]  (Abnormal) Collected: 09/21/23 0937    Order Status: Completed Specimen: Blood Updated: 09/25/23 0638     Blood Culture, Routine Gram stain adin bottle: Gram negative rods      Results called to and read back by: Madison Otoole RN ([facility]) @  03:05 by            BACTEROIDES FRAGILIS  Beta Lactamase positive      Narrative:      Aerobic and anaerobic    Blood culture x two cultures. Draw prior to antibiotics. [[id number]] Collected: 09/21/23 0937    Order Status: Completed Specimen: Blood Updated: 09/25/23 0552     Blood Culture, Routine Gram stain adin bottle: Gram positive cocci      Results called to and read back by: JOSE TOMAS RN [facility].      09/24/2023  06:16 TGC      Gram stain aer bottle: Gram positive cocci    Narrative:      Aerobic and anaerobic    Rapid Organism ID by PCR (from Blood culture) [[id number]] Collected: 09/24/23 0620    Order Status: Completed Updated: 09/24/23 0621     Enterococcus faecalis Not Detected     Enterococcus faecium Not Detected     Listeria monocytogenes Not Detected     Staphylococcus spp. Not Detected     Staphylococcus aureus Not Detected     Staphylococcus epidermidis Not Detected     Staphylococcus lugdunensis Not Detected     Streptococcus species Not Detected     Streptococcus agalactiae Not  Detected     Streptococcus pneumoniae Not Detected     Streptococcus pyogenes Not Detected     Acinetobacter calcoaceticus/baumannii complex Not Detected     Bacteroides fragilis Not Detected     Enterobacterales Not Detected     Enterobacter cloacae complex Not Detected     Escherichia coli Not Detected     Klebsiella aerogenes Not Detected     Klebsiella oxytoca Not Detected     Klebsiella pneumoniae group Not Detected     Proteus Not Detected     Salmonella sp Not Detected     Serratia marcescens Not Detected     Haemophilus influenzae Not Detected     Neisseria meningtidis Not Detected     Pseudomonas aeruginosa Not Detected     Stenotrophomonas maltophilia Not Detected     Candida albicans Not Detected     Candida auris Not Detected     Candida glabrata Not Detected     Candida krusei Not Detected     Candida parapsilosis Not Detected     Candida tropicalis Not Detected     Cryptococcus neoformans/gattii Not Detected     CTX-M (ESBL ) Test not applicable     IMP (Carbapenem resistant) Test not applicable     KPC resistance gene (Carbapenem resistant) Test not applicable     mcr-1  Test not applicable     mec A/C  Test not applicable     mec A/C and MREJ (MRSA) gene Test not applicable     NDM (Carbapenem resistant) Test not applicable     OXA-48-like (Carbapenem resistant) Test not applicable     van A/B (VRE gene) Test not applicable     VIM (Carbapenem resistant) Test not applicable    Narrative:      Aerobic and anaerobic    Urine culture [[id number]]  (Abnormal)  (Susceptibility) Collected: 09/21/23 1136    Order Status: Completed Specimen: Urine Updated: 09/23/23 0621     Urine Culture, Routine ESCHERICHIA COLI  >100,000 cfu/ml      Narrative:      Specimen Source->Urine    Rapid Organism ID by PCR (from Blood culture) [[id number]]  (Abnormal) Collected: 09/21/23 0937    Order Status: Completed Updated: 09/23/23 0308     Enterococcus faecalis Not Detected     Enterococcus faecium Not Detected      Listeria monocytogenes Not Detected     Staphylococcus spp. Not Detected     Staphylococcus aureus Not Detected     Staphylococcus epidermidis Not Detected     Staphylococcus lugdunensis Not Detected     Streptococcus species Not Detected     Streptococcus agalactiae Not Detected     Streptococcus pneumoniae Not Detected     Streptococcus pyogenes Not Detected     Acinetobacter calcoaceticus/baumannii complex Not Detected     Bacteroides fragilis Detected     Enterobacterales Not Detected     Enterobacter cloacae complex Not Detected     Escherichia coli Not Detected     Klebsiella aerogenes Not Detected     Klebsiella oxytoca Not Detected     Klebsiella pneumoniae group Not Detected     Proteus Not Detected     Salmonella sp Not Detected     Serratia marcescens Not Detected     Haemophilus influenzae Not Detected     Neisseria meningtidis Not Detected     Pseudomonas aeruginosa Not Detected     Stenotrophomonas maltophilia Not Detected     Candida albicans Not Detected     Candida auris Not Detected     Candida glabrata Not Detected     Candida krusei Not Detected     Candida parapsilosis Not Detected     Candida tropicalis Not Detected     Cryptococcus neoformans/gattii Not Detected     CTX-M (ESBL ) Test not applicable     IMP (Carbapenem resistant) Test not applicable     KPC resistance gene (Carbapenem resistant) Test not applicable     mcr-1  Test not applicable     mec A/C  Test not applicable     mec A/C and MREJ (MRSA) gene Test not applicable     NDM (Carbapenem resistant) Test not applicable     OXA-48-like (Carbapenem resistant) Test not applicable     van A/B (VRE gene) Test not applicable     VIM (Carbapenem resistant) Test not applicable    Narrative:      Aerobic and anaerobic    MRSA Screen by PCR [[id number]] Collected: 09/22/23 1647    Order Status: Completed Specimen: Nasopharyngeal Swab from Nasal Updated: 09/22/23 1809     MRSA SCREEN BY PCR Negative    Culture, Anaerobe [[id number]]  Collected: 09/22/23 1042    Order Status: Sent Specimen: Wound from Foot, Left Updated: 09/22/23 1132    Influenza A & B by Molecular [[id number]] Collected: 09/21/23 0948    Order Status: Completed Specimen: Nasal Swab Updated: 09/21/23 1037     Influenza A, Molecular Negative     Influenza B, Molecular Negative     Flu A & B Source Nasal swab              CURRENT/PREVIOUS VISIT EKG  Results for orders placed or performed during the hospital encounter of 09/21/23   EKG 12-lead    Collection Time: 09/22/23 10:29 AM    Narrative    Test Reason : I49.9,    Vent. Rate : 095 BPM     Atrial Rate : 000 BPM     P-R Int : 000 ms          QRS Dur : 080 ms      QT Int : 336 ms       P-R-T Axes : 000 -35 009 degrees     QTc Int : 422 ms    Atrial fibrillation with premature ventricular or aberrantly conducted  complexes  Left axis deviation  Abnormal ECG  When compared with ECG of 22-SEP-2023 10:28,  No significant change was found  Confirmed by Carlos Enrique MD ([id number]) on 9/23/2023 4:21:33 PM    Referred By: SUSAN   SELF           Confirmed By:Carlos Enrique MD       Significant Labs: All pertinent labs within the past 24 hours have been reviewed.     Significant Imaging: I have reviewed all relevant and available imaging results/findings within the past 24 hours.    X-Ray Foot Complete Right 09/21/23 1122  FINDINGS:   There are degenerative changes of the right foot is witnessed by joint space narrowing with adjacent sclerosis.  The adjacent soft tissues are unremarkable.  There is no evidence fracture or dislocation.   Impression:    There are degenerative changes of the right foot in soft tissue irregularity at the right heel.     X-Ray Foot Complete Left Resulted: 09/21/23 1125   FINDINGS:   There is soft tissue irregularity overlying the right calcaneus.  There is no evidence fracture or malalignment.  There are degenerative changes of the foot.   Impression:    There are degenerative changes of the left foot was  soft tissue irregularity overlying the left calcaneus.     CT Hip With Contrast Right 09/21/23 1143   FINDINGS:   There are degenerative changes of the inferior lumbar spine.  The right hip is located without evidence fracture.  There is no evidence bony erosion.  There is a skin wound overlying the greater trochanter with soft tissue stranding extending to the underlying gluteus musculature.  Compared with what was seen on 06/28/2023 the size of the soft tissue defect is larger.  No discrete, drainable fluid collection is visualized.  There is a left fat containing inguinal hernia.   Impression:    There is been progression of the patient's soft tissue wound overlying the right hip greater trochanter.  No bony destruction is visualized on today's study.       Shawna Lewis NP    Date of Service: 09/25/2023  12:54 PM

## 2023-09-25 NOTE — PROGRESS NOTES
Pharmacy Consult Discontinuation: Vancomycin    Adriano Borjas [id number] is a 88 y.o. male was consulted for vancomycin pharmacotherapy management by pharmacy.    Pharmacy consult for vancomycin dosing is no longer required.  Vancomycin was discontinued 09/25/2023 @0849.    Thank you for allowing us to participate in this patient's care. Should you have any questions or concerns please feel free to contact the pharmacy department at [phone].    Aung Otoole, FeleciaD

## 2023-09-25 NOTE — PLAN OF CARE
Discharge plan remains [facility] for continued correction care.  May qualify for SNF services for IV abx/ wound care.  Will continue to follow.        09/25/23 5631   Discharge Reassessment   Assessment Type Discharge Planning Reassessment   Discharge Plan A Skilled Nursing Facility;Return to nursing home   Transition of Care Barriers None   Why the patient remains in the hospital Requires continued medical care   Post-Acute Status   Post-Acute Authorization Placement   Post-Acute Placement Status Pending medical clearance/testing   Discharge Delays None known at this time

## 2023-09-26 LAB
ANION GAP SERPL CALC-SCNC: 3 MMOL/L (ref 8–16)
BACTERIA SPEC ANAEROBE CULT: ABNORMAL
BUN SERPL-MCNC: 20 MG/DL (ref 8–23)
CALCIUM SERPL-MCNC: 8.4 MG/DL (ref 8.7–10.5)
CHLORIDE SERPL-SCNC: 102 MMOL/L (ref 95–110)
CO2 SERPL-SCNC: 33 MMOL/L (ref 23–29)
CREAT SERPL-MCNC: 0.8 MG/DL (ref 0.5–1.4)
ERYTHROCYTE [DISTWIDTH] IN BLOOD BY AUTOMATED COUNT: 12.9 % (ref 11.5–14.5)
EST. GFR  (NO RACE VARIABLE): >60 ML/MIN/1.73 M^2
GLUCOSE SERPL-MCNC: 106 MG/DL (ref 70–110)
HCT VFR BLD AUTO: 29.2 % (ref 40–54)
HGB BLD-MCNC: 9.7 G/DL (ref 14–18)
MAGNESIUM SERPL-MCNC: 2 MG/DL (ref 1.6–2.6)
MCH RBC QN AUTO: 30.7 PG (ref 27–31)
MCHC RBC AUTO-ENTMCNC: 33.2 G/DL (ref 32–36)
MCV RBC AUTO: 92 FL (ref 82–98)
PLATELET # BLD AUTO: 213 K/UL (ref 150–450)
PMV BLD AUTO: 11.5 FL (ref 9.2–12.9)
POTASSIUM SERPL-SCNC: 3.8 MMOL/L (ref 3.5–5.1)
RBC # BLD AUTO: 3.16 M/UL (ref 4.6–6.2)
SODIUM SERPL-SCNC: 138 MMOL/L (ref 136–145)
WBC # BLD AUTO: 9.76 K/UL (ref 3.9–12.7)

## 2023-09-26 PROCEDURE — 25000003 PHARM REV CODE 250: Performed by: STUDENT IN AN ORGANIZED HEALTH CARE EDUCATION/TRAINING PROGRAM

## 2023-09-26 PROCEDURE — 36415 COLL VENOUS BLD VENIPUNCTURE: CPT | Performed by: STUDENT IN AN ORGANIZED HEALTH CARE EDUCATION/TRAINING PROGRAM

## 2023-09-26 PROCEDURE — 80048 BASIC METABOLIC PNL TOTAL CA: CPT | Performed by: STUDENT IN AN ORGANIZED HEALTH CARE EDUCATION/TRAINING PROGRAM

## 2023-09-26 PROCEDURE — 12000002 HC ACUTE/MED SURGE SEMI-PRIVATE ROOM

## 2023-09-26 PROCEDURE — 83735 ASSAY OF MAGNESIUM: CPT | Performed by: STUDENT IN AN ORGANIZED HEALTH CARE EDUCATION/TRAINING PROGRAM

## 2023-09-26 PROCEDURE — 99233 PR SUBSEQUENT HOSPITAL CARE,LEVL III: ICD-10-PCS | Mod: FS,,, | Performed by: NURSE PRACTITIONER

## 2023-09-26 PROCEDURE — 63600175 PHARM REV CODE 636 W HCPCS: Performed by: STUDENT IN AN ORGANIZED HEALTH CARE EDUCATION/TRAINING PROGRAM

## 2023-09-26 PROCEDURE — 99231 PR SUBSEQUENT HOSPITAL CARE,LEVL I: ICD-10-PCS | Mod: ,,, | Performed by: FAMILY MEDICINE

## 2023-09-26 PROCEDURE — 99233 SBSQ HOSP IP/OBS HIGH 50: CPT | Mod: FS,,, | Performed by: NURSE PRACTITIONER

## 2023-09-26 PROCEDURE — 97605 NEG PRS WND THER DME<=50SQCM: CPT

## 2023-09-26 PROCEDURE — 99231 SBSQ HOSP IP/OBS SF/LOW 25: CPT | Mod: ,,, | Performed by: FAMILY MEDICINE

## 2023-09-26 PROCEDURE — 63600175 PHARM REV CODE 636 W HCPCS: Performed by: NURSE PRACTITIONER

## 2023-09-26 PROCEDURE — 25000003 PHARM REV CODE 250: Performed by: FAMILY MEDICINE

## 2023-09-26 PROCEDURE — 85027 COMPLETE CBC AUTOMATED: CPT | Performed by: STUDENT IN AN ORGANIZED HEALTH CARE EDUCATION/TRAINING PROGRAM

## 2023-09-26 PROCEDURE — 25000003 PHARM REV CODE 250: Performed by: HOSPITALIST

## 2023-09-26 PROCEDURE — 94760 N-INVAS EAR/PLS OXIMETRY 1: CPT

## 2023-09-26 PROCEDURE — 94761 N-INVAS EAR/PLS OXIMETRY MLT: CPT

## 2023-09-26 PROCEDURE — 63600175 PHARM REV CODE 636 W HCPCS: Performed by: HOSPITALIST

## 2023-09-26 RX ORDER — CEFAZOLIN SODIUM 2 G/50ML
2 SOLUTION INTRAVENOUS
Status: DISCONTINUED | OUTPATIENT
Start: 2023-09-26 | End: 2023-09-28

## 2023-09-26 RX ADMIN — METRONIDAZOLE 500 MG: 5 INJECTION, SOLUTION INTRAVENOUS at 05:09

## 2023-09-26 RX ADMIN — MEMANTINE HYDROCHLORIDE 5 MG: 5 TABLET ORAL at 09:09

## 2023-09-26 RX ADMIN — MEMANTINE HYDROCHLORIDE 5 MG: 5 TABLET ORAL at 08:09

## 2023-09-26 RX ADMIN — SERTRALINE HYDROCHLORIDE 25 MG: 25 TABLET ORAL at 08:09

## 2023-09-26 RX ADMIN — MIRTAZAPINE 30 MG: 15 TABLET, FILM COATED ORAL at 09:09

## 2023-09-26 RX ADMIN — LEVOTHYROXINE SODIUM 100 MCG: 0.1 TABLET ORAL at 06:09

## 2023-09-26 RX ADMIN — ATORVASTATIN CALCIUM 40 MG: 40 TABLET, FILM COATED ORAL at 09:09

## 2023-09-26 RX ADMIN — METRONIDAZOLE 500 MG: 5 INJECTION, SOLUTION INTRAVENOUS at 02:09

## 2023-09-26 RX ADMIN — CEFAZOLIN SODIUM 2 G: 2 SOLUTION INTRAVENOUS at 04:09

## 2023-09-26 RX ADMIN — HYDROCODONE BITARTRATE AND ACETAMINOPHEN 1 TABLET: 5; 325 TABLET ORAL at 04:09

## 2023-09-26 RX ADMIN — COLLAGENASE SANTYL: 250 OINTMENT TOPICAL at 10:09

## 2023-09-26 RX ADMIN — CEFEPIME HYDROCHLORIDE 2 G: 2 INJECTION, POWDER, FOR SOLUTION INTRAVENOUS at 08:09

## 2023-09-26 RX ADMIN — MULTIVITAMIN TABLET 1 TABLET: TABLET at 08:09

## 2023-09-26 RX ADMIN — OLANZAPINE 10 MG: 5 TABLET, FILM COATED ORAL at 09:09

## 2023-09-26 RX ADMIN — MUPIROCIN 1 G: 20 OINTMENT TOPICAL at 08:09

## 2023-09-26 RX ADMIN — CEFEPIME HYDROCHLORIDE 2 G: 2 INJECTION, POWDER, FOR SOLUTION INTRAVENOUS at 12:09

## 2023-09-26 RX ADMIN — ENOXAPARIN SODIUM 40 MG: 40 INJECTION SUBCUTANEOUS at 04:09

## 2023-09-26 RX ADMIN — METRONIDAZOLE 500 MG: 5 INJECTION, SOLUTION INTRAVENOUS at 10:09

## 2023-09-26 NOTE — PROGRESS NOTES
[facility]  Progress Note    Patient Name: Adriano Borjas  MRN: [id number]  Patient Class: IP- Inpatient   Admission Date: 9/21/2023  Length of Stay: 5 days  Attending Physician: Blessing Anderson MD  Primary Care Provider: Nusrat, Primary Doctor        Subjective:     Principal Problem:Bacteremia        HPI:  88-year-old male with past medical history of dementia, AFib, hypertension, hyperlipidemia, TIA who was sent from nursing home today for fever.  Patient has known bilateral heel wounds, wound VAC to right hip, and wound to left buttocks.  Son is at bedside and helps provide history as patient is unable to provide history other than telling me he is in pain at this time.  Son reports fever and decreased mental status occurred this morning.  He states that at baseline patient is more conversant than he is today.  Son reports he thinks that patient was on antibiotics recently for his bilateral heel wounds but does not think he is on antibiotics currently.  ED workup revealed elevated lactic acid at 2.9 an elevated white blood cell count.  Sepsis workup in progress.  Son reports patient is DNR but does want fluid and pressors if needed.  Patient to be admitted to step-down bed.      Overview/Hospital Course:  Adriano Borjas is an 88 year old male with a past medical history of HTN, HLD, TIA, Afib, hypothyroidism, sacral ulcer, and dementia who presented with sepsis secondary to UTI and bilateral heel ulcer infection with concern for osteomyelitis. ID has been consulted. Urine culture shows E coli, blood culture shows Bacteroides and wound culture of the left heel shows  E coli. He is on cefepime and Flagyl. MRI of the heels and CTA of the lower extremities are pending. Wound Care and Podiatry have been consulted as patient may need bone biopsies and tissue debridement of left heels. He is DNR.      Interval History: no complaints or events o/n      Objective:     Vital Signs (Most  "Recent):  Temp: 98.4 °F (36.9 °C) (09/26/23 0447)  Pulse: 66 (09/26/23 0447)  Resp: 18 (09/26/23 0447)  BP: 120/64 (09/26/23 0447)  SpO2: 99 % (09/26/23 0447) Vital Signs (24h Range):  Temp:  [97.7 °F (36.5 °C)-98.7 °F (37.1 °C)] 98.4 °F (36.9 °C)  Pulse:  [66-82] 66  Resp:  [18] 18  SpO2:  [99 %-100 %] 99 %  BP: (120-130)/(64-72) 120/64     Weight: 73.8 kg (162 lb 11.2 oz)  Body mass index is 24.74 kg/m².    Intake/Output Summary (Last 24 hours) at 9/26/2023 1132  Last data filed at 9/26/2023 0940  Gross per 24 hour   Intake 1320 ml   Output 500 ml   Net 820 ml         Physical Exam  GENERAL:  Alert   HEENT:  EOMI. Conjunctivae intact.   NECK:  Supple   LUNGS:  Bilateral rales, good air movement  CARDIAC:  Irregular  ABDOMEN:  Soft,  Nontender and nondistended, no rebound or guarding, bowel sounds present   EXTREMITIES:  Peripheral pulses are 2+. Hands and feet are warm. Good capillary refill in fingers (< 2 seconds). No clubbing, cyanosis or edema  SKIN:  Bilateral necrotic heel wounds dressed  NEUROLOGIC:  Dementia  :  PureWick in place        Significant Labs: All pertinent labs within the past 24 hours have been reviewed.  Blood Culture:   Recent Labs   Lab 09/25/23 0638 09/25/23 0922   LABBLOO No Growth to date  No Growth to date No Growth to date  No Growth to date     BMP:   Recent Labs   Lab 09/26/23  0510         K 3.8      CO2 33*   BUN 20   CREATININE 0.8   CALCIUM 8.4*   MG 2.0     CBC:   Recent Labs   Lab 09/25/23 0638 09/26/23  0511   WBC 11.84 9.76   HGB 10.7* 9.7*   HCT 31.4* 29.2*    213     CMP:   Recent Labs   Lab 09/25/23 0638 09/26/23  0510   * 138   K 3.9 3.8    102   CO2 31* 33*   * 106   BUN 15 20   CREATININE 0.8 0.8   CALCIUM 8.1* 8.4*   ANIONGAP 4* 3*     Cardiac Markers: No results for input(s): "CKMB", "MYOGLOBIN", "BNP", "TROPISTAT" in the last 48 hours.    Significant Imaging: I have reviewed all pertinent imaging results/findings " within the past 24 hours.      Assessment/Plan:      Unstageable pressure ulcer of bilateral heels with bacteriodies bacteremia  Discussed with Podiatry.  Continue local wound care.  Could not tolerate MRI.  Bone scan ordered Antibiotics per ID    E coli UTI with bacteremia Acute cystitis  -Continue cefepime     Hypothyroid  -Continue Synthroid        Anemia  Stable.  -Trend CBC        Persistent atrial fibrillation  Not on anticoagulation or rate/rhythm control.  -Telemetry     Stage IV pressure ulcer of right hip  -Wound Care following       Frontotemporal dementia  Chronic. Stable.  -Fall, aspiration and delirium precautions     Essential hypertension  -Hold home medications  -Continue to monitor  Stable     TIA (transient ischemic attack)  Prior history.  -Continue statin     VTE Risk Mitigation (From admission, onward)         Ordered     IP VTE HIGH RISK PATIENT  Once         09/21/23 1759     Place sequential compression device  Until discontinued         09/21/23 1759     enoxaparin injection 40 mg  Daily         09/21/23 1545                Discharge Planning   KAY: 9/29/2023     Code Status: DNR   Is the patient medically ready for discharge?:     Reason for patient still in hospital (select all that apply): Laboratory test and Consult recommendations  Discharge Plan A: Skilled Nursing Facility, Return to nursing home   Discharge Delays: None known at this time              Blessing Anderson MD  Department of Hospital Medicine   [facility]

## 2023-09-26 NOTE — PROCEDURES
"Debridement    Date/Time: 9/25/2023 7:10 PM    Performed by: Nelson Middleton DO  Authorized by: Nelson Middleton DO    Time out: Immediately prior to procedure a "time out" was called to verify the correct patient, procedure, equipment, support staff and site/side marked as required.    Consent Done?:  Yes (Written)    Preparation: Patient was prepped and draped with aseptic technique    Local anesthesia used?: Yes    Anesthesia:  Local infiltration  Local anesthetic:  Lidocaine 2% topical gel  Anesthetic total (ml):  10    Wound Details:    Location:  Right foot    Location:  Right Heel    Type of Debridement:  Excisional       Length (cm):  5.5       Area (sq cm):  24.75       Width (cm):  4.5       Percent Debrided (%):  100       Depth (cm):  2       Total Area Debrided (sq cm):  24.75    Depth of debridement:  Bone    Tissue debrided:  Bone, Epidermis, Dermis, Muscle and Subcutaneous    Devitalized tissue debrided:  Biofilm, Exudate, Necrotic/Eschar and Slough    Instruments:  Curette, Blade and Forceps  Bleeding:  Minimal  Hemostasis Achieved: Yes  Method Used:  Pressure    Additional wounds:  1    2nd Wound Details:     Location:  Left foot    Location:  Left Heel    Location:  Left Heel    Type of Debridement:  Excisional       Length (cm):  3       Area (sq cm):  12       Width (cm):  4       Percent Debrided (%):  100       Depth (cm):  1.5       Total Area Debrided (sq cm):  12    Depth of debridement:  Muscle/fascia/tendon    Tissue debrided:  Dermis, Subcutaneous, Muscle and Epidermis    Devitalized tissue debrided:  Biofilm, Exudate, Fibrin, Slough and Necrotic/Eschar    Instruments:  Curette  Bleeding:  Minimal  Hemostasis Achieved: Yes    Method Used:  Pressure  Patient tolerance:  Patient tolerated the procedure well with no immediate complications  Specimen Collected: Specimen sent to microbiology     Pt did well with the left heel, pt continuously was thrashing his feet with the right heel. " Removed significant amount of necrotic tissue from the right foot

## 2023-09-26 NOTE — CONSULTS
Left buttock deep red non blanching stage 1 scarring sacrum and left buttock, Triad applied    Scrotum red purple denuded raw peeling painful cleaned and dried and applied Triad.  Waffle mattress in use.         Stage 3 Left heel pale red wound bed with small amount of  tan slough dry periwound 3x3.8x1.3cm undermining at the 6 oclock position 2.2cm cleaned and dried and santyl applied covered with aquacel ag, abd pad wrapped with kerlix heel protectors placed.                 Unstageable 6x5x1.8cm right heel boggy necrotic slough gray, tan, foul odor,  red painful periwound.  Cleaned and dried and applied Santyl and aquacel ag, covered with abd pad wrapped with kerlix.     Right hip stage 3 pressure injury red beefy tissue 2.8x2.2x1.2cm with undermining 3cm at the 4 oclock position. Wound vac dressing changed.

## 2023-09-26 NOTE — PROGRESS NOTES
[facility]  Department of Infectious Disease  Progress Note        PATIENT NAME: Adriano Borjas  MRN: [id number]  TODAY'S DATE: 09/26/2023  ADMIT DATE: 9/21/2023    PRINCIPLE PROBLEM: Bacteremia    REASON FOR CONSULT:  Sepsis-heel wounds and hip wound    INTERVAL HISTORY      09/26@1023:  Patient seen and examined in his room alone.  He is lying in bed and opens eyes to voice.  He seems very drowsy today in his not as talkative as he was yesterday.  No acute events reported overnight.  He had an excisional debridement at bedside by wound care.  T-max 98.7°, WBC 9.76, platelets 213, BUN/CR 20/0.8 with estimated creatinine clearance 64.1.  Bone Cultures are pending from debridement yesterday.  9/21 blood cultures with Bacteroides fragilis and Gram-positive cocci pending identification and repeat blood cultures pending and are negative.  He is currently on cefepime and metronidazole.    09/25 @ 1127:  Patient seen and examined in his room.  He is lying in bed and is awake and alert though confused about where he is.  He has some difficulty following commands.  Bilateral heel wounds are dressed and patient also has a hip wound in his being followed by wound care.  He was unable to tolerate MRI.  T-max 100.1° and last 24 hours.  WBC 11.84, platelet 198, BUN/CR 15/0.8.  Last CRP on 09/23 was greater than 16.  9/21 blood cultures with Bacteroides fragilis, urine culture with pansensitive E coli.  9/22 wound culture with E coli pansensitive.  MRSA screen  negative, 9/23 blood culture negative, 9/24 PCR from blood culture negative in 9/25 blood cultures x2 drawn this morning.  He continues on cefepime and metronidazole, vancomycin was stopped this morning.    09/23/2023--denies complaints.  Pleasant, appreciative.  Tmax improved.  WBC 10.   CTA and MRI are not performed yet.    Antibiotics (From admission, onward)      Start     Stop Route Frequency Ordered    09/26/23 1700  cefazolin (ANCEF) 2 gram in dextrose  5% 50 mL IVPB (premix)         -- IV Every 8 hours (non-standard times) 09/26/23 1112    09/22/23 0945  metronidazole IVPB 500 mg         -- IV Every 8 hours (non-standard times) 09/22/23 0833    09/21/23 2100  mupirocin 2 % ointment         09/26/23 2059 Nasl 2 times daily 09/21/23 1758            ASSESSMENT and PLAN     Severe sepsis multifactorial; E coli UTI and bilateral calcaneal osteomyelitis, foul-smelling drainage from left heel(E. Coli), possibly the right heel, and R hip sacral decub, the latter with granulation tissue  Procal 5.5, positive  ESR 94, CRP > 16  9/21 urine culture with pansensitive E coli  9/21 aerobic culture from left foot with pansensitive E coli  9/21 blood cultures x2 sets with 1 of 2 bottles positive for Bacteroides fragilis in 2nd set 1 of 2 bottles positive with PCR showing Bacteroides fragilis and g stain with Gram-positive cocci.  Previous cultures from Right foot wound culture 9/7 & R hip 6/28 & 6/26 with pansensitive E coli  MRSA swab neg - vancomycin stopped  9/24 blood culture PCR negative  9/25 unable to get MRI feet, x-rays with no obvious osteo  9/21 CT right hip with no bony destruction with progression of soft tissue wound.  Bilateral foot x-rays left with soft tissue irregularity overlying the left calcaneus no mention of osteo myelitis.  Right foot with no mention of osteomyelitis.  9/25 bedside debridement of heels, bone culture sent  Patient unable to do MRIs  CTA with runoff abdominal pelvis bilateral lower extremities is pending/nuclear medicine WBC scan is pending  Podiatry signed off as patient is being actively treated by Wound Care  9/26, stop cefepime, start cefazolin and continue metronidazole      PMHx: advanced dementia, AFib, hypertension, hyperlipidemia, prior TIA    3. Anaerobic Bacteremia with Bacteroides fragilis   9/25 blood cultures x2 ngtd       RECOMMENDATIONS  Stop cefepime - concern for age and frailty  Start cefazolin 2g IVPB q 8h   Continue  metronidazole 500 mg IV every 8 hours  Continue wound care  Follow CT angio runoff - rescheduled  Nuclear medicine WBC scan limited - initiated  Continue Cefepime 2 g IV q.8 hours  Continue Flagyl 500 mg IV q.8 for anaerobic coverage  Follow cultures.   Monitor renal function and dose antibiotics as appropriate  Follow CRP - a pain CRP in a.m.  Aspiration precautions    Thank you for this consult. Please send [employer] secure chat with any questions.      SUBJECTIVE    Review of Systems    Patient is a poor historian, he has no complaints but has dementia.    OBJECTIVE     Temp:  [97.7 °F (36.5 °C)-98.7 °F (37.1 °C)] 98.4 °F (36.9 °C)  Pulse:  [66-82] 66  Resp:  [18] 18  SpO2:  [99 %-100 %] 99 %  BP: (120-130)/(64-72) 120/64  Physical Exam    General:  Frail, elderly male, sleeping, opens eyes to voice. Dozes off.   Eyes: Eyes with no icterus or injection. Rt eye with crusting on lashes\.  Ears:  Hard of hearing.  Mouth: Moist mucous membranes, no ulcerations or erythema.  Cardiovascular:  Irregular rhythm, controlled rate, no edema.    Respiratory:  Clear to auscultation bilaterally, no tachypnea or increased work of breathing.    Genitourinary:  External catheter in place with clear yellow urine.    Gastrointestinal:  Soft with active bowel sounds, no tenderness to palpation, no distention.  Musculoskeletal:  Generally weak. Bilateral heels with dressing intact, no odor. Legs are contracted.  Skin:  Warm and dry, no obvious rashes.  See photos below post debridement  Neuro: Drowsy, disoriented.   Psych:  No agitation.    Wounds:  9/26 9/22              VAD:PIV  Isolation: Contact    CBC LAST 7 DAYS  Recent Labs   Lab 09/21/23  0937 09/22/23  1039 09/23/23  0333 09/24/23  0405 09/25/23  0638 09/26/23  0511   WBC 14.22* 12.93* 12.62 10.69 11.84 9.76   RBC 3.57* 3.38* 3.42* 3.17* 3.42* 3.16*   HGB 11.0* 10.2* 10.5* 9.8* 10.7* 9.7*   HCT 33.0* 31.7* 32.1* 29.5* 31.4* 29.2*   MCV 92 94 94 93 92 92   MCH  "30.8 30.2 30.7 30.9 31.3* 30.7   MCHC 33.3 32.2 32.7 33.2 34.1 33.2   RDW 13.0 13.3 13.2 13.0 12.8 12.9    201 175 181 198 213   MPV 11.8 12.6 12.5 12.0 11.7 11.5   GRAN 95.0*  13.5* 81.4*  10.5*  --   --   --   --    LYMPH 1.5*  0.2* 8.2*  1.1  --   --   --   --    MONO 2.7*  0.4 9.4  1.2*  --   --   --   --    BASO 0.05 0.05  --   --   --   --    NRBC 0 0  --   --   --   --        CHEMISTRY LAST 7 DAYS  Recent Labs   Lab 09/21/23  0937 09/22/23  1039 09/23/23  0333 09/23/23  0856 09/24/23  0405 09/24/23  1314 09/25/23  0638 09/26/23  0510    140 136  --  136  --  135* 138   K 3.4* 3.8 3.6 4.7 4.0  --  3.9 3.8    107 103  --  102  --  100 102   CO2 24 26 28  --  29  --  31* 33*   ANIONGAP 10 7* 5*  --  5*  --  4* 3*   BUN 28* 30* 26*  --  19  --  15 20   CREATININE 1.2 1.1 0.9  --  0.8  --  0.8 0.8   * 120* 117*  --  113*  --  115* 106   CALCIUM 8.2* 8.2* 8.0*  --  7.9*  --  8.1* 8.4*   MG  --  1.9  --  2.0 1.8 1.9 1.8 2.0   ALBUMIN 2.3* 2.8*  --   --   --   --   --   --    PROT 6.4 6.6  --   --   --   --   --   --    ALKPHOS 73 64  --   --   --   --   --   --    ALT 35 36  --   --   --   --   --   --    AST 42* 56*  --   --   --   --   --   --    BILITOT 1.0 0.7  --   --   --   --   --   --        Estimated Creatinine Clearance: 64.1 mL/min (based on SCr of 0.8 mg/dL).    INFLAMMATORY/PROCAL  LAST 7 DAYS  Recent Labs   Lab 09/21/23  0937 09/23/23  0333   PROCAL 5.53*  --    .1* >16.00*       No results found for: "ESR"  CRP   Date Value Ref Range Status   09/23/2023 >16.00 (H) <0.76 mg/dL Final   09/21/2023 175.1 (H) 0.0 - 8.2 mg/L Final     PRIOR  MICROBIOLOGY:    Susceptibility data from last 90 days.  Collected Specimen Info Organism Amox/K Clav'ate Amp/Sulbactam Ampicillin Cefazolin Cefepime Ceftriaxone Ciprofloxacin Ertapenem Gentamicin Levofloxacin Meropenem Nitrofurantoin PIPERACILLIN/TAZO SUSCEPTIBILITY Tetracycline   09/22/23 Wound from Foot, Left Escherichia coli   " S  S  S  S  S  S  S  S  S  S   S  S   09/21/23 Urine Escherichia coli   S  S  S  S  S  S  S  S  S  S  S  S  S   09/21/23 Blood Bacteroides fragilis                 09/21/23 Blood Gram-positive Cocci                 09/07/23 Wound from Foot, Right Escherichia coli  S  S  S  S  S  S  S  S  S  S  S   S  S   06/28/23 Wound from Hip, Right Escherichia coli  S  S  S  S  S  S  S  S  S  S  S   S  S     Collected Specimen Info Organism Tobramycin Trimeth/Sulfa   09/22/23 Wound from Foot, Left Escherichia coli  S  S   09/21/23 Urine Escherichia coli  S  S   09/21/23 Blood Bacteroides fragilis     09/21/23 Blood Gram-positive Cocci     09/07/23 Wound from Foot, Right Escherichia coli  S  S   06/28/23 Wound from Hip, Right Escherichia coli  S  S       LAST 7 DAYS MICROBIOLOGY   Microbiology Results (last 7 days)       Procedure Component Value Units Date/Time    Culture, Anaerobe [[id number]]  (Abnormal) Collected: 09/22/23 1042    Order Status: Completed Specimen: Wound from Foot, Left Updated: 09/26/23 1134     Anaerobic Culture BACTEROIDES FRAGILIS  Few  Beta Lactamase positive      Narrative:      Left calcaneous decubitus    Blood culture x two cultures. Draw prior to antibiotics. [[id number]]  (Abnormal) Collected: 09/21/23 0937    Order Status: Completed Specimen: Blood Updated: 09/26/23 1110     Blood Culture, Routine Gram stain adin bottle: Gram positive cocci      Results called to and read back by: DALTON MORGAN.      09/24/2023  06:16 TGC      Gram stain aer bottle: Gram positive cocci      GRAM POSITIVE COCCI  Identification and susceptibility pending      Narrative:      Aerobic and anaerobic    Blood culture [[id number]] Collected: 09/25/23 0922    Order Status: Completed Specimen: Blood Updated: 09/26/23 1032     Blood Culture, Routine No Growth to date      No Growth to date    Blood culture [[id number]] Collected: 09/25/23 0638    Order Status: Completed Specimen: Blood Updated: 09/26/23 1032     Blood  Culture, Routine No Growth to date      No Growth to date    Blood culture [[id number]] Collected: 09/23/23 0333    Order Status: Completed Specimen: Blood Updated: 09/26/23 1032     Blood Culture, Routine No Growth to date      No Growth to date      No Growth to date      No Growth to date    Aerobic culture [[id number]] Collected: 09/25/23 1936    Order Status: Completed Specimen: Bone from Foot, Right Updated: 09/26/23 0800     Aerobic Bacterial Culture No growth    Narrative:      Right heel    Culture, Anaerobic [[id number]] Collected: 09/25/23 1936    Order Status: Sent Specimen: Bone from Foot, Right Updated: 09/25/23 1944    Aerobic culture [[id number]]  (Abnormal)  (Susceptibility) Collected: 09/22/23 1042    Order Status: Completed Specimen: Wound from Foot, Left Updated: 09/25/23 0830     Aerobic Bacterial Culture ESCHERICHIA COLI  Moderate      Narrative:      Left calcaneous decubitus    Blood culture x two cultures. Draw prior to antibiotics. [[id number]]  (Abnormal) Collected: 09/21/23 0937    Order Status: Completed Specimen: Blood Updated: 09/25/23 0638     Blood Culture, Routine Gram stain adin bottle: Gram negative rods      Results called to and read back by: Madison Otoole RN ([facility]) @  03:05 by            BACTEROIDES FRAGILIS  Beta Lactamase positive      Narrative:      Aerobic and anaerobic    Rapid Organism ID by PCR (from Blood culture) [[id number]] Collected: 09/24/23 0620    Order Status: Completed Updated: 09/24/23 0621     Enterococcus faecalis Not Detected     Enterococcus faecium Not Detected     Listeria monocytogenes Not Detected     Staphylococcus spp. Not Detected     Staphylococcus aureus Not Detected     Staphylococcus epidermidis Not Detected     Staphylococcus lugdunensis Not Detected     Streptococcus species Not Detected     Streptococcus agalactiae Not Detected     Streptococcus pneumoniae Not Detected     Streptococcus pyogenes Not Detected     Acinetobacter  calcoaceticus/baumannii complex Not Detected     Bacteroides fragilis Not Detected     Enterobacterales Not Detected     Enterobacter cloacae complex Not Detected     Escherichia coli Not Detected     Klebsiella aerogenes Not Detected     Klebsiella oxytoca Not Detected     Klebsiella pneumoniae group Not Detected     Proteus Not Detected     Salmonella sp Not Detected     Serratia marcescens Not Detected     Haemophilus influenzae Not Detected     Neisseria meningtidis Not Detected     Pseudomonas aeruginosa Not Detected     Stenotrophomonas maltophilia Not Detected     Candida albicans Not Detected     Candida auris Not Detected     Candida glabrata Not Detected     Candida krusei Not Detected     Candida parapsilosis Not Detected     Candida tropicalis Not Detected     Cryptococcus neoformans/gattii Not Detected     CTX-M (ESBL ) Test not applicable     IMP (Carbapenem resistant) Test not applicable     KPC resistance gene (Carbapenem resistant) Test not applicable     mcr-1  Test not applicable     mec A/C  Test not applicable     mec A/C and MREJ (MRSA) gene Test not applicable     NDM (Carbapenem resistant) Test not applicable     OXA-48-like (Carbapenem resistant) Test not applicable     van A/B (VRE gene) Test not applicable     VIM (Carbapenem resistant) Test not applicable    Narrative:      Aerobic and anaerobic    Urine culture [[id number]]  (Abnormal)  (Susceptibility) Collected: 09/21/23 1136    Order Status: Completed Specimen: Urine Updated: 09/23/23 0621     Urine Culture, Routine ESCHERICHIA COLI  >100,000 cfu/ml      Narrative:      Specimen Source->Urine    Rapid Organism ID by PCR (from Blood culture) [[id number]]  (Abnormal) Collected: 09/21/23 0937    Order Status: Completed Updated: 09/23/23 0308     Enterococcus faecalis Not Detected     Enterococcus faecium Not Detected     Listeria monocytogenes Not Detected     Staphylococcus spp. Not Detected     Staphylococcus aureus Not  Detected     Staphylococcus epidermidis Not Detected     Staphylococcus lugdunensis Not Detected     Streptococcus species Not Detected     Streptococcus agalactiae Not Detected     Streptococcus pneumoniae Not Detected     Streptococcus pyogenes Not Detected     Acinetobacter calcoaceticus/baumannii complex Not Detected     Bacteroides fragilis Detected     Enterobacterales Not Detected     Enterobacter cloacae complex Not Detected     Escherichia coli Not Detected     Klebsiella aerogenes Not Detected     Klebsiella oxytoca Not Detected     Klebsiella pneumoniae group Not Detected     Proteus Not Detected     Salmonella sp Not Detected     Serratia marcescens Not Detected     Haemophilus influenzae Not Detected     Neisseria meningtidis Not Detected     Pseudomonas aeruginosa Not Detected     Stenotrophomonas maltophilia Not Detected     Candida albicans Not Detected     Candida auris Not Detected     Candida glabrata Not Detected     Candida krusei Not Detected     Candida parapsilosis Not Detected     Candida tropicalis Not Detected     Cryptococcus neoformans/gattii Not Detected     CTX-M (ESBL ) Test not applicable     IMP (Carbapenem resistant) Test not applicable     KPC resistance gene (Carbapenem resistant) Test not applicable     mcr-1  Test not applicable     mec A/C  Test not applicable     mec A/C and MREJ (MRSA) gene Test not applicable     NDM (Carbapenem resistant) Test not applicable     OXA-48-like (Carbapenem resistant) Test not applicable     van A/B (VRE gene) Test not applicable     VIM (Carbapenem resistant) Test not applicable    Narrative:      Aerobic and anaerobic    MRSA Screen by PCR [[id number]] Collected: 09/22/23 1647    Order Status: Completed Specimen: Nasopharyngeal Swab from Nasal Updated: 09/22/23 1809     MRSA SCREEN BY PCR Negative    Influenza A & B by Molecular [[id number]] Collected: 09/21/23 0948    Order Status: Completed Specimen: Nasal Swab Updated: 09/21/23 1037      Influenza A, Molecular Negative     Influenza B, Molecular Negative     Flu A & B Source Nasal swab          CURRENT/PREVIOUS VISIT EKG  Results for orders placed or performed during the hospital encounter of 09/21/23   EKG 12-lead    Collection Time: 09/22/23 10:29 AM    Narrative    Test Reason : I49.9,    Vent. Rate : 095 BPM     Atrial Rate : 000 BPM     P-R Int : 000 ms          QRS Dur : 080 ms      QT Int : 336 ms       P-R-T Axes : 000 -35 009 degrees     QTc Int : 422 ms    Atrial fibrillation with premature ventricular or aberrantly conducted  complexes  Left axis deviation  Abnormal ECG  When compared with ECG of 22-SEP-2023 10:28,  No significant change was found  Confirmed by Carlos Enrique MD ([id number]) on 9/23/2023 4:21:33 PM    Referred By: SUSAN   SELF           Confirmed By:Carlos Enrique MD       Significant Labs: All pertinent labs within the past 24 hours have been reviewed.     Significant Imaging: I have reviewed all relevant and available imaging results/findings within the past 24 hours.    X-Ray Foot Complete Right 09/21/23 1122  FINDINGS:   There are degenerative changes of the right foot is witnessed by joint space narrowing with adjacent sclerosis.  The adjacent soft tissues are unremarkable.  There is no evidence fracture or dislocation.   Impression:    There are degenerative changes of the right foot in soft tissue irregularity at the right heel.     X-Ray Foot Complete Left Resulted: 09/21/23 1125   FINDINGS:   There is soft tissue irregularity overlying the right calcaneus.  There is no evidence fracture or malalignment.  There are degenerative changes of the foot.   Impression:    There are degenerative changes of the left foot was soft tissue irregularity overlying the left calcaneus.     CT Hip With Contrast Right 09/21/23 1143   FINDINGS:   There are degenerative changes of the inferior lumbar spine.  The right hip is located without evidence fracture.  There is no  evidence bony erosion.  There is a skin wound overlying the greater trochanter with soft tissue stranding extending to the underlying gluteus musculature.  Compared with what was seen on 06/28/2023 the size of the soft tissue defect is larger.  No discrete, drainable fluid collection is visualized.  There is a left fat containing inguinal hernia.   Impression:    There is been progression of the patient's soft tissue wound overlying the right hip greater trochanter.  No bony destruction is visualized on today's study.       Shawna Lewis NP    Date of Service: 09/26/2023  12:54 PM

## 2023-09-26 NOTE — PROGRESS NOTES
"[facility]  Adult Nutrition   Progress Note (Follow-Up)    SUMMARY     Recommendations  Recommendation/Intervention:   1. Recommend Glucerna TID with meals and Kyle BID (changed orders).   2. Continue regular diet as tolerated.   3. Suggest probiotics with long-term antibotic therapy.   4. Patient may benefit from an appetite stimulant.   5. RD to follow for intake, lab values, bowel function and status change PRN.    Goals: Pt to meet his nutritional needs and to have complete healing of his wounds.  Nutrition Goal Status: progressing towards goal    Communication of RD Recs: reviewed with RN and MD    Dietitian Rounds Brief  Patient resting at visit; not eating a lot per nursing. Pt s/p debridement 9/25/23.   Patient with history of dementia; may benefit from an appetite stimulant and probiotics with long-term antibiotic therapy. Last BM 9/25/23. Plan to dc soon.    Diet order:   Current Diet Order: Regular diet   Oral Nutrition Supplement: Kyle and Glucerna        Evaluation of Received Nutrient/Fluid Intake  Energy Calories Required: not meeting needs  Protein Required: not meeting needs  Fluid Required: not meeting needs  Tolerance: tolerating     % Intake of Estimated Energy Needs: 25 - 50 %  % Meal Intake: 25 - 50 %      Intake/Output Summary (Last 24 hours) at 9/26/2023 1351  Last data filed at 9/26/2023 0940  Gross per 24 hour   Intake 840 ml   Output 500 ml   Net 340 ml        Anthropometrics  Temp: 99.5 °F (37.5 °C)  Height Method: Stated  Height: 5' 8" (172.7 cm)  Height (inches): 68 in  Weight Method: Bed Scale  Weight: 73.8 kg (162 lb 11.2 oz)  Weight (lb): 162.7 lb  Ideal Body Weight (IBW), Male: 154 lb  % Ideal Body Weight, Male (lb): 106.79 %  BMI (Calculated): 24.7  BMI Grade: 25 - 29.9 - overweight       Estimated/Assessed Needs  Weight Used For Calorie Calculations: 74.8 kg (164 lb 14.5 oz)  Energy Calorie Requirements (kcal): 5672-9513 kcals/day (20-25 kcals/kg ABW)  Energy Need " Method: Kcal/kg  Protein Requirements: 70-84 g/day (1.0-1.2 g/kg IBW)  Weight Used For Protein Calculations: 70 kg (154 lb 5.2 oz)     Estimated Fluid Requirement Method: RDA Method  RDA Method (mL): 1496       Reason for Assessment  Reason For Assessment: RD follow-up  Diagnosis: infection/sepsis  Relevant Medical History: Thyroid disease, Kidney stone, A-fib, Essential hypertension, Elevated cholesterol, Osteoarthritis, Stroke, TIA, Memory loss, Dementia, History of cerebral hemorrhage, Paroxysmal atrial fibrillation    Nutrition/Diet History  Food Allergies: NKFA  Factors Affecting Nutritional Intake: None identified at this time    Nutrition Risk Screen  Nutrition Risk Screen: difficulty chewing/swallowing       Altered Skin Integrity 09/21/23 1109 Right Heel Full thickness tissue loss. Base is covered by slough and/or eschar in the wound bed-Wound Image: Images linked       Altered Skin Integrity 09/21/23 1110 Right Buttocks Partial thickness tissue loss. Shallow open ulcer with a red or pink wound bed, without slough. Intact or Open/Ruptured Serum-filled blister.-Wound Image: (S) Images linked (Correction to image. R buttock)       Altered Skin Integrity 06/26/23 1500 Right Hip Ulceration Full thickness tissue loss with exposed bone, tendon, or muscle. Often includes undermining and tunneling. May extend into muscle and/or supporting structures.-Wound Image: (S) Images linked       Altered Skin Integrity 06/26/23 1813 Left Heel Ulceration Full thickness tissue loss. Base is covered by slough and/or eschar in the wound bed-Wound Image: (S) Images linked  MST Score: 0  Have you recently lost weight without trying?: No  Weight loss score: 0  Have you been eating poorly because of a decreased appetite?: No  Appetite score: 0       Weight History:  Wt Readings from Last 5 Encounters:   09/24/23 73.8 kg (162 lb 11.2 oz)   06/27/23 78.9 kg (173 lb 15.1 oz)   09/21/21 81.6 kg (179 lb 14.3 oz)   03/13/21 81.6 kg (180  "lb)   03/13/21 81.6 kg (179 lb 14.3 oz)        Lab/Procedures/Meds: Pertinent Labs/Meds Reviewed    Medications:Pertinent Medications Reviewed  Scheduled Meds:   atorvastatin  40 mg Oral QHS    ceFAZolin (ANCEF) IVPB  2 g Intravenous Q8H    collagenase   Topical (Top) Daily    enoxparin  40 mg Subcutaneous Daily    levothyroxine  100 mcg Oral Before breakfast    memantine  5 mg Oral BID    metronidazole  500 mg Intravenous Q8H    mirtazapine  30 mg Oral QHS    multivitamin  1 tablet Oral Daily    mupirocin   Nasal BID    OLANZapine  10 mg Oral Nightly    sertraline  25 mg Oral Daily     Continuous Infusions:  PRN Meds:.acetaminophen, HYDROcodone-acetaminophen, ibuprofen, LIDOcaine HCl 2%, magnesium oxide, magnesium oxide, naloxone, ondansetron, potassium bicarbonate, potassium bicarbonate, potassium bicarbonate, potassium, sodium phosphates, potassium, sodium phosphates, potassium, sodium phosphates    Labs: Pertinent Labs Reviewed  Clinical Chemistry:  Recent Labs   Lab 09/21/23  0937 09/22/23  1039 09/23/23  0333 09/26/23  0510    140   < > 138   K 3.4* 3.8   < > 3.8    107   < > 102   CO2 24 26   < > 33*   * 120*   < > 106   BUN 28* 30*   < > 20   CREATININE 1.2 1.1   < > 0.8   CALCIUM 8.2* 8.2*   < > 8.4*   PROT 6.4 6.6  --   --    ALBUMIN 2.3* 2.8*  --   --    BILITOT 1.0 0.7  --   --    ALKPHOS 73 64  --   --    AST 42* 56*  --   --    ALT 35 36  --   --    ANIONGAP 10 7*   < > 3*   MG  --  1.9   < > 2.0    < > = values in this interval not displayed.     CBC:   Recent Labs   Lab 09/26/23  0511   WBC 9.76   RBC 3.16*   HGB 9.7*   HCT 29.2*      MCV 92   MCH 30.7   MCHC 33.2     Lipid Panel:  No results for input(s): "CHOL", "HDL", "LDLCALC", "TRIG", "CHOLHDL" in the last 168 hours.  Cardiac Profile:  No results for input(s): "BNP", "CPK", "CPKMB", "TROPONINI", "CKTOTAL" in the last 168 hours.  Inflammatory Labs:  Recent Labs   Lab 09/21/23  0937 09/23/23  0333   .1* >16.00* " "    Diabetes:  Recent Labs   Lab 09/21/23  1232   POCTGLUCOSE 171*     Thyroid & Parathyroid:  No results for input(s): "TSH", "FREET4", "N2YIZNU", "X6JNWJN", "THYROIDAB" in the last 168 hours.    Monitor and Evaluation  Food and Nutrient Intake: food and beverage intake, energy intake  Food and Nutrient Adminstration: diet order  Knowledge/Beliefs/Attitudes: food and nutrition knowledge/skill, beliefs and attitudes  Physical Activity and Function: nutrition-related ADLs and IADLs, factors affecting access to physical activity  Anthropometric Measurements: weight, weight change, body mass index  Biochemical Data, Medical Tests and Procedures: lipid profile, inflammatory profile, glucose/endocrine profile, gastrointestinal profile, electrolyte and renal panel     Nutrition Risk  Level of Risk/Frequency of Follow-up: moderate - high     Nutrition Follow-Up  RD Follow-up?: Yes      Zuleika Simmons RD, LDN 09/26/2023 1:51 PM     "

## 2023-09-26 NOTE — PLAN OF CARE
Problem: Adult Inpatient Plan of Care  Goal: Plan of Care Review  Outcome: Ongoing, Progressing  Goal: Patient-Specific Goal (Individualized)  Outcome: Ongoing, Progressing  Goal: Absence of Hospital-Acquired Illness or Injury  Outcome: Ongoing, Progressing  Goal: Optimal Comfort and Wellbeing  Outcome: Ongoing, Progressing  Goal: Readiness for Transition of Care  Outcome: Ongoing, Progressing     Problem: Adjustment to Illness (Sepsis/Septic Shock)  Goal: Optimal Coping  Outcome: Ongoing, Progressing     Problem: Bleeding (Sepsis/Septic Shock)  Goal: Absence of Bleeding  Outcome: Ongoing, Progressing     Problem: Glycemic Control Impaired (Sepsis/Septic Shock)  Goal: Blood Glucose Level Within Desired Range  Outcome: Ongoing, Progressing     Problem: Infection Progression (Sepsis/Septic Shock)  Goal: Absence of Infection Signs and Symptoms  Outcome: Ongoing, Progressing     Problem: Nutrition Impaired (Sepsis/Septic Shock)  Goal: Optimal Nutrition Intake  Outcome: Ongoing, Progressing     Problem: Impaired Wound Healing  Goal: Optimal Wound Healing  Outcome: Ongoing, Progressing     Problem: Fall Injury Risk  Goal: Absence of Fall and Fall-Related Injury  Outcome: Ongoing, Progressing     Problem: Skin Injury Risk Increased  Goal: Skin Health and Integrity  Outcome: Ongoing, Progressing     Problem: Infection  Goal: Absence of Infection Signs and Symptoms  Outcome: Ongoing, Progressing     Problem: Oral Intake Inadequate  Goal: Improved Oral Intake  Outcome: Ongoing, Progressing

## 2023-09-26 NOTE — PLAN OF CARE
Problem: Skin Injury Risk Increased  Goal: Skin Health and Integrity  Intervention: Promote and Optimize Oral Intake  Flowsheets (Taken 9/26/2023 1359)  Oral Nutrition Promotion: calorie-dense liquids provided--Kyle BID X 14 days.     Problem: Oral Intake Inadequate  Goal: Improved Oral Intake  Outcome: Ongoing, Progressing  Intervention: Promote and Optimize Oral Intake  Flowsheets (Taken 9/26/2023 1358)  Oral Nutrition Promotion: calorie-dense liquids provided--Glucerna with meals.

## 2023-09-26 NOTE — PLAN OF CARE
Problem: Adult Inpatient Plan of Care  Goal: Plan of Care Review  9/26/2023 1409 by Sophy Kruger RN  Outcome: Ongoing, Progressing  9/26/2023 1404 by Sophy Kruger RN  Outcome: Ongoing, Progressing  Goal: Patient-Specific Goal (Individualized)  9/26/2023 1409 by Sophy Kruger RN  Outcome: Ongoing, Progressing  9/26/2023 1404 by Sophy Kruger RN  Outcome: Ongoing, Progressing  Goal: Absence of Hospital-Acquired Illness or Injury  9/26/2023 1409 by Sophy Kruger RN  Outcome: Ongoing, Progressing  9/26/2023 1404 by Sophy Kruger RN  Outcome: Ongoing, Progressing  Goal: Optimal Comfort and Wellbeing  9/26/2023 1409 by Sophy Kruger RN  Outcome: Ongoing, Progressing  9/26/2023 1404 by Sophy Kruger RN  Outcome: Ongoing, Progressing  Goal: Readiness for Transition of Care  9/26/2023 1409 by Sophy Kruger RN  Outcome: Ongoing, Progressing  9/26/2023 1404 by Sophy Kruger RN  Outcome: Ongoing, Progressing     Problem: Adjustment to Illness (Sepsis/Septic Shock)  Goal: Optimal Coping  9/26/2023 1409 by Sophy Kruger RN  Outcome: Ongoing, Progressing  9/26/2023 1404 by Sophy Kruger RN  Outcome: Ongoing, Progressing     Problem: Bleeding (Sepsis/Septic Shock)  Goal: Absence of Bleeding  9/26/2023 1409 by Sophy Kruger RN  Outcome: Ongoing, Progressing  9/26/2023 1404 by Sophy Kruger RN  Outcome: Ongoing, Progressing     Problem: Glycemic Control Impaired (Sepsis/Septic Shock)  Goal: Blood Glucose Level Within Desired Range  9/26/2023 1409 by Sophy Kruger RN  Outcome: Ongoing, Progressing  9/26/2023 1404 by Sophy Kruger RN  Outcome: Ongoing, Progressing     Problem: Infection Progression (Sepsis/Septic Shock)  Goal: Absence of Infection Signs and Symptoms  9/26/2023 1409 by Sophy Kruger RN  Outcome: Ongoing, Progressing  9/26/2023 1404 by Sophy Kruger RN  Outcome: Ongoing, Progressing     Problem: Nutrition Impaired (Sepsis/Septic Shock)  Goal: Optimal Nutrition Intake  9/26/2023 1409 by Sophy Kruger RN  Outcome: Ongoing, Progressing  9/26/2023 1404 by  Sophy Kruger RN  Outcome: Ongoing, Progressing     Problem: Impaired Wound Healing  Goal: Optimal Wound Healing  9/26/2023 1409 by Sophy Kruger RN  Outcome: Ongoing, Progressing  9/26/2023 1404 by Sophy Kruger RN  Outcome: Ongoing, Progressing     Problem: Fall Injury Risk  Goal: Absence of Fall and Fall-Related Injury  9/26/2023 1409 by Sophy Kruger RN  Outcome: Ongoing, Progressing  9/26/2023 1404 by Sophy Kruger RN  Outcome: Ongoing, Progressing     Problem: Skin Injury Risk Increased  Goal: Skin Health and Integrity  9/26/2023 1409 by Sophy Kruger RN  Outcome: Ongoing, Progressing  9/26/2023 1404 by Sophy Kruger RN  Outcome: Ongoing, Progressing     Problem: Infection  Goal: Absence of Infection Signs and Symptoms  9/26/2023 1409 by Sophy Kruger RN  Outcome: Ongoing, Progressing  9/26/2023 1404 by Sophy Kruger RN  Outcome: Ongoing, Progressing     Problem: Oral Intake Inadequate  Goal: Improved Oral Intake  9/26/2023 1409 by Sophy Kruger RN  Outcome: Ongoing, Progressing  9/26/2023 1404 by Sophy Kruger RN  Outcome: Ongoing, Progressing

## 2023-09-26 NOTE — SUBJECTIVE & OBJECTIVE
"Interval History: no complaints or events o/n      Objective:     Vital Signs (Most Recent):  Temp: 98.4 °F (36.9 °C) (09/26/23 0447)  Pulse: 66 (09/26/23 0447)  Resp: 18 (09/26/23 0447)  BP: 120/64 (09/26/23 0447)  SpO2: 99 % (09/26/23 0447) Vital Signs (24h Range):  Temp:  [97.7 °F (36.5 °C)-98.7 °F (37.1 °C)] 98.4 °F (36.9 °C)  Pulse:  [66-82] 66  Resp:  [18] 18  SpO2:  [99 %-100 %] 99 %  BP: (120-130)/(64-72) 120/64     Weight: 73.8 kg (162 lb 11.2 oz)  Body mass index is 24.74 kg/m².    Intake/Output Summary (Last 24 hours) at 9/26/2023 1132  Last data filed at 9/26/2023 0940  Gross per 24 hour   Intake 1320 ml   Output 500 ml   Net 820 ml         Physical Exam  GENERAL:  Alert   HEENT:  EOMI. Conjunctivae intact.   NECK:  Supple   LUNGS:  Bilateral rales, good air movement  CARDIAC:  Irregular  ABDOMEN:  Soft,  Nontender and nondistended, no rebound or guarding, bowel sounds present   EXTREMITIES:  Peripheral pulses are 2+. Hands and feet are warm. Good capillary refill in fingers (< 2 seconds). No clubbing, cyanosis or edema  SKIN:  Bilateral necrotic heel wounds dressed  NEUROLOGIC:  Dementia  :  PureWick in place        Significant Labs: All pertinent labs within the past 24 hours have been reviewed.  Blood Culture:   Recent Labs   Lab 09/25/23 0638 09/25/23 0922   LABBLOO No Growth to date  No Growth to date No Growth to date  No Growth to date     BMP:   Recent Labs   Lab 09/26/23  0510         K 3.8      CO2 33*   BUN 20   CREATININE 0.8   CALCIUM 8.4*   MG 2.0     CBC:   Recent Labs   Lab 09/25/23 0638 09/26/23  0511   WBC 11.84 9.76   HGB 10.7* 9.7*   HCT 31.4* 29.2*    213     CMP:   Recent Labs   Lab 09/25/23 0638 09/26/23  0510   * 138   K 3.9 3.8    102   CO2 31* 33*   * 106   BUN 15 20   CREATININE 0.8 0.8   CALCIUM 8.1* 8.4*   ANIONGAP 4* 3*     Cardiac Markers: No results for input(s): "CKMB", "MYOGLOBIN", "BNP", "TROPISTAT" in the last 48 " hours.    Significant Imaging: I have reviewed all pertinent imaging results/findings within the past 24 hours.

## 2023-09-27 ENCOUNTER — PATIENT MESSAGE (OUTPATIENT)
Dept: WOUND CARE | Facility: HOSPITAL | Age: 88
End: 2023-09-27
Payer: MEDICARE

## 2023-09-27 ENCOUNTER — HOSPITAL ENCOUNTER (OUTPATIENT)
Dept: RADIOLOGY | Facility: HOSPITAL | Age: 88
Discharge: HOME | DRG: 853 | End: 2023-09-27
Attending: HOSPITALIST | Admitting: HOSPITALIST
Payer: MEDICARE

## 2023-09-27 LAB
ANION GAP SERPL CALC-SCNC: 3 MMOL/L (ref 8–16)
BUN SERPL-MCNC: 24 MG/DL (ref 8–23)
CALCIUM SERPL-MCNC: 8.4 MG/DL (ref 8.7–10.5)
CHLORIDE SERPL-SCNC: 100 MMOL/L (ref 95–110)
CO2 SERPL-SCNC: 33 MMOL/L (ref 23–29)
CREAT SERPL-MCNC: 0.8 MG/DL (ref 0.5–1.4)
CRP SERPL-MCNC: 9.05 MG/DL
ERYTHROCYTE [DISTWIDTH] IN BLOOD BY AUTOMATED COUNT: 12.8 % (ref 11.5–14.5)
ERYTHROCYTE [SEDIMENTATION RATE] IN BLOOD BY WESTERGREN METHOD: 71 MM/HR (ref 0–10)
EST. GFR  (NO RACE VARIABLE): >60 ML/MIN/1.73 M^2
GLUCOSE SERPL-MCNC: 106 MG/DL (ref 70–110)
HCT VFR BLD AUTO: 29.7 % (ref 40–54)
HGB BLD-MCNC: 9.9 G/DL (ref 14–18)
MAGNESIUM SERPL-MCNC: 1.8 MG/DL (ref 1.6–2.6)
MCH RBC QN AUTO: 30.7 PG (ref 27–31)
MCHC RBC AUTO-ENTMCNC: 33.3 G/DL (ref 32–36)
MCV RBC AUTO: 92 FL (ref 82–98)
PLATELET # BLD AUTO: 249 K/UL (ref 150–450)
PMV BLD AUTO: 11.5 FL (ref 9.2–12.9)
POTASSIUM SERPL-SCNC: 3.8 MMOL/L (ref 3.5–5.1)
PROCALCITONIN SERPL IA-MCNC: 2.71 NG/ML (ref 0–0.5)
RBC # BLD AUTO: 3.23 M/UL (ref 4.6–6.2)
SODIUM SERPL-SCNC: 136 MMOL/L (ref 136–145)
WBC # BLD AUTO: 12.15 K/UL (ref 3.9–12.7)

## 2023-09-27 PROCEDURE — 12000002 HC ACUTE/MED SURGE SEMI-PRIVATE ROOM

## 2023-09-27 PROCEDURE — 85651 RBC SED RATE NONAUTOMATED: CPT | Performed by: STUDENT IN AN ORGANIZED HEALTH CARE EDUCATION/TRAINING PROGRAM

## 2023-09-27 PROCEDURE — 36415 COLL VENOUS BLD VENIPUNCTURE: CPT | Performed by: STUDENT IN AN ORGANIZED HEALTH CARE EDUCATION/TRAINING PROGRAM

## 2023-09-27 PROCEDURE — 80048 BASIC METABOLIC PNL TOTAL CA: CPT | Performed by: STUDENT IN AN ORGANIZED HEALTH CARE EDUCATION/TRAINING PROGRAM

## 2023-09-27 PROCEDURE — 63600175 PHARM REV CODE 636 W HCPCS: Performed by: NURSE PRACTITIONER

## 2023-09-27 PROCEDURE — 83735 ASSAY OF MAGNESIUM: CPT | Performed by: STUDENT IN AN ORGANIZED HEALTH CARE EDUCATION/TRAINING PROGRAM

## 2023-09-27 PROCEDURE — 25000003 PHARM REV CODE 250: Performed by: STUDENT IN AN ORGANIZED HEALTH CARE EDUCATION/TRAINING PROGRAM

## 2023-09-27 PROCEDURE — 94761 N-INVAS EAR/PLS OXIMETRY MLT: CPT

## 2023-09-27 PROCEDURE — 86140 C-REACTIVE PROTEIN: CPT | Performed by: STUDENT IN AN ORGANIZED HEALTH CARE EDUCATION/TRAINING PROGRAM

## 2023-09-27 PROCEDURE — 84145 PROCALCITONIN (PCT): CPT | Performed by: STUDENT IN AN ORGANIZED HEALTH CARE EDUCATION/TRAINING PROGRAM

## 2023-09-27 PROCEDURE — 85027 COMPLETE CBC AUTOMATED: CPT | Performed by: STUDENT IN AN ORGANIZED HEALTH CARE EDUCATION/TRAINING PROGRAM

## 2023-09-27 PROCEDURE — 63600175 PHARM REV CODE 636 W HCPCS: Performed by: HOSPITALIST

## 2023-09-27 PROCEDURE — 63600175 PHARM REV CODE 636 W HCPCS: Performed by: STUDENT IN AN ORGANIZED HEALTH CARE EDUCATION/TRAINING PROGRAM

## 2023-09-27 PROCEDURE — 25000003 PHARM REV CODE 250: Performed by: HOSPITALIST

## 2023-09-27 RX ADMIN — CEFAZOLIN SODIUM 2 G: 2 SOLUTION INTRAVENOUS at 12:09

## 2023-09-27 RX ADMIN — METRONIDAZOLE 500 MG: 5 INJECTION, SOLUTION INTRAVENOUS at 04:09

## 2023-09-27 RX ADMIN — ENOXAPARIN SODIUM 40 MG: 40 INJECTION SUBCUTANEOUS at 04:09

## 2023-09-27 RX ADMIN — LEVOTHYROXINE SODIUM 100 MCG: 0.1 TABLET ORAL at 05:09

## 2023-09-27 RX ADMIN — OLANZAPINE 10 MG: 5 TABLET, FILM COATED ORAL at 09:09

## 2023-09-27 RX ADMIN — Medication 800 MG: at 04:09

## 2023-09-27 RX ADMIN — HYDROCODONE BITARTRATE AND ACETAMINOPHEN 1 TABLET: 5; 325 TABLET ORAL at 09:09

## 2023-09-27 RX ADMIN — POTASSIUM BICARBONATE 50 MEQ: 977.5 TABLET, EFFERVESCENT ORAL at 04:09

## 2023-09-27 RX ADMIN — MEMANTINE HYDROCHLORIDE 5 MG: 5 TABLET ORAL at 09:09

## 2023-09-27 RX ADMIN — METRONIDAZOLE 500 MG: 5 INJECTION, SOLUTION INTRAVENOUS at 01:09

## 2023-09-27 RX ADMIN — MIRTAZAPINE 30 MG: 15 TABLET, FILM COATED ORAL at 09:09

## 2023-09-27 RX ADMIN — CEFAZOLIN SODIUM 2 G: 2 SOLUTION INTRAVENOUS at 04:09

## 2023-09-27 NOTE — SUBJECTIVE & OBJECTIVE
"Interval History:  No complaints.  No events noted overnight      Objective:     Vital Signs (Most Recent):  Temp: 97.8 °F (36.6 °C) (09/27/23 0734)  Pulse: 75 (09/27/23 0756)  Resp: 18 (09/27/23 0756)  BP: 128/66 (09/27/23 0734)  SpO2: 96 % (09/27/23 0756) Vital Signs (24h Range):  Temp:  [97.8 °F (36.6 °C)-98.7 °F (37.1 °C)] 97.8 °F (36.6 °C)  Pulse:  [73-82] 75  Resp:  [18] 18  SpO2:  [93 %-97 %] 96 %  BP: (100-151)/(57-67) 128/66     Weight: 73.8 kg (162 lb 11.2 oz)  Body mass index is 24.74 kg/m².    Intake/Output Summary (Last 24 hours) at 9/27/2023 1137  Last data filed at 9/27/2023 0957  Gross per 24 hour   Intake 440 ml   Output --   Net 440 ml         Physical Exam  GENERAL:  Alert   HEENT:  EOMI. Conjunctivae intact.   NECK:  Supple   LUNGS:  Bilateral rales, good air movement  CARDIAC:  Irregular  ABDOMEN:  Soft,  Nontender and nondistended, no rebound or guarding, bowel sounds present   EXTREMITIES:  Peripheral pulses are 2+. Hands and feet are warm. Good capillary refill in fingers (< 2 seconds). No clubbing, cyanosis or edema  SKIN:  Bilateral necrotic heel wounds dressed  NEUROLOGIC:  Dementia  :  PureWick in place        Significant Labs: All pertinent labs within the past 24 hours have been reviewed.  Blood Culture: No results for input(s): "LABBLOO" in the last 48 hours.  BMP:   Recent Labs   Lab 09/27/23  0520         K 3.8      CO2 33*   BUN 24*   CREATININE 0.8   CALCIUM 8.4*   MG 1.8     CBC:   Recent Labs   Lab 09/26/23  0511 09/27/23  0520   WBC 9.76 12.15   HGB 9.7* 9.9*   HCT 29.2* 29.7*    249     CMP:   Recent Labs   Lab 09/26/23  0510 09/27/23  0520    136   K 3.8 3.8    100   CO2 33* 33*    106   BUN 20 24*   CREATININE 0.8 0.8   CALCIUM 8.4* 8.4*   ANIONGAP 3* 3*     Cardiac Markers: No results for input(s): "CKMB", "MYOGLOBIN", "BNP", "TROPISTAT" in the last 48 hours.  Coagulation: No results for input(s): "PT", "INR", "APTT" in the last " 48 hours.    Significant Imaging: I have reviewed all pertinent imaging results/findings within the past 24 hours.

## 2023-09-27 NOTE — PLAN OF CARE
SNF authorization request sent via [employer] to [facility].  Expecting medical clearance for discharge Friday 9/29/23 per hospital med rounds.        09/27/23 0998   Post-Acute Status   Post-Acute Authorization Placement   Post-Acute Placement Status Pending payor review/awaiting authorization (if required)

## 2023-09-27 NOTE — PLAN OF CARE
Problem: Adult Inpatient Plan of Care  Goal: Plan of Care Review  Outcome: Ongoing, Progressing  Goal: Patient-Specific Goal (Individualized)  Outcome: Ongoing, Progressing  Goal: Absence of Hospital-Acquired Illness or Injury  Outcome: Ongoing, Progressing  Goal: Optimal Comfort and Wellbeing  Outcome: Ongoing, Progressing  Goal: Readiness for Transition of Care  Outcome: Ongoing, Progressing     Problem: Adjustment to Illness (Sepsis/Septic Shock)  Goal: Optimal Coping  Outcome: Ongoing, Progressing     Problem: Bleeding (Sepsis/Septic Shock)  Goal: Absence of Bleeding  Outcome: Ongoing, Progressing     Problem: Infection Progression (Sepsis/Septic Shock)  Goal: Absence of Infection Signs and Symptoms  Outcome: Ongoing, Progressing     Problem: Impaired Wound Healing  Goal: Optimal Wound Healing  Outcome: Ongoing, Progressing     Problem: Fall Injury Risk  Goal: Absence of Fall and Fall-Related Injury  Outcome: Ongoing, Progressing     Problem: Skin Injury Risk Increased  Goal: Skin Health and Integrity  Outcome: Ongoing, Progressing

## 2023-09-27 NOTE — CARE UPDATE
09/26/23 2200   Patient Assessment/Suction   Level of Consciousness (AVPU) alert   Respiratory Effort Normal;Unlabored   PRE-TX-O2   Device (Oxygen Therapy) room air   SpO2 96 %   Pulse Oximetry Type Intermittent   $ Pulse Oximetry - Single Charge Pulse Oximetry - Single   $ Pulse Oximetry - Multiple Charge Pulse Oximetry - Multiple

## 2023-09-27 NOTE — PROGRESS NOTES
Wound Care Progress Note    Subjective:       Patient ID: Adriano Borjas is a 88 y.o. male.    Chief Complaint: Fever (Per EMS, nursing home stated pt started having fever this morning. Pt has wound vac to right hip,wounds to bilateral feet, and left buttocks)    HPI  Pt seen at bedside today, family not present. Pt has BL heel pressure ulcers secondary to using hes heels to propel his wheelchair at the nursing facility. Pt family is aware of the wounds and their origin. Pt also has a right hip wound, which is continuing to heal with the wound vac.Pt  had xrays of the BL heels which were negative for osteomyelitis . No other complaints today, pt has dementia. Pt daughter was in the room tonight and we had a discussion of a plan going forward. She was interested in having her father spend some time at a skilled facility or LTAC for aggressive treatment of the heel wound. Pts heels were debrided last evening, and the right heel was cleaned up but the left heel was unable to be completed due to the patient thrashing during the procedure.   Review of Systems      Objective:        Physical Exam    Vitals:    09/26/23 1951   BP: (!) 100/57   Pulse: 82   Resp: 18   Temp: 98.5 °F (36.9 °C)       Assessment:           ICD-10-CM ICD-9-CM   1. Severe sepsis  A41.9 038.9    R65.20 995.92   2. Sepsis  A41.9 038.9     995.91   3. Right foot pain  M79.671 729.5   4. Right foot pain  M79.671 729.5   5. Right foot pain  M79.671 729.5   6. Acute cystitis without hematuria  N30.00 595.0   7. Abnormal heart rhythm  I49.9 427.9   8. Bacteremia  R78.81 790.7   9. Skin ulcer of right heel with necrosis of bone  L97.414 707.14     730.17   10. Skin ulcer of left heel with necrosis of bone  L97.424 707.14     730.17            Negative Pressure Wound Therapy  09/22/23 1900 Right anterior (Active)   09/22/23 1900   Side: Right   Orientation: anterior   Location: Hip   Additional Comments: 125 mmHG   Removal Indication and  Assessment:    Location:    SDO Location:    NPWT Type Vacuum Therapy 09/26/23 1310   Therapy Setting NPWT Continuous therapy 09/26/23 1310   Pressure Setting NPWT 125 mmHg 09/26/23 1310   Therapy Interventions NPWT Seal intact 09/26/23 1300   Sponges Inserted NPWT 3 09/26/23 1310   Sponges Removed NPWT 3 09/26/23 1310   General Output (mL) 0 09/26/23 0725            Altered Skin Integrity 06/26/23 1500 Right Hip Ulceration Full thickness tissue loss with exposed bone, tendon, or muscle. Often includes undermining and tunneling. May extend into muscle and/or supporting structures. (Active)   06/26/23 1500   Altered Skin Integrity Present on Admission - Did Patient arrive to the hospital with altered skin?: yes   Side: Right   Orientation:    Location: Hip   Wound Number:    Is this injury device related?:    Primary Wound Type: Ulceration   Description of Altered Skin Integrity: Full thickness tissue loss with exposed bone, tendon, or muscle. Often includes undermining and tunneling. May extend into muscle and/or supporting structures.   Ankle-Brachial Index:    Pulses:    Removal Indication and Assessment:    Wound Outcome:    (Retired) Wound Length (cm):    (Retired) Wound Width (cm):    (Retired) Depth (cm):    Wound Description (Comments):    Removal Indications:    Wound Image   09/21/23 1111   Dressing Appearance Dry;Intact;Clean 09/26/23 1300   Appearance Dressing in place, unable to visualize 09/26/23 0900   Dressing Changed 09/26/23 0725            Altered Skin Integrity 06/26/23 1813 Left Heel Ulceration Full thickness tissue loss. Base is covered by slough and/or eschar in the wound bed (Active)   06/26/23 1813   Altered Skin Integrity Present on Admission - Did Patient arrive to the hospital with altered skin?: yes   Side: Left   Orientation:    Location: Heel   Wound Number:    Is this injury device related?:    Primary Wound Type: Ulceration   Description of Altered Skin Integrity: Full thickness  tissue loss. Base is covered by slough and/or eschar in the wound bed   Ankle-Brachial Index:    Pulses:    Removal Indication and Assessment:    Wound Outcome:    (Retired) Wound Length (cm):    (Retired) Wound Width (cm):    (Retired) Depth (cm):    Wound Description (Comments):    Removal Indications:    Wound Image   09/21/23 1111   Description of Altered Skin Integrity Full thickness tissue loss with exposed bone, tendon, or muscle. Often includes undermining and tunneling. May extend into muscle and/or supporting structures. 09/26/23 1300   Dressing Appearance Dry;Intact;Clean 09/26/23 1300   Appearance Dressing in place, unable to visualize 09/26/23 0725   Care Cleansed with:;Sterile normal saline 09/26/23 0725   Dressing Changed 09/26/23 0725            Altered Skin Integrity 09/21/23 1109 Right Heel Full thickness tissue loss. Base is covered by slough and/or eschar in the wound bed (Active)   09/21/23 1109   Altered Skin Integrity Present on Admission - Did Patient arrive to the hospital with altered skin?: yes   Side: Right   Orientation:    Location: Heel   Wound Number:    Is this injury device related?:    Primary Wound Type:    Description of Altered Skin Integrity: Full thickness tissue loss. Base is covered by slough and/or eschar in the wound bed   Ankle-Brachial Index:    Pulses:    Removal Indication and Assessment:    Wound Outcome:    (Retired) Wound Length (cm):    (Retired) Wound Width (cm):    (Retired) Depth (cm):    Wound Description (Comments):    Removal Indications:    Wound Image   09/21/23 1112   Description of Altered Skin Integrity Full thickness tissue loss. Subcutaneous fat may be visible but bone, tendon or muscle are not exposed 09/26/23 1300   Dressing Appearance Dry;Intact;Clean 09/26/23 1300   Drainage Amount Small 09/26/23 0725   Appearance Dressing in place, unable to visualize 09/26/23 0725   Care Cleansed with:;Steri-strips 09/26/23 0725   Dressing Foam 09/26/23 0725             Altered Skin Integrity 09/21/23 1110 Right Buttocks Partial thickness tissue loss. Shallow open ulcer with a red or pink wound bed, without slough. Intact or Open/Ruptured Serum-filled blister. (Active)   09/21/23 1110   Altered Skin Integrity Present on Admission - Did Patient arrive to the hospital with altered skin?: yes   Side: Right   Orientation:    Location: Buttocks   Wound Number:    Is this injury device related?:    Primary Wound Type:    Description of Altered Skin Integrity: Partial thickness tissue loss. Shallow open ulcer with a red or pink wound bed, without slough. Intact or Open/Ruptured Serum-filled blister.   Ankle-Brachial Index:    Pulses:    Removal Indication and Assessment:    Wound Outcome:    (Retired) Wound Length (cm):    (Retired) Wound Width (cm):    (Retired) Depth (cm):    Wound Description (Comments):    Removal Indications:    Wound Image   09/21/23 1112   Description of Altered Skin Integrity Partial thickness tissue loss. Shallow open ulcer with a red or pink wound bed, without slough. Intact or Open/Ruptured Serum-filled blister. 09/26/23 0900   Dressing Appearance Dry 09/26/23 1300   Drainage Amount None 09/26/23 0725   Appearance Dressing in place, unable to visualize 09/26/23 0725   Care Cleansed with:;Sterile normal saline 09/26/23 0725   Dressing Foam 09/26/23 0725           Plan:       Essential hypertension  Hold antihypertensives given sepsis      Frontotemporal dementia  Patient with dementia with likely etiology of fronto-temporal dementia. Dementia is moderate. The patient does not have signs of behavioral disturbance. Home dementia medications are Held or Continued: continued.. Continue non-pharmacologic interventions to prevent delirium (No VS between 11PM-5AM, activity during day, opening blinds, providing glasses/hearing aids, and up in chair during daytime). Will avoid narcotics and benzos unless absolutely necessary. PRN anti-psychotics are not prescribed  "to avoid self harm behaviors.        Unstageable pressure ulcer of left heel  Noted.  Consult with Wound Care.      Unstageable pressure ulcer of right heel  Consult with Wound Care.      Stage IV pressure ulcer of right hip  Continue wound VAC.  Consult with wound care.    CT of right hip with progression of patient's soft tissue wound.  No bony destruction visualized.  Consider MRI to evaluate for osteomyelitis however not sure if patient will be able to tolerate      Severe sepsis  This patient does have evidence of infective focus  My overall impression is sepsis.  Source: Skin and Soft Tissue (location Suspect from heel or hip wounds)  Antibiotics given-   Antibiotics (72h ago, onward)      Start     Stop Route Frequency Ordered    09/21/23 0930  vancomycin 1,500 mg in dextrose 5 % (D5W) 250 mL IVPB (Vial-Mate)         09/21/23 2129 IV ED 1 Time 09/21/23 0926          Latest lactate reviewed-  No results for input(s): "LACTATE" in the last 72 hours.  Organ dysfunction indicated by Encephalopathy        Initiate broad-spectrum IV antibiotics.  Check ESR, CRP, procalcitonin.  Consult with ID.    Acute cystitis  Ucx GNR  - continue abx per sepsis section  - f/u ucx final    Essential hypertension  Chronic, low-normal  - hold amlodipine    Frontotemporal dementia  Noted hx   - delirium precautions  - redirect as able    Severe sepsis  This patient does have evidence of infective focus  My overall impression is sepsis.  Source: Skin and Soft Tissue and urinary  Antibiotics given-   Antibiotics (72h ago, onward)      Start     Stop Route Frequency Ordered    09/22/23 0945  cefepime 2 g in dextrose 5% 100 mL IVPB (ready to mix)         -- IV Every 12 hours (non-standard times) 09/22/23 0833    09/22/23 0945  metronidazole IVPB 500 mg         -- IV Every 8 hours (non-standard times) 09/22/23 0833    09/21/23 2100  mupirocin 2 % ointment         09/26/23 2059 Nasl 2 times daily 09/21/23 1758          Latest lactate " reviewed-  Recent Labs   Lab 09/21/23  1333   LACTATE 1.4     Organ dysfunction indicated by Encephalopathy  No pressor needed  - continue abx  - f/u ID and wound care recs    Stage IV pressure ulcer of right hip  CT reviewed  - Continue wound VAC.    - Consult with wound care.      Unstageable pressure ulcer of left heel  With suspected cellulitic component  XR reviewed  - continue abx per sepsis section  - consult wound care and ID  - f/u MRI and US art    Unstageable pressure ulcer of right heel  With suspected cellulitic component  XR reviewed  - continue abx per sepsis section  - consult wound care and ID  - f/u MRI and US art    Persistent atrial fibrillation  Chronic, rate-controlled  Not on rate or rhythm control meds  Not on anticoagulation  - monitor on tele      Not on anticoagulation or rate/rhythm control.  -Telemetry    Essential hypertension  -Hold home medications  -Continue to monitor    Frontotemporal dementia  Chronic. Stable.  -Fall, aspiration and delirium precautions    Unstageable pressure ulcer of left heel  Concern for osteomyelitis.  -Wound Care  -Podiatry  -Follow up MRI and CTA  -Continue cefepime and Flagyl    Unstageable pressure ulcer of right heel  Concern for osteomyelitis.  -Wound Care  -Podiatry  -Follow up MRI and CTA  -Continue cefepime and Flagyl    Stage IV pressure ulcer of right hip  -Wound Care following      Severe sepsis  This patient does have evidence of infective focus  My overall impression is sepsis.  Source: Skin and Soft Tissue and urinary  Antibiotics given-   Antibiotics (72h ago, onward)      Start     Stop Route Frequency Ordered    09/22/23 0945  cefepime 2 g in dextrose 5% 100 mL IVPB (ready to mix)         -- IV Every 12 hours (non-standard times) 09/22/23 0833    09/22/23 0945  metronidazole IVPB 500 mg         -- IV Every 8 hours (non-standard times) 09/22/23 0833    09/21/23 2100  mupirocin 2 % ointment         09/26/23 2059 Nasl 2 times daily 09/21/23  1758          Latest lactate reviewed-  Recent Labs   Lab 09/21/23  1333   LACTATE 1.4     Organ dysfunction indicated by Encephalopathy  No pressor needed  - continue abx  - f/u ID and wound care recs    Acute cystitis  E coli.  -Continue cefepime    Anemia  Stable.  -Trend Hgb with CBC      Bacteremia  Bacteroides on rapid PCR. Possible from foot ulcers.  -Repeat blood culture pending  -Continue cefepime and Flagyl.      Hypothyroid  -Continue Synthroid      TIA (transient ischemic attack)  Prior history.  -Continue statin        Persistent atrial fibrillation  Not on anticoagulation or rate/rhythm control.  -Telemetry    Acute cystitis  E coli.  -Continue cefepime    Bacteremia  Bacteroides on rapid PCR. Possible from foot ulcers.  -Repeat blood culture pending  -Continue cefepime and Flagyl.      TIA (transient ischemic attack)  Prior history.  -Continue statin        Essential hypertension  -Hold home medications  -Continue to monitor    Frontotemporal dementia  Chronic. Stable.  -Fall, aspiration and delirium precautions    Unstageable pressure ulcer of left heel  Concern for osteomyelitis.  -Wound Care  -Podiatry  -Follow up MRI and CTA  -Continue cefepime and Flagyl    Unstageable pressure ulcer of right heel  Concern for osteomyelitis.  -Wound Care  -Podiatry  -Follow up MRI and CTA  -Continue cefepime and Flagyl    Skin ulcer of left heel with necrosis of bone  Bone scan pending and CTA pending (Patient had palpable pedal pulses present)    Recommend debridements of both heel wounds, bone cultures, with wound vac placement to both wounds,  floating of the heels at all times, in addition to optimal nutrition.     lesvia and glucerna ordered for nutrition    Patient has been under care of Dr. Middleton outpatient for active treatment of bilateral heel wounds. Dr. Middleton is also continuing to treat patient for his heel wounds while he is inpatient. Therefore, I will defer treatment  recommendations/debridements/bone cultures/etc. to Dr. Middleton, since patient is under his care currently, both outpatient and inpatient.    Podiatry will sign off, in order to avoid 2 different wound care physicians treating/ debriding the same wounds.    EXAMINATION:  XR FOOT COMPLETE 3 VIEW LEFT     CLINICAL HISTORY:  .  Pain in right foot     TECHNIQUE:  AP, lateral and oblique views of the left foot were performed.     COMPARISON:  None     FINDINGS:  There is soft tissue irregularity overlying the right calcaneus.  There is no evidence fracture or malalignment.  There are degenerative changes of the foot.     Impression:     There are degenerative changes of the left foot was soft tissue irregularity overlying the left calcaneus.        Electronically signed by: Margot Simeon MD  Date:                                            09/21/2023  Time:                                           11:25    EXAMINATION:  XR FOOT COMPLETE 3 VIEW RIGHT     CLINICAL HISTORY:  . Pain in right foot     TECHNIQUE:  AP, lateral, and oblique views of the right foot were performed.     COMPARISON:  None     FINDINGS:  There are degenerative changes of the right foot is witnessed by joint space narrowing with adjacent sclerosis.  The adjacent soft tissues are unremarkable.  There is no evidence fracture or dislocation.     Impression:     There are degenerative changes of the right foot in soft tissue irregularity at the right heel.        Electronically signed by: Margot Simeon MD  Date:                                            09/21/2023  Time:                                           11:22      Assessment and Recommendations         Diagnoses:    1. Right hip stage 4 pressure ulcer  2. BL heel unstagable pressure ulcers     Plan:  1. Santyl to the BL heel pressure ulcers  2. Wound VAC to the right hip pressure ulcer  3. Discussed the options for treatment going forward with his daughter. She would he happy with him going  to a SNF or LTAC for continued treatment of the  wounds.  4. Lidocaine ordered for debridement.

## 2023-09-27 NOTE — PROGRESS NOTES
[facility] Medicine  Progress Note    Patient Name: Adriano Borjas  MRN: [id number]  Patient Class: IP- Inpatient   Admission Date: 9/21/2023  Length of Stay: 6 days  Attending Physician: Blessing Anderson MD  Primary Care Provider: Nusrat, Primary Doctor        Subjective:     Principal Problem:Bacteremia        HPI:  88-year-old male with past medical history of dementia, AFib, hypertension, hyperlipidemia, TIA who was sent from nursing home today for fever.  Patient has known bilateral heel wounds, wound VAC to right hip, and wound to left buttocks.  Son is at bedside and helps provide history as patient is unable to provide history other than telling me he is in pain at this time.  Son reports fever and decreased mental status occurred this morning.  He states that at baseline patient is more conversant than he is today.  Son reports he thinks that patient was on antibiotics recently for his bilateral heel wounds but does not think he is on antibiotics currently.  ED workup revealed elevated lactic acid at 2.9 an elevated white blood cell count.  Sepsis workup in progress.  Son reports patient is DNR but does want fluid and pressors if needed.  Patient to be admitted to step-down bed.      Overview/Hospital Course:  Adriano Borjas is an 88 year old male with a past medical history of HTN, HLD, TIA, Afib, hypothyroidism, sacral ulcer, and dementia who presented with sepsis secondary to UTI and bilateral heel ulcer infection with concern for osteomyelitis. ID has been following. Urine culture shows E coli, blood culture shows Bacteroides and wound culture of the left heel shows  E coli. He was initially treated with cefepime and Flagyl which has been deescalated to Ancef. MRI of the heels unable to be done because of patient's dementia and unable to stay still.  CTA of the lower extremity and bone scan ordered but have not been completed.   Wound Care and Podiatry were consulted.       Interval  "History:  No complaints.  No events noted overnight      Objective:     Vital Signs (Most Recent):  Temp: 97.8 °F (36.6 °C) (09/27/23 0734)  Pulse: 75 (09/27/23 0756)  Resp: 18 (09/27/23 0756)  BP: 128/66 (09/27/23 0734)  SpO2: 96 % (09/27/23 0756) Vital Signs (24h Range):  Temp:  [97.8 °F (36.6 °C)-98.7 °F (37.1 °C)] 97.8 °F (36.6 °C)  Pulse:  [73-82] 75  Resp:  [18] 18  SpO2:  [93 %-97 %] 96 %  BP: (100-151)/(57-67) 128/66     Weight: 73.8 kg (162 lb 11.2 oz)  Body mass index is 24.74 kg/m².    Intake/Output Summary (Last 24 hours) at 9/27/2023 1137  Last data filed at 9/27/2023 0957  Gross per 24 hour   Intake 440 ml   Output --   Net 440 ml         Physical Exam  GENERAL:  Alert   HEENT:  EOMI. Conjunctivae intact.   NECK:  Supple   LUNGS:  Bilateral rales, good air movement  CARDIAC:  Irregular  ABDOMEN:  Soft,  Nontender and nondistended, no rebound or guarding, bowel sounds present   EXTREMITIES:  Peripheral pulses are 2+. Hands and feet are warm. Good capillary refill in fingers (< 2 seconds). No clubbing, cyanosis or edema  SKIN:  Bilateral necrotic heel wounds dressed  NEUROLOGIC:  Dementia  :  PureWick in place        Significant Labs: All pertinent labs within the past 24 hours have been reviewed.  Blood Culture: No results for input(s): "LABBLOO" in the last 48 hours.  BMP:   Recent Labs   Lab 09/27/23  0520         K 3.8      CO2 33*   BUN 24*   CREATININE 0.8   CALCIUM 8.4*   MG 1.8     CBC:   Recent Labs   Lab 09/26/23  0511 09/27/23  0520   WBC 9.76 12.15   HGB 9.7* 9.9*   HCT 29.2* 29.7*    249     CMP:   Recent Labs   Lab 09/26/23  0510 09/27/23  0520    136   K 3.8 3.8    100   CO2 33* 33*    106   BUN 20 24*   CREATININE 0.8 0.8   CALCIUM 8.4* 8.4*   ANIONGAP 3* 3*     Cardiac Markers: No results for input(s): "CKMB", "MYOGLOBIN", "BNP", "TROPISTAT" in the last 48 hours.  Coagulation: No results for input(s): "PT", "INR", "APTT" in the last 48 " hours.    Significant Imaging: I have reviewed all pertinent imaging results/findings within the past 24 hours.      Assessment/Plan:      Unstageable pressure ulcer of bilateral heels with bacteriodies bacteremia  CTA runoff and bone scan ordered, have not yet been completed.  Patient was unable to tolerate MRI.   Continue local wound care.   Wound care physician following.  Continue  Antibiotics per ID     E coli UTI with bacteremia Acute cystitis  -cefepime changed to cefazolin yesterday     Hypothyroid  -Continue Synthroid        Anemia  Stable.  -Trend CBC        Persistent atrial fibrillation  Not on anticoagulation or rate/rhythm control.  -Telemetry     Stage IV pressure ulcer of right hip  -Wound Care following       Frontotemporal dementia  Chronic. Stable.  -Fall, aspiration and delirium precautions     Essential hypertension  -Hold home medications  -Continue to monitor  Stable     TIA (transient ischemic attack)  Prior history.  -Continue statin    VTE Risk Mitigation (From admission, onward)           Ordered     IP VTE HIGH RISK PATIENT  Once         09/21/23 1759     Place sequential compression device  Until discontinued         09/21/23 1759     enoxaparin injection 40 mg  Daily         09/21/23 1545                    Discharge Planning   KAY: 9/29/2023     Code Status: DNR   Is the patient medically ready for discharge?:     Reason for patient still in hospital (select all that apply): Imaging and Consult recommendations  Discharge Plan A: Skilled Nursing Facility, Return to nursing home   Discharge Delays: None known at this time              Blessing Anderson MD  Department of Hospital Medicine   [facility]

## 2023-09-28 LAB
ANION GAP SERPL CALC-SCNC: 5 MMOL/L (ref 8–16)
BACTERIA BLD CULT: NORMAL
BUN SERPL-MCNC: 21 MG/DL (ref 8–23)
CALCIUM SERPL-MCNC: 8.2 MG/DL (ref 8.7–10.5)
CHLORIDE SERPL-SCNC: 102 MMOL/L (ref 95–110)
CO2 SERPL-SCNC: 29 MMOL/L (ref 23–29)
CREAT SERPL-MCNC: 0.8 MG/DL (ref 0.5–1.4)
ERYTHROCYTE [DISTWIDTH] IN BLOOD BY AUTOMATED COUNT: 12.9 % (ref 11.5–14.5)
EST. GFR  (NO RACE VARIABLE): >60 ML/MIN/1.73 M^2
GLUCOSE SERPL-MCNC: 107 MG/DL (ref 70–110)
HCT VFR BLD AUTO: 29.8 % (ref 40–54)
HGB BLD-MCNC: 10.1 G/DL (ref 14–18)
MAGNESIUM SERPL-MCNC: 1.8 MG/DL (ref 1.6–2.6)
MCH RBC QN AUTO: 31.5 PG (ref 27–31)
MCHC RBC AUTO-ENTMCNC: 33.9 G/DL (ref 32–36)
MCV RBC AUTO: 93 FL (ref 82–98)
PLATELET # BLD AUTO: 267 K/UL (ref 150–450)
PMV BLD AUTO: 11.4 FL (ref 9.2–12.9)
POTASSIUM SERPL-SCNC: 3.7 MMOL/L (ref 3.5–5.1)
RBC # BLD AUTO: 3.21 M/UL (ref 4.6–6.2)
SODIUM SERPL-SCNC: 136 MMOL/L (ref 136–145)
WBC # BLD AUTO: 10.47 K/UL (ref 3.9–12.7)

## 2023-09-28 PROCEDURE — 25000003 PHARM REV CODE 250: Performed by: HOSPITALIST

## 2023-09-28 PROCEDURE — 99900035 HC TECH TIME PER 15 MIN (STAT)

## 2023-09-28 PROCEDURE — 63600175 PHARM REV CODE 636 W HCPCS: Performed by: HOSPITALIST

## 2023-09-28 PROCEDURE — 99233 SBSQ HOSP IP/OBS HIGH 50: CPT | Mod: FS,,, | Performed by: NURSE PRACTITIONER

## 2023-09-28 PROCEDURE — 25000003 PHARM REV CODE 250: Performed by: STUDENT IN AN ORGANIZED HEALTH CARE EDUCATION/TRAINING PROGRAM

## 2023-09-28 PROCEDURE — 80048 BASIC METABOLIC PNL TOTAL CA: CPT | Performed by: STUDENT IN AN ORGANIZED HEALTH CARE EDUCATION/TRAINING PROGRAM

## 2023-09-28 PROCEDURE — 99231 PR SUBSEQUENT HOSPITAL CARE,LEVL I: ICD-10-PCS | Mod: ,,, | Performed by: FAMILY MEDICINE

## 2023-09-28 PROCEDURE — 25000003 PHARM REV CODE 250: Performed by: NURSE PRACTITIONER

## 2023-09-28 PROCEDURE — 94761 N-INVAS EAR/PLS OXIMETRY MLT: CPT

## 2023-09-28 PROCEDURE — 63600175 PHARM REV CODE 636 W HCPCS: Performed by: NURSE PRACTITIONER

## 2023-09-28 PROCEDURE — 12000002 HC ACUTE/MED SURGE SEMI-PRIVATE ROOM

## 2023-09-28 PROCEDURE — 85027 COMPLETE CBC AUTOMATED: CPT | Performed by: STUDENT IN AN ORGANIZED HEALTH CARE EDUCATION/TRAINING PROGRAM

## 2023-09-28 PROCEDURE — 83735 ASSAY OF MAGNESIUM: CPT | Performed by: STUDENT IN AN ORGANIZED HEALTH CARE EDUCATION/TRAINING PROGRAM

## 2023-09-28 PROCEDURE — 99233 PR SUBSEQUENT HOSPITAL CARE,LEVL III: ICD-10-PCS | Mod: FS,,, | Performed by: NURSE PRACTITIONER

## 2023-09-28 PROCEDURE — 99900031 HC PATIENT EDUCATION (STAT)

## 2023-09-28 PROCEDURE — 99231 SBSQ HOSP IP/OBS SF/LOW 25: CPT | Mod: ,,, | Performed by: FAMILY MEDICINE

## 2023-09-28 PROCEDURE — 36415 COLL VENOUS BLD VENIPUNCTURE: CPT | Performed by: STUDENT IN AN ORGANIZED HEALTH CARE EDUCATION/TRAINING PROGRAM

## 2023-09-28 PROCEDURE — 63600175 PHARM REV CODE 636 W HCPCS: Performed by: STUDENT IN AN ORGANIZED HEALTH CARE EDUCATION/TRAINING PROGRAM

## 2023-09-28 RX ADMIN — LEVOTHYROXINE SODIUM 100 MCG: 0.1 TABLET ORAL at 05:09

## 2023-09-28 RX ADMIN — ENOXAPARIN SODIUM 40 MG: 40 INJECTION SUBCUTANEOUS at 04:09

## 2023-09-28 RX ADMIN — MEMANTINE HYDROCHLORIDE 5 MG: 5 TABLET ORAL at 08:09

## 2023-09-28 RX ADMIN — SERTRALINE HYDROCHLORIDE 25 MG: 25 TABLET ORAL at 08:09

## 2023-09-28 RX ADMIN — OLANZAPINE 10 MG: 5 TABLET, FILM COATED ORAL at 09:09

## 2023-09-28 RX ADMIN — PIPERACILLIN SODIUM AND TAZOBACTAM SODIUM 4.5 G: 4; .5 INJECTION, POWDER, LYOPHILIZED, FOR SOLUTION INTRAVENOUS at 04:09

## 2023-09-28 RX ADMIN — METRONIDAZOLE 500 MG: 5 INJECTION, SOLUTION INTRAVENOUS at 01:09

## 2023-09-28 RX ADMIN — CEFAZOLIN SODIUM 2 G: 2 SOLUTION INTRAVENOUS at 08:09

## 2023-09-28 RX ADMIN — METRONIDAZOLE 500 MG: 5 INJECTION, SOLUTION INTRAVENOUS at 08:09

## 2023-09-28 RX ADMIN — MEMANTINE HYDROCHLORIDE 5 MG: 5 TABLET ORAL at 09:09

## 2023-09-28 RX ADMIN — MIRTAZAPINE 30 MG: 15 TABLET, FILM COATED ORAL at 09:09

## 2023-09-28 RX ADMIN — MULTIVITAMIN TABLET 1 TABLET: TABLET at 08:09

## 2023-09-28 RX ADMIN — CEFAZOLIN SODIUM 2 G: 2 SOLUTION INTRAVENOUS at 12:09

## 2023-09-28 NOTE — PROGRESS NOTES
[facility]  Department of Infectious Disease  Progress Note        PATIENT NAME: Adriano Borjas  MRN: [id number]  TODAY'S DATE: 09/28/2023  ADMIT DATE: 9/21/2023    PRINCIPLE PROBLEM: Bacteremia    REASON FOR CONSULT:  Sepsis-heel wounds and hip wound    INTERVAL HISTORY     09/28@1106:      09/26@1023:  Patient seen and examined in his room alone.  He is lying in bed and opens eyes to voice.  He seems very drowsy today in his not as talkative as he was yesterday.  No acute events reported overnight.  He had an excisional debridement at bedside by wound care.  T-max 98.7°, WBC 9.76, platelets 213, BUN/CR 20/0.8 with estimated creatinine clearance 64.1.  Bone Cultures are pending from debridement yesterday.  9/21 blood cultures with Bacteroides fragilis and Gram-positive cocci pending identification and repeat blood cultures pending and are negative.  He is currently on cefepime and metronidazole.    09/25 @ 1127:  Patient seen and examined in his room.  He is lying in bed and is awake and alert though confused about where he is.  He has some difficulty following commands.  Bilateral heel wounds are dressed and patient also has a hip wound in his being followed by wound care.  He was unable to tolerate MRI.  T-max 100.1° and last 24 hours.  WBC 11.84, platelet 198, BUN/CR 15/0.8.  Last CRP on 09/23 was greater than 16.  9/21 blood cultures with Bacteroides fragilis, urine culture with pansensitive E coli.  9/22 wound culture with E coli pansensitive.  MRSA screen  negative, 9/23 blood culture negative, 9/24 PCR from blood culture negative in 9/25 blood cultures x2 drawn this morning.  He continues on cefepime and metronidazole, vancomycin was stopped this morning.    09/23/2023--denies complaints.  Pleasant, appreciative.  Tmax improved.  WBC 10.   CTA and MRI are not performed yet.    Antibiotics (From admission, onward)      Start     Stop Route Frequency Ordered    09/26/23 1700  cefazolin (ANCEF) 2  gram in dextrose 5% 50 mL IVPB (premix)         -- IV Every 8 hours (non-standard times) 09/26/23 1112    09/22/23 0945  metronidazole IVPB 500 mg         -- IV Every 8 hours (non-standard times) 09/22/23 0833    09/21/23 2100  mupirocin 2 % ointment         09/26/23 2059 Nasl 2 times daily 09/21/23 175            ASSESSMENT and PLAN     Severe sepsis multifactorial; E coli UTI and bilateral calcaneal osteomyelitis, foul-smelling drainage from left heel(E. Coli), possibly the right heel, and R hip sacral decub, the latter with granulation tissue  Procal 5.5, positive  ESR 94, CRP > 16  9/21 urine culture with pansensitive E coli  9/21 aerobic culture from left foot with pansensitive E coli  9/21 blood cultures x2 sets with 1 of 2 bottles positive for Bacteroides fragilis in 2nd set 1 of 2 bottles positive with PCR showing Bacteroides fragilis and g stain with Gram-positive cocci.  Previous cultures from Right foot wound culture 9/7 & R hip 6/28 & 6/26 with pansensitive E coli  MRSA swab neg - vancomycin stopped  9/24 blood culture PCR negative  9/25 unable to get MRI feet, x-rays with no obvious osteo  9/21 CT right hip with no bony destruction with progression of soft tissue wound.  Bilateral foot x-rays left with soft tissue irregularity overlying the left calcaneus no mention of osteo myelitis.  Right foot with no mention of osteomyelitis.  9/25 bedside debridement of heels, bone culture sent  Patient unable to do MRIs  CTA with runoff abdominal pelvis bilateral lower extremities is pending/nuclear medicine WBC scan is pending  Podiatry signed off as patient is being actively treated by Wound Care  9/26, stop cefepime, start cefazolin and continue metronidazole      PMHx: advanced dementia, AFib, hypertension, hyperlipidemia, prior TIA    3. Anaerobic Bacteremia with Bacteroides fragilis   9/25 blood cultures x2 ngtd       RECOMMENDATIONS  Stop cefepime - concern for age and frailty  Start cefazolin 2g IVPB q 8h    Continue metronidazole 500 mg IV every 8 hours  Continue wound care  Follow CT angio runoff - rescheduled  Nuclear medicine WBC scan limited - initiated  Continue Cefepime 2 g IV q.8 hours  Continue Flagyl 500 mg IV q.8 for anaerobic coverage  Follow cultures.   Monitor renal function and dose antibiotics as appropriate  Follow CRP - a pain CRP in a.m.  Aspiration precautions    Thank you for this consult. Please send [employer] secure chat with any questions.      SUBJECTIVE    Review of Systems    Patient is a poor historian, he has no complaints but has dementia.    OBJECTIVE     Temp:  [98.3 °F (36.8 °C)-99.4 °F (37.4 °C)] 98.6 °F (37 °C)  Pulse:  [68-97] 73  Resp:  [18-22] 18  SpO2:  [90 %-100 %] 98 %  BP: (116-135)/(67-75) 129/68  Physical Exam    General:  Frail, elderly male, sleeping, opens eyes to voice. Dozes off.   Eyes: Eyes with no icterus or injection. Rt eye with crusting on lashes\.  Ears:  Hard of hearing.  Mouth: Moist mucous membranes, no ulcerations or erythema.  Cardiovascular:  Irregular rhythm, controlled rate, no edema.    Respiratory:  Clear to auscultation bilaterally, no tachypnea or increased work of breathing.    Genitourinary:  External catheter in place with clear yellow urine.    Gastrointestinal:  Soft with active bowel sounds, no tenderness to palpation, no distention.  Musculoskeletal:  Generally weak. Bilateral heels with dressing intact, no odor. Legs are contracted.  Skin:  Warm and dry, no obvious rashes.  See photos below post debridement  Neuro: Drowsy, disoriented.   Psych:  No agitation.    Wounds:  9/26 9/22              VAD:PIV  Isolation: Contact    CBC LAST 7 DAYS  Recent Labs   Lab 09/22/23  1039 09/23/23  0333 09/24/23  0405 09/25/23  0638 09/26/23  0511 09/27/23  0520 09/28/23  0639   WBC 12.93* 12.62 10.69 11.84 9.76 12.15 10.47   RBC 3.38* 3.42* 3.17* 3.42* 3.16* 3.23* 3.21*   HGB 10.2* 10.5* 9.8* 10.7* 9.7* 9.9* 10.1*   HCT 31.7* 32.1* 29.5* 31.4*  "29.2* 29.7* 29.8*   MCV 94 94 93 92 92 92 93   MCH 30.2 30.7 30.9 31.3* 30.7 30.7 31.5*   MCHC 32.2 32.7 33.2 34.1 33.2 33.3 33.9   RDW 13.3 13.2 13.0 12.8 12.9 12.8 12.9    175 181 198 213 249 267   MPV 12.6 12.5 12.0 11.7 11.5 11.5 11.4   GRAN 81.4*  10.5*  --   --   --   --   --   --    LYMPH 8.2*  1.1  --   --   --   --   --   --    MONO 9.4  1.2*  --   --   --   --   --   --    BASO 0.05  --   --   --   --   --   --    NRBC 0  --   --   --   --   --   --        CHEMISTRY LAST 7 DAYS  Recent Labs   Lab 09/22/23  1039 09/23/23  0333 09/23/23  0856 09/24/23  0405 09/24/23  1314 09/25/23  0638 09/26/23  0510 09/27/23  0520 09/28/23  0639    136  --  136  --  135* 138 136 136   K 3.8 3.6 4.7 4.0  --  3.9 3.8 3.8 3.7    103  --  102  --  100 102 100 102   CO2 26 28  --  29  --  31* 33* 33* 29   ANIONGAP 7* 5*  --  5*  --  4* 3* 3* 5*   BUN 30* 26*  --  19  --  15 20 24* 21   CREATININE 1.1 0.9  --  0.8  --  0.8 0.8 0.8 0.8   * 117*  --  113*  --  115* 106 106 107   CALCIUM 8.2* 8.0*  --  7.9*  --  8.1* 8.4* 8.4* 8.2*   MG 1.9  --  2.0 1.8 1.9 1.8 2.0 1.8 1.8   ALBUMIN 2.8*  --   --   --   --   --   --   --   --    PROT 6.6  --   --   --   --   --   --   --   --    ALKPHOS 64  --   --   --   --   --   --   --   --    ALT 36  --   --   --   --   --   --   --   --    AST 56*  --   --   --   --   --   --   --   --    BILITOT 0.7  --   --   --   --   --   --   --   --        Estimated Creatinine Clearance: 64.1 mL/min (based on SCr of 0.8 mg/dL).    INFLAMMATORY/PROCAL  LAST 7 DAYS  Recent Labs   Lab 09/23/23  0333 09/27/23  0520   PROCAL  --  2.715*   CRP >16.00* 9.05*       No results found for: "ESR"  CRP   Date Value Ref Range Status   09/27/2023 9.05 (H) <0.76 mg/dL Final   09/23/2023 >16.00 (H) <0.76 mg/dL Final   09/21/2023 175.1 (H) 0.0 - 8.2 mg/L Final     PRIOR  MICROBIOLOGY:    Susceptibility data from last 90 days.  Collected Specimen Info Organism Amox/K Clav'ate Amp/Sulbactam " Ampicillin Cefazolin Cefepime Ceftriaxone Ciprofloxacin Ertapenem Gentamicin Levofloxacin Meropenem Nitrofurantoin PIPERACILLIN/TAZO SUSCEPTIBILITY Tetracycline   09/25/23 Bone from Foot, Right Enterococcus species                 09/23/23 Blood No growth after 5 days.                 09/22/23 Wound from Foot, Left Bacteroides fragilis                 09/22/23 Wound from Foot, Left Escherichia coli   S  S  S  S  S  S  S  S  S  S   S  S   09/21/23 Urine Escherichia coli   S  S  S  S  S  S  S  S  S  S  S  S  S   09/21/23 Blood Bacteroides fragilis                 09/21/23 Blood Gram-positive Cocci                 09/07/23 Wound from Foot, Right Escherichia coli  S  S  S  S  S  S  S  S  S  S  S   S  S     Collected Specimen Info Organism Tobramycin Trimeth/Sulfa   09/25/23 Bone from Foot, Right Enterococcus species     09/23/23 Blood No growth after 5 days.     09/22/23 Wound from Foot, Left Bacteroides fragilis     09/22/23 Wound from Foot, Left Escherichia coli  S  S   09/21/23 Urine Escherichia coli  S  S   09/21/23 Blood Bacteroides fragilis     09/21/23 Blood Gram-positive Cocci     09/07/23 Wound from Foot, Right Escherichia coli  S  S       LAST 7 DAYS MICROBIOLOGY   Microbiology Results (last 7 days)       Procedure Component Value Units Date/Time    Aerobic culture [[id number]]  (Abnormal) Collected: 09/25/23 1936    Order Status: Completed Specimen: Bone from Foot, Right Updated: 09/28/23 1145     Aerobic Bacterial Culture ENTEROCOCCUS SPECIES  From broth only  Identification and susceptibility pending      Narrative:      Right heel    Blood culture [[id number]] Collected: 09/25/23 0922    Order Status: Completed Specimen: Blood Updated: 09/28/23 1032     Blood Culture, Routine No Growth to date      No Growth to date      No Growth to date      No Growth to date    Blood culture [[id number]] Collected: 09/25/23 0638    Order Status: Completed Specimen: Blood Updated: 09/28/23 1032     Blood Culture, Routine  No Growth to date      No Growth to date      No Growth to date      No Growth to date    Blood culture [[id number]] Collected: 09/23/23 0333    Order Status: Completed Specimen: Blood Updated: 09/28/23 1032     Blood Culture, Routine No growth after 5 days.    Culture, Anaerobe [[id number]]  (Abnormal) Collected: 09/22/23 1042    Order Status: Completed Specimen: Wound from Foot, Left Updated: 09/26/23 1134     Anaerobic Culture BACTEROIDES FRAGILIS  Few  Beta Lactamase positive      Narrative:      Left calcaneous decubitus    Blood culture x two cultures. Draw prior to antibiotics. [[id number]]  (Abnormal) Collected: 09/21/23 0937    Order Status: Completed Specimen: Blood Updated: 09/26/23 1110     Blood Culture, Routine Gram stain adin bottle: Gram positive cocci      Results called to and read back by: DALTON MORGAN.      09/24/2023  06:16 TGC      Gram stain aer bottle: Gram positive cocci      GRAM POSITIVE COCCI  Identification and susceptibility pending      Narrative:      Aerobic and anaerobic    Culture, Anaerobic [[id number]] Collected: 09/25/23 1936    Order Status: Sent Specimen: Bone from Foot, Right Updated: 09/25/23 1944    Aerobic culture [[id number]]  (Abnormal)  (Susceptibility) Collected: 09/22/23 1042    Order Status: Completed Specimen: Wound from Foot, Left Updated: 09/25/23 0830     Aerobic Bacterial Culture ESCHERICHIA COLI  Moderate      Narrative:      Left calcaneous decubitus    Blood culture x two cultures. Draw prior to antibiotics. [[id number]]  (Abnormal) Collected: 09/21/23 0937    Order Status: Completed Specimen: Blood Updated: 09/25/23 0638     Blood Culture, Routine Gram stain adin bottle: Gram negative rods      Results called to and read back by: Madison Otoole RN ([facility]) @  03:05 by            BACTEROIDES FRAGILIS  Beta Lactamase positive      Narrative:      Aerobic and anaerobic    Rapid Organism ID by PCR (from Blood culture) [[id number]] Collected: 09/24/23 0620     Order Status: Completed Updated: 09/24/23 0621     Enterococcus faecalis Not Detected     Enterococcus faecium Not Detected     Listeria monocytogenes Not Detected     Staphylococcus spp. Not Detected     Staphylococcus aureus Not Detected     Staphylococcus epidermidis Not Detected     Staphylococcus lugdunensis Not Detected     Streptococcus species Not Detected     Streptococcus agalactiae Not Detected     Streptococcus pneumoniae Not Detected     Streptococcus pyogenes Not Detected     Acinetobacter calcoaceticus/baumannii complex Not Detected     Bacteroides fragilis Not Detected     Enterobacterales Not Detected     Enterobacter cloacae complex Not Detected     Escherichia coli Not Detected     Klebsiella aerogenes Not Detected     Klebsiella oxytoca Not Detected     Klebsiella pneumoniae group Not Detected     Proteus Not Detected     Salmonella sp Not Detected     Serratia marcescens Not Detected     Haemophilus influenzae Not Detected     Neisseria meningtidis Not Detected     Pseudomonas aeruginosa Not Detected     Stenotrophomonas maltophilia Not Detected     Candida albicans Not Detected     Candida auris Not Detected     Candida glabrata Not Detected     Candida krusei Not Detected     Candida parapsilosis Not Detected     Candida tropicalis Not Detected     Cryptococcus neoformans/gattii Not Detected     CTX-M (ESBL ) Test not applicable     IMP (Carbapenem resistant) Test not applicable     KPC resistance gene (Carbapenem resistant) Test not applicable     mcr-1  Test not applicable     mec A/C  Test not applicable     mec A/C and MREJ (MRSA) gene Test not applicable     NDM (Carbapenem resistant) Test not applicable     OXA-48-like (Carbapenem resistant) Test not applicable     van A/B (VRE gene) Test not applicable     VIM (Carbapenem resistant) Test not applicable    Narrative:      Aerobic and anaerobic    Urine culture [[id number]]  (Abnormal)  (Susceptibility) Collected: 09/21/23 6672     Order Status: Completed Specimen: Urine Updated: 09/23/23 0621     Urine Culture, Routine ESCHERICHIA COLI  >100,000 cfu/ml      Narrative:      Specimen Source->Urine    Rapid Organism ID by PCR (from Blood culture) [[id number]]  (Abnormal) Collected: 09/21/23 0937    Order Status: Completed Updated: 09/23/23 0308     Enterococcus faecalis Not Detected     Enterococcus faecium Not Detected     Listeria monocytogenes Not Detected     Staphylococcus spp. Not Detected     Staphylococcus aureus Not Detected     Staphylococcus epidermidis Not Detected     Staphylococcus lugdunensis Not Detected     Streptococcus species Not Detected     Streptococcus agalactiae Not Detected     Streptococcus pneumoniae Not Detected     Streptococcus pyogenes Not Detected     Acinetobacter calcoaceticus/baumannii complex Not Detected     Bacteroides fragilis Detected     Enterobacterales Not Detected     Enterobacter cloacae complex Not Detected     Escherichia coli Not Detected     Klebsiella aerogenes Not Detected     Klebsiella oxytoca Not Detected     Klebsiella pneumoniae group Not Detected     Proteus Not Detected     Salmonella sp Not Detected     Serratia marcescens Not Detected     Haemophilus influenzae Not Detected     Neisseria meningtidis Not Detected     Pseudomonas aeruginosa Not Detected     Stenotrophomonas maltophilia Not Detected     Candida albicans Not Detected     Candida auris Not Detected     Candida glabrata Not Detected     Candida krusei Not Detected     Candida parapsilosis Not Detected     Candida tropicalis Not Detected     Cryptococcus neoformans/gattii Not Detected     CTX-M (ESBL ) Test not applicable     IMP (Carbapenem resistant) Test not applicable     KPC resistance gene (Carbapenem resistant) Test not applicable     mcr-1  Test not applicable     mec A/C  Test not applicable     mec A/C and MREJ (MRSA) gene Test not applicable     NDM (Carbapenem resistant) Test not applicable     OXA-48-like  (Carbapenem resistant) Test not applicable     van A/B (VRE gene) Test not applicable     VIM (Carbapenem resistant) Test not applicable    Narrative:      Aerobic and anaerobic    MRSA Screen by PCR [[id number]] Collected: 09/22/23 1647    Order Status: Completed Specimen: Nasopharyngeal Swab from Nasal Updated: 09/22/23 1809     MRSA SCREEN BY PCR Negative          CURRENT/PREVIOUS VISIT EKG  Results for orders placed or performed during the hospital encounter of 09/21/23   EKG 12-lead    Collection Time: 09/22/23 10:29 AM    Narrative    Test Reason : I49.9,    Vent. Rate : 095 BPM     Atrial Rate : 000 BPM     P-R Int : 000 ms          QRS Dur : 080 ms      QT Int : 336 ms       P-R-T Axes : 000 -35 009 degrees     QTc Int : 422 ms    Atrial fibrillation with premature ventricular or aberrantly conducted  complexes  Left axis deviation  Abnormal ECG  When compared with ECG of 22-SEP-2023 10:28,  No significant change was found  Confirmed by Carlos Enrique MD ([id number]) on 9/23/2023 4:21:33 PM    Referred By: SUSAN   SELF           Confirmed By:Carlos Enrique MD       Significant Labs: All pertinent labs within the past 24 hours have been reviewed.     Significant Imaging: I have reviewed all relevant and available imaging results/findings within the past 24 hours.    X-Ray Foot Complete Right 09/21/23 1122  FINDINGS:   There are degenerative changes of the right foot is witnessed by joint space narrowing with adjacent sclerosis.  The adjacent soft tissues are unremarkable.  There is no evidence fracture or dislocation.   Impression:    There are degenerative changes of the right foot in soft tissue irregularity at the right heel.     X-Ray Foot Complete Left Resulted: 09/21/23 1125   FINDINGS:   There is soft tissue irregularity overlying the right calcaneus.  There is no evidence fracture or malalignment.  There are degenerative changes of the foot.   Impression:    There are degenerative changes of the left  foot was soft tissue irregularity overlying the left calcaneus.     CT Hip With Contrast Right 09/21/23 1143   FINDINGS:   There are degenerative changes of the inferior lumbar spine.  The right hip is located without evidence fracture.  There is no evidence bony erosion.  There is a skin wound overlying the greater trochanter with soft tissue stranding extending to the underlying gluteus musculature.  Compared with what was seen on 06/28/2023 the size of the soft tissue defect is larger.  No discrete, drainable fluid collection is visualized.  There is a left fat containing inguinal hernia.   Impression:    There is been progression of the patient's soft tissue wound overlying the right hip greater trochanter.  No bony destruction is visualized on today's study.       Shawna Lewis NP    Date of Service: 09/28/2023  12:54 PM

## 2023-09-28 NOTE — PROGRESS NOTES
Wound Care Progress Note    Subjective:       Patient ID: Adriano Borjas is a 88 y.o. male.    Chief Complaint: Fever (Per EMS, nursing home stated pt started having fever this morning. Pt has wound vac to right hip,wounds to bilateral feet, and left buttocks)    HPI  Pt seen at bedside today, family not present. Pt has BL heel pressure ulcers secondary to using hes heels to propel his wheelchair at the nursing facility. Pt family is aware of the wounds and their origin. Pt also has a right hip wound, which is continuing to heal with the wound vac.Pt  had xrays of the BL heels which were negative for osteomyelitis . No other complaints today, pt has dementia. Pt daughter was in the room tonight and we had a discussion of a plan going forward. She was interested in having her father spend some time at a skilled facility or LTAC for aggressive treatment of the heel wound. Pts heels were debrided last evening, and the right heel was cleaned up but the left heel was unable to be completed due to the patient thrashing during the procedure.     9/28/23 1829  Pt seen at bedside tonight. Family was absent from the bedside tonight. Pt is awake and comfortable. Pt bone cx returned with enterococcus species with susceptibilities pending. I will reach out to the family. Patient will benefit from LTAC or SNF for aggressive wound care and VAC and IV antibiotics for 6 weeks.     Review of Systems      Objective:        Physical Exam    Vitals:    09/28/23 1640   BP: (!) 146/76   Pulse: 76   Resp: 17   Temp: 99.3 °F (37.4 °C)       Assessment:           ICD-10-CM ICD-9-CM   1. Severe sepsis  A41.9 038.9    R65.20 995.92   2. Sepsis  A41.9 038.9     995.91   3. Right foot pain  M79.671 729.5   4. Right foot pain  M79.671 729.5   5. Right foot pain  M79.671 729.5   6. Acute cystitis without hematuria  N30.00 595.0   7. Abnormal heart rhythm  I49.9 427.9   8. Bacteremia  R78.81 790.7   9. Skin ulcer of right heel with  necrosis of bone  L97.414 707.14     730.17   10. Skin ulcer of left heel with necrosis of bone  L97.424 707.14     730.17            Negative Pressure Wound Therapy  09/22/23 1900 Right anterior (Active)   09/22/23 1900   Side: Right   Orientation: anterior   Location: Hip   Additional Comments: 125 mmHG   Removal Indication and Assessment:    Location:    SDO Location:    NPWT Type Vacuum Therapy 09/28/23 0755   Therapy Setting NPWT Continuous therapy 09/28/23 0755   Pressure Setting NPWT 125 mmHg 09/28/23 0755   Therapy Interventions NPWT Seal intact 09/28/23 0755   Sponges Inserted NPWT 3 09/26/23 1310   Sponges Removed NPWT 3 09/26/23 1310   General Output (mL) 0 09/26/23 0725            Altered Skin Integrity 06/26/23 1500 Right Hip Ulceration Full thickness tissue loss with exposed bone, tendon, or muscle. Often includes undermining and tunneling. May extend into muscle and/or supporting structures. (Active)   06/26/23 1500   Altered Skin Integrity Present on Admission - Did Patient arrive to the hospital with altered skin?: yes   Side: Right   Orientation:    Location: Hip   Wound Number:    Is this injury device related?:    Primary Wound Type: Ulceration   Description of Altered Skin Integrity: Full thickness tissue loss with exposed bone, tendon, or muscle. Often includes undermining and tunneling. May extend into muscle and/or supporting structures.   Ankle-Brachial Index:    Pulses:    Removal Indication and Assessment:    Wound Outcome:    (Retired) Wound Length (cm):    (Retired) Wound Width (cm):    (Retired) Depth (cm):    Wound Description (Comments):    Removal Indications:    Wound Image   09/21/23 1111   Dressing Appearance Dry;Intact;Clean 09/27/23 2100   Appearance Dressing in place, unable to visualize;Intact 09/27/23 2100   Dressing Changed 09/26/23 0725            Altered Skin Integrity 06/26/23 1813 Left Heel Ulceration Full thickness tissue loss. Base is covered by slough and/or eschar in  the wound bed (Active)   06/26/23 1813   Altered Skin Integrity Present on Admission - Did Patient arrive to the hospital with altered skin?: yes   Side: Left   Orientation:    Location: Heel   Wound Number:    Is this injury device related?:    Primary Wound Type: Ulceration   Description of Altered Skin Integrity: Full thickness tissue loss. Base is covered by slough and/or eschar in the wound bed   Ankle-Brachial Index:    Pulses:    Removal Indication and Assessment:    Wound Outcome:    (Retired) Wound Length (cm):    (Retired) Wound Width (cm):    (Retired) Depth (cm):    Wound Description (Comments):    Removal Indications:    Wound Image   09/21/23 1111   Description of Altered Skin Integrity Full thickness tissue loss with exposed bone, tendon, or muscle. Often includes undermining and tunneling. May extend into muscle and/or supporting structures. 09/26/23 1300   Dressing Appearance Dry;Intact;Clean 09/27/23 2100   Appearance Dressing in place, unable to visualize 09/27/23 2100   Care Cleansed with:;Sterile normal saline 09/26/23 0725   Dressing Changed 09/26/23 0725            Altered Skin Integrity 09/21/23 1109 Right Heel Full thickness tissue loss. Base is covered by slough and/or eschar in the wound bed (Active)   09/21/23 1109   Altered Skin Integrity Present on Admission - Did Patient arrive to the hospital with altered skin?: yes   Side: Right   Orientation:    Location: Heel   Wound Number:    Is this injury device related?:    Primary Wound Type:    Description of Altered Skin Integrity: Full thickness tissue loss. Base is covered by slough and/or eschar in the wound bed   Ankle-Brachial Index:    Pulses:    Removal Indication and Assessment:    Wound Outcome:    (Retired) Wound Length (cm):    (Retired) Wound Width (cm):    (Retired) Depth (cm):    Wound Description (Comments):    Removal Indications:    Wound Image   09/21/23 1112   Description of Altered Skin Integrity Full thickness tissue  loss. Subcutaneous fat may be visible but bone, tendon or muscle are not exposed 09/26/23 1300   Dressing Appearance Dry;Intact;Clean 09/27/23 2100   Drainage Amount Small 09/26/23 0725   Appearance Dressing in place, unable to visualize 09/27/23 2100   Care Cleansed with:;Steri-strips 09/26/23 0725   Dressing Foam 09/26/23 0725            Altered Skin Integrity 09/21/23 1110 Right Buttocks Partial thickness tissue loss. Shallow open ulcer with a red or pink wound bed, without slough. Intact or Open/Ruptured Serum-filled blister. (Active)   09/21/23 1110   Altered Skin Integrity Present on Admission - Did Patient arrive to the hospital with altered skin?: yes   Side: Right   Orientation:    Location: Buttocks   Wound Number:    Is this injury device related?:    Primary Wound Type:    Description of Altered Skin Integrity: Partial thickness tissue loss. Shallow open ulcer with a red or pink wound bed, without slough. Intact or Open/Ruptured Serum-filled blister.   Ankle-Brachial Index:    Pulses:    Removal Indication and Assessment:    Wound Outcome:    (Retired) Wound Length (cm):    (Retired) Wound Width (cm):    (Retired) Depth (cm):    Wound Description (Comments):    Removal Indications:    Wound Image   09/21/23 1112   Description of Altered Skin Integrity Partial thickness tissue loss. Shallow open ulcer with a red or pink wound bed, without slough. Intact or Open/Ruptured Serum-filled blister. 09/27/23 2100   Dressing Appearance Dry 09/27/23 2100   Drainage Amount None 09/27/23 2100   Appearance Pink;Maroon;Blistered 09/27/23 2100   Tissue loss description Partial thickness 09/27/23 2100   Care Cleansed with:;Sterile normal saline 09/26/23 0725   Dressing Foam 09/26/23 0725            Altered Skin Integrity 09/27/23 2221 Scrotum (Active)   09/27/23 2221   Altered Skin Integrity Present on Admission - Did Patient arrive to the hospital with altered skin?:    Side:    Orientation:    Location: Scrotum   Wound  Number:    Is this injury device related?:    Primary Wound Type:    Description of Altered Skin Integrity:    Ankle-Brachial Index:    Pulses:    Removal Indication and Assessment:    Wound Outcome:    (Retired) Wound Length (cm):    (Retired) Wound Width (cm):    (Retired) Depth (cm):    Wound Description (Comments):    Removal Indications:    Wound Image   09/27/23 2222   Description of Altered Skin Integrity Partial thickness tissue loss. Shallow open ulcer with a red or pink wound bed, without slough. Intact or Open/Ruptured Serum-filled blister. 09/27/23 2222   Dressing Appearance Open to air 09/27/23 2222   Drainage Amount Scant 09/27/23 2222   Drainage Characteristics/Odor Yellow 09/27/23 2222   Appearance Pink;Red;Blistered 09/27/23 2222   Tissue loss description Partial thickness 09/27/23 2222           Plan:       Essential hypertension  Hold antihypertensives given sepsis      Frontotemporal dementia  Patient with dementia with likely etiology of fronto-temporal dementia. Dementia is moderate. The patient does not have signs of behavioral disturbance. Home dementia medications are Held or Continued: continued.. Continue non-pharmacologic interventions to prevent delirium (No VS between 11PM-5AM, activity during day, opening blinds, providing glasses/hearing aids, and up in chair during daytime). Will avoid narcotics and benzos unless absolutely necessary. PRN anti-psychotics are not prescribed to avoid self harm behaviors.        Unstageable pressure ulcer of left heel  Noted.  Consult with Wound Care.      Unstageable pressure ulcer of right heel  Consult with Wound Care.      Stage IV pressure ulcer of right hip  Continue wound VAC.  Consult with wound care.    CT of right hip with progression of patient's soft tissue wound.  No bony destruction visualized.  Consider MRI to evaluate for osteomyelitis however not sure if patient will be able to tolerate      Severe sepsis  This patient does have  "evidence of infective focus  My overall impression is sepsis.  Source: Skin and Soft Tissue (location Suspect from heel or hip wounds)  Antibiotics given-   Antibiotics (72h ago, onward)      Start     Stop Route Frequency Ordered    09/21/23 0930  vancomycin 1,500 mg in dextrose 5 % (D5W) 250 mL IVPB (Vial-Mate)         09/21/23 2129 IV ED 1 Time 09/21/23 0926          Latest lactate reviewed-  No results for input(s): "LACTATE" in the last 72 hours.  Organ dysfunction indicated by Encephalopathy        Initiate broad-spectrum IV antibiotics.  Check ESR, CRP, procalcitonin.  Consult with ID.    Acute cystitis  Ucx GNR  - continue abx per sepsis section  - f/u ucx final    Essential hypertension  Chronic, low-normal  - hold amlodipine    Frontotemporal dementia  Noted hx   - delirium precautions  - redirect as able    Severe sepsis  This patient does have evidence of infective focus  My overall impression is sepsis.  Source: Skin and Soft Tissue and urinary  Antibiotics given-   Antibiotics (72h ago, onward)      Start     Stop Route Frequency Ordered    09/22/23 0945  cefepime 2 g in dextrose 5% 100 mL IVPB (ready to mix)         -- IV Every 12 hours (non-standard times) 09/22/23 0833    09/22/23 0945  metronidazole IVPB 500 mg         -- IV Every 8 hours (non-standard times) 09/22/23 0833    09/21/23 2100  mupirocin 2 % ointment         09/26/23 2059 Nasl 2 times daily 09/21/23 1758          Latest lactate reviewed-  Recent Labs   Lab 09/21/23  1333   LACTATE 1.4     Organ dysfunction indicated by Encephalopathy  No pressor needed  - continue abx  - f/u ID and wound care recs    Stage IV pressure ulcer of right hip  CT reviewed  - Continue wound VAC.    - Consult with wound care.      Unstageable pressure ulcer of left heel  With suspected cellulitic component  XR reviewed  - continue abx per sepsis section  - consult wound care and ID  - f/u MRI and US art    Unstageable pressure ulcer of right heel  With " suspected cellulitic component  XR reviewed  - continue abx per sepsis section  - consult wound care and ID  - f/u MRI and US art    Persistent atrial fibrillation  Chronic, rate-controlled  Not on rate or rhythm control meds  Not on anticoagulation  - monitor on tele      Not on anticoagulation or rate/rhythm control.  -Telemetry    Essential hypertension  -Hold home medications  -Continue to monitor    Frontotemporal dementia  Chronic. Stable.  -Fall, aspiration and delirium precautions    Unstageable pressure ulcer of left heel  Concern for osteomyelitis.  -Wound Care  -Podiatry  -Follow up MRI and CTA  -Continue cefepime and Flagyl    Unstageable pressure ulcer of right heel  Concern for osteomyelitis.  -Wound Care  -Podiatry  -Follow up MRI and CTA  -Continue cefepime and Flagyl    Stage IV pressure ulcer of right hip  -Wound Care following      Severe sepsis  This patient does have evidence of infective focus  My overall impression is sepsis.  Source: Skin and Soft Tissue and urinary  Antibiotics given-   Antibiotics (72h ago, onward)      Start     Stop Route Frequency Ordered    09/22/23 0945  cefepime 2 g in dextrose 5% 100 mL IVPB (ready to mix)         -- IV Every 12 hours (non-standard times) 09/22/23 0833    09/22/23 0945  metronidazole IVPB 500 mg         -- IV Every 8 hours (non-standard times) 09/22/23 0833    09/21/23 2100  mupirocin 2 % ointment         09/26/23 2059 Nasl 2 times daily 09/21/23 1758          Latest lactate reviewed-  Recent Labs   Lab 09/21/23  1333   LACTATE 1.4     Organ dysfunction indicated by Encephalopathy  No pressor needed  - continue abx  - f/u ID and wound care recs    Acute cystitis  E coli.  -Continue cefepime    Anemia  Stable.  -Trend Hgb with CBC      Bacteremia  Bacteroides on rapid PCR. Possible from foot ulcers.  -Repeat blood culture pending  -Continue cefepime and Flagyl.      Hypothyroid  -Continue Synthroid      TIA (transient ischemic attack)  Prior  history.  -Continue statin        Persistent atrial fibrillation  Not on anticoagulation or rate/rhythm control.  -Telemetry    Acute cystitis  E coli.  -Continue cefepime    Bacteremia  Bacteroides on rapid PCR. Possible from foot ulcers.  -Repeat blood culture pending  -Continue cefepime and Flagyl.      TIA (transient ischemic attack)  Prior history.  -Continue statin        Essential hypertension  -Hold home medications  -Continue to monitor    Frontotemporal dementia  Chronic. Stable.  -Fall, aspiration and delirium precautions    Unstageable pressure ulcer of left heel  Concern for osteomyelitis.  -Wound Care  -Podiatry  -Follow up MRI and CTA  -Continue cefepime and Flagyl    Unstageable pressure ulcer of right heel  Concern for osteomyelitis.  -Wound Care  -Podiatry  -Follow up MRI and CTA  -Continue cefepime and Flagyl    Skin ulcer of left heel with necrosis of bone  Bone scan pending and CTA pending (Patient had palpable pedal pulses present)    Recommend debridements of both heel wounds, bone cultures, with wound vac placement to both wounds,  floating of the heels at all times, in addition to optimal nutrition.     lesvia and glucerna ordered for nutrition    Patient has been under care of Dr. Middleton outpatient for active treatment of bilateral heel wounds. Dr. Middleton is also continuing to treat patient for his heel wounds while he is inpatient. Therefore, I will defer treatment recommendations/debridements/bone cultures/etc. to Dr. Middleton, since patient is under his care currently, both outpatient and inpatient.    Podiatry will sign off, in order to avoid 2 different wound care physicians treating/ debriding the same wounds.      Persistent atrial fibrillation  Not on anticoagulation or rate/rhythm control.  -Telemetry    TIA (transient ischemic attack)  Prior history.  -Continue statin        Essential hypertension  -Hold home medications  -Continue to monitor    Frontotemporal dementia  Chronic.  Stable.  -Fall, aspiration and delirium precautions  -AOx0; does not follow commands    Unstageable pressure ulcer of left heel  Concern for osteomyelitis.  -Wound Care  -Podiatry  -pt unable to cooperate for CTA or MRA   -cont zosyn   -ID requesting wound vac and LTAC placement     Unstageable pressure ulcer of right heel  Concern for osteomyelitis.  -see above     Stage IV pressure ulcer of right hip  -Wound Care following      Acute cystitis  E coli.  -zosyn     Anemia  Stable.  -Trend Hgb with CBC      Bacteremia  Bacteroides on rapid PCR. Possible from foot ulcers.  -Repeat blood culture negative   -zosyn       Hypothyroid  -Continue Synthroid      Skin ulcer of left heel with necrosis of bone        Skin ulcer of right heel with necrosis of bone      EXAMINATION:  XR FOOT COMPLETE 3 VIEW LEFT     CLINICAL HISTORY:  .  Pain in right foot     TECHNIQUE:  AP, lateral and oblique views of the left foot were performed.     COMPARISON:  None     FINDINGS:  There is soft tissue irregularity overlying the right calcaneus.  There is no evidence fracture or malalignment.  There are degenerative changes of the foot.     Impression:     There are degenerative changes of the left foot was soft tissue irregularity overlying the left calcaneus.        Electronically signed by: Margot Simeon MD  Date:                                            09/21/2023  Time:                                           11:25    EXAMINATION:  XR FOOT COMPLETE 3 VIEW RIGHT     CLINICAL HISTORY:  . Pain in right foot     TECHNIQUE:  AP, lateral, and oblique views of the right foot were performed.     COMPARISON:  None     FINDINGS:  There are degenerative changes of the right foot is witnessed by joint space narrowing with adjacent sclerosis.  The adjacent soft tissues are unremarkable.  There is no evidence fracture or dislocation.     Impression:     There are degenerative changes of the right foot in soft tissue irregularity at the right  heel.        Electronically signed by: Margot Simeon MD  Date:                                            09/21/2023  Time:                                           11:22  Aerobic Bacterial Culture      Abnormal   ENTEROCOCCUS SPECIES   From broth only   Identification and susceptibility pending   P      Resulting Agency [facility]              Narrative  Performed by: RACHEL  Right heel      Specimen Collected: 09/25/23 19:36 Last Resulted: 09/28/23 11:45             Assessment and Recommendations         Diagnoses:    1. Right hip stage 4 pressure ulcer  2. BL heel unstagable pressure ulcers     Plan:  1. Santyl to the BL heel pressure ulcers  2. Wound VAC to the right hip pressure ulcer  3. I recommend a stay at LTAC or SNF, preferrably at [facility] so I can continue his care .  4. Lidocaine ordered for debridement.

## 2023-09-28 NOTE — ASSESSMENT & PLAN NOTE
Concern for osteomyelitis.  -Wound Care  -Podiatry  -pt unable to cooperate for CTA or MRA   -cont zosyn   -ID requesting wound vac and LTAC placement

## 2023-09-28 NOTE — PROGRESS NOTES
[facility] Medicine  Progress Note    Patient Name: Adriano Borjas  MRN: [id number]  Patient Class: IP- Inpatient   Admission Date: 9/21/2023  Length of Stay: 7 days  Attending Physician: Seb East MD  Primary Care Provider: Nusrat, Primary Doctor        Subjective:     Principal Problem:Bacteremia        HPI:  88-year-old male with past medical history of dementia, AFib, hypertension, hyperlipidemia, TIA who was sent from nursing home today for fever.  Patient has known bilateral heel wounds, wound VAC to right hip, and wound to left buttocks.  Son is at bedside and helps provide history as patient is unable to provide history other than telling me he is in pain at this time.  Son reports fever and decreased mental status occurred this morning.  He states that at baseline patient is more conversant than he is today.  Son reports he thinks that patient was on antibiotics recently for his bilateral heel wounds but does not think he is on antibiotics currently.  ED workup revealed elevated lactic acid at 2.9 an elevated white blood cell count.  Sepsis workup in progress.  Son reports patient is DNR but does want fluid and pressors if needed.  Patient to be admitted to step-down bed.      Overview/Hospital Course:  Adriano Borjas is an 88 year old male with a past medical history of HTN, HLD, TIA, Afib, hypothyroidism, sacral ulcer, and dementia who presented with sepsis secondary to UTI and bilateral heel ulcer infection with concern for osteomyelitis. ID has been following. Urine culture shows E coli, blood culture shows Bacteroides and wound culture of the left heel shows  E coli. He was initially treated with cefepime and Flagyl which has been deescalated to zosyn. MRI of the heels unable to be done because of patient's dementia and unable to stay still.  CTA of the lower extremity and likewise unable to be completed as patient cannot straighten legs.   Wound Care and Podiatry were  consulted. ID recommending LTAC will have to discuss w/CM.       Interval History: Spoke with nursing and unable to complete CTA as patient unable to straighten legs. ID informed. Abx switched to zosyn. ID recommending LTAC but may have difficulty w/placement 2/2 insurance issues.     Review of Systems  Objective:     Vital Signs (Most Recent):  Temp: 99.3 °F (37.4 °C) (09/28/23 1640)  Pulse: 76 (09/28/23 1640)  Resp: 17 (09/28/23 1640)  BP: (!) 146/76 (09/28/23 1640)  SpO2: 98 % (09/28/23 1640) Vital Signs (24h Range):  Temp:  [98.3 °F (36.8 °C)-99.4 °F (37.4 °C)] 99.3 °F (37.4 °C)  Pulse:  [68-86] 76  Resp:  [17-22] 17  SpO2:  [90 %-100 %] 98 %  BP: (116-146)/(67-76) 146/76     Weight: 73.8 kg (162 lb 11.2 oz)  Body mass index is 24.74 kg/m².    Intake/Output Summary (Last 24 hours) at 9/28/2023 1646  Last data filed at 9/28/2023 1640  Gross per 24 hour   Intake 600 ml   Output 1900 ml   Net -1300 ml         Physical Exam  Constitutional:       Appearance: He is ill-appearing.      Comments: Thin, frail    HENT:      Head: Normocephalic and atraumatic.      Right Ear: External ear normal.      Left Ear: External ear normal.      Nose: Nose normal.      Mouth/Throat:      Mouth: Mucous membranes are dry.   Eyes:      Extraocular Movements: Extraocular movements intact.   Cardiovascular:      Rate and Rhythm: Normal rate. Rhythm irregular.      Heart sounds: No murmur heard.  Pulmonary:      Effort: No respiratory distress.      Comments: 2L NC  Abdominal:      General: There is no distension.      Palpations: Abdomen is soft.   Musculoskeletal:      Right lower leg: No edema.      Left lower leg: No edema.      Comments: Heel dressings c/d/i   Skin:     General: Skin is warm.   Neurological:      Mental Status: Mental status is at baseline.      Comments: Aox0   Does not follow commands             Significant Labs: All pertinent labs within the past 24 hours have been reviewed.    Significant Imaging: I have  reviewed all pertinent imaging results/findings within the past 24 hours.      Assessment/Plan:      * Bacteremia  Bacteroides on rapid PCR. Possible from foot ulcers.  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  -Trend Hgb with CBC      Persistent atrial fibrillation  Not on anticoagulation or rate/rhythm control.  -Telemetry    Acute cystitis  E coli.  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  -see above     Unstageable pressure ulcer of left heel  Concern for osteomyelitis.  -Wound Care  -Podiatry  -pt unable to cooperate for CTA or MRA   -cont zosyn   -ID requesting wound vac and LTAC placement     Frontotemporal dementia  Chronic. Stable.  -Fall, aspiration and delirium precautions  -AOx0; does not follow commands    Essential hypertension  -Hold home medications  -Continue to monitor    TIA (transient ischemic attack)  Prior history.  -Continue statin          VTE Risk Mitigation (From admission, onward)         Ordered     IP VTE HIGH RISK PATIENT  Once         09/21/23 1759     Place sequential compression device  Until discontinued         09/21/23 1759     enoxaparin injection 40 mg  Daily         09/21/23 1545                Discharge Planning   KAY: 9/29/2023     Code Status: DNR   Is the patient medically ready for discharge?:     Reason for patient still in hospital (select all that apply): Treatment and Consult recommendations  Discharge Plan A: Skilled Nursing Facility   Discharge Delays: None known at this time              Seb East MD  Department of Hospital Medicine   [facility]

## 2023-09-28 NOTE — PLAN OF CARE
SNF auth pending final IV abx decision and end date, which is pending cultures.      Will continue to follow.        09/28/23 2203   Post-Acute Status   Post-Acute Authorization Placement   Post-Acute Placement Status Pending payor review/awaiting authorization (if required)   Discharge Plan   Discharge Plan A Skilled Nursing Facility

## 2023-09-28 NOTE — PROGRESS NOTES
[facility]  Department of Infectious Disease  Progress Note        PATIENT NAME: Adriano Borjas  MRN: [id number]  TODAY'S DATE: 09/28/2023  ADMIT DATE: 9/21/2023    PRINCIPLE PROBLEM: Bacteremia    REASON FOR CONSULT:  Sepsis-heel wounds and hip wound    INTERVAL HISTORY      09/28@1106:  Patient seen and examined in his room alone.  Interim reviewed.  He is pleasantly confused.  He follows commands but closes his eyes and goes back to sleep.  He has been eating and drinking per nursing.  He was supposed go to skilled nursing facility on Friday.  He has a wound VAC in place to right hip and bilateral heel wounds are dressed.  T-max 99.4° in the last 24 hours.  WBC 10.47, platelets 267, BUN/CR 21/0.8.  Aerobic culture from bone on 09/25 from broth only with Enterococcus species, anaerobic culture is pending repeat blood cultures no growth to date.  Previous wound cultures with Bacteroides and E coli and blood cultures with Bacteroides.  He is currently on cefazolin and metronidazole.    09/26@1023:  Patient seen and examined in his room alone.  He is lying in bed and opens eyes to voice.  He seems very drowsy today in his not as talkative as he was yesterday.  No acute events reported overnight.  He had an excisional debridement at bedside by wound care.  T-max 98.7°, WBC 9.76, platelets 213, BUN/CR 20/0.8 with estimated creatinine clearance 64.1.  Bone Cultures are pending from debridement yesterday.  9/21 blood cultures with Bacteroides fragilis and Gram-positive cocci pending identification and repeat blood cultures pending and are negative.  He is currently on cefepime and metronidazole.    09/25 @ 1127:  Patient seen and examined in his room.  He is lying in bed and is awake and alert though confused about where he is.  He has some difficulty following commands.  Bilateral heel wounds are dressed and patient also has a hip wound in his being followed by wound care.  He was unable to tolerate MRI.   T-max 100.1° and last 24 hours.  WBC 11.84, platelet 198, BUN/CR 15/0.8.  Last CRP on 09/23 was greater than 16.  9/21 blood cultures with Bacteroides fragilis, urine culture with pansensitive E coli.  9/22 wound culture with E coli pansensitive.  MRSA screen  negative, 9/23 blood culture negative, 9/24 PCR from blood culture negative in 9/25 blood cultures x2 drawn this morning.  He continues on cefepime and metronidazole, vancomycin was stopped this morning.    09/23/2023--denies complaints.  Pleasant, appreciative.  Tmax improved.  WBC 10.   CTA and MRI are not performed yet.    Antibiotics (From admission, onward)      Start     Stop Route Frequency Ordered    09/26/23 1700  cefazolin (ANCEF) 2 gram in dextrose 5% 50 mL IVPB (premix)         -- IV Every 8 hours (non-standard times) 09/26/23 1112    09/22/23 0945  metronidazole IVPB 500 mg         -- IV Every 8 hours (non-standard times) 09/22/23 0833    09/21/23 2100  mupirocin 2 % ointment         09/26/23 2059 Nasl 2 times daily 09/21/23 1758            ASSESSMENT and PLAN     Severe sepsis multifactorial; E coli UTI and bilateral calcaneal osteomyelitis, foul-smelling drainage from left heel(E. Coli), possibly the right heel, and R hip sacral decub, the latter with granulation tissue  Procal 5.5->2.7  ESR 94->71  CRP >16->9.05  Previous cultures from Right foot wound culture 9/7 & R hip 6/28 & 6/26 with pansensitive E coli  MRSA swab neg   9/22 aerobic culture from left foot with pansensitive E coli, anaerobic culture with Bacteroides fragilis  9/25 unable to get MRI feet, x-rays with no obvious osteo  9/21 CT right hip with no bony destruction with progression of soft tissue wound.  Bilateral foot x-rays left with soft tissue irregularity overlying the left calcaneus no mention of osteo myelitis.  Right foot with no mention of osteomyelitis.  9/25 bedside debridement of heels, bone culture sent  Patient unable to do MRIs because cannot tolerate  CTA with  runoff abdominal pelvis bilateral lower extremities is pending??  Podiatry signed off as patient is being actively treated by Wound Care  9/26 nuclear med WBC study limited left ankle with minimal asymmetric increased activity at the level of the contralateral/right ankle  9/25 S/P debridement with bone cultures  - right foot bone culture with Enterococcus species in broth only pending identification and susceptibility; anaerobic culture from right foot pending      Acute UTI  9/21 urine culture with pansensitive E coli    3. Anaerobic Bacteremia with Bacteroides fragilis  9/21 blood cultures x2 sets with 1 of 2 bottles positive for Bacteroides fragilis in 2nd set 1 of 2 bottles positive with PCR showing Bacteroides fragilis and g stain with Gram-positive cocci.  9/24 blood culture PCR negative   9/25 blood cultures x2 ngtd    4. PMHx: advanced dementia, AFib, hypertension, hyperlipidemia, prior TIA       RECOMMENDATIONS    Stop cefazolin 2g IVPB q 8h   Stop metronidazole 500 mg IV every 8 hours  Start Zosyn 4.5g every 8 hours for Enterococcus - awaiting identification and sensitivities  Continue wound care  Follow CT angio runoff - rescheduled  Follow cultures.   Monitor renal function and dose antibiotics as appropriate  Follow CRP   Aspiration precautions    Thank you for this consult. Please send [employer] secure chat with any questions.      SUBJECTIVE    Review of Systems    Patient is a poor historian, he has no complaints but has dementia.    OBJECTIVE     Temp:  [98.3 °F (36.8 °C)-99.4 °F (37.4 °C)] 98.6 °F (37 °C)  Pulse:  [68-97] 73  Resp:  [18-22] 18  SpO2:  [90 %-100 %] 98 %  BP: (116-135)/(67-75) 129/68    Physical Exam    General:  Frail, elderly male, opens eyes and says hello, goes back to sleep.  Eyes: Eyes with no icterus or injection. Crusting to eyelashes improved.  Ears:  Hard of hearing.  Mouth: Moist mucous membranes, no ulcerations or erythema.  Cardiovascular:  Irregular rhythm, controlled rate,  no edema.    Respiratory:  Clear to auscultation bilaterally, no tachypnea or increased work of breathing.    Genitourinary:  External catheter in place with clear yellow urine.    Gastrointestinal:  Soft with active bowel sounds, no tenderness to palpation, no distention.  Musculoskeletal:  Generally weak. Bilateral heels with dressing intact, no odor. Legs flexed, thin with poor bulk. Wound vac inplace to right hip.  Skin:  Warm and dry, no obvious rashes.  See photos below post debridement, left heel worse than right heel per pictures. Dressings intact, clean and dry.  Neuro: Drowsy, disoriented.   Psych:  No agitation. Calm and sleepy.    Wounds:    9/26:  Mr. hello all Enterococcus just popped up when I opened the it was not on there refreshed it  Right hip                 scrotum                              Right ankle                 left ankle  Left ankle           9/22              VAD: PIV  Isolation: Contact    CBC LAST 7 DAYS  Recent Labs   Lab 09/22/23  1039 09/23/23  0333 09/24/23  0405 09/25/23  0638 09/26/23  0511 09/27/23  0520 09/28/23  0639   WBC 12.93* 12.62 10.69 11.84 9.76 12.15 10.47   RBC 3.38* 3.42* 3.17* 3.42* 3.16* 3.23* 3.21*   HGB 10.2* 10.5* 9.8* 10.7* 9.7* 9.9* 10.1*   HCT 31.7* 32.1* 29.5* 31.4* 29.2* 29.7* 29.8*   MCV 94 94 93 92 92 92 93   MCH 30.2 30.7 30.9 31.3* 30.7 30.7 31.5*   MCHC 32.2 32.7 33.2 34.1 33.2 33.3 33.9   RDW 13.3 13.2 13.0 12.8 12.9 12.8 12.9    175 181 198 213 249 267   MPV 12.6 12.5 12.0 11.7 11.5 11.5 11.4   GRAN 81.4*  10.5*  --   --   --   --   --   --    LYMPH 8.2*  1.1  --   --   --   --   --   --    MONO 9.4  1.2*  --   --   --   --   --   --    BASO 0.05  --   --   --   --   --   --    NRBC 0  --   --   --   --   --   --        CHEMISTRY LAST 7 DAYS  Recent Labs   Lab 09/22/23  1039 09/23/23  0333 09/23/23  0856 09/24/23  0405 09/24/23  1314 09/25/23  0638 09/26/23  0510 09/27/23  0520 09/28/23  0639    136  --  136  --  135* 138 136 136  "  K 3.8 3.6 4.7 4.0  --  3.9 3.8 3.8 3.7    103  --  102  --  100 102 100 102   CO2 26 28  --  29  --  31* 33* 33* 29   ANIONGAP 7* 5*  --  5*  --  4* 3* 3* 5*   BUN 30* 26*  --  19  --  15 20 24* 21   CREATININE 1.1 0.9  --  0.8  --  0.8 0.8 0.8 0.8   * 117*  --  113*  --  115* 106 106 107   CALCIUM 8.2* 8.0*  --  7.9*  --  8.1* 8.4* 8.4* 8.2*   MG 1.9  --  2.0 1.8 1.9 1.8 2.0 1.8 1.8   ALBUMIN 2.8*  --   --   --   --   --   --   --   --    PROT 6.6  --   --   --   --   --   --   --   --    ALKPHOS 64  --   --   --   --   --   --   --   --    ALT 36  --   --   --   --   --   --   --   --    AST 56*  --   --   --   --   --   --   --   --    BILITOT 0.7  --   --   --   --   --   --   --   --        Estimated Creatinine Clearance: 64.1 mL/min (based on SCr of 0.8 mg/dL).    INFLAMMATORY/PROCAL  LAST 7 DAYS  Recent Labs   Lab 09/23/23  0333 09/27/23  0520   PROCAL  --  2.715*   CRP >16.00* 9.05*       No results found for: "ESR"  CRP   Date Value Ref Range Status   09/27/2023 9.05 (H) <0.76 mg/dL Final   09/23/2023 >16.00 (H) <0.76 mg/dL Final   09/21/2023 175.1 (H) 0.0 - 8.2 mg/L Final     PRIOR  MICROBIOLOGY:    Susceptibility data from last 90 days.  Collected Specimen Info Organism Amox/K Clav'ate Amp/Sulbactam Ampicillin Cefazolin Cefepime Ceftriaxone Ciprofloxacin Ertapenem Gentamicin Levofloxacin Meropenem Nitrofurantoin PIPERACILLIN/TAZO SUSCEPTIBILITY Tetracycline   09/25/23 Bone from Foot, Right Enterococcus species                 09/23/23 Blood No growth after 5 days.                 09/22/23 Wound from Foot, Left Bacteroides fragilis                 09/22/23 Wound from Foot, Left Escherichia coli   S  S  S  S  S  S  S  S  S  S   S  S   09/21/23 Urine Escherichia coli   S  S  S  S  S  S  S  S  S  S  S  S  S   09/21/23 Blood Bacteroides fragilis                 09/21/23 Blood Gram-positive Cocci                 09/07/23 Wound from Foot, Right Escherichia coli  S  S  S  S  S  S  S  S  S  S  S   S "  S     Collected Specimen Info Organism Tobramycin Trimeth/Sulfa   09/25/23 Bone from Foot, Right Enterococcus species     09/23/23 Blood No growth after 5 days.     09/22/23 Wound from Foot, Left Bacteroides fragilis     09/22/23 Wound from Foot, Left Escherichia coli  S  S   09/21/23 Urine Escherichia coli  S  S   09/21/23 Blood Bacteroides fragilis     09/21/23 Blood Gram-positive Cocci     09/07/23 Wound from Foot, Right Escherichia coli  S  S       LAST 7 DAYS MICROBIOLOGY   Microbiology Results (last 7 days)       Procedure Component Value Units Date/Time    Aerobic culture [[id number]]  (Abnormal) Collected: 09/25/23 1936    Order Status: Completed Specimen: Bone from Foot, Right Updated: 09/28/23 1145     Aerobic Bacterial Culture ENTEROCOCCUS SPECIES  From broth only  Identification and susceptibility pending      Narrative:      Right heel    Blood culture [[id number]] Collected: 09/25/23 0922    Order Status: Completed Specimen: Blood Updated: 09/28/23 1032     Blood Culture, Routine No Growth to date      No Growth to date      No Growth to date      No Growth to date    Blood culture [[id number]] Collected: 09/25/23 0638    Order Status: Completed Specimen: Blood Updated: 09/28/23 1032     Blood Culture, Routine No Growth to date      No Growth to date      No Growth to date      No Growth to date    Blood culture [[id number]] Collected: 09/23/23 0333    Order Status: Completed Specimen: Blood Updated: 09/28/23 1032     Blood Culture, Routine No growth after 5 days.    Culture, Anaerobe [[id number]]  (Abnormal) Collected: 09/22/23 1042    Order Status: Completed Specimen: Wound from Foot, Left Updated: 09/26/23 1134     Anaerobic Culture BACTEROIDES FRAGILIS  Few  Beta Lactamase positive      Narrative:      Left calcaneous decubitus    Blood culture x two cultures. Draw prior to antibiotics. [[id number]]  (Abnormal) Collected: 09/21/23 0937    Order Status: Completed Specimen: Blood Updated: 09/26/23  1110     Blood Culture, Routine Gram stain adin bottle: Gram positive cocci      Results called to and read back by: JOSE TOMAS RN CARDPEDRITO.      09/24/2023  06:16 TGC      Gram stain aer bottle: Gram positive cocci      GRAM POSITIVE COCCI  Identification and susceptibility pending      Narrative:      Aerobic and anaerobic    Culture, Anaerobic [[id number]] Collected: 09/25/23 1936    Order Status: Sent Specimen: Bone from Foot, Right Updated: 09/25/23 1944    Aerobic culture [[id number]]  (Abnormal)  (Susceptibility) Collected: 09/22/23 1042    Order Status: Completed Specimen: Wound from Foot, Left Updated: 09/25/23 0830     Aerobic Bacterial Culture ESCHERICHIA COLI  Moderate      Narrative:      Left calcaneous decubitus    Blood culture x two cultures. Draw prior to antibiotics. [[id number]]  (Abnormal) Collected: 09/21/23 0937    Order Status: Completed Specimen: Blood Updated: 09/25/23 0638     Blood Culture, Routine Gram stain adin bottle: Gram negative rods      Results called to and read back by: Madison Otoole RN ([facility]) @  03:05 by            BACTEROIDES FRAGILIS  Beta Lactamase positive      Narrative:      Aerobic and anaerobic    Rapid Organism ID by PCR (from Blood culture) [[id number]] Collected: 09/24/23 0620    Order Status: Completed Updated: 09/24/23 0621     Enterococcus faecalis Not Detected     Enterococcus faecium Not Detected     Listeria monocytogenes Not Detected     Staphylococcus spp. Not Detected     Staphylococcus aureus Not Detected     Staphylococcus epidermidis Not Detected     Staphylococcus lugdunensis Not Detected     Streptococcus species Not Detected     Streptococcus agalactiae Not Detected     Streptococcus pneumoniae Not Detected     Streptococcus pyogenes Not Detected     Acinetobacter calcoaceticus/baumannii complex Not Detected     Bacteroides fragilis Not Detected     Enterobacterales Not Detected     Enterobacter cloacae complex Not Detected     Escherichia coli Not  Detected     Klebsiella aerogenes Not Detected     Klebsiella oxytoca Not Detected     Klebsiella pneumoniae group Not Detected     Proteus Not Detected     Salmonella sp Not Detected     Serratia marcescens Not Detected     Haemophilus influenzae Not Detected     Neisseria meningtidis Not Detected     Pseudomonas aeruginosa Not Detected     Stenotrophomonas maltophilia Not Detected     Candida albicans Not Detected     Candida auris Not Detected     Candida glabrata Not Detected     Candida krusei Not Detected     Candida parapsilosis Not Detected     Candida tropicalis Not Detected     Cryptococcus neoformans/gattii Not Detected     CTX-M (ESBL ) Test not applicable     IMP (Carbapenem resistant) Test not applicable     KPC resistance gene (Carbapenem resistant) Test not applicable     mcr-1  Test not applicable     mec A/C  Test not applicable     mec A/C and MREJ (MRSA) gene Test not applicable     NDM (Carbapenem resistant) Test not applicable     OXA-48-like (Carbapenem resistant) Test not applicable     van A/B (VRE gene) Test not applicable     VIM (Carbapenem resistant) Test not applicable    Narrative:      Aerobic and anaerobic    Urine culture [[id number]]  (Abnormal)  (Susceptibility) Collected: 09/21/23 1136    Order Status: Completed Specimen: Urine Updated: 09/23/23 0621     Urine Culture, Routine ESCHERICHIA COLI  >100,000 cfu/ml      Narrative:      Specimen Source->Urine    Rapid Organism ID by PCR (from Blood culture) [[id number]]  (Abnormal) Collected: 09/21/23 0937    Order Status: Completed Updated: 09/23/23 0308     Enterococcus faecalis Not Detected     Enterococcus faecium Not Detected     Listeria monocytogenes Not Detected     Staphylococcus spp. Not Detected     Staphylococcus aureus Not Detected     Staphylococcus epidermidis Not Detected     Staphylococcus lugdunensis Not Detected     Streptococcus species Not Detected     Streptococcus agalactiae Not Detected     Streptococcus  pneumoniae Not Detected     Streptococcus pyogenes Not Detected     Acinetobacter calcoaceticus/baumannii complex Not Detected     Bacteroides fragilis Detected     Enterobacterales Not Detected     Enterobacter cloacae complex Not Detected     Escherichia coli Not Detected     Klebsiella aerogenes Not Detected     Klebsiella oxytoca Not Detected     Klebsiella pneumoniae group Not Detected     Proteus Not Detected     Salmonella sp Not Detected     Serratia marcescens Not Detected     Haemophilus influenzae Not Detected     Neisseria meningtidis Not Detected     Pseudomonas aeruginosa Not Detected     Stenotrophomonas maltophilia Not Detected     Candida albicans Not Detected     Candida auris Not Detected     Candida glabrata Not Detected     Candida krusei Not Detected     Candida parapsilosis Not Detected     Candida tropicalis Not Detected     Cryptococcus neoformans/gattii Not Detected     CTX-M (ESBL ) Test not applicable     IMP (Carbapenem resistant) Test not applicable     KPC resistance gene (Carbapenem resistant) Test not applicable     mcr-1  Test not applicable     mec A/C  Test not applicable     mec A/C and MREJ (MRSA) gene Test not applicable     NDM (Carbapenem resistant) Test not applicable     OXA-48-like (Carbapenem resistant) Test not applicable     van A/B (VRE gene) Test not applicable     VIM (Carbapenem resistant) Test not applicable    Narrative:      Aerobic and anaerobic    MRSA Screen by PCR [[id number]] Collected: 09/22/23 1647    Order Status: Completed Specimen: Nasopharyngeal Swab from Nasal Updated: 09/22/23 1809     MRSA SCREEN BY PCR Negative          CURRENT/PREVIOUS VISIT EKG  Results for orders placed or performed during the hospital encounter of 09/21/23   EKG 12-lead    Collection Time: 09/22/23 10:29 AM    Narrative    Test Reason : I49.9,    Vent. Rate : 095 BPM     Atrial Rate : 000 BPM     P-R Int : 000 ms          QRS Dur : 080 ms      QT Int : 336 ms        P-R-T Axes : 000 -35 009 degrees     QTc Int : 422 ms    Atrial fibrillation with premature ventricular or aberrantly conducted  complexes  Left axis deviation  Abnormal ECG  When compared with ECG of 22-SEP-2023 10:28,  No significant change was found  Confirmed by Carlos Enrique MD ([id number]) on 9/23/2023 4:21:33 PM    Referred By: SUSAN   SELF           Confirmed By:Carlos Enrique MD       Significant Labs: All pertinent labs within the past 24 hours have been reviewed.     Significant Imaging: I have reviewed all relevant and available imaging results/findings within the past 24 hours.    X-Ray Foot Complete Right 09/21/23 1122  FINDINGS:   There are degenerative changes of the right foot is witnessed by joint space narrowing with adjacent sclerosis.  The adjacent soft tissues are unremarkable.  There is no evidence fracture or dislocation.   Impression:    There are degenerative changes of the right foot in soft tissue irregularity at the right heel.     X-Ray Foot Complete Left Resulted: 09/21/23 1125   FINDINGS:   There is soft tissue irregularity overlying the right calcaneus.  There is no evidence fracture or malalignment.  There are degenerative changes of the foot.   Impression:    There are degenerative changes of the left foot was soft tissue irregularity overlying the left calcaneus.     CT Hip With Contrast Right 09/21/23 1143   FINDINGS:   There are degenerative changes of the inferior lumbar spine.  The right hip is located without evidence fracture.  There is no evidence bony erosion.  There is a skin wound overlying the greater trochanter with soft tissue stranding extending to the underlying gluteus musculature.  Compared with what was seen on 06/28/2023 the size of the soft tissue defect is larger.  No discrete, drainable fluid collection is visualized.  There is a left fat containing inguinal hernia.   Impression:    There is been progression of the patient's soft tissue wound overlying the  right hip greater trochanter.  No bony destruction is visualized on today's study.     US Kidney 09/26/23 173   IMPRESSION:   1. Symmetrically small kidneys with no evidence of hydronephrosis.     NM WBC Study Limited Ankle [[id number]]Collected: 09/26/23 0920 FINDINGS:   Very minimal asymmetric increased activity at the level of the contralateral/right ankle. Consider correlation with history, symptoms and radiographic follow-up as warranted.   Tracer distribution is otherwise physiologic with activity in the liver, spleen and marrow.   IMPRESSION:   White blood cell study as outlined above.     Shawna Lewis NP    Date of Service: 09/28/2023  12:54 PM

## 2023-09-28 NOTE — SUBJECTIVE & OBJECTIVE
Interval History: Spoke with nursing and unable to complete CTA as patient unable to straighten legs. ID informed. Abx switched to zosyn. ID recommending LTAC but may have difficulty w/placement 2/2 insurance issues.     Review of Systems  Objective:     Vital Signs (Most Recent):  Temp: 99.3 °F (37.4 °C) (09/28/23 1640)  Pulse: 76 (09/28/23 1640)  Resp: 17 (09/28/23 1640)  BP: (!) 146/76 (09/28/23 1640)  SpO2: 98 % (09/28/23 1640) Vital Signs (24h Range):  Temp:  [98.3 °F (36.8 °C)-99.4 °F (37.4 °C)] 99.3 °F (37.4 °C)  Pulse:  [68-86] 76  Resp:  [17-22] 17  SpO2:  [90 %-100 %] 98 %  BP: (116-146)/(67-76) 146/76     Weight: 73.8 kg (162 lb 11.2 oz)  Body mass index is 24.74 kg/m².    Intake/Output Summary (Last 24 hours) at 9/28/2023 1646  Last data filed at 9/28/2023 1640  Gross per 24 hour   Intake 600 ml   Output 1900 ml   Net -1300 ml         Physical Exam  Constitutional:       Appearance: He is ill-appearing.      Comments: Thin, frail    HENT:      Head: Normocephalic and atraumatic.      Right Ear: External ear normal.      Left Ear: External ear normal.      Nose: Nose normal.      Mouth/Throat:      Mouth: Mucous membranes are dry.   Eyes:      Extraocular Movements: Extraocular movements intact.   Cardiovascular:      Rate and Rhythm: Normal rate. Rhythm irregular.      Heart sounds: No murmur heard.  Pulmonary:      Effort: No respiratory distress.      Comments: 2L NC  Abdominal:      General: There is no distension.      Palpations: Abdomen is soft.   Musculoskeletal:      Right lower leg: No edema.      Left lower leg: No edema.      Comments: Heel dressings c/d/i   Skin:     General: Skin is warm.   Neurological:      Mental Status: Mental status is at baseline.      Comments: Aox0   Does not follow commands             Significant Labs: All pertinent labs within the past 24 hours have been reviewed.    Significant Imaging: I have reviewed all pertinent imaging results/findings within the past 24  hours.

## 2023-09-29 VITALS
RESPIRATION RATE: 19 BRPM | SYSTOLIC BLOOD PRESSURE: 118 MMHG | OXYGEN SATURATION: 94 % | HEIGHT: 68 IN | HEART RATE: 81 BPM | WEIGHT: 162.69 LBS | BODY MASS INDEX: 24.66 KG/M2 | TEMPERATURE: 98 F | DIASTOLIC BLOOD PRESSURE: 62 MMHG

## 2023-09-29 PROBLEM — M86.9 OSTEOMYELITIS OF LEFT FOOT: Status: ACTIVE | Noted: 2023-09-29

## 2023-09-29 PROBLEM — R78.81 BACTEREMIA: Status: RESOLVED | Noted: 2023-09-23 | Resolved: 2023-09-29

## 2023-09-29 LAB
ANION GAP SERPL CALC-SCNC: 3 MMOL/L (ref 8–16)
BACTERIA SPEC AEROBE CULT: ABNORMAL
BACTERIA SPEC ANAEROBE CULT: NORMAL
BUN SERPL-MCNC: 17 MG/DL (ref 8–23)
CALCIUM SERPL-MCNC: 8.2 MG/DL (ref 8.7–10.5)
CHLORIDE SERPL-SCNC: 100 MMOL/L (ref 95–110)
CO2 SERPL-SCNC: 32 MMOL/L (ref 23–29)
CREAT SERPL-MCNC: 0.9 MG/DL (ref 0.5–1.4)
ERYTHROCYTE [DISTWIDTH] IN BLOOD BY AUTOMATED COUNT: 13 % (ref 11.5–14.5)
EST. GFR  (NO RACE VARIABLE): >60 ML/MIN/1.73 M^2
GLUCOSE SERPL-MCNC: 110 MG/DL (ref 70–110)
HCT VFR BLD AUTO: 31.3 % (ref 40–54)
HGB BLD-MCNC: 10.4 G/DL (ref 14–18)
MAGNESIUM SERPL-MCNC: 2 MG/DL (ref 1.6–2.6)
MCH RBC QN AUTO: 30.4 PG (ref 27–31)
MCHC RBC AUTO-ENTMCNC: 33.2 G/DL (ref 32–36)
MCV RBC AUTO: 92 FL (ref 82–98)
PLATELET # BLD AUTO: 309 K/UL (ref 150–450)
PMV BLD AUTO: 10.7 FL (ref 9.2–12.9)
POTASSIUM SERPL-SCNC: 3.7 MMOL/L (ref 3.5–5.1)
RBC # BLD AUTO: 3.42 M/UL (ref 4.6–6.2)
SODIUM SERPL-SCNC: 135 MMOL/L (ref 136–145)
WBC # BLD AUTO: 10.6 K/UL (ref 3.9–12.7)

## 2023-09-29 PROCEDURE — 63600175 PHARM REV CODE 636 W HCPCS: Performed by: NURSE PRACTITIONER

## 2023-09-29 PROCEDURE — 25000003 PHARM REV CODE 250: Performed by: STUDENT IN AN ORGANIZED HEALTH CARE EDUCATION/TRAINING PROGRAM

## 2023-09-29 PROCEDURE — 83735 ASSAY OF MAGNESIUM: CPT | Performed by: STUDENT IN AN ORGANIZED HEALTH CARE EDUCATION/TRAINING PROGRAM

## 2023-09-29 PROCEDURE — 25000003 PHARM REV CODE 250: Performed by: HOSPITALIST

## 2023-09-29 PROCEDURE — 85027 COMPLETE CBC AUTOMATED: CPT | Performed by: STUDENT IN AN ORGANIZED HEALTH CARE EDUCATION/TRAINING PROGRAM

## 2023-09-29 PROCEDURE — 36415 COLL VENOUS BLD VENIPUNCTURE: CPT | Performed by: STUDENT IN AN ORGANIZED HEALTH CARE EDUCATION/TRAINING PROGRAM

## 2023-09-29 PROCEDURE — 25000003 PHARM REV CODE 250: Performed by: NURSE PRACTITIONER

## 2023-09-29 PROCEDURE — 94761 N-INVAS EAR/PLS OXIMETRY MLT: CPT

## 2023-09-29 PROCEDURE — 80048 BASIC METABOLIC PNL TOTAL CA: CPT | Performed by: STUDENT IN AN ORGANIZED HEALTH CARE EDUCATION/TRAINING PROGRAM

## 2023-09-29 RX ADMIN — SERTRALINE HYDROCHLORIDE 25 MG: 25 TABLET ORAL at 08:09

## 2023-09-29 RX ADMIN — PIPERACILLIN SODIUM AND TAZOBACTAM SODIUM 4.5 G: 4; .5 INJECTION, POWDER, LYOPHILIZED, FOR SOLUTION INTRAVENOUS at 08:09

## 2023-09-29 RX ADMIN — LEVOTHYROXINE SODIUM 100 MCG: 0.1 TABLET ORAL at 06:09

## 2023-09-29 RX ADMIN — HYDROCODONE BITARTRATE AND ACETAMINOPHEN 1 TABLET: 5; 325 TABLET ORAL at 05:09

## 2023-09-29 RX ADMIN — MULTIVITAMIN TABLET 1 TABLET: TABLET at 08:09

## 2023-09-29 RX ADMIN — PIPERACILLIN SODIUM AND TAZOBACTAM SODIUM 4.5 G: 4; .5 INJECTION, POWDER, LYOPHILIZED, FOR SOLUTION INTRAVENOUS at 12:09

## 2023-09-29 RX ADMIN — COLLAGENASE SANTYL: 250 OINTMENT TOPICAL at 08:09

## 2023-09-29 RX ADMIN — MEMANTINE HYDROCHLORIDE 5 MG: 5 TABLET ORAL at 08:09

## 2023-09-29 NOTE — DISCHARGE SUMMARY
[facility] Medicine  Discharge Summary      Patient Name: Adriano Borjas  MRN: [id number]  HALLIE: [id number]  Patient Class: IP- Inpatient  Admission Date: 9/21/2023  Hospital Length of Stay: 8 days  Discharge Date and Time:  09/29/2023 2:43 PM  Attending Physician: Seb East MD   Discharging Provider: Seb East MD  Primary Care Provider: Nusrat Primary Doctor    Primary Care Team: Networked reference to record PCT     HPI:   88-year-old male with past medical history of dementia, AFib, hypertension, hyperlipidemia, TIA who was sent from nursing home today for fever.  Patient has known bilateral heel wounds, wound VAC to right hip, and wound to left buttocks.  Son is at bedside and helps provide history as patient is unable to provide history other than telling me he is in pain at this time.  Son reports fever and decreased mental status occurred this morning.  He states that at baseline patient is more conversant than he is today.  Son reports he thinks that patient was on antibiotics recently for his bilateral heel wounds but does not think he is on antibiotics currently.  ED workup revealed elevated lactic acid at 2.9 an elevated white blood cell count.  Sepsis workup in progress.  Son reports patient is DNR but does want fluid and pressors if needed.  Patient to be admitted to step-down bed.      * No surgery found *      Hospital Course:   Adriano Borjas is an 88 year old male with a past medical history of HTN, HLD, TIA, Afib, hypothyroidism, sacral ulcer, and dementia who presented with sepsis secondary to UTI and bilateral heel ulcer infection with concern for osteomyelitis. ID has been following. Urine culture shows E coli, blood culture shows Bacteroides and wound culture of the left heel shows E coli, bacteroides. Bone biopsy from left foot positive for enterococcus. He was initially treated with cefepime and Flagyl which has been deescalated to zosyn. Unable to complete MRI or  CTA of LE extremities as patient unable to straighten legs and stay still. Decision was made to empirically treat for osteo as patient had visible bone on exam. Wound Care and Podiatry were consulted. Left foot was debrided and wound vac was placed. He was discharged to LTAC to continue abx for total 6 weeks (EOT Nov 6) and for wound care.     Micro Data:  9/21: Uclx - ecoli   9/21: blood clx - bacteroides   9/22: L foot wound - ecoli and bacteroides   9/25: L foot debridement/bone biopsy - bacteroides   9/25: repeat blood cultures negative        Goals of Care Treatment Preferences:  Code Status: DNR      Consults:   Consults (From admission, onward)          Status Ordering Provider     Inpatient consult to Social Work/Case Management  Once        Provider:  (Not yet assigned)    Acknowledged GUDELIA NAYLOR     Inpatient consult to   Once        Provider:  (Not yet assigned)    Acknowledged ALEX MCCULLOUGH     Inpatient consult to Podiatry  Once        Provider:  (Not yet assigned)    Acknowledged HUY REYES     Inpatient consult to Infectious Diseases  Once        Provider:  Alex Mccullough MD    Completed JUJU SUTTON            No new Assessment & Plan notes have been filed under this hospital service since the last note was generated.  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] 07/13/2023 Yes    Unstageable pressure ulcer of left heel [L89.620] 07/13/2023 Yes    Unstageable pressure ulcer of right heel [L89.610] 07/13/2023 Yes    Frontotemporal dementia [G31.09, F02.80] 08/23/2019 Yes    Essential hypertension [I10] 07/27/2018 Yes    TIA (transient ischemic attack) [G45.9] 11/29/2012 Yes       Problems Resolved During this Admission:    Diagnosis Date Noted Date Resolved POA    PRINCIPAL PROBLEM:  Bacteremia [R78.81] 09/23/2023 09/29/2023 Yes    Severe sepsis [A41.9, R65.20] 09/21/2023 09/23/2023 Yes       Discharged Condition: stable    Disposition: Long Term Acute Care    Follow Up:    Patient Instructions:   No discharge procedures on file.    Significant Diagnostic Studies: Labs:   BMP:   Recent Labs   Lab 09/28/23  0639 09/29/23  0640    110    135*   K 3.7 3.7    100   CO2 29 32*   BUN 21 17   CREATININE 0.8 0.9   CALCIUM 8.2* 8.2*   MG 1.8 2.0    and CBC   Recent Labs   Lab 09/28/23  0639 09/29/23  0640   WBC 10.47 10.60   HGB 10.1* 10.4*   HCT 29.8* 31.3*    309       Pending Diagnostic Studies:       None           Medications:  Reconciled Home Medications:      Medication List        CHANGE how you take these medications      * ketoconazole 2 % cream  Commonly known as: NIZORAL  Thin film to red scaly rash on face twice daily as needed for flare  What changed: Another medication with the same name was changed. Make sure you understand how and when to take each.     * ketoconazole 2 % shampoo  Commonly known as: NIZORAL  Wash hair with medicated shampoo at least 2x/week - let sit on scalp at least 5 minutes prior to rinsing  What changed:   how much to take  how to take this  when to take this           * This list has 2 medication(s) that are the same as other medications prescribed for you. Read the directions carefully, and ask your doctor or other care provider to review them with you.                CONTINUE taking these medications      acetaminophen 500 MG tablet  Commonly known as: TYLENOL  Take 500 mg by mouth every evening.     ammonium lactate 12 % lotion  Commonly known as: LAC-HYDRIN  Apply 1 Application topically 2 (two) times a day.     ascorbic acid (vitamin C) 500 MG tablet  Commonly known as: VITAMIN C  Take 500 mg by mouth once daily.      atorvastatin 40 MG tablet  Commonly known as: LIPITOR  Take 1 tablet (40 mg total) by mouth once daily.     collagenase ointment  Commonly known as: SANTYL  Apply topically once daily. Clean right hip with wound cleanser and pat dry. Apply a nickel thick layer of santyl to the right hip, cover with adaptic (vaseline gauze) and ABD and secure with tape daily.     docusate sodium 100 MG capsule  Commonly known as: COLACE  Take 100 mg by mouth once daily.     HYDROcodone-acetaminophen 5-325 mg per tablet  Commonly known as: NORCO  Take 1 tablet by mouth every 6 (six) hours as needed for Pain.     hydrocortisone 2.5 % cream  Thin film to AA onface daily PRN flare     hydrOXYzine pamoate 25 MG Cap  Commonly known as: VISTARIL  Take 1 capsule (25 mg total) by mouth every 8 (eight) hours as needed (Itching).     levothyroxine 100 MCG tablet  Commonly known as: SYNTHROID  Take 1 tablet (100 mcg total) by mouth once daily.     memantine 5 MG Tab  Commonly known as: NAMENDA  Take 5 mg by mouth 2 (two) times daily.     mirtazapine 30 MG tablet  Commonly known as: REMERON  Take 1 tablet (30 mg total) by mouth every evening.     multivitamin tablet  Commonly known as: THERAGRAN  Take 1 tablet by mouth once daily.     OCUSOFT LID SCRUB PLUS Padm  Generic drug: eyelid cleanser combination 3  Place 1 Pad into both eyes once daily. Apply to bilateral eyes topically every morning     OLANZapine 10 MG tablet  Commonly known as: ZyPREXA  Take 10 mg by mouth nightly.     propylene glycoL 0.6 % Drop  Place 2 drops into both eyes 3 (three) times daily. For dry eyes     pulse oximeter device  Commonly known as: pulse oximeter  by Apply Externally route 2 (two) times a day. Use twice daily at 8 AM and 3 PM and [facility] the [facility] as directed.     sertraline 25 MG tablet  Commonly known as: ZOLOFT  Take 1 tablet (25 mg total) by mouth once daily.            STOP taking these medications      amLODIPine 5 MG tablet  Commonly known  as: NORVASC     GUILLERMO 7-7-1.5 gram Pwpk  Generic drug: arginine-glutamine-calcium HMB     PRO-STAT AWC ORAL              Indwelling Lines/Drains at time of discharge:   Lines/Drains/Airways       Drain  Duration             Male External Urinary Catheter 09/21/23 1900 7 days                  Physical Exam:  Gen: frail, debilitated male   Lungs: CTA anteriorly   MSK: Bilateral feet wrapped in gauze, dressing c/d/i  Neuro: only able to state first name, does not follow commands      Time spent on the discharge of patient: 40 minutes         Seb East MD  Department of Hospital Medicine  [facility]

## 2023-09-29 NOTE — PLAN OF CARE
LTAC consult noted and auth request sent to [employer] via [employer].  Liaabhishek Feldman notified.  Will follow up.        09/29/23 7462   Post-Acute Status   Post-Acute Authorization Placement   Post-Acute Placement Status Pending payor review/awaiting authorization (if required)   Discharge Plan   Discharge Plan A Long-term acute care facility (LTAC)   Discharge Plan B Skilled Nursing Facility

## 2023-09-29 NOTE — PLAN OF CARE
INFECTIOUS DISEASE DISCHARGE RECOMMENDATIONS    Zosyn 4.5 g IVPB every 8 hours (over 4 hours) for 6 weeks through November 6  Weekly labs including CBC with differential, CMP, CRP for duration of antibiotic therapy  Continue wound care/wound VAC  Follow-up with Infectious Disease after discharge from LTAC approximately 6 weeks      Shawna ARREOLA  [facility] Infectious Disease  09/29/2023  1:23 PM

## 2023-09-29 NOTE — PLAN OF CARE
LTAC has been approved by PHN per eduardo Feldman.     Spoke with patient's son Prieto at number on face sheet concerning discharge plan. Level of care education provided for LTAC services and facility options with financial disclosure explained in detail.  [facility] is facility of choice, eduardo Baca notified via Secure Chat.    [facility] has accepted patient and has bed availability for admission today per eduardo Baca, Dr. East updated and Francia with N notified of accepting facility.        09/29/23 1020   Post-Acute Status   Post-Acute Authorization Placement   Post-Acute Placement Status Set-up Complete/Auth obtained   Discharge Delays None known at this time   Discharge Plan   Discharge Plan A Long-term acute care facility (LTAC)

## 2023-09-29 NOTE — PROGRESS NOTES
"[facility]  Adult Nutrition   Progress Note (Follow-Up)    SUMMARY     Recommendations  Recommendation/Intervention: 1. Continue Regular, soft foods as tolerated. 2. Continue Kyle BID and Glucerna with meals. 3. RD to monitor for intake, labs and status change PRN.  Goals: Pt to meet his nutritional needs and to have complete healing of his wounds.  Nutrition Goal Status: progressing towards goal  Communication of RD Recs: reviewed with RN    Dietitian Rounds Brief  Patient eating fair, drinks supplements. Pt has dc order on chart to [facility]. Last BM 9/25/23. RD to follow for change in status.        Diet order:   Current Diet Order: Regular diet, soft foods.   Oral Nutrition Supplement: Kyle BID, Glucerna TID             Evaluation of Received Nutrient/Fluid Intake  Energy Calories Required: not meeting needs  Protein Required: not meeting needs  Fluid Required: meeting needs  Tolerance: tolerating     % Intake of Estimated Energy Needs: 25 - 50 %  % Meal Intake: 25 - 50 %      Intake/Output Summary (Last 24 hours) at 9/29/2023 1419  Last data filed at 9/29/2023 0801  Gross per 24 hour   Intake 200 ml   Output 2050 ml   Net -1850 ml        Anthropometrics  Temp: 98.5 °F (36.9 °C)  Height Method: Stated  Height: 5' 8" (172.7 cm)  Height (inches): 68 in  Weight Method: Bed Scale  Weight: 73.8 kg (162 lb 11.2 oz)  Weight (lb): 162.7 lb  Ideal Body Weight (IBW), Male: 154 lb  % Ideal Body Weight, Male (lb): 106.79 %  BMI (Calculated): 24.7  BMI Grade: 25 - 29.9 - overweight       Estimated/Assessed Needs  Weight Used For Calorie Calculations: 74.8 kg (164 lb 14.5 oz)  Energy Calorie Requirements (kcal): 0154-0799 kcals/day (20-25 kcals/kg ABW)  Energy Need Method: Kcal/kg  Protein Requirements: 70-84 g/day (1.0-1.2 g/kg IBW)  Weight Used For Protein Calculations: 70 kg (154 lb 5.2 oz)     Estimated Fluid Requirement Method: RDA Method  RDA Method (mL): 1496       Reason for Assessment  Reason For " Assessment: RD follow-up  Diagnosis: infection/sepsis  Relevant Medical History: Thyroid disease, Kidney stone, A-fib, Essential hypertension, Elevated cholesterol, Osteoarthritis, Stroke, TIA, Memory loss, Dementia, History of cerebral hemorrhage, Paroxysmal atrial fibrillation  Interdisciplinary Rounds: did not attend    Nutrition/Diet History  Food Allergies: NKFA  Factors Affecting Nutritional Intake: None identified at this time    Nutrition Risk Screen  Nutrition Risk Screen: no indicators present       Altered Skin Integrity 09/27/23 2221 Scrotum-Wound Image: Images linked       Altered Skin Integrity 09/21/23 1109 Right Heel Full thickness tissue loss. Base is covered by slough and/or eschar in the wound bed-Wound Image: Images linked       Altered Skin Integrity 09/21/23 1110 Right Buttocks Partial thickness tissue loss. Shallow open ulcer with a red or pink wound bed, without slough. Intact or Open/Ruptured Serum-filled blister.-Wound Image: (S) Images linked (Correction to image. R buttock)       Altered Skin Integrity 06/26/23 1500 Right Hip Ulceration Full thickness tissue loss with exposed bone, tendon, or muscle. Often includes undermining and tunneling. May extend into muscle and/or supporting structures.-Wound Image: (S) Images linked       Altered Skin Integrity 06/26/23 1813 Left Heel Ulceration Full thickness tissue loss. Base is covered by slough and/or eschar in the wound bed-Wound Image: (S) Images linked  MST Score: 0  Have you recently lost weight without trying?: No  Weight loss score: 0  Have you been eating poorly because of a decreased appetite?: No  Appetite score: 0       Weight History:  Wt Readings from Last 5 Encounters:   09/24/23 73.8 kg (162 lb 11.2 oz)   06/27/23 78.9 kg (173 lb 15.1 oz)   09/21/21 81.6 kg (179 lb 14.3 oz)   03/13/21 81.6 kg (180 lb)   03/13/21 81.6 kg (179 lb 14.3 oz)        Lab/Procedures/Meds: Pertinent Labs/Meds Reviewed    Medications:Pertinent Medications  "Reviewed  Scheduled Meds:   atorvastatin  40 mg Oral QHS    collagenase   Topical (Top) Daily    enoxparin  40 mg Subcutaneous Daily    levothyroxine  100 mcg Oral Before breakfast    memantine  5 mg Oral BID    mirtazapine  30 mg Oral QHS    multivitamin  1 tablet Oral Daily    OLANZapine  10 mg Oral Nightly    piperacillin-tazobactam (Zosyn) IV (PEDS and ADULTS) (extended infusion is not appropriate)  4.5 g Intravenous Q8H    sertraline  25 mg Oral Daily     Continuous Infusions:  PRN Meds:.acetaminophen, HYDROcodone-acetaminophen, ibuprofen, LIDOcaine HCl 2%, magnesium oxide, magnesium oxide, naloxone, ondansetron, potassium bicarbonate, potassium bicarbonate, potassium bicarbonate, potassium, sodium phosphates, potassium, sodium phosphates, potassium, sodium phosphates    Labs: Pertinent Labs Reviewed  Clinical Chemistry:  Recent Labs   Lab 09/29/23  0640   *   K 3.7      CO2 32*      BUN 17   CREATININE 0.9   CALCIUM 8.2*   ANIONGAP 3*   MG 2.0     CBC:   Recent Labs   Lab 09/29/23  0640   WBC 10.60   RBC 3.42*   HGB 10.4*   HCT 31.3*      MCV 92   MCH 30.4   MCHC 33.2     Lipid Panel:  No results for input(s): "CHOL", "HDL", "LDLCALC", "TRIG", "CHOLHDL" in the last 168 hours.  Cardiac Profile:  No results for input(s): "BNP", "CPK", "CPKMB", "TROPONINI", "CKTOTAL" in the last 168 hours.  Inflammatory Labs:  Recent Labs   Lab 09/23/23  0333 09/27/23  0520   CRP >16.00* 9.05*     Diabetes:  No results for input(s): "HGBA1C", "POCTGLUCOSE" in the last 168 hours.  Thyroid & Parathyroid:  No results for input(s): "TSH", "FREET4", "N7VACZN", "D1CXSCM", "THYROIDAB" in the last 168 hours.    Monitor and Evaluation  Food and Nutrient Intake: food and beverage intake, energy intake  Food and Nutrient Adminstration: diet order  Knowledge/Beliefs/Attitudes: food and nutrition knowledge/skill, beliefs and attitudes  Physical Activity and Function: nutrition-related ADLs and IADLs, factors " affecting access to physical activity  Anthropometric Measurements: weight, weight change, body mass index  Biochemical Data, Medical Tests and Procedures: lipid profile, inflammatory profile, glucose/endocrine profile, gastrointestinal profile, electrolyte and renal panel     Nutrition Risk  Level of Risk/Frequency of Follow-up: moderate - high     Nutrition Follow-Up  RD Follow-up?: Yes      Zuleika Simmons RD, ZACN 09/29/2023 2:19 PM

## 2023-09-29 NOTE — PLAN OF CARE
Patient cleared to admit to [facility] for LTAC services per liaabhishek Baca, who is arranging transportation with [facility].  Report information given to nurse Perez via Secure Chat. Jose Elias Moreau updated and verbalizes agreement and understanding.        09/29/23 1308   Final Note   Assessment Type Final Discharge Note   Anticipated Discharge Disposition Long Term   Post-Acute Status   Post-Acute Authorization Placement   Post-Acute Placement Status Set-up Complete/Auth obtained   Discharge Delays None known at this time

## 2023-09-29 NOTE — CARE UPDATE
09/28/23 2018   Patient Assessment/Suction   Level of Consciousness (AVPU) responds to voice   Respiratory Effort Normal;Unlabored   Expansion/Accessory Muscles/Retractions no use of accessory muscles   PRE-TX-O2   Device (Oxygen Therapy) room air   SpO2 96 %   Pulse Oximetry Type Intermittent   $ Pulse Oximetry - Multiple Charge Pulse Oximetry - Multiple   Pulse 74   Resp 17   Education   $ Education Other (see comment);15 min  (sats)   Respiratory Evaluation   $ Care Plan Tech Time 15 min

## 2023-09-30 LAB
BACTERIA BLD CULT: NORMAL
BACTERIA BLD CULT: NORMAL

## 2023-10-02 LAB — BACTERIA ISLT: ABNORMAL

## 2023-10-03 PROBLEM — L89.614 PRESSURE INJURY OF RIGHT HEEL, STAGE 4: Status: ACTIVE | Noted: 2023-09-25

## 2023-10-03 LAB
BACTERIA BLD CULT: ABNORMAL

## 2023-10-11 ENCOUNTER — PATIENT MESSAGE (OUTPATIENT)
Dept: WOUND CARE | Facility: HOSPITAL | Age: 88
End: 2023-10-11
Payer: MEDICARE

## 2023-10-11 ENCOUNTER — PATIENT OUTREACH (OUTPATIENT)
Dept: ADMINISTRATIVE | Facility: HOSPITAL | Age: 88
End: 2023-10-11
Payer: MEDICARE

## 2023-10-11 NOTE — PROGRESS NOTES
Population Health Chart Review & Patient Outreach Details:     Reason for Outreach Encounter:     []  Non-Compliant Report   [x]  Payor Report ([employer], PHN, BCBS, MSSP, MCIP, UHC, etc.)   []  Pre-Visit Chart Review     Updates Requested / Reviewed:     [x]  Care Everywhere    [x]     []  External Sources ([facility], [facility], DIS, etc.)   []  Care Team Updated    Patient Outreach Method:    []  Telephone Outreach Completed   [] Successful   [] Left Voicemail   [] Unable to Contact (wrong number, no voicemail)  []  [employer] [facility] Outreach Sent  []  Letter Outreach Mailed  []  Fax Sent for External Records  []  External Records Upload    Health Maintenance Topics Addressed and Outreach Outcomes / Actions Taken:        []      Breast Cancer Screening []  Mammo Scheduled      []  External Records Requested     []  Added Reminder to Complete to Upcoming Primary Care Appt Notes     []  Patient Declined     []  Patient Will Call Back to Schedule     []  Patient Will Schedule with External Provider / Order Routed if Applicable             []       Cervical Cancer Screening []  Pap Scheduled      []  External Records Requested     []  Added Reminder to Complete to Upcoming Primary Care Appt Notes     []  Patient Declined     []  Patient Will Call Back to Schedule     []  Patient Will Schedule with External Provider               []          Colorectal Cancer Screening []  Colonoscopy Case Request or Referral Placed     []  External Records Requested     []  Added Reminder to Complete to Upcoming Primary Care Appt Notes     []  Patient Declined     []  Patient Will Call Back to Schedule     []  Patient Will Schedule with External Provider     []  Fit Kit Mailed (add the SmartPhrase under additional notes)     []  Reminded Patient to Complete Home Test             []      Diabetic Eye Exam []  Eye Camera Scheduled or Optometry Referral Placed     []  External Records Requested     []  Added Reminder to Complete to  Upcoming Primary Care Appt Notes     []  Patient Declined     []  Patient Will Call Back to Schedule     []  Patient Will Schedule with External Provider             []      Blood Pressure Control []  Primary Care Follow Up Visit Scheduled     []  Remote Blood Pressure Reading Captured     []  Added Reminder to Complete to Upcoming Primary Care Appt Notes     []  Patient Declined     []  Patient Will Call Back / Patient Will Send Portal Message with Reading     []  Patient Will Call Back to Schedule Provider Visit             []       HbA1c & Other Labs []  Lab Appt Scheduled for Due Labs     []  Primary Care Follow Up Visit Scheduled      []  Reminded Patient to Complete Home Test     []  Added Reminder to Complete to Upcoming Primary Care Appt Notes     []  Patient Declined     []  Patient Will Call Back to Schedule     []  Patient Will Schedule with External Provider / Order Routed if Applicable           []    Schedule Primary Care Appt []  Primary Care Appt Scheduled     []  Patient Declined     []  Patient Will Call Back to Schedule     []  Pt Established with External Provider & Updated Care Team             [x]      Medication Adherence []  Primary Care Appointment Scheduled     []  Added Reminder to Upcoming Primary Care Appt Notes     []  Patient Reminded to  Prescription     []  Patient Declined, Provider Notified if Needed     []  Sent Provider Message to Review and/or Add Exclusion to Problem List             []      Osteoporosis Screening []  DXA Appointment Scheduled     []  External Records Requested     []  Added Reminder to Complete to Upcoming Primary Care Appt Notes     []  Patient Declined     []  Patient Will Call Back to Schedule     []  Patient Will Schedule with External Provider / Order Routed if Applicable     Additional Care Coordinator Notes:     Currently inpatient - most recent refill found for atorvastatin 08/2023.    Further Action Needed If Patient Returns Outreach:

## 2023-10-31 PROBLEM — M86.171 OSTEOMYELITIS OF ANKLE OR FOOT, RIGHT, ACUTE: Status: ACTIVE | Noted: 2023-10-31

## 2023-11-08 ENCOUNTER — PATIENT MESSAGE (OUTPATIENT)
Dept: WOUND CARE | Facility: HOSPITAL | Age: 88
End: 2023-11-08
Payer: MEDICARE

## 2023-11-28 ENCOUNTER — HOSPITAL ENCOUNTER (INPATIENT)
Facility: HOSPITAL | Age: 88
LOS: 2 days | Discharge: HOSPICE | DRG: 539 | End: 2023-11-30
Attending: EMERGENCY MEDICINE | Admitting: STUDENT IN AN ORGANIZED HEALTH CARE EDUCATION/TRAINING PROGRAM
Payer: MEDICARE

## 2023-11-28 DIAGNOSIS — L08.9 WOUND INFECTION: ICD-10-CM

## 2023-11-28 DIAGNOSIS — M86.9 OSTEOMYELITIS, UNSPECIFIED SITE, UNSPECIFIED TYPE: Primary | ICD-10-CM

## 2023-11-28 DIAGNOSIS — R50.9 FEVER: ICD-10-CM

## 2023-11-28 DIAGNOSIS — R07.9 CHEST PAIN: ICD-10-CM

## 2023-11-28 DIAGNOSIS — T14.8XXA WOUND INFECTION: ICD-10-CM

## 2023-11-28 DIAGNOSIS — M86.171 ACUTE OSTEOMYELITIS OF RIGHT CALCANEUS: ICD-10-CM

## 2023-11-28 PROBLEM — L97.424: Status: RESOLVED | Noted: 2023-09-25 | Resolved: 2023-11-28

## 2023-11-28 PROBLEM — S31.829A OPEN WOUND OF LEFT BUTTOCK: Status: RESOLVED | Noted: 2023-08-17 | Resolved: 2023-11-28

## 2023-11-28 PROBLEM — N30.00 ACUTE CYSTITIS: Status: RESOLVED | Noted: 2023-09-21 | Resolved: 2023-11-28

## 2023-11-28 PROBLEM — L89.614 PRESSURE INJURY OF RIGHT HEEL, STAGE 4: Status: RESOLVED | Noted: 2023-09-25 | Resolved: 2023-11-28

## 2023-11-28 LAB
ALBUMIN SERPL BCP-MCNC: 3 G/DL (ref 3.5–5.2)
ALP SERPL-CCNC: 83 U/L (ref 55–135)
ALT SERPL W/O P-5'-P-CCNC: 12 U/L (ref 10–44)
ANION GAP SERPL CALC-SCNC: 6 MMOL/L (ref 8–16)
AST SERPL-CCNC: 20 U/L (ref 10–40)
BASOPHILS # BLD AUTO: 0.06 K/UL (ref 0–0.2)
BASOPHILS NFR BLD: 0.6 % (ref 0–1.9)
BILIRUB SERPL-MCNC: 0.6 MG/DL (ref 0.1–1)
BILIRUB UR QL STRIP: NEGATIVE
BNP SERPL-MCNC: 128 PG/ML (ref 0–99)
BUN SERPL-MCNC: 21 MG/DL (ref 8–23)
CALCIUM SERPL-MCNC: 8.7 MG/DL (ref 8.7–10.5)
CHLORIDE SERPL-SCNC: 104 MMOL/L (ref 95–110)
CLARITY UR: CLEAR
CO2 SERPL-SCNC: 29 MMOL/L (ref 23–29)
COLOR UR: YELLOW
CREAT SERPL-MCNC: 0.8 MG/DL (ref 0.5–1.4)
DIFFERENTIAL METHOD BLD: ABNORMAL
EOSINOPHIL # BLD AUTO: 0.1 K/UL (ref 0–0.5)
EOSINOPHIL NFR BLD: 0.7 % (ref 0–8)
ERYTHROCYTE [DISTWIDTH] IN BLOOD BY AUTOMATED COUNT: 14.3 % (ref 11.5–14.5)
EST. GFR  (NO RACE VARIABLE): >60 ML/MIN/1.73 M^2
GLUCOSE SERPL-MCNC: 128 MG/DL (ref 70–110)
GLUCOSE UR QL STRIP: NEGATIVE
HCT VFR BLD AUTO: 34.6 % (ref 40–54)
HGB BLD-MCNC: 11.4 G/DL (ref 14–18)
HGB UR QL STRIP: NEGATIVE
IMM GRANULOCYTES # BLD AUTO: 0.03 K/UL (ref 0–0.04)
IMM GRANULOCYTES NFR BLD AUTO: 0.3 % (ref 0–0.5)
INFLUENZA A, MOLECULAR: NEGATIVE
INFLUENZA B, MOLECULAR: NEGATIVE
KETONES UR QL STRIP: NEGATIVE
LACTATE SERPL-SCNC: 2.4 MMOL/L (ref 0.5–1.9)
LEUKOCYTE ESTERASE UR QL STRIP: NEGATIVE
LYMPHOCYTES # BLD AUTO: 1.3 K/UL (ref 1–4.8)
LYMPHOCYTES NFR BLD: 13.4 % (ref 18–48)
MAGNESIUM SERPL-MCNC: 1.8 MG/DL (ref 1.6–2.6)
MCH RBC QN AUTO: 30.5 PG (ref 27–31)
MCHC RBC AUTO-ENTMCNC: 32.9 G/DL (ref 32–36)
MCV RBC AUTO: 93 FL (ref 82–98)
MONOCYTES # BLD AUTO: 1.3 K/UL (ref 0.3–1)
MONOCYTES NFR BLD: 13.1 % (ref 4–15)
NEUTROPHILS # BLD AUTO: 7.1 K/UL (ref 1.8–7.7)
NEUTROPHILS NFR BLD: 71.9 % (ref 38–73)
NITRITE UR QL STRIP: NEGATIVE
NRBC BLD-RTO: 0 /100 WBC
PH UR STRIP: 6 [PH] (ref 5–8)
PLATELET # BLD AUTO: 192 K/UL (ref 150–450)
PMV BLD AUTO: 12.2 FL (ref 9.2–12.9)
POTASSIUM SERPL-SCNC: 3.9 MMOL/L (ref 3.5–5.1)
PROCALCITONIN SERPL IA-MCNC: 0.1 NG/ML (ref 0–0.5)
PROT SERPL-MCNC: 6.5 G/DL (ref 6–8.4)
PROT UR QL STRIP: ABNORMAL
RBC # BLD AUTO: 3.74 M/UL (ref 4.6–6.2)
SODIUM SERPL-SCNC: 139 MMOL/L (ref 136–145)
SP GR UR STRIP: 1.03 (ref 1–1.03)
SPECIMEN SOURCE: NORMAL
TROPONIN I SERPL HS-MCNC: 34.4 PG/ML (ref 0–14.9)
URN SPEC COLLECT METH UR: ABNORMAL
UROBILINOGEN UR STRIP-ACNC: NEGATIVE EU/DL
WBC # BLD AUTO: 9.83 K/UL (ref 3.9–12.7)

## 2023-11-28 PROCEDURE — 87154 CUL TYP ID BLD PTHGN 6+ TRGT: CPT | Performed by: EMERGENCY MEDICINE

## 2023-11-28 PROCEDURE — 25000003 PHARM REV CODE 250: Performed by: EMERGENCY MEDICINE

## 2023-11-28 PROCEDURE — 93010 ELECTROCARDIOGRAM REPORT: CPT | Mod: ,,, | Performed by: GENERAL PRACTICE

## 2023-11-28 PROCEDURE — 85025 COMPLETE CBC W/AUTO DIFF WBC: CPT | Performed by: EMERGENCY MEDICINE

## 2023-11-28 PROCEDURE — 96365 THER/PROPH/DIAG IV INF INIT: CPT

## 2023-11-28 PROCEDURE — 87502 INFLUENZA DNA AMP PROBE: CPT | Performed by: EMERGENCY MEDICINE

## 2023-11-28 PROCEDURE — 99223 1ST HOSP IP/OBS HIGH 75: CPT | Mod: FS,,, | Performed by: NURSE PRACTITIONER

## 2023-11-28 PROCEDURE — 63600175 PHARM REV CODE 636 W HCPCS: Performed by: NURSE PRACTITIONER

## 2023-11-28 PROCEDURE — 83880 ASSAY OF NATRIURETIC PEPTIDE: CPT | Performed by: EMERGENCY MEDICINE

## 2023-11-28 PROCEDURE — 80053 COMPREHEN METABOLIC PANEL: CPT | Performed by: EMERGENCY MEDICINE

## 2023-11-28 PROCEDURE — 63600175 PHARM REV CODE 636 W HCPCS: Performed by: EMERGENCY MEDICINE

## 2023-11-28 PROCEDURE — 99285 EMERGENCY DEPT VISIT HI MDM: CPT | Mod: 25

## 2023-11-28 PROCEDURE — 36415 COLL VENOUS BLD VENIPUNCTURE: CPT | Performed by: EMERGENCY MEDICINE

## 2023-11-28 PROCEDURE — 87040 BLOOD CULTURE FOR BACTERIA: CPT | Performed by: EMERGENCY MEDICINE

## 2023-11-28 PROCEDURE — 51702 INSERT TEMP BLADDER CATH: CPT

## 2023-11-28 PROCEDURE — 51701 INSERT BLADDER CATHETER: CPT

## 2023-11-28 PROCEDURE — 25000003 PHARM REV CODE 250: Performed by: NURSE PRACTITIONER

## 2023-11-28 PROCEDURE — 84145 PROCALCITONIN (PCT): CPT | Performed by: EMERGENCY MEDICINE

## 2023-11-28 PROCEDURE — 83605 ASSAY OF LACTIC ACID: CPT | Performed by: EMERGENCY MEDICINE

## 2023-11-28 PROCEDURE — 25000003 PHARM REV CODE 250: Performed by: STUDENT IN AN ORGANIZED HEALTH CARE EDUCATION/TRAINING PROGRAM

## 2023-11-28 PROCEDURE — 93005 ELECTROCARDIOGRAM TRACING: CPT | Performed by: GENERAL PRACTICE

## 2023-11-28 PROCEDURE — 84484 ASSAY OF TROPONIN QUANT: CPT | Performed by: EMERGENCY MEDICINE

## 2023-11-28 PROCEDURE — 81003 URINALYSIS AUTO W/O SCOPE: CPT | Performed by: EMERGENCY MEDICINE

## 2023-11-28 PROCEDURE — 86140 C-REACTIVE PROTEIN: CPT | Performed by: EMERGENCY MEDICINE

## 2023-11-28 PROCEDURE — 63600175 PHARM REV CODE 636 W HCPCS: Performed by: STUDENT IN AN ORGANIZED HEALTH CARE EDUCATION/TRAINING PROGRAM

## 2023-11-28 PROCEDURE — 83735 ASSAY OF MAGNESIUM: CPT | Performed by: EMERGENCY MEDICINE

## 2023-11-28 PROCEDURE — 12000002 HC ACUTE/MED SURGE SEMI-PRIVATE ROOM

## 2023-11-28 RX ORDER — ARGININE/GLUTAMINE/CALCIUM BMB 7G-7G-1.5G
1 POWDER IN PACKET (EA) ORAL 2 TIMES DAILY
COMMUNITY

## 2023-11-28 RX ORDER — ATORVASTATIN CALCIUM 40 MG/1
40 TABLET, FILM COATED ORAL DAILY
Status: DISCONTINUED | OUTPATIENT
Start: 2023-11-28 | End: 2023-11-30 | Stop reason: HOSPADM

## 2023-11-28 RX ORDER — SERTRALINE HYDROCHLORIDE 25 MG/1
25 TABLET, FILM COATED ORAL DAILY
Status: DISCONTINUED | OUTPATIENT
Start: 2023-11-28 | End: 2023-11-30 | Stop reason: HOSPADM

## 2023-11-28 RX ORDER — HYDROCODONE BITARTRATE AND ACETAMINOPHEN 10; 325 MG/1; MG/1
1 TABLET ORAL EVERY 6 HOURS PRN
Status: DISCONTINUED | OUTPATIENT
Start: 2023-11-28 | End: 2023-11-30 | Stop reason: HOSPADM

## 2023-11-28 RX ORDER — AMOXICILLIN AND CLAVULANATE POTASSIUM 875; 125 MG/1; MG/1
1 TABLET, FILM COATED ORAL 2 TIMES DAILY
COMMUNITY
Start: 2023-11-27 | End: 2023-11-28

## 2023-11-28 RX ORDER — VANCOMYCIN HCL IN 5 % DEXTROSE 1G/250ML
1000 PLASTIC BAG, INJECTION (ML) INTRAVENOUS
Status: DISCONTINUED | OUTPATIENT
Start: 2023-11-29 | End: 2023-11-29

## 2023-11-28 RX ORDER — LEVOTHYROXINE SODIUM 100 UG/1
100 TABLET ORAL DAILY
Status: DISCONTINUED | OUTPATIENT
Start: 2023-11-29 | End: 2023-11-30 | Stop reason: HOSPADM

## 2023-11-28 RX ORDER — MORPHINE SULFATE 2 MG/ML
2 INJECTION, SOLUTION INTRAMUSCULAR; INTRAVENOUS
Status: CANCELLED | OUTPATIENT
Start: 2023-11-28

## 2023-11-28 RX ORDER — ENOXAPARIN SODIUM 100 MG/ML
40 INJECTION SUBCUTANEOUS EVERY 24 HOURS
Status: DISCONTINUED | OUTPATIENT
Start: 2023-11-29 | End: 2023-11-30 | Stop reason: HOSPADM

## 2023-11-28 RX ORDER — MORPHINE SULFATE 2 MG/ML
2 INJECTION, SOLUTION INTRAMUSCULAR; INTRAVENOUS EVERY 4 HOURS PRN
Status: DISCONTINUED | OUTPATIENT
Start: 2023-11-28 | End: 2023-11-28

## 2023-11-28 RX ORDER — MORPHINE SULFATE 4 MG/ML
4 INJECTION, SOLUTION INTRAMUSCULAR; INTRAVENOUS
Status: DISCONTINUED | OUTPATIENT
Start: 2023-11-28 | End: 2023-11-30 | Stop reason: HOSPADM

## 2023-11-28 RX ORDER — MORPHINE SULFATE 4 MG/ML
4 INJECTION, SOLUTION INTRAMUSCULAR; INTRAVENOUS EVERY 4 HOURS PRN
Status: DISCONTINUED | OUTPATIENT
Start: 2023-11-28 | End: 2023-11-28

## 2023-11-28 RX ORDER — ACETAMINOPHEN 325 MG/1
650 TABLET ORAL EVERY 6 HOURS PRN
Status: DISCONTINUED | OUTPATIENT
Start: 2023-11-28 | End: 2023-11-30 | Stop reason: HOSPADM

## 2023-11-28 RX ORDER — AMMONIUM LACTATE 12 G/100G
1 LOTION TOPICAL 2 TIMES DAILY
COMMUNITY
Start: 2023-11-27

## 2023-11-28 RX ORDER — HYDROCODONE BITARTRATE AND ACETAMINOPHEN 5; 325 MG/1; MG/1
1 TABLET ORAL 3 TIMES DAILY PRN
COMMUNITY

## 2023-11-28 RX ORDER — MORPHINE SULFATE 4 MG/ML
4 INJECTION, SOLUTION INTRAMUSCULAR; INTRAVENOUS
Status: CANCELLED | OUTPATIENT
Start: 2023-11-28

## 2023-11-28 RX ORDER — HYDROCODONE BITARTRATE AND ACETAMINOPHEN 5; 325 MG/1; MG/1
1 TABLET ORAL EVERY 6 HOURS PRN
Status: DISCONTINUED | OUTPATIENT
Start: 2023-11-28 | End: 2023-11-30 | Stop reason: HOSPADM

## 2023-11-28 RX ORDER — MEMANTINE HYDROCHLORIDE 5 MG/1
5 TABLET ORAL 2 TIMES DAILY
Status: DISCONTINUED | OUTPATIENT
Start: 2023-11-28 | End: 2023-11-30 | Stop reason: HOSPADM

## 2023-11-28 RX ORDER — MUPIROCIN 20 MG/G
OINTMENT TOPICAL 2 TIMES DAILY
Status: DISCONTINUED | OUTPATIENT
Start: 2023-11-28 | End: 2023-11-30 | Stop reason: HOSPADM

## 2023-11-28 RX ORDER — MIRTAZAPINE 15 MG/1
30 TABLET, FILM COATED ORAL NIGHTLY
Status: DISCONTINUED | OUTPATIENT
Start: 2023-11-28 | End: 2023-11-30 | Stop reason: HOSPADM

## 2023-11-28 RX ORDER — MORPHINE SULFATE 2 MG/ML
2 INJECTION, SOLUTION INTRAMUSCULAR; INTRAVENOUS
Status: DISCONTINUED | OUTPATIENT
Start: 2023-11-28 | End: 2023-11-30 | Stop reason: HOSPADM

## 2023-11-28 RX ORDER — AMINO ACIDS/PROTEIN HYDROLYS 15G-100/30
45 LIQUID (ML) ORAL 2 TIMES DAILY
COMMUNITY

## 2023-11-28 RX ADMIN — CEFEPIME 2 G: 2 INJECTION, POWDER, FOR SOLUTION INTRAVENOUS at 01:11

## 2023-11-28 RX ADMIN — MEMANTINE HYDROCHLORIDE 5 MG: 5 TABLET ORAL at 09:11

## 2023-11-28 RX ADMIN — MIRTAZAPINE 30 MG: 15 TABLET, FILM COATED ORAL at 09:11

## 2023-11-28 RX ADMIN — MUPIROCIN 1 G: 20 OINTMENT TOPICAL at 09:11

## 2023-11-28 RX ADMIN — MORPHINE SULFATE 2 MG: 2 INJECTION, SOLUTION INTRAMUSCULAR; INTRAVENOUS at 08:11

## 2023-11-28 RX ADMIN — MORPHINE SULFATE 4 MG: 4 INJECTION, SOLUTION INTRAMUSCULAR; INTRAVENOUS at 04:11

## 2023-11-28 RX ADMIN — VANCOMYCIN HYDROCHLORIDE 1500 MG: 1.5 INJECTION, POWDER, LYOPHILIZED, FOR SOLUTION INTRAVENOUS at 03:11

## 2023-11-28 RX ADMIN — OLANZAPINE 7.5 MG: 5 TABLET, FILM COATED ORAL at 09:11

## 2023-11-28 RX ADMIN — PIPERACILLIN AND TAZOBACTAM 4.5 G: 4; .5 INJECTION, POWDER, LYOPHILIZED, FOR SOLUTION INTRAVENOUS at 10:11

## 2023-11-28 RX ADMIN — PIPERACILLIN SODIUM AND TAZOBACTAM SODIUM 4.5 G: 4; .5 INJECTION, POWDER, LYOPHILIZED, FOR SOLUTION INTRAVENOUS at 09:11

## 2023-11-28 RX ADMIN — SODIUM CHLORIDE 1773 ML: 0.9 INJECTION, SOLUTION INTRAVENOUS at 10:11

## 2023-11-28 NOTE — ED PROVIDER NOTES
Encounter Date: 11/28/2023       History     Chief Complaint   Patient presents with    Fever     Pt from [facility]. C/o altered mental status and fever. Pt received tylenol at 0750.      Patient presents from nursing home with complaint of fever at the nursing home of 89 as well as change in mental status.  Patient has recent history of chronic wound to the heel and hip for which he has been treated with IV antibiotics in the recent past.  He sees Wound Care Dr. Middleton.  At the worst symptoms are moderate.    Review of patient's allergies indicates:   Allergen Reactions    Ciprofloxacin Other (See Comments)     Shoulder and leg pain/cramping     Codeine Other (See Comments)     Pt cannot remember been a long time ago    Sulfa (sulfonamide antibiotics)      Allergic reaction to bactrim     Past Medical History:   Diagnosis Date    A-fib     Dementia     Elevated cholesterol     Elevated PSA 6/10/2014    Essential hypertension     History of cerebral hemorrhage 9/5/2019    Kidney stone     passed on his own    Memory loss     dememtia per family    Osteoarthritis     Paroxysmal atrial fibrillation 12/12/2018    Stroke     2012 TIA     Thyroid disease      Past Surgical History:   Procedure Laterality Date    CYSTOSCOPY      EYE SURGERY      MAGNETIC RESONANCE IMAGING N/A 8/23/2019    Procedure: MRI (Magnetic Resonance Imagine) needs anesthesia;  Surgeon: Panchito Montoya MD;  Location: [facility];  Service: Anesthesiology;  Laterality: N/A;     Family History   Problem Relation Age of Onset    Cancer Mother     Diabetes Mother     Hyperlipidemia Mother     Heart disease Father     Hyperlipidemia Father     Dementia Sister     Dementia Brother     Heart disease Brother     Migraines Daughter     Tremor Daughter     Tremor Son     Urolithiasis Neg Hx     Prostate cancer Neg Hx     Kidney cancer Neg Hx      Social History     Tobacco Use    Smoking status:  Former     Types: Cigarettes    Smokeless tobacco: Never    Tobacco comments:     quit 50 years ago   Substance Use Topics    Alcohol use: No    Drug use: No     Review of Systems   All other systems reviewed and are negative.      Physical Exam     Initial Vitals   BP Pulse Resp Temp SpO2   11/28/23 0900 11/28/23 0858 11/28/23 0858 11/28/23 1058 11/28/23 0858   (!) 141/59 79 (!) 22 99.8 °F (37.7 °C) (!) 94 %      MAP       --                Physical Exam    Nursing note and vitals reviewed.  Constitutional: He is not diaphoretic. No distress.   Pleasant, polite.  Elderly, frail   HENT:   Head: Normocephalic and atraumatic.   Eyes: EOM are normal. Pupils are equal, round, and reactive to light.   Neck: Neck supple.   Normal range of motion.  Cardiovascular:  Normal rate, regular rhythm, normal heart sounds and intact distal pulses.           Pulmonary/Chest: Breath sounds normal. No respiratory distress.   Abdominal: Abdomen is soft.   Musculoskeletal:      Cervical back: Normal range of motion and neck supple.      Comments: He is contracted to the lower extremities with chronic wounds to the right heel and right hip.  The right hip wound has mild area of erythema and drainage.  The right heel has area of erythema and drainage.  Please see the attached pictures     Neurological: He is alert.   Patient is awake and answers most questions appropriately.  He does appear elderly frail and confused to some questions.   Skin: Skin is warm and dry.   Psychiatric: He has a normal mood and affect. His behavior is normal. Judgment and thought content normal.       ED Course   Procedures  Labs Reviewed   CBC W/ AUTO DIFFERENTIAL - Abnormal; Notable for the following components:       Result Value    RBC 3.74 (*)     Hemoglobin 11.4 (*)     Hematocrit 34.6 (*)     Mono # 1.3 (*)     Lymph % 13.4 (*)     All other components within normal limits   COMPREHENSIVE METABOLIC PANEL - Abnormal; Notable for the following  components:    Glucose 128 (*)     Albumin 3.0 (*)     Anion Gap 6 (*)     All other components within normal limits   LACTIC ACID, PLASMA - Abnormal; Notable for the following components:    Lactate (Lactic Acid) 2.4 (*)     All other components within normal limits   B-TYPE NATRIURETIC PEPTIDE - Abnormal; Notable for the following components:     (*)     All other components within normal limits   TROPONIN I HIGH SENSITIVITY - Abnormal; Notable for the following components:    Troponin I High Sensitivity 34.4 (*)     All other components within normal limits   CULTURE, BLOOD    Narrative:     Collection has been rescheduled by DW at 11/28/2023 09:08 Reason:   Patient unavailable with doctor also getting EKG   Collection has been rescheduled by DW at 11/28/2023 09:08 Reason:   Patient unavailable with doctor also getting EKG    CULTURE, BLOOD    Narrative:     Collection has been rescheduled by DW at 11/28/2023 09:08 Reason:   Patient unavailable with doctor also getting EKG   Collection has been rescheduled by DW at 11/28/2023 09:08 Reason:   Patient unavailable with doctor also getting EKG    INFLUENZA A AND B ANTIGEN    Narrative:     Specimen Source->Nasopharyngeal Swab   PROCALCITONIN   MAGNESIUM   URINALYSIS, REFLEX TO URINE CULTURE   URINALYSIS, REFLEX TO URINE CULTURE   LACTIC ACID, PLASMA        ECG Results              EKG 12-lead (In process)  Result time 11/28/23 09:45:52      In process by Interface, Lab In [facility] (11/28/23 09:45:52)                   Narrative:    Test Reason : R50.9,    Vent. Rate : 075 BPM     Atrial Rate : 075 BPM     P-R Int : 168 ms          QRS Dur : 100 ms      QT Int : 368 ms       P-R-T Axes : 031 -31 015 degrees     QTc Int : 410 ms    Sinus rhythm with Premature supraventricular complexes  Left axis deviation  Abnormal ECG  When compared with ECG of 22-SEP-2023 10:29,  Sinus rhythm has replaced Atrial fibrillation    Referred By:  KAMI WILCOX           Confirmed By:                                    Imaging Results              X-Ray Foot Complete Right (Final result)  Result time 11/28/23 10:43:28      Final result by Amanda Delgado MD (11/28/23 10:43:28)                   Narrative:    3 views right foot    Clinical history is infection to the heel    COMPARISON: 9/21/2023    FINDINGS: There is stable soft tissue ulceration along the posterior calcaneus. There is cortical lucency within the posterior calcaneus suggestive of osteomyelitis. MRI recommended for further evaluation.  There are no additional osseous abnormalities. There are degenerative changes of the hindfoot.    IMPRESSION: Findings suggestive of osteomyelitis of the posterior calcaneus with associated soft tissue ulceration. Correlation with MRI or triple phase bone scan is recommended    Electronically signed by:  Amanda Delgado MD  11/28/2023 10:43 AM [facility] Workstation: [id number]                                     X-Ray Hip 2 or 3 views Right (with Pelvis when performed) (Final result)  Result time 11/28/23 10:46:22      Final result by Andrew Doty MD (11/28/23 10:46:22)                   Narrative:    Right hip with AP pelvis    CLINICAL DATA: Right hip wound    FINDINGS: 3 views are submitted. Comparison is made to a CT examination from September 2023.    Exam is limited by difficulty with positioning. There is moderate varus angulation at the right hip. There is no evidence of fracture or dislocation. Mild to moderate joint space narrowing at the right hip is consistent with osteoarthritic change.    Soft tissue defect overlying the greater trochanter of the right femur is noted, and compared to the prior CT appears somewhat more prominent. No definite underlying bone destruction is identified.    Mild osteoarthritic changes noted at the left hip. The remainder of the bony pelvis is unremarkable.    IMPRESSION:  1. Prominent soft tissue defect over the greater trochanter of the right femur as  above. There is no definite radiographic evidence of osteomyelitis, however if osteomyelitis is a strong clinical concern, MR or three-phase bone scan should be considered.  2. Additional chronic findings as above.    Electronically signed by:  Andrew Doty MD  11/28/2023 10:46 AM [facility] Workstation: [id number]                                     X-Ray Chest AP Portable (Final result)  Result time 11/28/23 09:12:20      Final result by Prieto Bermeo MD (11/28/23 09:12:20)                   Narrative:    XR CHEST 1 VIEW    CLINICAL HISTORY:  88 years Male Sepsis    COMPARISON: None    FINDINGS: Low lung volumes with accentuation of cardiac silhouette size. Atherosclerotic calcification of the aorta. Mild increased interstitial markings bilaterally. No confluent alveolar consolidation. No large pleural effusion or pneumothorax.    IMPRESSION:    Increased interstitial markings bilaterally which could reflect pulmonary edema/volume overload or atypical infection.    Electronically signed by:  Prieto Bermeo MD  11/28/2023 09:12 AM [facility] Workstation: [id number]                                     Medications   vancomycin - pharmacy to dose (has no administration in time range)   vancomycin 1.5 g in dextrose 5 % 250 mL IVPB (ready to mix) (has no administration in time range)   cefepime 2 g in dextrose 5% 100 mL IVPB (ready to mix) (has no administration in time range)   piperacillin-tazobactam 4.5 g in dextrose 5 % 100 mL IVPB (ready to mix) (4.5 g Intravenous New Bag 11/28/23 1004)   sodium chloride 0.9% bolus 1,773 mL 1,773 mL (1,773 mLs Intravenous New Bag 11/28/23 1001)     Medical Decision Making  Patient appears elderly and frail     Considerations include but are not limited to sepsis, osteomyelitis, acute kidney injury, dehydration, electrolyte abnormalities    MDM    Patient presents for emergent evaluation of acute change in mental status, fever that poses a possible threat to life and/or bodily  function.    In the ED patient found to have acute sepsis.   I ordered labs and personally reviewed them.  Labs significant for lactate of 2.4.    I ordered X-rays and personally reviewed them and reviewed the radiologist interpretation.  Xray significant for osteomyelitis right heel.    I ordered EKG and personally reviewed it.  EKG significant for irregular, no ST elevation, pVCs present.        Admission MDM  I discussed the patient presentation labs, ekg, X-rays, findings with the consultant(s) [facility] Dr. Johnson   Patient was managed in the ED with IV Zosyn, fluids.    The response to treatment was improved.    Patient required emergent consultation to hospitalist for admission.    Attending Critical Care:   Critical Care Times:   Direct Patient Care (initial evaluation, reassessments, and time considering the case)................................................................10 minutes.   Additional History from reviewing old medical records or taking additional history from the family, EMS, PCP, etc.......................10 minutes.   Ordering, Reviewing, and Interpreting Diagnostic Studies...............................................................................................................10 minutes.   Documentation..................................................................................................................................................................................5 minutes.   ==============================================================  · Total Critical Care Time - exclusive of procedural time: 35 minutes.  ==============================================================  Critical care was necessary to treat or prevent imminent or life-threatening deterioration of the following conditions:  Sepsis.   Critical care was time spent personally by me on the following activities: obtaining history from patient or relative, examination of patient, review of x-rays /  CT sent with the patient, ordering lab, x-rays, and/or EKG, development of treatment plan with patient or relative, ordering and performing treatments and interventions, interpretation of cardiac measurements and re-evaluation of patient's conition.   Critical Care Condition: potentially life-threatening     Amount and/or Complexity of Data Reviewed  Labs:  Decision-making details documented in ED Course.  Radiology: ordered.    Risk  Decision regarding hospitalization.      Additional MDM:   Sepsis:   This patient does have evidence of infective focus  My overall impression is sepsis.  Source: Skin and Soft Tissue (location osteomyelitis right heel)  Antibiotics given- Antibiotics     Patient Encounter Information Not Found      Latest lactate reviewed- 2.4  Organ dysfunction indicated by Encephalopathy    Fluid challenge Actual Body weight- Patient will receive 30ml/kg actual body weight to calculate fluid bolus for treatment of septic shock.     Post- resuscitation assessment No - Post resuscitation assessment not needed       Will Not start Pressors- Levophed for MAP of 65  Source control achieved by:  Antibiotics              ED Course as of 11/28/23 1125   Tue Nov 28, 2023   1036 Magnesium : 1.8 [AP]   1036 WBC: 9.83 [AP]   1037 Hematocrit(!): 34.6 [AP]   1037 Platelet Count: 192 [AP]   1037 Sodium: 139 [AP]   1037 Potassium: 3.9 [AP]   1037 Chloride: 104 [AP]   1037 CO2: 29 [AP]   1037 Glucose(!): 128 [AP]   1037 BUN: 21 [AP]   1037 Creatinine: 0.8 [AP]   1037 Calcium: 8.7 [AP]   1037 PROTEIN TOTAL: 6.5 [AP]   1037 Albumin(!): 3.0 [AP]   1037 BILIRUBIN TOTAL: 0.6 [AP]   1037 ALP: 83 [AP]   1037 AST: 20 [AP]   1037 ALT: 12 [AP]   1047 Lactate, Allen(!!): 2.4 [AP]   1048 BNP(!): 128 [AP]   1048 Troponin I High Sensitivity(!): 34.4 [AP]   1048 Sodium: 139 [AP]   1048 Potassium: 3.9 [AP]   1048 Chloride: 104 [AP]   1048 CO2: 29 [AP]   1048 Glucose(!): 128 [AP]   1048 BUN: 21 [AP]   1048 Creatinine: 0.8 [AP]   1048  Calcium: 8.7 [AP]   1048 PROTEIN TOTAL: 6.5 [AP]   1048 Albumin(!): 3.0 [AP]   1048 BILIRUBIN TOTAL: 0.6 [AP]   1048 ALP: 83 [AP]   1048 AST: 20 [AP]   1048 ALT: 12 [AP]   1048 Anion Gap(!): 6 [AP]   1048 WBC: 9.83 [AP]   1048 Hemoglobin(!): 11.4 [AP]   1048 Hematocrit(!): 34.6 [AP]   1048 Platelet Count: 192 [AP]      ED Course User Index  [AP] Gibran Richardson MD                           Clinical Impression:  Final diagnoses:  [R50.9] Fever  [T14.8XXA, L08.9] Wound infection  [M86.9] Osteomyelitis, unspecified site, unspecified type (Primary)  [M86.171] Acute osteomyelitis of right calcaneus          ED Disposition Condition    Admit Stable                Gibran Richardson MD  11/28/23 5616

## 2023-11-28 NOTE — ASSESSMENT & PLAN NOTE
Recurrent vs refractory  S/p recent IV abx treatment w/ zosyn ended 11/6  Has grown enterococcus and e.coli in last 1 year  - continue with vanc/cefepime for now  - f/u NM scan; could not tolerate MRI in past  - consults to wound care, ID, and podiatry  - f/u goals of care with daughter who is POA

## 2023-11-28 NOTE — CONSULTS
Formerly [facility], later [facility] - Emergency Dept   Department of Infectious Disease  Consult Note        PATIENT NAME: Adriano Borjas  MRN: [id number]  TODAY'S DATE: 11/28/2023  ADMIT DATE: 11/28/2023  LENGTH OF STAY: 0 DAYS      CHIEF COMPLAINT: Fever (Pt from [facility]. C/o altered mental status and fever. Pt received tylenol at 0750. )    PRINCIPLE PROBLEM: Acute osteomyelitis of right calcaneus    REASON FOR CONSULT: Right heel osteo    ASSESSMENT and PLAN     1. Sepsis with Bilateral calcaneal osteomyelitis and right hip decubitus and recently treated osteomyelitis and anaerobic  bacteremia with chronic bilateral heel wounds with surgical/bone cultures growing Enterococcus faecalis, anaerobes and E coli  -CRP and procalcitonin pending  -in a.m. 3 phase bone scan of the feet with right-greater-than-left calcaneus uptake compatible with cellulitis and osteomyelitis  -T-max since arrival 100.4  -CRP and Procal pending, WBC 9.83, lactic acid 2.4    2. Frail elderly with decreased mobility and dementia  -usually alert, conversant, needs assistance to eat, altered mentation today    3. Chronic medical problems including atrial fibrillation, hypertension, osteoarthritis eyes and history of cerebral hemorrhage      RECOMMENDATIONS:  Stop cefepime   Obtain MRSA screen   Continue vancomycin with pharmacy to dose, keep vanc level 15-20  Start Zosyn 4.5 g IV every 8 hours extended infusion   Consult to palliative care - discussed with daughter Shawna who is POA  CRP and procalcitonin pending   Follow cultures   Wound care and podiatry consulted  Aspiration precautions  Wound care to all affected areas     D/W daughter, Dr Velazquez    Thank you for this consult. Please send [employer] secure chat with any questions.    Antibiotics (From admission, onward)      Start     Stop Route Frequency Ordered    11/28/23 2000  piperacillin-tazobactam 4.5 g in dextrose 5 % 100 mL IVPB (ready to mix)         -- IV Every 8 hours (non-standard times)  11/28/23 1624    11/28/23 1215  vancomycin 1.5 g in dextrose 5 % 250 mL IVPB (ready to mix)         -- IV Once 11/28/23 1114    11/28/23 1208  vancomycin - pharmacy to dose  (vancomycin IVPB (PEDS and ADULTS))        See Hyperspace for full Linked Orders Report.    -- IV pharmacy to manage frequency 11/28/23 1108            HPI      Adriano Borjas is a 88 y.o. male who is a resident of [facility] ([place], [place]) with chronic medical problems including dementia, atrial fibrillation, hypertension, hyperlipidemia who was sent to the emergency room today for evaluation of 103 fever.  He was hospitalized here about a month ago and was sent to [facility] to continue Zosyn for 6 weeks through 11/6.  He was followed by wound care and Infectious Disease there in his daughter said he had improvement.  He was discharged from LT on 11/09 in his daughter said he seemed to be doing well initially.  He was followed by wound care at [facility] and a couple of days ago they told him his wound had gotten worse and had an odor and drainage and then today he had 103 fever.  His mental status has been good up until the last day or 2 and he has been less interactive.  She said prior to that he was eating and drinking well though he needs to be fed.  He sits up in a wheelchair and has to be pushed around.  She said he seems to be in lot of pain with any kind of movement.  She said overall he has had weight loss and is down to 130 lb.  She is very conflicted over whether to continue treatment or seek hospice care for her father.    In the ED, lactic acid was elevated 2.4,  WBC 9.83, no left shift, platelets 192, BUN/CR 21/0.8 with creatinine clearance 55.4.  Procalcitonin 0.101, urinalysis with trace protein, influenza screen negative, ALT/AST 12/20, albumin 3.0 glucose 128, temperature 100.4, blood cultures screen and MRSA screen is pending.  O2 sat was 94-96% on room air.  Chest x-ray with increased interstitial markings bilaterally possible atypical  infection versus volume overload, x-ray of right foot with posterior calcaneus osteomyelitis and right hip x-ray with no definitive evidence of osteomyelitis..  Nuclear medicine 3 phase bone scan of feet with right-greater-than-left cellulitis and osteomyelitis of the calcaneus.  Blood cultures x2 were drawn.  He was unable to have an angiogram or an MRI of the last admission because of lower extremity contractures.  He was started on IV Zosyn, was given a dose of cefepime and vancomycin.      Outdoor activities: Lives at [place], bed/wheelchair bound  Travel: none  Implants:  None  Antibiotic history:  See HPI    Past Medical History:   Diagnosis Date    A-fib     Dementia     Elevated cholesterol     Elevated PSA 6/10/2014    Essential hypertension     History of cerebral hemorrhage 9/5/2019    Kidney stone     passed on his own    Memory loss     dememtia per family    Osteoarthritis     Paroxysmal atrial fibrillation 12/12/2018    Stroke     2012 TIA     Thyroid disease        Past Surgical History:   Procedure Laterality Date    CYSTOSCOPY      EYE SURGERY      MAGNETIC RESONANCE IMAGING N/A 8/23/2019    Procedure: MRI (Magnetic Resonance Imagine) needs anesthesia;  Surgeon: Panchito Montoya MD;  Location: [facility];  Service: Anesthesiology;  Laterality: N/A;       Family History   Problem Relation Age of Onset    Cancer Mother     Diabetes Mother     Hyperlipidemia Mother     Heart disease Father     Hyperlipidemia Father     Dementia Sister     Dementia Brother     Heart disease Brother     Migraines Daughter     Tremor Daughter     Tremor Son     Urolithiasis Neg Hx     Prostate cancer Neg Hx     Kidney cancer Neg Hx        Social History     Socioeconomic History    Marital status:    Tobacco Use    Smoking status: Former     Types: Cigarettes    Smokeless tobacco: Never    Tobacco comments:     quit 50 years ago   Substance and Sexual Activity    Alcohol use: No    Drug use: No     Social  Determinants of Health     Financial Resource Strain: Low Risk  (10/30/2023)    Overall Financial Resource Strain (CARDIA)     Difficulty of Paying Living Expenses: Not hard at all   Food Insecurity: No Food Insecurity (10/30/2023)    Hunger Vital Sign     Worried About Running Out of Food in the Last Year: Never true     Ran Out of Food in the Last Year: Never true   Transportation Needs: No Transportation Needs (10/30/2023)    PRAPARE - Transportation     Lack of Transportation (Medical): No     Lack of Transportation (Non-Medical): No   Physical Activity: Inactive (10/30/2023)    Exercise Vital Sign     Days of Exercise per Week: 0 days     Minutes of Exercise per Session: 0 min   Stress: Stress Concern Present (10/30/2023)    [employer] - Occupational Stress Questionnaire     Feeling of Stress : To some extent   Social Connections: Socially Isolated (10/30/2023)    Social Connection and Isolation Panel [NHANES]     Frequency of Communication with Friends and Family: Twice a week     Frequency of Social Gatherings with Friends and Family: Once a week     Attends Faith Services: Never     Active Member of Clubs or Organizations: No     Attends Club or Organization Meetings: Never     Marital Status:    Housing Stability: Low Risk  (10/30/2023)    Housing Stability Vital Sign     Unable to Pay for Housing in the Last Year: No     Number of Places Lived in the Last Year: 1     Unstable Housing in the Last Year: No       Review of patient's allergies indicates:   Allergen Reactions    Ciprofloxacin Other (See Comments)     Shoulder and leg pain/cramping     Codeine Other (See Comments)     Pt cannot remember been a long time ago    Sulfa (sulfonamide antibiotics)      Allergic reaction to bactrim       Current Outpatient Medications   Medication Instructions    acetaminophen (TYLENOL) 500 mg, Oral, Nightly    amino acids-protein hydrolys (PRO-STAT AWC)  gram-kcal/30 mL  Liqd 45 mLs, Oral, 2 times daily    ammonium lactate (LAC-HYDRIN) 1 g, Topical (Top), 2 times daily, For dry skin    arginine-glutamine-calcium HMB (GUILLERMO) 7-7-1.5 gram PwPk 1 packet, Oral, 2 times daily    ascorbic acid (vitamin C) (VITAMIN C) 500 mg, Oral, Daily    atorvastatin (LIPITOR) 40 mg, Oral, Daily    collagenase (SANTYL) ointment Topical (Top), Daily, Clean right hip with wound cleanser and pat dry. Apply a nickel thick layer of santyl to the right hip, cover with adaptic (vaseline gauze) and ABD and secure with tape daily.    HYDROcodone-acetaminophen (NORCO) 5-325 mg per tablet 1 tablet, Oral, 3 times daily PRN    levothyroxine (SYNTHROID) 100 mcg, Oral, Daily    memantine (NAMENDA) 5 mg, Oral, 2 times daily    miconazole (MICOTIN) 2 % cream Topical (Top), 2 times daily    mirtazapine (REMERON) 30 mg, Oral, Nightly    multivitamin (THERAGRAN) tablet 1 tablet, Oral, Daily    OLANZapine (ZYPREXA) 7.5 mg, Oral, Nightly    sertraline (ZOLOFT) 25 mg, Oral, Daily       Review of Systems  Unable to obtain due to altered mental status    OBJECTIVE     Temp:  [99.8 °F (37.7 °C)] 99.8 °F (37.7 °C)  Pulse:  [79-87] 85  Resp:  [19-22] 20  SpO2:  [94 %-96 %] 96 %  BP: (124-151)/(59-89) 151/70  Temp:  [99.8 °F (37.7 °C)]   Temp: 99.8 °F (37.7 °C) (11/28/23 1058)  Pulse: 85 (11/28/23 1431)  Resp: 20 (11/28/23 1431)  BP: (!) 151/70 (11/28/23 1430)  SpO2: 96 % (11/28/23 1128)    Intake/Output Summary (Last 24 hours) at 11/28/2023 1533  Last data filed at 11/28/2023 1105  Gross per 24 hour   Intake 100 ml   Output 200 ml   Net -100 ml        Physical Exam  General:  Frail, elderly male, lying in bed, opens eyes to voice, history is provided by his daughter.  Eyes: Eyes with no icterus or injection.  Yellow crusting to right eyelashes.  Ears:  Mildly hard of hearing.  Nose: Nares patent  Mouth:  Dry erythematous mucous membranes. No ulcerations, erythema or exudates.  Cardiovascular:  Irregular rhythm, controlled rate in  "the 80s, no obvious murmurs, no edema.    Respiratory:  Clear to auscultation bilaterally, no tachypnea or increased work of breathing. On RA  Gastrointestinal:  Soft with active bowel sounds, no tenderness to palpation, no distention.  Genitourinary:  No suprapubic tenderness. Wearing a brief. Scrotal wound as in photo.   Musculoskeletal:  Weak with contracted lower extremities, poor bulk  Skin:  Pale, warm and dry, no obvious rashes.  Right heel wound is difficult to visualize because of patient contractures and crying in pain when you try to move him though there is some odor to wound, yellow drainage, right hip wound appears clean with minimal nonpurulent serous drainage. Left heel wound scabbed over. Very dry peeling skin.   Neuro: Non-conversant, opens eyes to voice, we will respond to his daughter with a smile or laugh occasionally otherwise he moans with pain and has a tremor of his upper extremities.  Psych:  Flat affect.  VAD: PIV  Isolation: None    Wounds  11/28                      Significant Labs: All pertinent labs within the past 24 hours have been reviewed.    CBC LAST 7 DAYS  Recent Labs   Lab 11/28/23  0932   WBC 9.83   RBC 3.74*   HGB 11.4*   HCT 34.6*   MCV 93   MCH 30.5   MCHC 32.9   RDW 14.3      MPV 12.2   GRAN 71.9  7.1   LYMPH 13.4*  1.3   MONO 13.1  1.3*   BASO 0.06   NRBC 0       CHEMISTRY LAST 7 DAYS  Recent Labs   Lab 11/28/23  0932      K 3.9      CO2 29   ANIONGAP 6*   BUN 21   CREATININE 0.8   *   CALCIUM 8.7   MG 1.8   ALBUMIN 3.0*   PROT 6.5   ALKPHOS 83   ALT 12   AST 20   BILITOT 0.6       Estimated Creatinine Clearance: 55.4 mL/min (based on SCr of 0.8 mg/dL).    INFLAMMATORY/PROCAL  LAST 7 DAYS  Recent Labs   Lab 11/28/23  0932   PROCAL 0.101     No results found for: "ESR"  CRP   Date Value Ref Range Status   10/23/2023 22.5 (H) 0.0 - 8.2 mg/L Final   10/16/2023 28.6 (H) 0.0 - 8.2 mg/L Final   10/09/2023 6.9 0.0 - 8.2 mg/L Final   10/02/2023 24.1 " (H) 0.0 - 8.2 mg/L Final   09/30/2023 30.3 (H) 0.0 - 8.2 mg/L Final   09/27/2023 9.05 (H) <0.76 mg/dL Final   09/23/2023 >16.00 (H) <0.76 mg/dL Final       PRIOR MICROBIOLOGY:  Susceptibility data from last 90 days.  Collected Specimen Info Organism Amox/K Clav'ate Amp/Sulbactam Ampicillin Cefazolin Cefepime Ceftriaxone Ciprofloxacin Ertapenem Genatmicin Synergy Screen Gentamicin Levofloxacin Meropenem Nitrofurantoin   09/25/23 Bone from Foot, Right Enterococcus faecalis    S       S       09/25/23 Blood No growth after 5 days.                09/25/23 Blood No growth after 5 days.                09/23/23 Blood No growth after 5 days.                09/22/23 Wound from Foot, Left Bacteroides fragilis                09/22/23 Wound from Foot, Left Escherichia coli   S  S  S  S  S  S  S   S  S  S    09/21/23 Urine Escherichia coli   S  S  S  S  S  S  S   S  S  S  S   09/21/23 Blood Bacteroides fragilis                09/21/23 Blood Trueperella bernardiae                09/21/23 Blood Trueperella bernardiae                09/07/23 Wound from Foot, Right Escherichia coli  S  S  S  S  S  S  S  S   S  S  S      Collected Specimen Info Organism PIPERACILLIN/TAZO SUSCEPTIBILITY Tetracycline Tobramycin Trimeth/Sulfa Vancomycin   09/25/23 Bone from Foot, Right Enterococcus faecalis   R    S   09/25/23 Blood No growth after 5 days.        09/25/23 Blood No growth after 5 days.        09/23/23 Blood No growth after 5 days.        09/22/23 Wound from Foot, Left Bacteroides fragilis        09/22/23 Wound from Foot, Left Escherichia coli  S  S  S  S    09/21/23 Urine Escherichia coli  S  S  S  S    09/21/23 Blood Bacteroides fragilis        09/21/23 Blood Trueperella bernardiae        09/21/23 Blood Trueperella bernardiae        09/07/23 Wound from Foot, Right Escherichia coli  S  S  S  S        LAST 7 DAYS MICROBIOLOGY   Microbiology Results (last 7 days)       Procedure Component Value Units Date/Time    Blood culture x two  cultures. Draw prior to antibiotics. [[id number]] Collected: 11/28/23 0954    Order Status: Sent Specimen: Blood Updated: 11/28/23 0954    Narrative:      Collection has been rescheduled by DW at 11/28/2023 09:08 Reason:   Patient unavailable with doctor also getting EKG   Collection has been rescheduled by DW at 11/28/2023 09:08 Reason:   Patient unavailable with doctor also getting EKG     Blood culture x two cultures. Draw prior to antibiotics. [[id number]] Collected: 11/28/23 0953    Order Status: Sent Specimen: Blood Updated: 11/28/23 0954    Narrative:      Collection has been rescheduled by DW at 11/28/2023 09:08 Reason:   Patient unavailable with doctor also getting EKG   Collection has been rescheduled by DW at 11/28/2023 09:08 Reason:   Patient unavailable with doctor also getting EKG             CURRENT/PREVIOUS VISIT EKG  Results for orders placed or performed during the hospital encounter of 11/28/23   EKG 12-lead    Collection Time: 11/28/23  9:11 AM    Narrative    Test Reason : R50.9,    Vent. Rate : 075 BPM     Atrial Rate : 075 BPM     P-R Int : 168 ms          QRS Dur : 100 ms      QT Int : 368 ms       P-R-T Axes : 031 -31 015 degrees     QTc Int : 410 ms    Sinus rhythm with Premature supraventricular complexes  Left axis deviation  Abnormal ECG  When compared with ECG of 22-SEP-2023 10:29,  Sinus rhythm has replaced Atrial fibrillation    Referred By:  KAMI WILCOX           Confirmed By:          Significant Imaging: I have reviewed all relevant and available imaging results/findings within the past 24 hours.      X-Ray Hip 2 or 3 views Right (with Pelvis when performed)11/28/23 0958 1. Prominent soft tissue defect over the greater trochanter of the right femur as above. There is no definite radiographic evidence of osteomyelitis, however if osteomyelitis is a strong clinical concern, MR or three-phase bone scan should be considered.   2. Additional chronic findings as above.    X-Ray Chest AP  Portable 11/28/23 0900   FINDINGS: Low lung volumes with accentuation of cardiac silhouette size. Atherosclerotic calcification of the aorta. Mild increased interstitial markings bilaterally. No confluent alveolar consolidation. No large pleural effusion or pneumothorax.   IMPRESSION:   Increased interstitial markings bilaterally which could reflect pulmonary edema/volume overload or atypical infection.    X-Ray Foot Complete Right 11/28/23 0957  IMPRESSION: Findings suggestive of osteomyelitis of the posterior calcaneus with associated soft tissue ulceration. Correlation with MRI or triple phase bone scan is recommended    NM Bone Scan 3 Phase Foot 11/28/23 1137  Abnormal 3 phase radiotracer uptake over the right greater than left calcaneus compatible with cellulitis and osteomyelitis.      Shawna Lewis NP  Date of Service: 11/28/2023  3:33 PM

## 2023-11-28 NOTE — HPI
88M with PMH HTN, HLD, pAfib, TIA, hypothyroid, dementia, right hip ulcer, and bilateral heel ulcers with recent treatment for osteomyelitis s/p zosyn completion 11/6 and discharge from LT[facility] 11/9 presents to the ER from group home nursing home due to fever. His daughter Chris is present and provides most of the recent history. Patient cannot provide history due to dementia but he does deny any pain. Chris states his general health has decompensated over the past several months to being mostly bed-bound at this point with contractures of his limbs. The left heel ulcer has mostly healed. The right heel ulcer is still open and has worsened lately since discharge from LTAC. Labs are generally unremarkable. Right heel xray is consistent with osteomyelitis and right hip xray shows soft tissue defect. Chris is contemplating how much more medical treatment her and her brothers are willing to put the patient through. Given abx and sepsis bolus IVF in ER. Admitted to hospital medicine for continued management and evaluation.

## 2023-11-28 NOTE — H&P
[facility] - Emergency Dept  Hospital Medicine  History & Physical    Patient Name: Adriano Borjas  MRN: [id number]  Patient Class: IP- Inpatient  Admission Date: 11/28/2023  Attending Physician: Weston Johnson MD  Primary Care Provider: N[facility] Primary Doctor         Patient information was obtained from relative(s), past medical records, and ER records.     Subjective:     Principal Problem:Acute osteomyelitis of right calcaneus    Chief Complaint:   Chief Complaint   Patient presents with    Fever     Pt from [facility]. C/o altered mental status and fever. Pt received tylenol at 0750.         HPI: 88M with PMH HTN, HLD, pAfib, TIA, hypothyroid, dementia, right hip ulcer, and bilateral heel ulcers with recent treatment for osteomyelitis s/p zosyn completion 11/6 and discharge from [facility] 11/9 presents to the ER from [facility] nursing home due to fever. His daughter Chris is present and provides most of the recent history. Patient cannot provide history due to dementia but he does deny any pain. Chris states his general health has decompensated over the past several months to being mostly bed-bound at this point with contractures of his limbs. The left heel ulcer has mostly healed. The right heel ulcer is still open and has worsened lately since discharge from LTAC. Labs are generally unremarkable. Right heel xray is consistent with osteomyelitis and right hip xray shows soft tissue defect. Chris is contemplating how much more medical treatment her and her brothers are willing to put the patient through. Given abx and sepsis bolus IVF in ER. Admitted to hospital medicine for continued management and evaluation.    Past Medical History:   Diagnosis Date    A-fib     Dementia     Elevated cholesterol     Elevated PSA 6/10/2014    Essential hypertension     History of cerebral hemorrhage 9/5/2019    Kidney stone     passed on his own    Memory loss     dememtia per family    Osteoarthritis      Paroxysmal atrial fibrillation 12/12/2018    Stroke     2012 TIA     Thyroid disease        Past Surgical History:   Procedure Laterality Date    CYSTOSCOPY      EYE SURGERY      MAGNETIC RESONANCE IMAGING N/A 8/23/2019    Procedure: MRI (Magnetic Resonance Imagine) needs anesthesia;  Surgeon: Panchito Montoya MD;  Location: [facility];  Service: Anesthesiology;  Laterality: N/A;       Review of patient's allergies indicates:   Allergen Reactions    Ciprofloxacin Other (See Comments)     Shoulder and leg pain/cramping     Codeine Other (See Comments)     Pt cannot remember been a long time ago    Sulfa (sulfonamide antibiotics)      Allergic reaction to bactrim       Current Facility-Administered Medications on File Prior to Encounter   Medication    lactated ringers infusion     Current Outpatient Medications on File Prior to Encounter   Medication Sig    acetaminophen (TYLENOL) 500 MG tablet Take 1 tablet (500 mg total) by mouth every evening.    amino acids-protein hydrolys (PRO-STAT AWC)  gram-kcal/30 mL Liqd Take 45 mLs by mouth 2 (two) times a day.    ammonium lactate (LAC-HYDRIN) 12 % lotion Apply 1 g topically 2 (two) times daily. For dry skin    arginine-glutamine-calcium HMB (GUILLERMO) 7-7-1.5 gram PwPk Take 1 packet by mouth 2 (two) times a day.    ascorbic acid, vitamin C, (VITAMIN C) 500 MG tablet Take 500 mg by mouth once daily.    atorvastatin (LIPITOR) 40 MG tablet Take 1 tablet (40 mg total) by mouth once daily.    HYDROcodone-acetaminophen (NORCO) 5-325 mg per tablet Take 1 tablet by mouth 3 (three) times daily as needed for Pain.    levothyroxine (SYNTHROID) 100 MCG tablet Take 1 tablet (100 mcg total) by mouth once daily.    memantine (NAMENDA) 5 MG Tab Take 5 mg by mouth 2 (two) times daily.    miconazole (MICOTIN) 2 % cream Apply topically 2 (two) times daily.    mirtazapine (REMERON) 30 MG tablet Take 1 tablet (30 mg total) by mouth every evening.    multivitamin (THERAGRAN) tablet Take  1 tablet by mouth once daily.    OLANZapine (ZYPREXA) 7.5 MG tablet Take 1 tablet (7.5 mg total) by mouth nightly.    sertraline (ZOLOFT) 25 MG tablet Take 1 tablet (25 mg total) by mouth once daily.    [DISCONTINUED] amoxicillin-clavulanate 875-125mg (AUGMENTIN) 875-125 mg per tablet Take 1 tablet by mouth 2 (two) times daily.    collagenase (SANTYL) ointment Apply topically once daily. Clean right hip with wound cleanser and pat dry. Apply a nickel thick layer of santyl to the right hip, cover with adaptic (vaseline gauze) and ABD and secure with tape daily. (Patient taking differently: Apply 1 g topically once daily. Clean right hip with wound cleanser and pat dry. Apply a nickel thick layer of santyl to the right hip, cover with adaptic (vaseline gauze) and ABD and secure with tape daily.)    [DISCONTINUED] HYDROcodone-acetaminophen (NORCO) 5-325 mg per tablet Take 1 tablet by mouth every 6 (six) hours as needed for Pain.     Family History       Problem Relation (Age of Onset)    Cancer Mother    Dementia Sister, Brother    Diabetes Mother    Heart disease Father, Brother    Hyperlipidemia Mother, Father    Migraines Daughter    Tremor Daughter, Son          Tobacco Use    Smoking status: Former     Types: Cigarettes    Smokeless tobacco: Never    Tobacco comments:     quit 50 years ago   Substance and Sexual Activity    Alcohol use: No    Drug use: No    Sexual activity: Not on file     Review of Systems   Unable to perform ROS: Dementia     Objective:     Vital Signs (Most Recent):  Temp: 99.8 °F (37.7 °C) (11/28/23 1058)  Pulse: 86 (11/28/23 1230)  Resp: 19 (11/28/23 1113)  BP: 134/64 (11/28/23 1230)  SpO2: 96 % (11/28/23 1128) Vital Signs (24h Range):  Temp:  [99.8 °F (37.7 °C)] 99.8 °F (37.7 °C)  Pulse:  [79-86] 86  Resp:  [19-22] 19  SpO2:  [94 %-96 %] 96 %  BP: (124-141)/(59-89) 134/64     Weight: 59.1 kg (130 lb 3.2 oz)  Body mass index is 19.8 kg/m².     Physical Exam  Vitals reviewed.    Constitutional:       General: He is not in acute distress.     Appearance: He is ill-appearing (chronic).      Comments: Frail, elderly  Global limb contractures   HENT:      Head: Normocephalic and atraumatic.   Cardiovascular:      Rate and Rhythm: Normal rate and regular rhythm.   Pulmonary:      Effort: Pulmonary effort is normal. No respiratory distress.      Breath sounds: Normal breath sounds.   Abdominal:      General: Abdomen is flat. Bowel sounds are normal. There is no distension.      Palpations: Abdomen is soft.      Tenderness: There is no abdominal tenderness.   Musculoskeletal:      Right lower leg: No edema.      Left lower leg: No edema.   Skin:     General: Skin is warm and dry.      Comments: Right hip ulcer at least stage 2  Left heel ulcer pressure injury, healing  Right heal ulcer unstageable with surrounding erythema  See media   Neurological:      General: No focal deficit present.      Mental Status: He is alert. Mental status is at baseline.   Psychiatric:         Cognition and Memory: Cognition is impaired.                                  Significant Labs: All pertinent labs within the past 24 hours have been reviewed.    Significant Imaging: I have reviewed all pertinent imaging results/findings within the past 24 hours.      Admission on 11/28/2023   Component Date Value Ref Range Status    WBC 11/28/2023 9.83  3.90 - 12.70 K/uL Final    RBC 11/28/2023 3.74 (L)  4.60 - 6.20 M/uL Final    Hemoglobin 11/28/2023 11.4 (L)  14.0 - 18.0 g/dL Final    Hematocrit 11/28/2023 34.6 (L)  40.0 - 54.0 % Final    MCV 11/28/2023 93  82 - 98 fL Final    MCH 11/28/2023 30.5  27.0 - 31.0 pg Final    MCHC 11/28/2023 32.9  32.0 - 36.0 g/dL Final    RDW 11/28/2023 14.3  11.5 - 14.5 % Final    Platelets 11/28/2023 192  150 - 450 K/uL Final    MPV 11/28/2023 12.2  9.2 - 12.9 fL Final    Immature Granulocytes 11/28/2023 0.3  0.0 - 0.5 % Final    Gran # (ANC) 11/28/2023 7.1  1.8 - 7.7 K/uL Final    Immature  Grans (Abs) 11/28/2023 0.03  0.00 - 0.04 K/uL Final    Comment: Mild elevation in immature granulocytes is non specific and   can be seen in a variety of conditions including stress response,   acute inflammation, trauma and pregnancy. Correlation with other   laboratory and clinical findings is essential.      Lymph # 11/28/2023 1.3  1.0 - 4.8 K/uL Final    Mono # 11/28/2023 1.3 (H)  0.3 - 1.0 K/uL Final    Eos # 11/28/2023 0.1  0.0 - 0.5 K/uL Final    Baso # 11/28/2023 0.06  0.00 - 0.20 K/uL Final    nRBC 11/28/2023 0  0 /100 WBC Final    Gran % 11/28/2023 71.9  38.0 - 73.0 % Final    Lymph % 11/28/2023 13.4 (L)  18.0 - 48.0 % Final    Mono % 11/28/2023 13.1  4.0 - 15.0 % Final    Eosinophil % 11/28/2023 0.7  0.0 - 8.0 % Final    Basophil % 11/28/2023 0.6  0.0 - 1.9 % Final    Differential Method 11/28/2023 Automated   Final    Sodium 11/28/2023 139  136 - 145 mmol/L Final    Potassium 11/28/2023 3.9  3.5 - 5.1 mmol/L Final    Chloride 11/28/2023 104  95 - 110 mmol/L Final    CO2 11/28/2023 29  23 - 29 mmol/L Final    Glucose 11/28/2023 128 (H)  70 - 110 mg/dL Final    BUN 11/28/2023 21  8 - 23 mg/dL Final    Creatinine 11/28/2023 0.8  0.5 - 1.4 mg/dL Final    Calcium 11/28/2023 8.7  8.7 - 10.5 mg/dL Final    Total Protein 11/28/2023 6.5  6.0 - 8.4 g/dL Final    Albumin 11/28/2023 3.0 (L)  3.5 - 5.2 g/dL Final    Total Bilirubin 11/28/2023 0.6  0.1 - 1.0 mg/dL Final    Comment: For infants and newborns, interpretation of results should be based  on gestational age, weight and in agreement with clinical  observations.    Premature Infant recommended reference ranges:  Up to 24 hours.............<8.0 mg/dL  Up to 48 hours............<12.0 mg/dL  3-5 days..................<15.0 mg/dL  6-29 days.................<15.0 mg/dL      Alkaline Phosphatase 11/28/2023 83  55 - 135 U/L Final    AST 11/28/2023 20  10 - 40 U/L Final    ALT 11/28/2023 12  10 - 44 U/L Final    eGFR 11/28/2023 >60.0  >60 mL/min/1.73 m^2 Final     Anion Gap 11/28/2023 6 (L)  8 - 16 mmol/L Final    Lactate (Lactic Acid) 11/28/2023 2.4 (HH)  0.5 - 1.9 mmol/L Final    Comment: Falsely low lactic acid results can be found in samples   containing >=13.0 mg/dL total bilirubin and/or >=3.5 mg/dL   direct bilirubin.  Critical result LAC 2.4 mmol/L called to and read back by Eileen Lopez   RN/ED  at 28-Nov-2023 10:47 by [facility].  Result Rechecked      Influenza A, Molecular 11/28/2023 Negative  Negative Final    Influenza B, Molecular 11/28/2023 Negative  Negative Final    Flu A & B Source 11/28/2023 Nasal swab   Final    BNP 11/28/2023 128 (H)  0 - 99 pg/mL Final    Values of less than 100 pg/ml are consistent with non-CHF populations.    Troponin I High Sensitivity 11/28/2023 34.4 (H)  0.0 - 14.9 pg/mL Final    Comment: Troponin results differ between methods. Do not use   results between Troponin methods interchangeably.    Alkaline Phospatase levels above 400 U/L may   cause false positive results.    Access hsTnI should not be used for patients taking   Asfotase alpesh (Strensiq).      Procalcitonin 11/28/2023 0.101  0.000 - 0.500 ng/mL Final    Comment: Procalcitonin Result Interpretation:    <=0.50 ng/mL  Systemic infection (sepsis) is not likely. Local bacterial infection   is   possible.    >0.50 and <= 2.00 ng/mL  Systemic infection (sepsis) is possible, but other conditions are   also known   to elevate procalcitonin.     >2.00 ng/mL  Systemic infection (sepsis) is likely unless other causes are known.    >=10.00 ng/mL  Important systemic inflammatory response, almost exclusively due to   severe   bacterial sepsis or septic shock.      Magnesium 11/28/2023 1.8  1.6 - 2.6 mg/dL Final    Specimen UA 11/28/2023 Urine, Catheterized   Final    Color, UA 11/28/2023 Yellow  Yellow, Straw, Amy Final    Appearance, UA 11/28/2023 Clear  Clear Final    pH, UA 11/28/2023 6.0  5.0 - 8.0 Final    Specific [place], UA 11/28/2023 1.030  1.005 - 1.030 Final    Protein,  UA 11/28/2023 Trace (A)  Negative Final    Comment: Recommend a 24 hour urine protein or a urine   protein/creatinine ratio if globulin induced proteinuria is  clinically suspected.      Glucose, UA 11/28/2023 Negative  Negative Final    Ketones, UA 11/28/2023 Negative  Negative Final    Bilirubin (UA) 11/28/2023 Negative  Negative Final    Occult Blood UA 11/28/2023 Negative  Negative Final    Nitrite, UA 11/28/2023 Negative  Negative Final    Urobilinogen, UA 11/28/2023 Negative  Negative EU/dL Final    Leukocytes, UA 11/28/2023 Negative  Negative Final           Imaging Results              NM Bone Scan 3 Phase Foot (In process)                      X-Ray Foot Complete Right (Final result)  Result time 11/28/23 10:43:28      Final result by Amanda Delgado MD (11/28/23 10:43:28)                   Narrative:    3 views right foot    Clinical history is infection to the heel    COMPARISON: 9/21/2023    FINDINGS: There is stable soft tissue ulceration along the posterior calcaneus. There is cortical lucency within the posterior calcaneus suggestive of osteomyelitis. MRI recommended for further evaluation.  There are no additional osseous abnormalities. There are degenerative changes of the hindfoot.    IMPRESSION: Findings suggestive of osteomyelitis of the posterior calcaneus with associated soft tissue ulceration. Correlation with MRI or triple phase bone scan is recommended    Electronically signed by:  Amanda Delgado MD  11/28/2023 10:43 AM [facility] [place] Workstation: [id number]                                     X-Ray Hip 2 or 3 views Right (with Pelvis when performed) (Final result)  Result time 11/28/23 10:46:22      Final result by Andrew Doty MD (11/28/23 10:46:22)                   Narrative:    Right hip with AP pelvis    CLINICAL DATA: Right hip wound    FINDINGS: 3 views are submitted. Comparison is made to a CT examination from September 2023.    Exam is limited by difficulty with positioning.  There is moderate varus angulation at the right hip. There is no evidence of fracture or dislocation. Mild to moderate joint space narrowing at the right hip is consistent with osteoarthritic change.    Soft tissue defect overlying the greater trochanter of the right femur is noted, and compared to the prior CT appears somewhat more prominent. No definite underlying bone destruction is identified.    Mild osteoarthritic changes noted at the left hip. The remainder of the bony pelvis is unremarkable.    IMPRESSION:  1. Prominent soft tissue defect over the greater trochanter of the right femur as above. There is no definite radiographic evidence of osteomyelitis, however if osteomyelitis is a strong clinical concern, MR or three-phase bone scan should be considered.  2. Additional chronic findings as above.    Electronically signed by:  Andrew Doty MD  11/28/2023 10:46 AM [facility] Workstation: [id number]                                     X-Ray Chest AP Portable (Final result)  Result time 11/28/23 09:12:20      Final result by Prieto Bermeo MD (11/28/23 09:12:20)                   Narrative:    XR CHEST 1 VIEW    CLINICAL HISTORY:  88 years Male Sepsis    COMPARISON: None    FINDINGS: Low lung volumes with accentuation of cardiac silhouette size. Atherosclerotic calcification of the aorta. Mild increased interstitial markings bilaterally. No confluent alveolar consolidation. No large pleural effusion or pneumothorax.    IMPRESSION:    Increased interstitial markings bilaterally which could reflect pulmonary edema/volume overload or atypical infection.    Electronically signed by:  Prieto Bermeo MD  11/28/2023 09:12 AM [facility] Workstation: [id number]                                    Assessment/Plan:     * Acute osteomyelitis of right calcaneus  Recurrent vs refractory  S/p recent IV abx treatment w/ zosyn ended 11/6  Has grown enterococcus and e.coli in last 1 year  - continue with vanc/cefepime for now  - f/u  NM scan; could not tolerate MRI in past  - consults to wound care, ID, and podiatry  - f/u goals of care with daughter who is POA    Hypothyroid  Chronic  - continue home levothyroxine    Persistent atrial fibrillation  Well-controlled. Not on AC, rate control, or rhythm control meds  - monitor    Unstageable pressure ulcer of right heel  Appears infected  - see osteomyelitis section  - wound care  - offload    Unstageable pressure ulcer of left heel  Does not appear infected  - wound care  - offload    Decubitus ulcer, hip, right, unstageable  Does not appear infected  - wound care  - turn q2    Hyperlipidemia  Chronic  - continue statin    Frontotemporal dementia  Stable  - delirium precautions  - continue home memantine    Essential hypertension  Chronic, controlled  Not on anti-hypertensives  - monitor      VTE Risk Mitigation (From admission, onward)      Cristobal Johnson MD  Department of Hospital Medicine  [facility] - Emergency Dept           labs, urine, ekg reviewed.  Urine with evidence of infection + elevated white count.  Will start on ceftin.  Negative orthostatics.  No longer feeling dizzy or lightheaded.  Likely vasovagal in etiology.  EKG non ischemic.  Non focal neurological exam.

## 2023-11-28 NOTE — SUBJECTIVE & OBJECTIVE
Past Medical History:   Diagnosis Date    A-fib     Dementia     Elevated cholesterol     Elevated PSA 6/10/2014    Essential hypertension     History of cerebral hemorrhage 9/5/2019    Kidney stone     passed on his own    Memory loss     dememtia per family    Osteoarthritis     Paroxysmal atrial fibrillation 12/12/2018    Stroke     2012 TIA     Thyroid disease        Past Surgical History:   Procedure Laterality Date    CYSTOSCOPY      EYE SURGERY      MAGNETIC RESONANCE IMAGING N/A 8/23/2019    Procedure: MRI (Magnetic Resonance Imagine) needs anesthesia;  Surgeon: Panchito Montoya MD;  Location: [facility];  Service: Anesthesiology;  Laterality: N/A;       Review of patient's allergies indicates:   Allergen Reactions    Ciprofloxacin Other (See Comments)     Shoulder and leg pain/cramping     Codeine Other (See Comments)     Pt cannot remember been a long time ago    Sulfa (sulfonamide antibiotics)      Allergic reaction to bactrim       Current Facility-Administered Medications on File Prior to Encounter   Medication    lactated ringers infusion     Current Outpatient Medications on File Prior to Encounter   Medication Sig    acetaminophen (TYLENOL) 500 MG tablet Take 1 tablet (500 mg total) by mouth every evening.    amino acids-protein hydrolys (PRO-STAT AWC)  gram-kcal/30 mL Liqd Take 45 mLs by mouth 2 (two) times a day.    ammonium lactate (LAC-HYDRIN) 12 % lotion Apply 1 g topically 2 (two) times daily. For dry skin    arginine-glutamine-calcium HMB (GUILLERMO) 7-7-1.5 gram PwPk Take 1 packet by mouth 2 (two) times a day.    ascorbic acid, vitamin C, (VITAMIN C) 500 MG tablet Take 500 mg by mouth once daily.    atorvastatin (LIPITOR) 40 MG tablet Take 1 tablet (40 mg total) by mouth once daily.    HYDROcodone-acetaminophen (NORCO) 5-325 mg per tablet Take 1 tablet by mouth 3 (three) times daily as needed for Pain.    levothyroxine (SYNTHROID) 100 MCG tablet Take 1 tablet (100 mcg total) by mouth  once daily.    memantine (NAMENDA) 5 MG Tab Take 5 mg by mouth 2 (two) times daily.    miconazole (MICOTIN) 2 % cream Apply topically 2 (two) times daily.    mirtazapine (REMERON) 30 MG tablet Take 1 tablet (30 mg total) by mouth every evening.    multivitamin (THERAGRAN) tablet Take 1 tablet by mouth once daily.    OLANZapine (ZYPREXA) 7.5 MG tablet Take 1 tablet (7.5 mg total) by mouth nightly.    sertraline (ZOLOFT) 25 MG tablet Take 1 tablet (25 mg total) by mouth once daily.    [DISCONTINUED] amoxicillin-clavulanate 875-125mg (AUGMENTIN) 875-125 mg per tablet Take 1 tablet by mouth 2 (two) times daily.    collagenase (SANTYL) ointment Apply topically once daily. Clean right hip with wound cleanser and pat dry. Apply a nickel thick layer of santyl to the right hip, cover with adaptic (vaseline gauze) and ABD and secure with tape daily. (Patient taking differently: Apply 1 g topically once daily. Clean right hip with wound cleanser and pat dry. Apply a nickel thick layer of santyl to the right hip, cover with adaptic (vaseline gauze) and ABD and secure with tape daily.)    [DISCONTINUED] HYDROcodone-acetaminophen (NORCO) 5-325 mg per tablet Take 1 tablet by mouth every 6 (six) hours as needed for Pain.     Family History       Problem Relation (Age of Onset)    Cancer Mother    Dementia Sister, Brother    Diabetes Mother    Heart disease Father, Brother    Hyperlipidemia Mother, Father    Migraines Daughter    Tremor Daughter, Son          Tobacco Use    Smoking status: Former     Types: Cigarettes    Smokeless tobacco: Never    Tobacco comments:     quit 50 years ago   Substance and Sexual Activity    Alcohol use: No    Drug use: No    Sexual activity: Not on file     Review of Systems   Unable to perform ROS: Dementia     Objective:     Vital Signs (Most Recent):  Temp: 99.8 °F (37.7 °C) (11/28/23 1058)  Pulse: 86 (11/28/23 1230)  Resp: 19 (11/28/23 1113)  BP: 134/64 (11/28/23 1230)  SpO2: 96 % (11/28/23 1128)  Vital Signs (24h Range):  Temp:  [99.8 °F (37.7 °C)] 99.8 °F (37.7 °C)  Pulse:  [79-86] 86  Resp:  [19-22] 19  SpO2:  [94 %-96 %] 96 %  BP: (124-141)/(59-89) 134/64     Weight: 59.1 kg (130 lb 3.2 oz)  Body mass index is 19.8 kg/m².     Physical Exam  Vitals reviewed.   Constitutional:       General: He is not in acute distress.     Appearance: He is ill-appearing (chronic).      Comments: Frail, elderly  Global limb contractures   HENT:      Head: Normocephalic and atraumatic.   Cardiovascular:      Rate and Rhythm: Normal rate and regular rhythm.   Pulmonary:      Effort: Pulmonary effort is normal. No respiratory distress.      Breath sounds: Normal breath sounds.   Abdominal:      General: Abdomen is flat. Bowel sounds are normal. There is no distension.      Palpations: Abdomen is soft.      Tenderness: There is no abdominal tenderness.   Musculoskeletal:      Right lower leg: No edema.      Left lower leg: No edema.   Skin:     General: Skin is warm and dry.      Comments: Right hip ulcer at least stage 2  Left heel ulcer pressure injury, healing  Right heal ulcer unstageable with surrounding erythema  See media   Neurological:      General: No focal deficit present.      Mental Status: He is alert. Mental status is at baseline.   Psychiatric:         Cognition and Memory: Cognition is impaired.                                  Significant Labs: All pertinent labs within the past 24 hours have been reviewed.    Significant Imaging: I have reviewed all pertinent imaging results/findings within the past 24 hours.      Admission on 11/28/2023   Component Date Value Ref Range Status    WBC 11/28/2023 9.83  3.90 - 12.70 K/uL Final    RBC 11/28/2023 3.74 (L)  4.60 - 6.20 M/uL Final    Hemoglobin 11/28/2023 11.4 (L)  14.0 - 18.0 g/dL Final    Hematocrit 11/28/2023 34.6 (L)  40.0 - 54.0 % Final    MCV 11/28/2023 93  82 - 98 fL Final    MCH 11/28/2023 30.5  27.0 - 31.0 pg Final    MCHC 11/28/2023 32.9  32.0 - 36.0 g/dL  Final    RDW 11/28/2023 14.3  11.5 - 14.5 % Final    Platelets 11/28/2023 192  150 - 450 K/uL Final    MPV 11/28/2023 12.2  9.2 - 12.9 fL Final    Immature Granulocytes 11/28/2023 0.3  0.0 - 0.5 % Final    Gran # (ANC) 11/28/2023 7.1  1.8 - 7.7 K/uL Final    Immature Grans (Abs) 11/28/2023 0.03  0.00 - 0.04 K/uL Final    Comment: Mild elevation in immature granulocytes is non specific and   can be seen in a variety of conditions including stress response,   acute inflammation, trauma and pregnancy. Correlation with other   laboratory and clinical findings is essential.      Lymph # 11/28/2023 1.3  1.0 - 4.8 K/uL Final    Mono # 11/28/2023 1.3 (H)  0.3 - 1.0 K/uL Final    Eos # 11/28/2023 0.1  0.0 - 0.5 K/uL Final    Baso # 11/28/2023 0.06  0.00 - 0.20 K/uL Final    nRBC 11/28/2023 0  0 /100 WBC Final    Gran % 11/28/2023 71.9  38.0 - 73.0 % Final    Lymph % 11/28/2023 13.4 (L)  18.0 - 48.0 % Final    Mono % 11/28/2023 13.1  4.0 - 15.0 % Final    Eosinophil % 11/28/2023 0.7  0.0 - 8.0 % Final    Basophil % 11/28/2023 0.6  0.0 - 1.9 % Final    Differential Method 11/28/2023 Automated   Final    Sodium 11/28/2023 139  136 - 145 mmol/L Final    Potassium 11/28/2023 3.9  3.5 - 5.1 mmol/L Final    Chloride 11/28/2023 104  95 - 110 mmol/L Final    CO2 11/28/2023 29  23 - 29 mmol/L Final    Glucose 11/28/2023 128 (H)  70 - 110 mg/dL Final    BUN 11/28/2023 21  8 - 23 mg/dL Final    Creatinine 11/28/2023 0.8  0.5 - 1.4 mg/dL Final    Calcium 11/28/2023 8.7  8.7 - 10.5 mg/dL Final    Total Protein 11/28/2023 6.5  6.0 - 8.4 g/dL Final    Albumin 11/28/2023 3.0 (L)  3.5 - 5.2 g/dL Final    Total Bilirubin 11/28/2023 0.6  0.1 - 1.0 mg/dL Final    Comment: For infants and newborns, interpretation of results should be based  on gestational age, weight and in agreement with clinical  observations.    Premature Infant recommended reference ranges:  Up to 24 hours.............<8.0 mg/dL  Up to 48 hours............<12.0 mg/dL  3-5  days..................<15.0 mg/dL  6-29 days.................<15.0 mg/dL      Alkaline Phosphatase 11/28/2023 83  55 - 135 U/L Final    AST 11/28/2023 20  10 - 40 U/L Final    ALT 11/28/2023 12  10 - 44 U/L Final    eGFR 11/28/2023 >60.0  >60 mL/min/1.73 m^2 Final    Anion Gap 11/28/2023 6 (L)  8 - 16 mmol/L Final    Lactate (Lactic Acid) 11/28/2023 2.4 (HH)  0.5 - 1.9 mmol/L Final    Comment: Falsely low lactic acid results can be found in samples   containing >=13.0 mg/dL total bilirubin and/or >=3.5 mg/dL   direct bilirubin.  Critical result LAC 2.4 mmol/L called to and read back by Eileen Lopez   RN/ED  [facility] 28-Nov-2023 10:[facility]  Result Rechecked      Influenza A, Molecular 11/28/2023 Negative  Negative Final    Influenza B, Molecular 11/28/2023 Negative  Negative Final    Flu A & B Source 11/28/2023 Nasal swab   Final    BNP 11/28/2023 128 (H)  0 - 99 pg/mL Final    Values of less than 100 pg/ml are consistent with non-CHF populations.    Troponin I High Sensitivity 11/28/2023 34.4 (H)  0.0 - 14.9 pg/mL Final    Comment: Troponin results differ between methods. Do not use   results between Troponin methods interchangeably.    Alkaline Phospatase levels above 400 U/L may   cause false positive results.    Access hsTnI should not be used for patients taking   Asfotase alpesh (Strensiq).      Procalcitonin 11/28/2023 0.101  0.000 - 0.500 ng/mL Final    Comment: Procalcitonin Result Interpretation:    <=0.50 ng/mL  Systemic infection (sepsis) is not likely. Local bacterial infection   is   possible.    >0.50 and <= 2.00 ng/mL  Systemic infection (sepsis) is possible, but other conditions are   also known   to elevate procalcitonin.     >2.00 ng/mL  Systemic infection (sepsis) is likely unless other causes are known.    >=10.00 ng/mL  Important systemic inflammatory response, almost exclusively due to   severe   bacterial sepsis or septic shock.      Magnesium 11/28/2023 1.8  1.6 - 2.6 mg/dL Final     Specimen UA 11/28/2023 Urine, Catheterized   Final    Color, UA 11/28/2023 Yellow  Yellow, Straw, Amy Final    Appearance, UA 11/28/2023 Clear  Clear Final    pH, UA 11/28/2023 6.0  5.0 - 8.0 Final    Specific [place], UA 11/28/2023 1.030  1.005 - 1.030 Final    Protein, UA 11/28/2023 Trace (A)  Negative Final    Comment: Recommend a 24 hour urine protein or a urine   protein/creatinine ratio if globulin induced proteinuria is  clinically suspected.      Glucose, UA 11/28/2023 Negative  Negative Final    Ketones, UA 11/28/2023 Negative  Negative Final    Bilirubin (UA) 11/28/2023 Negative  Negative Final    Occult Blood UA 11/28/2023 Negative  Negative Final    Nitrite, UA 11/28/2023 Negative  Negative Final    Urobilinogen, UA 11/28/2023 Negative  Negative EU/dL Final    Leukocytes, UA 11/28/2023 Negative  Negative Final           Imaging Results              NM Bone Scan 3 Phase Foot (In process)                      X-Ray Foot Complete Right (Final result)  Result time 11/28/23 10:43:28      Final result by Amanda Delgado MD (11/28/23 10:43:28)                   Narrative:    3 views right foot    Clinical history is infection to the heel    COMPARISON: 9/21/2023    FINDINGS: There is stable soft tissue ulceration along the posterior calcaneus. There is cortical lucency within the posterior calcaneus suggestive of osteomyelitis. MRI recommended for further evaluation.  There are no additional osseous abnormalities. There are degenerative changes of the hindfoot.    IMPRESSION: Findings suggestive of osteomyelitis of the posterior calcaneus with associated soft tissue ulceration. Correlation with MRI or triple phase bone scan is recommended    Electronically signed by:  Amanda Delgado MD  11/28/2023 10:43 AM [facility] Workstation: [id number]                                     X-Ray Hip 2 or 3 views Right (with Pelvis when performed) (Final result)  Result time 11/28/23 10:46:22      Final result by  Andrew Doty MD (11/28/23 10:46:22)                   Narrative:    Right hip with AP pelvis    CLINICAL DATA: Right hip wound    FINDINGS: 3 views are submitted. Comparison is made to a CT examination from September 2023.    Exam is limited by difficulty with positioning. There is moderate varus angulation at the right hip. There is no evidence of fracture or dislocation. Mild to moderate joint space narrowing at the right hip is consistent with osteoarthritic change.    Soft tissue defect overlying the greater trochanter of the right femur is noted, and compared to the prior CT appears somewhat more prominent. No definite underlying bone destruction is identified.    Mild osteoarthritic changes noted at the left hip. The remainder of the bony pelvis is unremarkable.    IMPRESSION:  1. Prominent soft tissue defect over the greater trochanter of the right femur as above. There is no definite radiographic evidence of osteomyelitis, however if osteomyelitis is a strong clinical concern, MR or three-phase bone scan should be considered.  2. Additional chronic findings as above.    Electronically signed by:  Andrew Doty MD  11/28/2023 10:46 AM [facility] Workstation: [id number]                                     X-Ray Chest AP Portable (Final result)  Result time 11/28/23 09:12:20      Final result by Prieto Bermeo MD (11/28/23 09:12:20)                   Narrative:    XR CHEST 1 VIEW    CLINICAL HISTORY:  88 years Male Sepsis    COMPARISON: None    FINDINGS: Low lung volumes with accentuation of cardiac silhouette size. Atherosclerotic calcification of the aorta. Mild increased interstitial markings bilaterally. No confluent alveolar consolidation. No large pleural effusion or pneumothorax.    IMPRESSION:    Increased interstitial markings bilaterally which could reflect pulmonary edema/volume overload or atypical infection.    Electronically signed by:  Prieto Bermeo MD  11/28/2023 09:12 AM CST  Workstation: [id number]

## 2023-11-29 LAB
ACINETOBACTER CALCOACETICUS/BAUMANNII COMPLEX: NOT DETECTED
ALBUMIN SERPL BCP-MCNC: 2.9 G/DL (ref 3.5–5.2)
ALP SERPL-CCNC: 74 U/L (ref 55–135)
ALT SERPL W/O P-5'-P-CCNC: 11 U/L (ref 10–44)
ANION GAP SERPL CALC-SCNC: 6 MMOL/L (ref 8–16)
AST SERPL-CCNC: 17 U/L (ref 10–40)
BACTERIA #/AREA URNS HPF: NEGATIVE /HPF
BACTEROIDES FRAGILIS: NOT DETECTED
BILIRUB SERPL-MCNC: 1.1 MG/DL (ref 0.1–1)
BILIRUB UR QL STRIP: NEGATIVE
BUN SERPL-MCNC: 16 MG/DL (ref 8–23)
CALCIUM SERPL-MCNC: 8.3 MG/DL (ref 8.7–10.5)
CANDIDA ALBICANS: NOT DETECTED
CANDIDA AURIS: NOT DETECTED
CANDIDA GLABRATA: NOT DETECTED
CANDIDA KRUSEI: NOT DETECTED
CANDIDA PARAPSILOSIS: NOT DETECTED
CANDIDA TROPICALIS: NOT DETECTED
CHLORIDE SERPL-SCNC: 103 MMOL/L (ref 95–110)
CLARITY UR: CLEAR
CO2 SERPL-SCNC: 29 MMOL/L (ref 23–29)
COLOR UR: YELLOW
CREAT SERPL-MCNC: 0.9 MG/DL (ref 0.5–1.4)
CRP SERPL-MCNC: 114.4 MG/L (ref 0–8.2)
CRYPTOCOCCUS NEOFORMANS/GATTII: NOT DETECTED
CTX-M GENE (ESBL PRODUCER): ABNORMAL
ENTEROBACTER CLOACAE COMPLEX: NOT DETECTED
ENTEROBACTERALES: NOT DETECTED
ENTEROCOCCUS FAECALIS: NOT DETECTED
ENTEROCOCCUS FAECIUM: NOT DETECTED
ERYTHROCYTE [DISTWIDTH] IN BLOOD BY AUTOMATED COUNT: 14.2 % (ref 11.5–14.5)
ESCHERICHIA COLI: NOT DETECTED
EST. GFR  (NO RACE VARIABLE): >60 ML/MIN/1.73 M^2
GLUCOSE SERPL-MCNC: 103 MG/DL (ref 70–110)
GLUCOSE UR QL STRIP: NEGATIVE
HAEMOPHILUS INFLUENZAE: NOT DETECTED
HCT VFR BLD AUTO: 31.5 % (ref 40–54)
HGB BLD-MCNC: 10.5 G/DL (ref 14–18)
HGB UR QL STRIP: NEGATIVE
HYALINE CASTS #/AREA URNS LPF: 0 /LPF
IMP GENE (CARBAPENEM RESISTANT): ABNORMAL
KETONES UR QL STRIP: ABNORMAL
KLEBSIELLA AEROGENES: NOT DETECTED
KLEBSIELLA OXYTOCA: NOT DETECTED
KLEBSIELLA PNEUMONIAE GROUP: NOT DETECTED
KPC RESISTANCE GENE (CARBAPENEM): ABNORMAL
LEUKOCYTE ESTERASE UR QL STRIP: ABNORMAL
LISTERIA MONOCYTOGENES: NOT DETECTED
MCH RBC QN AUTO: 31.1 PG (ref 27–31)
MCHC RBC AUTO-ENTMCNC: 33.3 G/DL (ref 32–36)
MCR-1: ABNORMAL
MCV RBC AUTO: 93 FL (ref 82–98)
MEC A/C AND MREJ (MRSA): ABNORMAL
MEC A/C: ABNORMAL
MICROSCOPIC COMMENT: ABNORMAL
NDM GENE (CARBAPENEM RESISTANT): ABNORMAL
NEISSERIA MENINGITIDIS: NOT DETECTED
NITRITE UR QL STRIP: NEGATIVE
OXA-48-LIKE (CARBAPENEM RESISTANT): ABNORMAL
PH UR STRIP: 6 [PH] (ref 5–8)
PLATELET # BLD AUTO: 164 K/UL (ref 150–450)
PMV BLD AUTO: 11.5 FL (ref 9.2–12.9)
POTASSIUM SERPL-SCNC: 3.4 MMOL/L (ref 3.5–5.1)
PROT SERPL-MCNC: 6.2 G/DL (ref 6–8.4)
PROT UR QL STRIP: ABNORMAL
PROTEUS SPECIES: NOT DETECTED
PSEUDOMONAS AERUGINOSA: NOT DETECTED
RBC # BLD AUTO: 3.38 M/UL (ref 4.6–6.2)
RBC #/AREA URNS HPF: 5 /HPF (ref 0–4)
SALMONELLA SP: NOT DETECTED
SERRATIA MARCESCENS: NOT DETECTED
SODIUM SERPL-SCNC: 138 MMOL/L (ref 136–145)
SP GR UR STRIP: >1.03 (ref 1–1.03)
SQUAMOUS #/AREA URNS HPF: 1 /HPF
STAPHYLOCOCCUS AUREUS: NOT DETECTED
STAPHYLOCOCCUS EPIDERMIDIS: NOT DETECTED
STAPHYLOCOCCUS LUGDUNESIS: NOT DETECTED
STAPHYLOCOCCUS SPECIES: DETECTED
STENOTROPHOMONAS MALTOPHILIA: NOT DETECTED
STREPTOCOCCUS AGALACTIAE: NOT DETECTED
STREPTOCOCCUS PNEUMONIAE: NOT DETECTED
STREPTOCOCCUS PYOGENES: NOT DETECTED
STREPTOCOCCUS SPECIES: NOT DETECTED
URN SPEC COLLECT METH UR: ABNORMAL
UROBILINOGEN UR STRIP-ACNC: NEGATIVE EU/DL
VAN A/B (VRE GENE): ABNORMAL
VIM GENE (CARBAPENEM RESISTANT): ABNORMAL
WBC # BLD AUTO: 7.93 K/UL (ref 3.9–12.7)
WBC #/AREA URNS HPF: 5 /HPF (ref 0–5)

## 2023-11-29 PROCEDURE — 80053 COMPREHEN METABOLIC PANEL: CPT | Performed by: NURSE PRACTITIONER

## 2023-11-29 PROCEDURE — 63600175 PHARM REV CODE 636 W HCPCS: Performed by: NURSE PRACTITIONER

## 2023-11-29 PROCEDURE — 25000003 PHARM REV CODE 250: Performed by: STUDENT IN AN ORGANIZED HEALTH CARE EDUCATION/TRAINING PROGRAM

## 2023-11-29 PROCEDURE — 81001 URINALYSIS AUTO W/SCOPE: CPT | Performed by: NURSE PRACTITIONER

## 2023-11-29 PROCEDURE — 85027 COMPLETE CBC AUTOMATED: CPT | Performed by: STUDENT IN AN ORGANIZED HEALTH CARE EDUCATION/TRAINING PROGRAM

## 2023-11-29 PROCEDURE — 25000003 PHARM REV CODE 250: Performed by: NURSE PRACTITIONER

## 2023-11-29 PROCEDURE — 99221 1ST HOSP IP/OBS SF/LOW 40: CPT | Mod: ,,, | Performed by: FAMILY MEDICINE

## 2023-11-29 PROCEDURE — 63600175 PHARM REV CODE 636 W HCPCS: Performed by: STUDENT IN AN ORGANIZED HEALTH CARE EDUCATION/TRAINING PROGRAM

## 2023-11-29 PROCEDURE — 12000002 HC ACUTE/MED SURGE SEMI-PRIVATE ROOM

## 2023-11-29 PROCEDURE — 36415 COLL VENOUS BLD VENIPUNCTURE: CPT | Performed by: NURSE PRACTITIONER

## 2023-11-29 RX ORDER — LANOLIN ALCOHOL/MO/W.PET/CERES
800 CREAM (GRAM) TOPICAL
Status: DISCONTINUED | OUTPATIENT
Start: 2023-11-29 | End: 2023-11-30 | Stop reason: HOSPADM

## 2023-11-29 RX ORDER — SODIUM,POTASSIUM PHOSPHATES 280-250MG
2 POWDER IN PACKET (EA) ORAL
Status: DISCONTINUED | OUTPATIENT
Start: 2023-11-29 | End: 2023-11-30 | Stop reason: HOSPADM

## 2023-11-29 RX ORDER — ALUMINUM HYDROXIDE, MAGNESIUM HYDROXIDE, AND SIMETHICONE 1200; 120; 1200 MG/30ML; MG/30ML; MG/30ML
30 SUSPENSION ORAL 4 TIMES DAILY PRN
Status: DISCONTINUED | OUTPATIENT
Start: 2023-11-29 | End: 2023-11-30 | Stop reason: HOSPADM

## 2023-11-29 RX ORDER — ONDANSETRON 4 MG/1
8 TABLET, ORALLY DISINTEGRATING ORAL EVERY 8 HOURS PRN
Status: DISCONTINUED | OUTPATIENT
Start: 2023-11-29 | End: 2023-11-30 | Stop reason: HOSPADM

## 2023-11-29 RX ORDER — TALC
6 POWDER (GRAM) TOPICAL NIGHTLY PRN
Status: DISCONTINUED | OUTPATIENT
Start: 2023-11-29 | End: 2023-11-30 | Stop reason: HOSPADM

## 2023-11-29 RX ORDER — NALOXONE HCL 0.4 MG/ML
0.02 VIAL (ML) INJECTION
Status: DISCONTINUED | OUTPATIENT
Start: 2023-11-29 | End: 2023-11-30 | Stop reason: HOSPADM

## 2023-11-29 RX ORDER — GLUCAGON 1 MG
1 KIT INJECTION
Status: DISCONTINUED | OUTPATIENT
Start: 2023-11-29 | End: 2023-11-30 | Stop reason: HOSPADM

## 2023-11-29 RX ORDER — SODIUM CHLORIDE 0.9 % (FLUSH) 0.9 %
10 SYRINGE (ML) INJECTION EVERY 12 HOURS PRN
Status: DISCONTINUED | OUTPATIENT
Start: 2023-11-29 | End: 2023-11-30 | Stop reason: HOSPADM

## 2023-11-29 RX ORDER — IBUPROFEN 200 MG
16 TABLET ORAL
Status: DISCONTINUED | OUTPATIENT
Start: 2023-11-29 | End: 2023-11-30 | Stop reason: HOSPADM

## 2023-11-29 RX ORDER — IBUPROFEN 200 MG
24 TABLET ORAL
Status: DISCONTINUED | OUTPATIENT
Start: 2023-11-29 | End: 2023-11-30 | Stop reason: HOSPADM

## 2023-11-29 RX ADMIN — MORPHINE SULFATE 2 MG: 2 INJECTION, SOLUTION INTRAMUSCULAR; INTRAVENOUS at 08:11

## 2023-11-29 RX ADMIN — PIPERACILLIN SODIUM AND TAZOBACTAM SODIUM 4.5 G: 4; .5 INJECTION, POWDER, LYOPHILIZED, FOR SOLUTION INTRAVENOUS at 04:11

## 2023-11-29 RX ADMIN — ENOXAPARIN SODIUM 40 MG: 40 INJECTION SUBCUTANEOUS at 09:11

## 2023-11-29 RX ADMIN — LEVOTHYROXINE SODIUM 100 MCG: 0.1 TABLET ORAL at 05:11

## 2023-11-29 RX ADMIN — PIPERACILLIN SODIUM AND TAZOBACTAM SODIUM 4.5 G: 4; .5 INJECTION, POWDER, LYOPHILIZED, FOR SOLUTION INTRAVENOUS at 01:11

## 2023-11-29 RX ADMIN — MUPIROCIN 1 G: 20 OINTMENT TOPICAL at 08:11

## 2023-11-29 RX ADMIN — MUPIROCIN 1 G: 20 OINTMENT TOPICAL at 09:11

## 2023-11-29 NOTE — PROGRESS NOTES
"[facility]  Adult Nutrition   Consult Note (Initial Assessment)     SUMMARY     Recommendations  Recommendation/Intervention:   1. Speech to assess patient for diet order   2. Addition of Kyle TID x 14 days, when diet progresses, for wound healing on hip.  Goals:   1. Labs to begin to move WNL.   2. Diet progresses from NPO to oral intake.  Nutrition Goal Status: new, progressing towards goal    PES: Inadequate oral intake (NI-2.1) related to NPO diet order as evidenced by 0% oral intake on NPO diet.     Increased nutrient needs, protein (NI-5.1) related to wound on heel and hip as evidenced by increased protein needs for wound healing.     Dietitian Rounds Brief  Pt. Is an 88 y.o. male admitted on 11/28/2023 with acute osteomyelitis of right calcaneous. He was placed on NPO diet. Consulted for wound, difficulty swallowing, and malnutrition. Pt. Was sleeping when dietetic intern went to speak with him. Recommend speech to assess pt. For diet order. Addition of Kyle TID for heel and hip wound once diet progresses from NPO. Last BM recorder 11/27/23. Plan is for dc halfway NH with hospice care.    Diet order:   Current Diet Order: NPO   Oral Nutrition Supplement: Kyle TID x 14 days when diet progresses.        Evaluation of Received Nutrient/Fluid Intake  Total Calories (kcal): 2405  Total Calories (kcal/kg): 32.5  Total Protein (gm): 100.11  Total Protein (gm/kg): 1.35  Total Fluid Intake (mL): 2220     % Intake of Estimated Energy Needs: 0%  % Meal Intake: NPO      Intake/Output Summary (Last 24 hours) at 11/29/2023 1429  Last data filed at 11/29/2023 0954  Gross per 24 hour   Intake 300 ml   Output 385 ml   Net -85 ml        Anthropometrics  Temp: 98.6 °F (37 °C)  Height Method: Stated  Height: 5' 9" (175.3 cm)  Height (inches): 69 in  Weight Method: Bed Scale  Weight: 74 kg (163 lb 2.3 oz)  Weight (lb): 163.14 lb  Ideal Body Weight (IBW), Male: 160 lb  % Ideal Body Weight, Male (lb): 101.96 " %  BMI (Calculated): 24.1  BMI Grade: 18.5-24.9 - normal       Estimated/Assessed Needs  Weight Used For Calorie Calculations: 74 kg (163 lb 2.3 oz)  Energy Calorie Requirements (kcal): 9543-3943 kcal (30-35 kcal/kg)  Energy Need Method: Kcal/kg  Protein Requirements: 88..23 g (1.2-1.5 g/kg)  Weight Used For Protein Calculations: 74 kg (163 lb 2.3 oz)  Fluid Requirements (mL): 2220 mL  Estimated Fluid Requirement Method: RDA Method  RDA Method (mL): 2220       Reason for Assessment  Reason For Assessment: consult (Wound; difficulty swallowing & malnutrition)  Diagnosis: other (see comments) (Acute osteomyelitis of right calcaneus)  Relevant Medical History: HTN, HLD, pAfib, TIA, hypothyroid, dementia, right hip ulcer, and bilateral heel ulcers  Interdisciplinary Rounds: did not attend    Nutrition/Diet History  Patient Reported Diet/Restrictions/Preferences: no oral intake  Typical Food/Fluid Intake: 0%  Spiritual, Cultural Beliefs, Uatsdin Practices, Values that Affect Care: no  Food Allergies: NKFA  Factors Affecting Nutritional Intake: NPO    Nutrition Risk Screen  Nutrition Risk Screen: large or nonhealing wound, burn or pressure injury       Altered Skin Integrity 11/28/23 1600 Penis Non pressure chronic ulcer-Wound Image: Images linked       Altered Skin Integrity 06/26/23 1813 Left Heel Ulceration Full thickness tissue loss. Base is covered by slough and/or eschar in the wound bed-Wound Image: Images linked       Altered Skin Integrity 09/21/23 1109 Right Heel Full thickness tissue loss. Base is covered by slough and/or eschar in the wound bed-Wound Image: Images linked       Altered Skin Integrity 11/28/23 1600 Right anterior Greater trochanter Other (comment) Full thickness tissue loss. Subcutaneous fat may be visible but bone, tendon or muscle are not exposed-Wound Image: Images linked  MST Score: 3  Have you recently lost weight without trying?: Unsure  Weight loss score: 2  Have you been eating  "poorly because of a decreased appetite?: Yes  Appetite score: 1       Weight History:  Wt Readings from Last 10 Encounters:   11/29/23 74 kg (163 lb 2.3 oz)   11/05/23 61.1 kg (134 lb 11.2 oz)   10/25/23 71.9 kg (158 lb 8.2 oz)   09/24/23 73.8 kg (162 lb 11.2 oz)   06/27/23 78.9 kg (173 lb 15.1 oz)   09/21/21 81.6 kg (179 lb 14.3 oz)   03/13/21 81.6 kg (180 lb)   03/13/21 81.6 kg (179 lb 14.3 oz)   03/05/21 78.9 kg (174 lb)   02/05/21 81.4 kg (179 lb 7.3 oz)        Lab/Procedures/Meds: Pertinent Labs/Meds Reviewed    Medications:Pertinent Medications Reviewed  Scheduled Meds:   atorvastatin  40 mg Oral Daily    enoxparin  40 mg Subcutaneous Q24H (prophylaxis, 1700)    levothyroxine  100 mcg Oral Daily    memantine  5 mg Oral BID    mirtazapine  30 mg Oral QHS    mupirocin   Nasal BID    OLANZapine  7.5 mg Oral Nightly    piperacillin-tazobactam (Zosyn) IV (PEDS and ADULTS) (extended infusion is not appropriate)  4.5 g Intravenous Q8H    sertraline  25 mg Oral Daily     Continuous Infusions:  PRN Meds:.acetaminophen, HYDROcodone-acetaminophen, HYDROcodone-acetaminophen, morphine, morphine    Labs: Pertinent Labs Reviewed  Clinical Chemistry:  Recent Labs   Lab 11/28/23  0932 11/29/23  0440    138   K 3.9 3.4*    103   CO2 29 29   * 103   BUN 21 16   CREATININE 0.8 0.9   CALCIUM 8.7 8.3*   PROT 6.5 6.2   ALBUMIN 3.0* 2.9*   BILITOT 0.6 1.1*   ALKPHOS 83 74   AST 20 17   ALT 12 11   ANIONGAP 6* 6*   MG 1.8  --      CBC:   Recent Labs   Lab 11/29/23  0440   WBC 7.93   RBC 3.38*   HGB 10.5*   HCT 31.5*      MCV 93   MCH 31.1*   MCHC 33.3     Lipid Panel:  No results for input(s): "CHOL", "HDL", "LDLCALC", "TRIG", "CHOLHDL" in the last 168 hours.  Cardiac Profile:  Recent Labs   Lab 11/28/23  0932   *     Inflammatory Labs:  Recent Labs   Lab 11/28/23  0932   .4*     Diabetes:  No results for input(s): "HGBA1C", "POCTGLUCOSE" in the last 168 hours.  Thyroid & Parathyroid:  No " "results for input(s): "TSH", "FREET4", "A4TEUHR", "B2DLVMI", "THYROIDAB" in the last 168 hours.    Monitor and Evaluation  Food and Nutrient Intake: energy intake, food and beverage intake  Food and Nutrient Adminstration: diet order, other (specify) (diet progression)  Knowledge/Beliefs/Attitudes: food and nutrition knowledge/skill  Physical Activity and Function: nutrition-related ADLs and IADLs  Anthropometric Measurements: weight change, body mass index  Biochemical Data, Medical Tests and Procedures: glucose/endocrine profile, electrolyte and renal panel, inflammatory profile  Nutrition-Focused Physical Findings: overall appearance, extremities, muscles and bones, skin     Nutrition Risk  Level of Risk/Frequency of Follow-up: low - moderate     Nutrition Follow-Up  RD Follow-up?: Yes      Starr Peoples, Student Dietitian 11/29/2023 2:29 PM     I certify that I directed the dietetic intern in service delivery and guided them using my skilled judgment. As the cosigning dietitian, I have reviewed the dietetic interns documentation and am responsible for the treatment, assessment, and plan.  YOHANNES Wells 11/29/2023 3:15 PM           "

## 2023-11-29 NOTE — PROGRESS NOTES
Advance Care Planning     Date: 11/29/2023    [facility]  I engaged the healthcare power of   in a voluntary conversation about advance care planning and we specifically addressed what the goals of care would be moving forward, in light of the patient's change in clinical status.  We did specifically address the patient's likely prognosis, which is poor.  We explored the patient's values and preferences for future care.  The healthcare power of   endorses that what is most important right now is to focus on comfort and QOL .    Accordingly, we have decided that the best plan to meet the patient's goals includes enrolling in hospice care    I did explain the role for hospice care at this stage of the patient's illness, including its ability to help the patient live with the best quality of life possible.  We will be making a hospice referral today. Daughter Chris is agreeable to [place] [facility] at [facility].    I spent a total of 60 minutes engaging the patient in this advance care planning discussion.         CM and medical team updated. Dr. Santos notified. PC Team will continue to follow.

## 2023-11-29 NOTE — CONSULTS
"Consult noted; chart reviewed. Pt is a DNR; HCPOA noted. 2nd POA, Son Prieto, at the bedside. Pt lying in bed resting with eyes closed; lethargic and unable to participate. Pt with history of dementia and resides at [facility]. Son states at baseline pt is somewhat interactive and able to eat/drink when fed. Mostly bed-bound recently. WC bound prior to LTAC. Multiple wounds.     Son reports major decline following an LTAC stay in November. In his opinion, Son reports pt has "no quality of life." States his siblings feel differently and need to be present for GOC discussion. He does not wish for his Father to return to LTAC or have any further invasive procedures. He states PEG tube is also not an option. He desires for the pt to return to the NH with Hospice services, however, needs his other 2 siblings to be in agreement. He is planning to call his Sister Shawna today and requested that I call his Brother Adriano [phone]. Family meeting is preferred, however, Shawna is not in good health. PC Team will discuss with other children and continue to follow. Continue current POC for now. DNR status confirmed.     Arranged phone call with Daughter Shawna today at 1:00pm.      Attempted to call Son Adriano; no answer, left message. CM and Dr. Santos updated.   "

## 2023-11-29 NOTE — PROGRESS NOTES
Pharmacy Consult Discontinuation: Vancomycin    Adriano Borjas [id number] is a 88 y.o. male was consulted for vancomycin pharmacotherapy management by pharmacy.    Pharmacy consult for vancomycin dosing is no longer required.  Vancomycin was discontinued 11/29/2023 by Jace @ 0821    Thank you for allowing us to participate in this patient's care. Should you have any questions or concerns please feel free to contact the pharmacy department at [phone].    Zack Tsang RPH

## 2023-11-29 NOTE — PT/OT/SLP PROGRESS
Speech Language Pathology      Adriano Borjas  MRN: [id number]    Patient not seen today secondary to Unarousable. Attempted to wake pt w/ bed movement, cool towel to face, auditory stimuli, and sternal rub; pt's sons reporting pt had received morphine ~20 minutes prior to attempted evaluation. Will follow-up 11/30.

## 2023-11-29 NOTE — NURSING
Nurses Note -- 4 Eyes      11/28/2023   11:34 PM      Skin assessed during: Admit      [] No Altered Skin Integrity Present    []Prevention Measures Documented      [x] Yes- Altered Skin Integrity Present or Discovered   [x] LDA Added if Not in Epic (Describe Wound)   [x] New Altered Skin Integrity was Present on Admit and Documented in LDA   [x] Wound Image Taken    Wound Care Consulted? Yes    Attending Nurse:  James Schwartz RN/Staff Member:   Sheldon

## 2023-11-29 NOTE — PLAN OF CARE
Met with patient at bedside to complete initial assessment. Patient / family reports patient DOES NOT have a living will and Prieto Borjas (Son) [phone] (Mobile)  is medical POA. Patient is a resident at [facility].    [facility]  Initial Discharge Assessment       Primary Care Provider: Nusrat, Primary Doctor    Admission Diagnosis: Acute osteomyelitis of right calcaneus [M86.171]    Admission Date: 11/28/2023  Expected Discharge Date: 12/1/2023    Transition of Care Barriers: (P) None    Payor: [employer] MEDICARE / Plan: [employer] 65 / Product Type: Medicare Advantage /     Extended Emergency Contact Information  Primary Emergency Contact: Prieto Borjas  Address: [address], [place] [id number] [place] [address]  Home Phone: [phone]  Work Phone: [phone]  Mobile Phone: [phone]  Relation: Son  Secondary Emergency Contact: Chris Monae  Address: [address] [place] [address] [facility]  Home Phone: [phone]  Mobile Phone: [phone]  Relation: Daughter    Discharge Plan A: (P) Return to nursing home  Discharge Plan B: (P) Return to Nursing Home      [facility] [place] - [address]  [address] [place] [address]  Phone: [phone] Fax: [phone]      Initial Assessment (most recent)       Adult Discharge Assessment - 11/29/23 1005          Discharge Assessment    Assessment Type Discharge Planning Assessment (P)      Confirmed/corrected address, phone number and insurance Yes (P)    Patient is a resident at [facility] [address] [place] [address]    Source of Information family (P)      If unable to respond/provide information was family/caregiver contacted? Yes (P)      Communicated KAY with patient/caregiver No (P)      Reason For Admission osteomyelitis of right calcaneous (P)      People in Home facility  resident (P)      Facility Arrived From: [facility] (P)      Do you expect to return to your current living situation? Yes (P)      Do you have help at home or someone to help you manage your care at home? Yes (P)      Who are your caregiver(s) and their phone number(s)? facility staff (P)      Current cognitive status: Coma/Sedated/Intubated (P)      Walking or Climbing Stairs ambulation difficulty, dependent;transferring difficulty, dependent (P)      Dressing/Bathing bathing difficulty, dependent;dressing difficulty, dependent (P)      Equipment Currently Used at Home hospital bed;wheelchair (P)      Readmission within 30 days? No (P)      Patient currently being followed by outpatient case management? No (P)      Do you currently have service(s) that help you manage your care at home? No (P)      Do you have prescription coverage? Yes (P)      Coverage [facility] (P)      Who is going to help you get home at discharge? facility transportation or ambulance (P)      Are you on dialysis? No (P)      Do you take coumadin? No (P)      DME Needed Upon Discharge  none (P)      Discharge Plan discussed with: Adult children (P)      Transition of Care Barriers None (P)      Discharge Plan A Return to nursing home (P)      Discharge Plan B Return to Nursing Home (P)         OTHER    Name(s) of People in Home [facility] staff (P)

## 2023-11-29 NOTE — PROGRESS NOTES
[facility] Medicine  Progress Note    Patient name: Adriano Borjas  MRN: [id number]  Admit Date: 11/28/2023   LOS: 1 day     SUBJECTIVE:     Principal problem: Acute osteomyelitis of right calcaneus    HPI: 88M with PMH HTN, HLD, pAfib, TIA, hypothyroid, dementia, right hip ulcer, and bilateral heel ulcers with recent treatment for osteomyelitis s/p zosyn completion 11/6 and discharge from LTAC 11/9 presents to the ER from long term nursing home due to fever. His daughter Chrsi is present and provides most of the recent history. Patient cannot provide history due to dementia but he does deny any pain. Chris states his general health has decompensated over the past several months to being mostly bed-bound at this point with contractures of his limbs. The left heel ulcer has mostly healed. The right heel ulcer is still open and has worsened lately since discharge from LTAC. Labs are generally unremarkable. Right heel xray is consistent with osteomyelitis and right hip xray shows soft tissue defect. Chris is contemplating how much more medical treatment her and her brothers are willing to put the patient through. Given abx and sepsis bolus IVF in ER. Admitted to hospital medicine for continued management and evaluation.     Hospital course:   Patient was admitted with fever, patient was found to have acute on chronic osteomyelitis of the right heel. ID and podiatry was consulted. However family now leaning more towards palliative care. They do not wish for any surgical interventions or another 6 weeks IV antibiotics. Palliative was consulted.     Interval History:  Patient is sleeping and did not wake up for voice or light touch. Two sons at bedside states that patient was given pain meds just now and when he is sleeping he is comfortable. Discussed with family treatment options and they are leaning towards comfort care approach.     Scheduled Meds:   atorvastatin  40 mg Oral Daily     enoxparin  40 mg Subcutaneous Q24H (prophylaxis, 1700)    levothyroxine  100 mcg Oral Daily    memantine  5 mg Oral BID    mirtazapine  30 mg Oral QHS    mupirocin   Nasal BID    OLANZapine  7.5 mg Oral Nightly    piperacillin-tazobactam (Zosyn) IV (PEDS and ADULTS) (extended infusion is not appropriate)  4.5 g Intravenous Q8H    sertraline  25 mg Oral Daily     Continuous Infusions:  PRN Meds:acetaminophen, HYDROcodone-acetaminophen, HYDROcodone-acetaminophen, morphine, morphine    Review of patient's allergies indicates:   Allergen Reactions    Ciprofloxacin Other (See Comments)     Shoulder and leg pain/cramping     Codeine Other (See Comments)     Pt cannot remember been a long time ago    Sulfa (sulfonamide antibiotics)      Allergic reaction to bactrim       Review of Systems  As per subjective    OBJECTIVE:     Vital Signs (Most Recent)  Temp: 98.6 °F (37 °C) (11/29/23 1104)  Pulse: 71 (11/29/23 1104)  Resp: 20 (11/29/23 1104)  BP: (!) 101/51 (11/29/23 1104)  SpO2: (!) 93 % (11/29/23 1104)    Vital Signs Range (Last 24H):  Temp:  [98.4 °F (36.9 °C)-100.4 °F (38 °C)]   Pulse:  [71-88]   Resp:  [18-25]   BP: (101-151)/()   SpO2:  [92 %-96 %]     I & O (Last 24H):  Intake/Output Summary (Last 24 hours) at 11/29/2023 1148  Last data filed at 11/29/2023 0954  Gross per 24 hour   Intake 300 ml   Output 385 ml   Net -85 ml       Physical Exam  Vitals reviewed.   Constitutional:       General: He is not in acute distress.     Appearance: He is ill-appearing (chronic).      Comments: Frail, elderly  Global limb contractures   HENT:      Head: Normocephalic and atraumatic.   Cardiovascular:      Rate and Rhythm: Normal rate and regular rhythm.   Pulmonary:      Effort: Pulmonary effort is normal. No respiratory distress.      Breath sounds: Normal breath sounds.   Abdominal:      General: Abdomen is flat. Bowel sounds are normal. There is no distension.      Palpations: Abdomen is soft.      Tenderness: There is  no abdominal tenderness.   Musculoskeletal:      Right lower leg: No edema.      Left lower leg: No edema.   Skin:     General: Skin is warm and dry.      Comments: Right hip ulcer at least stage 2  Left heel ulcer pressure injury, healing  Right heal ulcer unstageable with surrounding erythema  See media   Neurological:      General: No focal deficit present.      Mental Status: He is alert. Mental status is at baseline.   Psychiatric:         Cognition and Memory: Cognition is impaired.    Laboratory:  All pertinent labs within the past 24 hours have been reviewed.  BMP:   Recent Labs   Lab 11/28/23 0932 11/29/23  0440   * 103    138   K 3.9 3.4*    103   CO2 29 29   BUN 21 16   CREATININE 0.8 0.9   CALCIUM 8.7 8.3*   MG 1.8  --      CBC:   Recent Labs   Lab 11/28/23 0932 11/29/23 0440   WBC 9.83 7.93   HGB 11.4* 10.5*   HCT 34.6* 31.5*    164       Microbiology Results (last 7 days)       Procedure Component Value Units Date/Time    Blood culture x two cultures. Draw prior to antibiotics. [[id number]] Collected: 11/28/23 0954    Order Status: Completed Specimen: Blood Updated: 11/29/23 1032     Blood Culture, Routine No Growth to date      No Growth to date    Narrative:      Aerobic and anaerobic  Collection has been rescheduled by DW at 11/28/2023 09:08 Reason:   Patient unavailable with doctor also getting EKG   Collection has been rescheduled by DW at 11/28/2023 09:08 Reason:   Patient unavailable with doctor also getting EKG     Blood culture x two cultures. Draw prior to antibiotics. [[id number]] Collected: 11/28/23 0953    Order Status: Completed Specimen: Blood Updated: 11/29/23 1032     Blood Culture, Routine No Growth to date      No Growth to date    Narrative:      Aerobic and anaerobic  Collection has been rescheduled by DW at 11/28/2023 09:08 Reason:   Patient unavailable with doctor also getting EKG   Collection has been rescheduled by DW at 11/28/2023 09:08 Reason:    Patient unavailable with doctor also getting EKG     MRSA Screen by PCR [[id number]]     Order Status: No result Specimen: Nasopharyngeal Swab from Nasal              Diagnostic Results:      ASSESSMENT/PLAN:     Acute osteomyelitis of right calcaneus  Unstageable pressure ulcers of both heels   Decubitus ulcer, hip, right, unstageable  Recurrent vs refractory  S/p recent IV abx treatment w/ zosyn ended 11/6  Has grown enterococcus and e.coli in last 1 year  - ID consulted for antibiotics: cont Zosyn   - Palliative care consulted   - wound care      Hypothyroid  Chronic  - continue home levothyroxine     Persistent atrial fibrillation  Secondary hypercoagulable state   Well-controlled. Not on AC, rate control, or rhythm control meds  - monitor     Hyperlipidemia  Chronic  - continue statin     Frontotemporal dementia  Stable  - delirium precautions  - continue home memantine     Essential hypertension  Chronic, controlled  Not on anti-hypertensives  - monitor    Failure to thrive   Functional quadriplegia: bed bound   - Palliative consult       VTE Risk Mitigation (From admission, onward)           Ordered     enoxaparin injection 40 mg  Every 24 hours         11/28/23 2004                          Department Hospital Medicine  [facility]  Jami Riggs MD  Date of service: 11/29/2023

## 2023-11-29 NOTE — PLAN OF CARE
Noted messages from Palliative care and order for hospice. Manasa, RN with palliative care reports she spoke with patient's daughter who expresses the family's wishes for the patient to return to jail care at [facility] under hospice care. Family reports they have spoken with Sadie from [facility] two months ago. Referral sent via [employer] to [facility] and voicemail message left for Sadie informing her of referral sent.        11/29/23 3871   Post-Acute Status   Post-Acute Authorization Hospice   Hospice Status Referrals Sent   Coverage [employer]   Discharge Delays None known at this time   Discharge Plan   Discharge Plan A Hospice/home   Discharge Plan B Hospice/home

## 2023-11-29 NOTE — PROGRESS NOTES
VANCOMYCIN PHARMACOKINETIC NOTE:  Vancomycin Day # 1    Objective/Assessment:    Diagnosis/Indication for Vancomycin: Osteomyelitis     88 y.o., male; Actual Body Weight = 59.1 kg (130 lb 3.2 oz).    The patient has the following labs:  11/28/2023 Estimated Creatinine Clearance: 55.4 mL/min (based on SCr of 0.8 mg/dL). Lab Results   Component Value Date    BUN 21 11/28/2023     Lab Results   Component Value Date    WBC 9.83 11/28/2023            Plan:  Adjust vancomycin dose and/or frequency based on the patient's actual weight and renal function:  Initiate Vancomycin 1500 mg x 1 dose mg IV once, Then vancomycin 1000 mg q24h.    Orders have been entered into patient's chart.    Will keep Vancomycin trough levels  between 15-20 mcg/ml.    Vancomycin dose = 16.9 mg/kg actual body weight    Vancomycin trough level has been ordered for prior to 4th dose.    Pharmacy will manage vancomycin therapy, monitor serum vancomycin levels, monitor renal function and adjust regimen as necessary.    Thank you for allowing us to participate in this patient's care.     Dash Martinez 11/28/2023 8:24 PM  Department of Pharmacy  Ext [phone]

## 2023-11-30 VITALS
BODY MASS INDEX: 24.16 KG/M2 | SYSTOLIC BLOOD PRESSURE: 131 MMHG | WEIGHT: 163.13 LBS | HEIGHT: 69 IN | DIASTOLIC BLOOD PRESSURE: 64 MMHG | OXYGEN SATURATION: 95 % | RESPIRATION RATE: 16 BRPM | HEART RATE: 86 BPM | TEMPERATURE: 99 F

## 2023-11-30 LAB
ALBUMIN SERPL BCP-MCNC: 2.8 G/DL (ref 3.5–5.2)
ALP SERPL-CCNC: 70 U/L (ref 55–135)
ALT SERPL W/O P-5'-P-CCNC: 13 U/L (ref 10–44)
ANION GAP SERPL CALC-SCNC: 7 MMOL/L (ref 8–16)
AST SERPL-CCNC: 21 U/L (ref 10–40)
BACTERIA BLD CULT: ABNORMAL
BASOPHILS # BLD AUTO: 0.06 K/UL (ref 0–0.2)
BASOPHILS NFR BLD: 0.9 % (ref 0–1.9)
BILIRUB SERPL-MCNC: 0.9 MG/DL (ref 0.1–1)
BUN SERPL-MCNC: 20 MG/DL (ref 8–23)
CALCIUM SERPL-MCNC: 8.3 MG/DL (ref 8.7–10.5)
CHLORIDE SERPL-SCNC: 104 MMOL/L (ref 95–110)
CO2 SERPL-SCNC: 27 MMOL/L (ref 23–29)
CREAT SERPL-MCNC: 0.8 MG/DL (ref 0.5–1.4)
DIFFERENTIAL METHOD BLD: ABNORMAL
EOSINOPHIL # BLD AUTO: 0.4 K/UL (ref 0–0.5)
EOSINOPHIL NFR BLD: 5.4 % (ref 0–8)
ERYTHROCYTE [DISTWIDTH] IN BLOOD BY AUTOMATED COUNT: 14 % (ref 11.5–14.5)
EST. GFR  (NO RACE VARIABLE): >60 ML/MIN/1.73 M^2
GLUCOSE SERPL-MCNC: 85 MG/DL (ref 70–110)
GLUCOSE SERPL-MCNC: 97 MG/DL (ref 70–110)
HCT VFR BLD AUTO: 31.4 % (ref 40–54)
HGB BLD-MCNC: 10.4 G/DL (ref 14–18)
IMM GRANULOCYTES # BLD AUTO: 0.04 K/UL (ref 0–0.04)
IMM GRANULOCYTES NFR BLD AUTO: 0.6 % (ref 0–0.5)
LYMPHOCYTES # BLD AUTO: 1 K/UL (ref 1–4.8)
LYMPHOCYTES NFR BLD: 14.9 % (ref 18–48)
MCH RBC QN AUTO: 30.6 PG (ref 27–31)
MCHC RBC AUTO-ENTMCNC: 33.1 G/DL (ref 32–36)
MCV RBC AUTO: 92 FL (ref 82–98)
MONOCYTES # BLD AUTO: 0.8 K/UL (ref 0.3–1)
MONOCYTES NFR BLD: 12.2 % (ref 4–15)
NEUTROPHILS # BLD AUTO: 4.5 K/UL (ref 1.8–7.7)
NEUTROPHILS NFR BLD: 66 % (ref 38–73)
NRBC BLD-RTO: 0 /100 WBC
PLATELET # BLD AUTO: 177 K/UL (ref 150–450)
PMV BLD AUTO: 11.9 FL (ref 9.2–12.9)
POTASSIUM SERPL-SCNC: 3.4 MMOL/L (ref 3.5–5.1)
PROT SERPL-MCNC: 6.1 G/DL (ref 6–8.4)
RBC # BLD AUTO: 3.4 M/UL (ref 4.6–6.2)
SODIUM SERPL-SCNC: 138 MMOL/L (ref 136–145)
WBC # BLD AUTO: 6.8 K/UL (ref 3.9–12.7)

## 2023-11-30 PROCEDURE — 25000003 PHARM REV CODE 250: Performed by: STUDENT IN AN ORGANIZED HEALTH CARE EDUCATION/TRAINING PROGRAM

## 2023-11-30 PROCEDURE — 97167 OT EVAL HIGH COMPLEX 60 MIN: CPT

## 2023-11-30 PROCEDURE — 85025 COMPLETE CBC W/AUTO DIFF WBC: CPT | Performed by: NURSE PRACTITIONER

## 2023-11-30 PROCEDURE — 94760 N-INVAS EAR/PLS OXIMETRY 1: CPT

## 2023-11-30 PROCEDURE — 63600175 PHARM REV CODE 636 W HCPCS: Performed by: STUDENT IN AN ORGANIZED HEALTH CARE EDUCATION/TRAINING PROGRAM

## 2023-11-30 PROCEDURE — 80053 COMPREHEN METABOLIC PANEL: CPT | Performed by: NURSE PRACTITIONER

## 2023-11-30 PROCEDURE — 36415 COLL VENOUS BLD VENIPUNCTURE: CPT | Performed by: NURSE PRACTITIONER

## 2023-11-30 PROCEDURE — 99900031 HC PATIENT EDUCATION (STAT)

## 2023-11-30 RX ORDER — DOXYCYCLINE HYCLATE 100 MG
100 TABLET ORAL 2 TIMES DAILY
Qty: 14 TABLET | Refills: 0
Start: 2023-11-30 | End: 2023-12-07

## 2023-11-30 RX ADMIN — MUPIROCIN: 20 OINTMENT TOPICAL at 09:11

## 2023-11-30 RX ADMIN — MORPHINE SULFATE 2 MG: 2 INJECTION, SOLUTION INTRAMUSCULAR; INTRAVENOUS at 11:11

## 2023-11-30 NOTE — PLAN OF CARE
Patient to transfer to hospice care with [facility] where he is a resident. Patient will transport via ambulance.  Discharge orders and chart reviewed with no further post-acute discharge needs identified at this time.  At this time, patient is cleared for discharge from Case Management standpoint.        11/30/23 1156   Final Note   Assessment Type Final Discharge Note   Anticipated Discharge Disposition HospiceHome   Post-Acute Status   Post-Acute Authorization Hospice   Hospice Status Set-up Complete/Auth obtained   Coverage [employer]   Discharge Delays None known at this time

## 2023-11-30 NOTE — PLAN OF CARE
Blood cultures from yesterday returned positive for gram positive cocci. Reviewed chart. Appears family has decided to pursue hospice care. Palliative consulted. ID discontinued IV abx on 11/28 due to decision to pursue hospice. Will hold off on starting IV abx at this time and not order repeat blood cultures at this time.

## 2023-11-30 NOTE — PT/OT/SLP PROGRESS
Physical Therapy      Patient Name:  Adriano Borjas   MRN:  [id number]    Patient not seen today secondary to  [facility]   Pt has contractures to all extremities and requires Claus Lift for t/f OOB. Not appropriate for skilled PT.). Will not  follow-up .

## 2023-11-30 NOTE — PT/OT/SLP PROGRESS
Speech Language Pathology      Adriano Borjas  MRN: [id number]    Patient not seen today secondary to Other (Comment) (pt transitioning to hospice care; orders d'c per MD).

## 2023-11-30 NOTE — HOSPITAL COURSE
Patient was admitted with fever.  He was found to have acute on chronic osteomyelitis of the right heel. ID and podiatry was consulted. IV abx started. However family now leaning more towards palliative care and did not wish for any further invasive procedures like debridement or prolonged IV abx.  Palliative was consulted and met with patient and family.  He will return to his [facility] on hospice.  I have prescribed palliative Doxy for 2 weeks after discussion with ID per request from family regarding going home on antibiotic.  Examined on day of discharge and alert, NAD, comfortable respirations on supplemental Oxygen.

## 2023-11-30 NOTE — NURSING
Patient awake and speaking, although still has altered mental status. Attempted to do bedside nursing swallow per family request, unsuccessful. Patient unable to follow commands to suck through straw. Patient then started coughing and was unable to clear sputum, patient suctioned at bedside with yanker and copious yellow thick sputum output noted. Family educated patient must remain NPO and is unsafe and at risk of aspiration if he has anything by mouth at this time. Family verbalized understanding of this importance and is agreeable. Plan to reassess tomorrow with SLP if patient is awake.

## 2023-11-30 NOTE — CONSULTS
Chief complaint:  Fever (Pt from [facility]. C/o altered mental status and fever. Pt received tylenol at 0750. )      HPI:  Adriano Borjas is a 88 y.o. male presenting with stage 4 pressure ulcers of the BL heels and right hip. Pt is known to me from prior hospital stays and a stay at the LTAC in [place]. Pt originally presented to my clinic in July of 2023 with BL heel pressure ulcers and a right hip pressure ulcer. Pt was living at in a SNF and was using his heels to move his wheelchair. Pt continued to use his heels and they broke down and he developed osteomyelitis of the heels. He spent 6 weeks at LTAC and SNF and the heels and hip were all improving. Pt returned to the SNF and the wounds deteriorated once again. He is here inpatient for the wounds that are POA.    PMH:  As per HPI and below:  Past Medical History:   Diagnosis Date    A-fib     Dementia     Elevated cholesterol     Elevated PSA 6/10/2014    Essential hypertension     History of cerebral hemorrhage 9/5/2019    Kidney stone     passed on his own    Memory loss     dememtia per family    Osteoarthritis     Paroxysmal atrial fibrillation 12/12/2018    Stroke     2012 TIA     Thyroid disease        Social History     Socioeconomic History    Marital status:    Tobacco Use    Smoking status: Former     Types: Cigarettes    Smokeless tobacco: Never    Tobacco comments:     quit 50 years ago   Substance and Sexual Activity    Alcohol use: No    Drug use: No     Social Determinants of Health     Financial Resource Strain: Low Risk  (10/30/2023)    Overall Financial Resource Strain (CARDIA)     Difficulty of Paying Living Expenses: Not hard at all   Food Insecurity: No Food Insecurity (10/30/2023)    Hunger Vital Sign     Worried About Running Out of Food in the Last Year: Never true     Ran Out of Food in the Last Year: Never true   Transportation Needs: No Transportation Needs (10/30/2023)    PRAPARE - Transportation     Lack of  Transportation (Medical): No     Lack of Transportation (Non-Medical): No   Physical Activity: Inactive (10/30/2023)    Exercise Vital Sign     Days of Exercise per Week: 0 days     Minutes of Exercise per Session: 0 min   Stress: Stress Concern Present (10/30/2023)    [employer] - Occupational Stress Questionnaire     Feeling of Stress : To some extent   Social Connections: Socially Isolated (10/30/2023)    Social Connection and Isolation Panel [NHANES]     Frequency of Communication with Friends and Family: Twice a week     Frequency of Social Gatherings with Friends and Family: Once a week     Attends [employer]ism Services: Never     Active Member of Clubs or Organizations: No     Attends Club or Organization Meetings: Never     Marital Status:    Housing Stability: Low Risk  (10/30/2023)    Housing Stability Vital Sign     Unable to Pay for Housing in the Last Year: No     Number of Places Lived in the Last Year: 1     Unstable Housing in the Last Year: No       Past Surgical History:   Procedure Laterality Date    CYSTOSCOPY      EYE SURGERY      MAGNETIC RESONANCE IMAGING N/A 8/23/2019    Procedure: MRI (Magnetic Resonance Imagine) needs anesthesia;  Surgeon: Panchito Montoya MD;  Location: [facility];  Service: Anesthesiology;  Laterality: N/A;       Family History   Problem Relation Age of Onset    Cancer Mother     Diabetes Mother     Hyperlipidemia Mother     Heart disease Father     Hyperlipidemia Father     Dementia Sister     Dementia Brother     Heart disease Brother     Migraines Daughter     Tremor Daughter     Tremor Son     Urolithiasis Neg Hx     Prostate cancer Neg Hx     Kidney cancer Neg Hx        Review of patient's allergies indicates:   Allergen Reactions    Ciprofloxacin Other (See Comments)     Shoulder and leg pain/cramping     Codeine Other (See Comments)     Pt cannot remember been a long time ago    Sulfa (sulfonamide antibiotics)      Allergic  reaction to bactrim       Current Facility-Administered Medications on File Prior to Encounter   Medication Dose Route Frequency Provider Last Rate Last Admin    lactated ringers infusion   Intravenous Continuous Panchito Montoya MD   New Bag at 08/23/19 1150     Current Outpatient Medications on File Prior to Encounter   Medication Sig Dispense Refill    acetaminophen (TYLENOL) 500 MG tablet Take 1 tablet (500 mg total) by mouth every evening.  0    amino acids-protein hydrolys (PRO-STAT AWC)  gram-kcal/30 mL Liqd Take 45 mLs by mouth 2 (two) times a day.      ammonium lactate (LAC-HYDRIN) 12 % lotion Apply 1 g topically 2 (two) times daily. For dry skin      arginine-glutamine-calcium HMB (GUILLERMO) 7-7-1.5 gram PwPk Take 1 packet by mouth 2 (two) times a day.      ascorbic acid, vitamin C, (VITAMIN C) 500 MG tablet Take 500 mg by mouth once daily.      atorvastatin (LIPITOR) 40 MG tablet Take 1 tablet (40 mg total) by mouth once daily. 30 tablet 0    HYDROcodone-acetaminophen (NORCO) 5-325 mg per tablet Take 1 tablet by mouth 3 (three) times daily as needed for Pain.      levothyroxine (SYNTHROID) 100 MCG tablet Take 1 tablet (100 mcg total) by mouth once daily.      memantine (NAMENDA) 5 MG Tab Take 5 mg by mouth 2 (two) times daily.      miconazole (MICOTIN) 2 % cream Apply topically 2 (two) times daily.  0    mirtazapine (REMERON) 30 MG tablet Take 1 tablet (30 mg total) by mouth every evening. 30 tablet 0    multivitamin (THERAGRAN) tablet Take 1 tablet by mouth once daily.      OLANZapine (ZYPREXA) 7.5 MG tablet Take 1 tablet (7.5 mg total) by mouth nightly. 30 tablet 11    sertraline (ZOLOFT) 25 MG tablet Take 1 tablet (25 mg total) by mouth once daily. 30 tablet 11    collagenase (SANTYL) ointment Apply topically once daily. Clean right hip with wound cleanser and pat dry. Apply a nickel thick layer of santyl to the right hip, cover with adaptic (vaseline gauze) and ABD and secure with tape daily.  "(Patient taking differently: Apply 1 g topically once daily. Clean right hip with wound cleanser and pat dry. Apply a nickel thick layer of santyl to the right hip, cover with adaptic (vaseline gauze) and ABD and secure with tape daily.)  0       ROS: As per HPI and below:  Review of systems not obtained due to patient factors.      Physical Exam:     Vitals:    23 1042 23 1104 23 1614 23 1921   BP:  (!) 101/51 (!) 122/59 130/68   Pulse:  71 68 85   Resp:  20 19 18   Temp:  98.6 °F (37 °C) 98.9 °F (37.2 °C) 98.4 °F (36.9 °C)   TempSrc:  Oral Oral Oral   SpO2:  (!) 93% (!) 94% (!) 92%   Weight: 74 kg (163 lb 2.3 oz)      Height: 5' 9" (1.753 m)          BP  Min: 101/51  Max: 151/70  Temp  Av.2 °F (37.3 °C)  Min: 98.4 °F (36.9 °C)  Max: 100.4 °F (38 °C)  Pulse  Av.6  Min: 68  Max: 88  Resp  Av.5  Min: 18  Max: 25  SpO2  Av.2 %  Min: 92 %  Max: 96 %  Height  Av' 9" (175.3 cm)  Min: 5' 9" (175.3 cm)  Max: 5' 9" (175.3 cm)  Weight  Av kg (152 lb 2.6 oz)  Min: 59.1 kg (130 lb 3.2 oz)  Max: 74 kg (163 lb 2.3 oz)    Body mass index is 24.09 kg/m².          General:             Well developed, well nourished, no apparent distress  HEENT:              NCAT, no JVD, mucous membranes moist, EOM intact  Cardiovascular:  Regular rate and rhythm, normal S1, normal S2, No murmurs, rubs, or gallops  Respiratory:        Normal breath sounds, no wheezes, no rales, no rhonchi  Abdomen:           Bowel sounds present, non tender, non distended, no masses, no hepatojugular reflux  Extremities:        No clubbing, no cyanosis, no edema  Vascular:            2+ b/l radial.  Peripheral pulses intact.  No carotid bruits.  Neurological:      No focal deficits  Skin:                   No obvious rashes or erythema, BL heels pressure ulcers, stage 4, right hip stage 4 pressure ulcer, skin breakdown on the penis and scrotum, all POA                Lab Results   Component Value Date    WBC 7.93 " 11/29/2023    HGB 10.5 (L) 11/29/2023    HCT 31.5 (L) 11/29/2023    MCV 93 11/29/2023     11/29/2023     Lab Results   Component Value Date    CHOL 166 10/04/2020    CHOL 203 (H) 09/13/2017    CHOL 221 (H) 09/02/2014     Lab Results   Component Value Date    HDL 32 (L) 10/04/2020    HDL 36 (L) 09/13/2017    HDL 33 (L) 09/02/2014     Lab Results   Component Value Date    LDLCALC 114.4 10/04/2020    LDLCALC 143.8 09/13/2017    LDLCALC 151.2 09/02/2014     Lab Results   Component Value Date    TRIG 98 10/04/2020    TRIG 116 09/13/2017    TRIG 184 (H) 09/02/2014     Lab Results   Component Value Date    CHOLHDL 19.3 (L) 10/04/2020    CHOLHDL 17.7 (L) 09/13/2017    CHOLHDL 14.9 (L) 09/02/2014     CMP  Recent Labs   Lab 11/29/23  0440      CALCIUM 8.3*   ALBUMIN 2.9*   PROT 6.2      K 3.4*   CO2 29      BUN 16   CREATININE 0.9   ALKPHOS 74   ALT 11   AST 17   BILITOT 1.1*      Lab Results   Component Value Date    TSH 3.204 10/26/2023           Assessment and Recommendations       Diagnoses:    1. Right hip stage 4 pressure ulcer  2. Right heel stage 4 pressure ulcer  3. Left heel stage 4 pressure ulcer    Plan:  1. Triad to the penis and scrotum daily  2. Aquacell Ag to the BL heels and right hip daily  3. Will continue to follow as needed    Complexity:    high

## 2023-11-30 NOTE — DISCHARGE SUMMARY
[facility]  Discharge Summary      Patient Name: Adriano Borjas  MRN: [id number]  HALLIE: [id number]  Patient Class: IP- Inpatient  Admission Date: 11/28/2023  Hospital Length of Stay: 2 days  Discharge Date and Time: 11/30/2023  3:02 PM  Attending Physician: Nusrat att. providers found   Discharging Provider: Obinna López MD  Primary Care Provider: Nusrat, Primary Doctor    Primary Care Team: Networked reference to record PCT     HPI:   88M with PMH HTN, HLD, pAfib, TIA, hypothyroid, dementia, right hip ulcer, and bilateral heel ulcers with recent treatment for osteomyelitis s/p zosyn completion 11/6 and discharge from LTAC 11/9 presents to the ER from [facility] nursing home due to fever. His daughter Chris is present and provides most of the recent history. Patient cannot provide history due to dementia but he does deny any pain. Chris states his general health has decompensated over the past several months to being mostly bed-bound at this point with contractures of his limbs. The left heel ulcer has mostly healed. The right heel ulcer is still open and has worsened lately since discharge from LTAC. Labs are generally unremarkable. Right heel xray is consistent with osteomyelitis and right hip xray shows soft tissue defect. Chris is contemplating how much more medical treatment her and her brothers are willing to put the patient through. Given abx and sepsis bolus IVF in ER. Admitted to hospital medicine for continued management and evaluation.    * No surgery found *      Hospital Course:   Patient was admitted with fever.  He was found to have acute on chronic osteomyelitis of the right heel. ID and podiatry was consulted. IV abx started. However family now leaning more towards palliative care and did not wish for any further invasive procedures like debridement or prolonged IV abx.  Palliative was consulted and met with patient and family.  He will return to his [facility] NH on  hospice.  I have prescribed palliative Doxy for 2 weeks after discussion with ID per request from family regarding going home on antibiotic.  Examined on day of discharge and alert, NAD, comfortable respirations on supplemental Oxygen.         Goals of Care Treatment Preferences:  Code Status: DNR          What is most important right now is to focus on comfort and QOL .  Accordingly, we have decided that the best plan to meet the patient's goals includes enrolling in hospice care.      Consults:   Consults (From admission, onward)          Status Ordering Provider     Inpatient consult to Registered Dietitian/Nutritionist  Once        Provider:  (Not yet assigned)    Completed SHRUTI, ANCA     Inpatient consult to Registered Dietitian/Nutritionist  Once        Provider:  (Not yet assigned)    Completed SHRUTI, ANCA     IP consult to case management  Once        Provider:  (Not yet assigned)    Completed SHRUTI, ANCA     Inpatient consult to Registered Dietitian/Nutritionist  Once        Provider:  (Not yet assigned)    Completed SHRUTI, ANCA     Inpatient consult to Palliative Care  Once        Provider:  Josse Santos MD    Completed GUDELIA NAYLOR     Inpatient consult to Infectious Diseases  Once        Provider:  Kelly Quiroz MD    Completed ANCA BAHENA            No new Assessment & Plan notes have been filed under this hospital service since the last note was generated.  Service: Hospital Medicine    Final Active Diagnoses:    Diagnosis Date Noted POA    PRINCIPAL PROBLEM:  Acute osteomyelitis of right calcaneus [M86.171] 11/28/2023 Yes    Hypothyroid [E03.9] 09/23/2023 Yes    Persistent atrial fibrillation [I48.19] 09/22/2023 Yes     Chronic    Unstageable pressure ulcer of right heel [L89.610] 07/13/2023 Yes    Unstageable pressure ulcer of left heel [L89.620] 07/13/2023 Yes    Decubitus ulcer, hip, right, unstageable [L89.210] 06/26/2023 Yes    Hyperlipidemia [E78.5]  10/23/2020 Yes    Frontotemporal dementia [G31.09, F02.80] 08/23/2019 Yes    Essential hypertension [I10] 07/27/2018 Yes      Problems Resolved During this Admission:       Discharged Condition: good    Disposition: Hospice/Home    Follow Up:   Follow-up Information       Hospice MD as needed Follow up.                           Patient Instructions:      Diet Adult Regular   Order Comments: Please feeds as tolerated     Activity as tolerated       Significant Diagnostic Studies: Labs: CMP   Recent Labs   Lab 11/29/23  0440 11/30/23  0410    138   K 3.4* 3.4*    104   CO2 29 27    85   BUN 16 20   CREATININE 0.9 0.8   CALCIUM 8.3* 8.3*   PROT 6.2 6.1   ALBUMIN 2.9* 2.8*   BILITOT 1.1* 0.9   ALKPHOS 74 70   AST 17 21   ALT 11 13   ANIONGAP 6* 7*    and CBC   Recent Labs   Lab 11/29/23 0440 11/30/23  0410   WBC 7.93 6.80   HGB 10.5* 10.4*   HCT 31.5* 31.4*    177       Pending Diagnostic Studies:       None           Medications:  Reconciled Home Medications:      Medication List        START taking these medications      doxycycline 100 MG tablet  Commonly known as: VIBRA-TABS  Take 1 tablet (100 mg total) by mouth 2 (two) times daily. for 7 days            CONTINUE taking these medications      acetaminophen 500 MG tablet  Commonly known as: TYLENOL  Take 1 tablet (500 mg total) by mouth every evening.     ammonium lactate 12 % lotion  Commonly known as: LAC-HYDRIN  Apply 1 g topically 2 (two) times daily. For dry skin     ascorbic acid (vitamin C) 500 MG tablet  Commonly known as: VITAMIN C  Take 500 mg by mouth once daily.     atorvastatin 40 MG tablet  Commonly known as: LIPITOR  Take 1 tablet (40 mg total) by mouth once daily.     collagenase ointment  Commonly known as: SANTYL  Apply topically once daily. Clean right hip with wound cleanser and pat dry. Apply a nickel thick layer of santyl to the right hip, cover with adaptic (vaseline gauze) and ABD and secure with tape daily.      HYDROcodone-acetaminophen 5-325 mg per tablet  Commonly known as: NORCO  Take 1 tablet by mouth 3 (three) times daily as needed for Pain.     GUILLERMO 7-7-1.5 gram Pwpk  Generic drug: arginine-glutamine-calcium HMB  Take 1 packet by mouth 2 (two) times a day.     levothyroxine 100 MCG tablet  Commonly known as: SYNTHROID  Take 1 tablet (100 mcg total) by mouth once daily.     memantine 5 MG Tab  Commonly known as: NAMENDA  Take 5 mg by mouth 2 (two) times daily.     miconazole 2 % cream  Commonly known as: MICOTIN  Apply topically 2 (two) times daily.     mirtazapine 30 MG tablet  Commonly known as: REMERON  Take 1 tablet (30 mg total) by mouth every evening.     multivitamin tablet  Commonly known as: THERAGRAN  Take 1 tablet by mouth once daily.     OLANZapine 7.5 MG tablet  Commonly known as: ZyPREXA  Take 1 tablet (7.5 mg total) by mouth nightly.     PRO-STAT AWC  gram-kcal/30 mL Liqd  Generic drug: amino acids-protein hydrolys  Take 45 mLs by mouth 2 (two) times a day.     sertraline 25 MG tablet  Commonly known as: ZOLOFT  Take 1 tablet (25 mg total) by mouth once daily.              Indwelling Lines/Drains at time of discharge:   Lines/Drains/Airways       Drain  Duration                  Urethral Catheter 11/28/23 1700 Straight-tip 16 Fr. 2 days                    Time spent on the discharge of patient: 32 minutes         Obinna López MD  Department of Hospital Medicine  [facility]

## 2023-11-30 NOTE — PLAN OF CARE
Problem: Infection  Goal: Absence of Infection Signs and Symptoms  Outcome: Ongoing, Not Progressing     Problem: Adult Inpatient Plan of Care  Goal: Plan of Care Review  Outcome: Ongoing, Not Progressing  Goal: Patient-Specific Goal (Individualized)  Outcome: Ongoing, Not Progressing  Goal: Absence of Hospital-Acquired Illness or Injury  Outcome: Ongoing, Not Progressing  Goal: Optimal Comfort and Wellbeing  Outcome: Ongoing, Not Progressing  Goal: Readiness for Transition of Care  Outcome: Ongoing, Not Progressing     Problem: Coping Ineffective  Goal: Effective Coping  Outcome: Ongoing, Not Progressing     Problem: Skin Injury Risk Increased  Goal: Skin Health and Integrity  Outcome: Ongoing, Not Progressing     Problem: Fall Injury Risk  Goal: Absence of Fall and Fall-Related Injury  Outcome: Ongoing, Not Progressing     Problem: Impaired Wound Healing  Goal: Optimal Wound Healing  Outcome: Ongoing, Not Progressing

## 2023-11-30 NOTE — PT/OT/SLP EVAL
Occupational Therapy   Evaluation and Discharge Note    Name: Adriano Borjas  MRN: [id number]  Admitting Diagnosis: Acute osteomyelitis of right calcaneus  Recent Surgery: * No surgery found *      Recommendations:     Discharge Recommendations: No Therapy Indicated  Discharge Equipment Recommendations: none  Barriers to discharge:  None    Assessment:     Adriano Borjas is a 88 y.o. male with a medical diagnosis of Acute osteomyelitis of right calcaneus. At this time, patient is functioning at their prior level of function and does not require further acute OT services.     Plan:     During this hospitalization, patient does not require further acute OT services.  Please re-consult if situation changes.    Plan of Care Reviewed with: patient, family    Subjective     Chief Complaint: none stated  Patient/Family Comments/goals: none stated    Occupational Profile:  Living Environment: Pt is a resident at a NH  Previous level of function: Total A with all ADLs and mobility; sridevi lift to w/c  Roles and Routines: father  Equipment Used at home: wheelchair, hospital bed  Assistance upon Discharge: yes, from facility    Pain/Comfort:  Pain Rating 1: 0/10    Patients cultural, spiritual, Gnosticist conflicts given the current situation:      Objective:     Communicated with: nursing prior to session.  Patient found HOB elevated with bed alarm, telemetry upon OT entry to room.    General Precautions: Standard, fall  Orthopedic Precautions: N/A  Braces: N/A  Respiratory Status: Room air     Occupational Performance:    Activities of Daily Living:  Feeding:  total assistance    Grooming: total assistance bed level to wash face  Toileting: total assistance bed level    Cognitive/Visual Perceptual:  Cognitive/Psychosocial Skills:  -       Follows Commands/attention:Follows one-step commands and inconsistently   -       Communication: difficult to understand at times  -       Memory: deficits  -       Safety awareness/insight  to disability: impaired   -       Mood/Affect/Coping skills/emotional control: Appropriate to situation, Cooperative, and Pleasant    Physical Exam:  Pt with contractures to BUE's (all joints); L worse than R  BLE's contracted as well    AMPAC 6 Click ADL:  AMPAC Total Score: 6    Treatment & Education:  Pt educated on role of OT/POC    Patient left HOB elevated with all lines intact, call button in reach, and family present    GOALS:   Multidisciplinary Problems       Occupational Therapy Goals       Not on file                    History:     Past Medical History:   Diagnosis Date    A-fib     Dementia     Elevated cholesterol     Elevated PSA 6/10/2014    Essential hypertension     History of cerebral hemorrhage 9/5/2019    Kidney stone     passed on his own    Memory loss     dememtia per family    Osteoarthritis     Paroxysmal atrial fibrillation 12/12/2018    Stroke     2012 TIA     Thyroid disease          Past Surgical History:   Procedure Laterality Date    CYSTOSCOPY      EYE SURGERY      MAGNETIC RESONANCE IMAGING N/A 8/23/2019    Procedure: MRI (Magnetic Resonance Imagine) needs anesthesia;  Surgeon: Panchito Montoya MD;  Location: [facility];  Service: Anesthesiology;  Laterality: N/A;       Time Tracking:     OT Date of Treatment: 11/30/23  OT Start Time: 1015  OT Stop Time: 1025  OT Total Time (min): 10 min    Billable Minutes:Evaluation 10    11/30/2023

## 2023-11-30 NOTE — PLAN OF CARE
11/30/23 0730   Patient Assessment/Suction   Level of Consciousness (AVPU) responds to voice   Respiratory Effort Normal;Unlabored   Expansion/Accessory Muscles/Retractions expansion symmetric   All Lung Fields Breath Sounds diminished   Rhythm/Pattern, Respiratory pattern regular   PRE-TX-O2   Device (Oxygen Therapy) room air   SpO2 (!) 94 %   Pulse Oximetry Type Intermittent   $ Pulse Oximetry - Single Charge Pulse Oximetry - Single   Education   $ Education 15 min  (SpO2)

## 2023-12-03 LAB — BACTERIA BLD CULT: NORMAL

## 2023-12-20 NOTE — PHYSICIAN QUERY
PT Name: Adriano Borjas  MR #: [id number]    DOCUMENTATION CLARIFICATION     CDS/: Lexis Izquierdo               Contact information:  This form is a permanent document in the medical record.    Query Date: December 20, 2023      By submitting this query, we are merely seeking further clarification of documentation.  Please utilize your independent clinical judgment when addressing the question(s) below.    The Medical Record reflects the following:    Clinical Information Location in Medical Record   In the ED patient found to have acute sepsis.  I ordered labs and personally reviewed them. Labs significant for lactate of 2.4.     Given abx and sepsis bolus IVF in ER.     Blood Culture, Routine 11/29/2023 22:05 KS3   Blood Culture, Routine    COAGULASE-NEGATIVE STAPHYLOCOCCUS SPECIES  Organism is a probable contaminant             1. Sepsis with Bilateral calcaneal osteomyelitis and right hip decubitus and recently treated osteomyelitis and anaerobic bacteremia with chronic bilateral heel wounds with surgical/bone cultures growing Enterococcus faecalis, anaerobes and E coli     WBC, Creatinine, Platelets, & PF Ration were all WNL    No Acute Organ Failure    Bilirubin - 1.1 on 11/29 ED Note      H&P                  Consults by Shawna Lewis NP 11/28/2023       Please clarify/confirm the Consultants diagnosis of Severe Sepsis:     [  ] Diagnosis ruled in   [  ] Diagnosis ruled in, but it resolved prior to my assessment of the patient   [  ] Diagnosis ruled out   [  x] Other diagnosis (please specify): Patient had an infection with a lactic acid greater than 2 but at least based on what i can chart review, i do not find other evidence to support a diagnosis of sepsis.      Present on admission (POA) status:   [   ] Yes (Y)                          [  ] Clinically Undetermined (W)  [   ] No (N)                            [   ] Documentation insufficient to determine if condition is POA (U)

## 2025-01-30 NOTE — ASSESSMENT & PLAN NOTE
Concern for osteomyelitis.  -Wound Care  -Podiatry  -Follow up MRI and CTA  -Continue cefepime and Flagyl   Writer contacted pharmacy for second time regarding this medication.  Please see telephone encounter from 1/28/25.    PA already completed.  Covered by insurance,- [place] Medicaid.   Pharmacy to bill for covered .     Pharmacist stated to ignore fax.  It has been taken care of.

## 2025-03-18 NOTE — TELEPHONE ENCOUNTER
Called and spoke with Ms. Matos, I advised her that Mr. Oh has an appointment scheduled for 9/27/18 @ 2:45p. I advised her I sent a message to find out if his results would be back in time for his appointment, and I would call and let her know what they say. She verbalized understanding. No further issues noted.    Rest & Hydration  Get plenty of rest to allow your body to recover.  Drink small, frequent sips of clear fluids (water, electrolyte drinks, broths) to prevent dehydration.  Avoid caffeine, alcohol, and dairy until symptoms resolve.  Medications  Zofran (Ondansetron): Take as prescribed to help control nausea and vomiting.  Avoid anti-diarrheal medications like loperamide (Imodium) unless directed by a provider, as they may prolong the illness.  Acetaminophen (Tylenol) can be taken for fever or body aches if needed.  Diet Progression  Start with clear liquids (broth, diluted juice, ice chips).  When tolerated, advance to the BRAT diet (Bananas, Rice, Applesauce, Toast).  Gradually reintroduce bland, easy-to-digest foods (crackers, boiled potatoes, plain chicken).  Avoid greasy, spicy, or sugary foods until fully recovered.  Preventing Spread  Norovirus is highly contagious--wash hands frequently with soap and water (hand  is less effective).  Disinfect surfaces, especially toilets, doorknobs, and countertops.  Avoid preparing food for others until at least 48 hours after symptoms stop.  When to Seek Medical Attention  Signs of dehydration: Dizziness, dry mouth, dark urine, or not urinating for 8+ hours.  Severe vomiting or diarrhea lasting more than 3 days.  Blood in vomit or stool.  High fever (102°F / 38.9°C or higher) that doesn’t improve.  Follow-Up  Most cases resolve in 24-72 hours, but if symptoms persist or worsen, contact your provider.  If you have chronic conditions (diabetes, kidney disease, or weakened immunity), monitor for complications and seek medical attention sooner if needed.